# Patient Record
Sex: MALE | Race: WHITE | NOT HISPANIC OR LATINO | Employment: PART TIME | URBAN - METROPOLITAN AREA
[De-identification: names, ages, dates, MRNs, and addresses within clinical notes are randomized per-mention and may not be internally consistent; named-entity substitution may affect disease eponyms.]

---

## 2017-07-27 DIAGNOSIS — I65.29 OCCLUSION AND STENOSIS OF UNSPECIFIED CAROTID ARTERY: ICD-10-CM

## 2018-07-27 ENCOUNTER — OFFICE VISIT (OUTPATIENT)
Dept: VASCULAR SURGERY | Facility: CLINIC | Age: 53
End: 2018-07-27
Payer: COMMERCIAL

## 2018-07-27 VITALS
WEIGHT: 190 LBS | HEIGHT: 69 IN | DIASTOLIC BLOOD PRESSURE: 70 MMHG | BODY MASS INDEX: 28.14 KG/M2 | TEMPERATURE: 98.5 F | SYSTOLIC BLOOD PRESSURE: 120 MMHG

## 2018-07-27 DIAGNOSIS — I83.893 VARICOSE VEINS OF BOTH LEGS WITH EDEMA: Primary | ICD-10-CM

## 2018-07-27 PROCEDURE — 99203 OFFICE O/P NEW LOW 30 MIN: CPT | Performed by: PHYSICIAN ASSISTANT

## 2018-07-27 RX ORDER — MULTIVIT WITH MINERALS/LUTEIN
1 TABLET ORAL DAILY
COMMUNITY

## 2018-07-27 RX ORDER — IBUPROFEN 200 MG
TABLET ORAL
COMMUNITY
End: 2020-11-25 | Stop reason: CLARIF

## 2018-07-27 RX ORDER — GEMFIBROZIL 600 MG/1
600 TABLET, FILM COATED ORAL 2 TIMES DAILY
COMMUNITY
Start: 2018-07-24 | End: 2020-12-07 | Stop reason: ALTCHOICE

## 2018-07-27 RX ORDER — RIBOFLAVIN (VITAMIN B2) 100 MG
TABLET ORAL
COMMUNITY
End: 2020-11-28 | Stop reason: HOSPADM

## 2018-07-27 RX ORDER — CLINDAMYCIN HYDROCHLORIDE 300 MG/1
CAPSULE ORAL
COMMUNITY
Start: 2018-07-06 | End: 2020-11-28 | Stop reason: HOSPADM

## 2018-07-27 RX ORDER — FOLIC ACID 1 MG/1
TABLET ORAL
Status: ON HOLD | COMMUNITY
Start: 2018-07-22 | End: 2020-11-03 | Stop reason: ALTCHOICE

## 2018-07-27 NOTE — LETTER
July 27, 2018     Dmitriy Molina MD  [id number]    Patient: Geri Cespedes   YOB: 1965   Date of Visit: 7/27/2018       Dear Dr Graham Bell Recipients: Thank you for referring Fabby Herrera to me for evaluation  Below are the relevant portions of my assessment and plan of care  If you have questions, please do not hesitate to call me  I look forward to following Shilo along with you  Sincerely,        Cheryle Gaw, PA-C        CC: Dorian Waters MD     Varicose veins of both legs with edema      Bilateral lower extremity varicose veins with edema  Venous insufficiency  Venous stasis changes without sign of infection    Shilo L [place] 47 M carotid artery disease (< 50% 2015), Raynauds, RA, Lupus, OA S/P bilateral THR and R TKR  Kayce Durham comes in for re-evaluation of varicose veins  He reports that his legs have been swelling much worse than the usual and he needs new compression since the current stockings are 11years old  He recently had an area redness and warmth to the back of the left leg  He was placed on clindamycin which improvement  There is a palpable area but no sign of infection  LEV performed elsewhere of the left leg showed no DVT or SVT  Despite the discoloration of his legs and the very prominent venous varicosities, the veins are otherwise not tender  He denies any daily symptoms such as leg pain, achiness or fatigue  Remarkably, outside of recent increase of swelling since last visit, he reports that the appearance or symptoms of legs have not progressed since he was last seen  No wounds  He has had no bleeding or blood clots  Exam:  2-3+ B LE edema  Marked venous stasis changes  B large venous varicosities  There is a L lateral palpable raised area which is slightly tender - vein? Georgette Ormond No heat or redness  The veins are otherwise non-tender  Feet are warm  Good pulses in feet        Lower extremity venous duplex 7/6/2018 report from outside facility  R - CFV negative  L  No thrombus L CFV, GSV, DF, F, pop and calf vein  No DVT or SVT      Recommendations: We had a detailed discussion about his history and the pathophysiology of varicose veins and venous insufficiency  We reviewed his recent lower extremity Venous duplex report from [facility] as well as the reflux study that he had 5 years ago  We further discussed a treatment would be largely be symptom driven  Currently, he has no major symptoms though there is concern for possible recent cellulitis  No evidence or history of phlebitis or blood clots  He has no wounds  The mainstay conservative therapy would be compression  We discussed the role of good, graded compression stockings  He is given a script to be formally fitted for compression  I would like to see him come back in 3 months see how he is doing from a symptom standpoint  He was educated on any concerning symptoms for which he should return sooner such as - bleeding or signs of infection       - Order for prescription graded compression stockings of 20-30 mm Hg  - Wear stocking EVERY day to help control swelling in legs and remove at night  - Elevate legs while at rest  - Continue healthy weight and exercise regularly   - Patient education for venous insufficiency    - We discussed reasons to return sooner, otherwise, follow up in 3 months for re-evaluation

## 2018-07-27 NOTE — PROGRESS NOTES
Assessment/Plan:    Varicose veins of both legs with edema      Bilateral lower extremity varicose veins with edema  Venous insufficiency  Venous stasis changes without sign of infection    [place] [place] [facility] [place] 47 [place] carotid artery disease (< 50% 2015), Raynauds, RA, Lupus, OA S/P bilateral THR and R TKR  Memo Rothman comes in for re-evaluation of varicose veins  He reports that his legs have been swelling much worse than the usual and he needs new compression since the current stockings are 11years old  He recently had an area redness and warmth to the back of the left leg  He was placed on clindamycin which improvement  There is a palpable area but no sign of infection  LEV performed elsewhere of the left leg showed no DVT or SVT  Despite the discoloration of his legs and the very prominent venous varicosities, the veins are otherwise not tender  He denies any daily symptoms such as leg pain, achiness or fatigue  Remarkably, outside of recent increase of swelling since last visit, he reports that the appearance or symptoms of legs have not progressed since he was last seen  No wounds  He has had no bleeding or blood clots  Exam:  2-3+ B LE edema  Marked venous stasis changes  B large venous varicosities  There is a L lateral palpable raised area which is slightly tender - vein? Echavarria Daksha No heat or redness  The veins are otherwise non-tender  Feet are warm  Good pulses in feet  Lower extremity venous duplex 7/6/2018 report from outside facility  R - CFV negative  L - No thrombus L CFV, GSV, DF, F, pop and calf vein  No DVT or SVT      Recommendations: We had a detailed discussion about his history and the pathophysiology of varicose veins and venous insufficiency  We reviewed his recent lower extremity Venous duplex report from [facility] as well as the reflux study that he had 5 years ago  We further discussed a treatment would be largely be symptom driven   Currently, he has no major symptoms though there is concern for possible recent cellulitis  No evidence or history of phlebitis or blood clots  He has no wounds  The mainstay conservative therapy would be compression  We discussed the role of good, graded compression stockings  He is given a script to be formally fitted for compression  I would like to see him come back in 3 months see how he is doing from a symptom standpoint  He was educated on any concerning symptoms for which he should return sooner such as - bleeding or signs of infection       - Order for prescription graded compression stockings of 20-30 mm Hg  - Wear stocking EVERY day to help control swelling in legs and remove at night  - Elevate legs while at rest  - Continue healthy weight and exercise regularly   - Patient education for venous insufficiency  - We discussed reasons to return sooner, otherwise, follow up in 3 months for re-evaluation     Patient ID: Delta Betancourt is a 47 y o  male  Chief complaint: Pt is new to our office here to review LEV 7/6 done at [facility]  Pt is c/o swelling to bilateral legs  Pt states this has been ongoing since 7/2018  Pt is RX wearing compression stockings daily  Pt states he is elevating when he gets a chance  Pt has some discoloration  Pt denies open wounds or sores  HPI    Shilo L [place] 47 M carotid artery disease (< 50% 2015), Raynauds, RA, Lupus, OA S/P bilateral THR and R TKR   He has been seen by Dr Katiuska Wolfe in the past for significant bilateral lower extremity varicose veins  He tells me that he saw Dr John marcano 3 or 4 years ago but I do not see a note from him so this likely longer than this  Devon Artist comes in for re-evaluation of varicose veins  He reports that his legs have been swelling much worse than the usual and he needs new compression since the current stockings are 11years old  Also, he reports he had an area of redness and warmth to the back of the left leg and was placed on clindamycin antibiotics    That area is no longer red or warm, but there is a palpable area  A lower extremity venous duplex performed elsewhere of the left leg showed no DVT or superficial thrombophlebitis  Despite the discoloration of his legs and the very prominent venous varicosities, the veins are otherwise not tender  He denies any daily symptoms such as leg pain, achiness or fatigue  Remarkably, outside of recent increase of swelling since last visit, he reports that the appearance or symptoms of legs have not progressed since he was last seen  No wounds  He has had no bleeding or blood clots  The following portions of the patient's history were reviewed and updated as appropriate: allergies, current medications, past family history, past medical history, past social history, past surgical history and problem list     Stuart Garcia is active  He works as a   This summer, he is working around the house the summer  He has no chest limiting symptoms  No history of heart attack/stroke/TIA  No history of bleeding  Review of Systems   Constitutional: Negative  HENT: Negative  Eyes: Negative  Respiratory: Negative  Cardiovascular: Negative  Gastrointestinal: Negative  Endocrine: Positive for cold intolerance  Genitourinary: Negative  Musculoskeletal: Positive for arthralgias and gait problem  Skin: Negative  Allergic/Immunologic: Negative  Hematological: Negative  Psychiatric/Behavioral: Negative  Objective:    2-3+ B LE edema  Marked venous stasis changes  B large venous varicosities  There is a L lateral palpable raised area which is slightly tender - vein? Jara Spruce No heat or redness  The veins are otherwise non-tender  Feet are warm  Good pulses in feet        /70 (BP Location: Right arm, Patient Position: Sitting, Cuff Size: Adult)   Temp 98 5 °F (36 9 °C) (Tympanic)   Ht 5' 9" (1 753 m)   Wt 86 2 kg (190 lb)   BMI 28 06 kg/m²                  R anterior leg            L lateral       Physical Exam   Constitutional: He is oriented to person, place, and time  He appears well-developed and well-nourished  He is cooperative  HENT:   Head: Normocephalic and atraumatic  Eyes: EOM are normal  Pupils are equal, round, and reactive to light  Neck: Trachea normal  Neck supple  No JVD present  No thyromegaly present  Cardiovascular: Normal rate, regular rhythm, S1 normal, S2 normal and normal heart sounds  Exam reveals no gallop and no friction rub  No murmur heard  Pulses:       Carotid pulses are 2+ on the right side, and 2+ on the left side  Radial pulses are 2+ on the right side, and 2+ on the left side  Dorsalis pedis pulses are 2+ on the right side, and 2+ on the left side  Posterior tibial pulses are 2+ on the right side  Pulmonary/Chest: Effort normal and breath sounds normal  No accessory muscle usage  No respiratory distress  He has no wheezes  He has no rales  Abdominal: Soft  Bowel sounds are normal  He exhibits no distension  There is no hepatosplenomegaly  There is no tenderness  Musculoskeletal: Normal range of motion  He exhibits edema (2+ B LE edema)  He exhibits no deformity  Neurological: He is alert and oriented to person, place, and time  Grossly normal    Skin: Skin is warm and dry  No lesion noted  No cyanosis or erythema  Nails show no clubbing  Psychiatric: He has a normal mood and affect  Nursing note and vitals reviewed  Vitals:    07/27/18 1051   BP: 120/70   BP Location: Right arm   Patient Position: Sitting   Cuff Size: Adult   Temp: 98 5 °F (36 9 °C)   TempSrc: Tympanic   Weight: 86 2 kg (190 lb)   Height: 5' 9" (1 753 m)       Patient Active Problem List   Diagnosis    Varicose veins of both legs with edema       Past Surgical History:   Procedure Laterality Date    HERNIA REPAIR  199?     REPLACEMENT TOTAL KNEE Bilateral     right 06/12 left 07/2016    REVISION TOTAL HIP ARTHROPLASTY Bilateral     right 05/2004 left 02/2006  TOTAL HIP ARTHROPLASTY Bilateral 1988/1989    Right 1988/ left 1989       Family History   Problem Relation Age of Onset    Varicose Veins Father        Social History     Social History    Marital status: /Civil Union     Spouse name: N/A    Number of children: N/A    Years of education: N/A     Occupational History    Not on file       Social History Main Topics    Smoking status: Former Smoker    Smokeless tobacco: Former User     Types: Chew    Alcohol use Not on file    Drug use: Unknown    Sexual activity: Not on file     Other Topics Concern    Not on file     Social History Narrative    No narrative on file       No Known Allergies      Current Outpatient Prescriptions:     Ascorbic Acid (VITAMIN C) 100 MG tablet, Take by mouth, Disp: , Rfl:     Calcium Carbonate (CALTRATE 600) 1500 (600 Ca) MG TABS, Take by mouth, Disp: , Rfl:     cholecalciferol (VITAMIN D3) 1,000 units tablet, Take by mouth, Disp: , Rfl:     clindamycin (CLEOCIN) 300 MG capsule, 2 po tid x 24 hrs then decrease to 1 po tid for additional 6 days, Disp: , Rfl:     folic acid (FOLVITE) 1 mg tablet, , Disp: , Rfl:     gemfibrozil (LOPID) 600 mg tablet, , Disp: , Rfl:     ibuprofen (ADVIL) 200 mg tablet, Take by mouth, Disp: , Rfl:     methotrexate 2 5 mg tablet, , Disp: , Rfl:     Multiple Vitamins-Minerals (CENTRUM SILVER) tablet, Take by mouth, Disp: , Rfl:     Omega-3 Fatty Acids (FISH OIL PO), Take 2 g by mouth, Disp: , Rfl:     Elastic Bandages & Supports (MEDICAL COMPRESSION STOCKINGS) MISC, by Does not apply route daily Knee High 20-30mmHg, Disp: 2 each, Rfl: 0

## 2018-07-27 NOTE — PATIENT INSTRUCTIONS
Bilateral lower extremity varicose veins with edema  Venous insufficiency  Venous stasis changes without sign of infection    Shilo FRAZIER Schoen[place] 47 M who comes in for re-evaluation of varicose veins  He reports that his legs have been swelling much worse than the usual and he needs new compression since the current stockings are 11years old  He recently had an area redness and warmth to the back of the left leg  He was placed on clindamycin which improvement  Despite the discoloration of his legs and the very prominent venous varicosities, the veins are otherwise not tender  He denies any daily symptoms such as leg pain, achiness or fatigue  Aside from recent increase of swelling since last visit, he reports that the appearance or symptoms of legs have not progressed since he was last seen  No wounds  He has had no bleeding or blood clots  Recommendations: We had a detailed discussion about his history and the pathophysiology of varicose veins and venous insufficiency  We reviewed his recent lower extremity Venous duplex report from [facility] as well as the reflux study that he had 5 years ago  We further discussed a treatment would be largely be symptom driven  Currently, he has no major symptoms though there is concern for possible recent cellulitis  No evidence or history of phlebitis or blood clots  He has no wounds  The mainstay conservative therapy would be compression  We discussed the role of good, graded compression stockings  He is given a script to be formally fitted for compression  I would like to see him come back in 3 months see how he is doing from a symptom standpoint    He was educated on any concerning symptoms for which he should return sooner such as - bleeding or signs of infection       - Order for prescription graded compression stockings of 20-30 mm Hg  - Wear stocking EVERY day to help control swelling in legs and remove at night  - Elevate legs while at rest  - Continue healthy weight and exercise regularly   - Patient education for venous insufficiency  - We discussed reasons to return sooner, otherwise, follow up in 3 months for re-evaluation            Varicose Veins   WHAT YOU NEED TO KNOW:   What are varicose veins? Varicose veins are veins that become large, twisted, and swollen  They are common on the back of the calves, knees, and thighs  Varicose veins are caused by valves in your veins that do not work properly  This causes blood to collect and increase pressure in the veins of your legs  The increased pressure causes your veins to stretch, get larger, swell, and twist        What increases my risk for varicose veins? · Pregnancy    · A family history of varicose veins    · Being overweight or obese    · Age 48 years or older    · Sitting or standing for long periods of time    · Wearing tight clothing  What are the signs and symptoms of varicose veins? Your symptoms may be worse after you stand or sit for long periods of time  You may have any of the following:  · Blue, purple, or bulging veins in your legs     · Pain, swelling, or muscle cramps in your legs    · Feeling of fatigue or heaviness in your legs  How are varicose veins diagnosed? Your healthcare provider will examine your legs and ask about your medical history  You may need tests, such as a Doppler ultrasound or duplex scan  These tests show your veins and valves, and how your blood is flowing through them  These tests may also show if there is a blockage or blood clot  How are varicose veins treated? The goal of treatment is to decrease symptoms, improve appearance, and prevent further problems  Treatment will depend on which veins are affected and how severe your condition is  You may need procedures to treat or remove your varicose veins  For example, your healthcare provider may inject a solution or use a laser to close the varicose veins  Surgery to remove long veins may also be done   Ask your healthcare provider for more information about procedures used to treat varicose veins  What can I do to manage my symptoms? · Do not sit or stand for long periods of time  This can cause the blood to collect in your legs and make your symptoms worse  Bend or rotate your ankles several times every hour  Walk around for a few minutes every hour to get blood moving in your legs  · Do not cross your legs when you sit  This decreases blood flow to your feet and can make your symptoms worse  · Do not wear tight clothing or shoes  Do not wear high-heeled shoes  Do not wear clothes that are tight around the waist or knees  · Maintain a healthy weight  Being overweight or obese can make your varicose veins worse  Ask your healthcare provider how much you should weigh  Ask him or her to help you create a weight loss plan if you are overweight  · Wear pressure stockings as directed  The stockings are tight and put pressure on your legs  They improve blood flow and help prevent clots  · Elevate your legs  Keep them above the level of your heart for 15 to 30 minutes several times a day  You can also prop the end of your bed up slightly to elevate your legs while you sleep  This will help blood to flow back to your heart  · Get regular exercise  Talk to your healthcare provider about the best exercise plan for you  Exercise can improve blood flow to your legs and feet  When should I seek immediate care? · You have a wound that does not heal or is infected  · You have an injury that has broken your skin and caused your varicose veins to bleed  · Your leg is swollen and hard  · You notice that your legs or feet are turning blue or black  · Your leg feels warm, tender, and painful  It may look swollen and red  When should I contact my healthcare provider? · You have pain in your leg that does not go away or gets worse  · You notice sudden large bruising on your legs  · You have a rash on your leg  · Your symptoms keep you from doing your daily activities  · You have questions or concerns about your condition or care  CARE AGREEMENT:   You have the right to help plan your care  Learn about your health condition and how it may be treated  Discuss treatment options with your caregivers to decide what care you want to receive  You always have the right to refuse treatment  The above information is an  only  It is not intended as medical advice for individual conditions or treatments  Talk to your doctor, nurse or pharmacist before following any medical regimen to see if it is safe and effective for you  © 2017 [employer] Information is for End User's use only and may not be sold, redistributed or otherwise used for commercial purposes  All illustrations and images included in CareNotes® are the copyrighted property of A [employer] M [employer]  or Elijah Gleasno

## 2018-07-27 NOTE — ASSESSMENT & PLAN NOTE
Bilateral lower extremity varicose veins with edema  Venous insufficiency  Venous stasis changes without sign of infection    Shilo KARIN Schoenfield 47 M carotid artery disease (< 50% 2015), Raynauds, RA, Lupus, OA S/P bilateral THR and R TKR  Emmanuel Howard comes in for re-evaluation of varicose veins  He reports that his legs have been swelling much worse than the usual and he needs new compression since the current stockings are 11years old  He recently had an area redness and warmth to the back of the left leg  He was placed on clindamycin which improvement  There is a palpable area but no sign of infection  LEV performed elsewhere of the left leg showed no DVT or SVT  Despite the discoloration of his legs and the very prominent venous varicosities, the veins are otherwise not tender  He denies any daily symptoms such as leg pain, achiness or fatigue  Remarkably, outside of recent increase of swelling since last visit, he reports that the appearance or symptoms of legs have not progressed since he was last seen  No wounds  He has had no bleeding or blood clots  Exam:  2-3+ B LE edema  Marked venous stasis changes  B large venous varicosities  There is a L lateral palpable raised area which is slightly tender - vein? Brissa Falcon No heat or redness  The veins are otherwise non-tender  Feet are warm  Good pulses in feet  Lower extremity venous duplex 7/6/2018 report from outside facility  R - CFV negative  L - No thrombus L CFV, GSV, DF, F, pop and calf vein  No DVT or SVT      Recommendations: We had a detailed discussion about his history and the pathophysiology of varicose veins and venous insufficiency  We reviewed his recent lower extremity Venous duplex report from [facility] as well as the reflux study that he had 5 years ago  We further discussed a treatment would be largely be symptom driven  Currently, he has no major symptoms though there is concern for possible recent cellulitis   No evidence or history of phlebitis or blood clots  He has no wounds  The mainstay conservative therapy would be compression  We discussed the role of good, graded compression stockings  He is given a script to be formally fitted for compression  I would like to see him come back in 3 months see how he is doing from a symptom standpoint  He was educated on any concerning symptoms for which he should return sooner such as - bleeding or signs of infection       - Order for prescription graded compression stockings of 20-30 mm Hg  - Wear stocking EVERY day to help control swelling in legs and remove at night  - Elevate legs while at rest  - Continue healthy weight and exercise regularly   - Patient education for venous insufficiency    - We discussed reasons to return sooner, otherwise, follow up in 3 months for re-evaluation

## 2020-05-23 ENCOUNTER — APPOINTMENT (EMERGENCY)
Dept: CT IMAGING | Facility: HOSPITAL | Age: 55
End: 2020-05-23
Payer: COMMERCIAL

## 2020-05-23 ENCOUNTER — APPOINTMENT (OUTPATIENT)
Dept: CT IMAGING | Facility: HOSPITAL | Age: 55
End: 2020-05-23
Payer: COMMERCIAL

## 2020-05-23 ENCOUNTER — HOSPITAL ENCOUNTER (OUTPATIENT)
Facility: HOSPITAL | Age: 55
Setting detail: OBSERVATION
Discharge: HOME | End: 2020-05-24
Attending: EMERGENCY MEDICINE | Admitting: INTERNAL MEDICINE
Payer: COMMERCIAL

## 2020-05-23 DIAGNOSIS — D61.818 PANCYTOPENIA (HCC): ICD-10-CM

## 2020-05-23 DIAGNOSIS — R63.4 UNINTENDED WEIGHT LOSS: ICD-10-CM

## 2020-05-23 DIAGNOSIS — R55 SYNCOPE: ICD-10-CM

## 2020-05-23 DIAGNOSIS — M32.8 OTHER FORMS OF SYSTEMIC LUPUS ERYTHEMATOSUS (HCC): ICD-10-CM

## 2020-05-23 DIAGNOSIS — R41.89 UNRESPONSIVE EPISODE: Primary | ICD-10-CM

## 2020-05-23 PROBLEM — M06.9 RHEUMATOID ARTHRITIS INVOLVING MULTIPLE SITES (HCC): Status: ACTIVE | Noted: 2020-05-23

## 2020-05-23 LAB
ALBUMIN SERPL BCP-MCNC: 2.8 G/DL (ref 3.5–5)
ALP SERPL-CCNC: 107 U/L (ref 46–116)
ALT SERPL W P-5'-P-CCNC: 43 U/L (ref 12–78)
AMPHETAMINES SERPL QL SCN: NEGATIVE
ANION GAP SERPL CALCULATED.3IONS-SCNC: 9 MMOL/L (ref 4–13)
AST SERPL W P-5'-P-CCNC: 51 U/L (ref 5–45)
BARBITURATES UR QL: NEGATIVE
BASOPHILS # BLD AUTO: 0.01 THOUSANDS/ΜL (ref 0–0.1)
BASOPHILS NFR BLD AUTO: 0 % (ref 0–1)
BENZODIAZ UR QL: NEGATIVE
BILIRUB SERPL-MCNC: 0.42 MG/DL (ref 0.2–1)
BUN SERPL-MCNC: 17 MG/DL (ref 5–25)
CALCIUM SERPL-MCNC: 8.4 MG/DL (ref 8.3–10.1)
CHLORIDE SERPL-SCNC: 107 MMOL/L (ref 100–108)
CO2 SERPL-SCNC: 24 MMOL/L (ref 21–32)
COCAINE UR QL: NEGATIVE
CREAT SERPL-MCNC: 0.78 MG/DL (ref 0.6–1.3)
D DIMER PPP FEU-MCNC: 1.09 UG/ML FEU
EOSINOPHIL # BLD AUTO: 0.01 THOUSAND/ΜL (ref 0–0.61)
EOSINOPHIL NFR BLD AUTO: 0 % (ref 0–6)
ERYTHROCYTE [DISTWIDTH] IN BLOOD BY AUTOMATED COUNT: 15 % (ref 11.6–15.1)
GFR SERPL CREATININE-BSD FRML MDRD: 102 ML/MIN/1.73SQ M
GLUCOSE SERPL-MCNC: 88 MG/DL (ref 65–140)
GLUCOSE SERPL-MCNC: 89 MG/DL (ref 65–140)
HCT VFR BLD AUTO: 37.8 % (ref 36.5–49.3)
HGB BLD-MCNC: 12.5 G/DL (ref 12–17)
IMM GRANULOCYTES # BLD AUTO: 0.04 THOUSAND/UL (ref 0–0.2)
IMM GRANULOCYTES NFR BLD AUTO: 2 % (ref 0–2)
LYMPHOCYTES # BLD AUTO: 0.53 THOUSANDS/ΜL (ref 0.6–4.47)
LYMPHOCYTES NFR BLD AUTO: 22 % (ref 14–44)
MCH RBC QN AUTO: 32.5 PG (ref 26.8–34.3)
MCHC RBC AUTO-ENTMCNC: 33.1 G/DL (ref 31.4–37.4)
MCV RBC AUTO: 98 FL (ref 82–98)
METHADONE UR QL: NEGATIVE
MONOCYTES # BLD AUTO: 0.22 THOUSAND/ΜL (ref 0.17–1.22)
MONOCYTES NFR BLD AUTO: 9 % (ref 4–12)
NEUTROPHILS # BLD AUTO: 1.6 THOUSANDS/ΜL (ref 1.85–7.62)
NEUTS SEG NFR BLD AUTO: 67 % (ref 43–75)
NRBC BLD AUTO-RTO: 0 /100 WBCS
OPIATES UR QL SCN: NEGATIVE
PCP UR QL: NEGATIVE
PLATELET # BLD AUTO: 128 THOUSANDS/UL (ref 149–390)
PMV BLD AUTO: 11.2 FL (ref 8.9–12.7)
POTASSIUM SERPL-SCNC: 3.8 MMOL/L (ref 3.5–5.3)
PROT SERPL-MCNC: 7.3 G/DL (ref 6.4–8.2)
RBC # BLD AUTO: 3.85 MILLION/UL (ref 3.88–5.62)
SARS-COV-2 RNA RESP QL NAA+PROBE: NEGATIVE
SODIUM SERPL-SCNC: 140 MMOL/L (ref 136–145)
THC UR QL: NEGATIVE
TROPONIN I SERPL-MCNC: <0.02 NG/ML
WBC # BLD AUTO: 2.41 THOUSAND/UL (ref 4.31–10.16)

## 2020-05-23 PROCEDURE — 36415 COLL VENOUS BLD VENIPUNCTURE: CPT | Performed by: EMERGENCY MEDICINE

## 2020-05-23 PROCEDURE — 85025 COMPLETE CBC W/AUTO DIFF WBC: CPT | Performed by: EMERGENCY MEDICINE

## 2020-05-23 PROCEDURE — 87635 SARS-COV-2 COVID-19 AMP PRB: CPT | Performed by: EMERGENCY MEDICINE

## 2020-05-23 PROCEDURE — 82948 REAGENT STRIP/BLOOD GLUCOSE: CPT

## 2020-05-23 PROCEDURE — 84484 ASSAY OF TROPONIN QUANT: CPT | Performed by: EMERGENCY MEDICINE

## 2020-05-23 PROCEDURE — 85379 FIBRIN DEGRADATION QUANT: CPT | Performed by: EMERGENCY MEDICINE

## 2020-05-23 PROCEDURE — 99285 EMERGENCY DEPT VISIT HI MDM: CPT | Performed by: EMERGENCY MEDICINE

## 2020-05-23 PROCEDURE — 80053 COMPREHEN METABOLIC PANEL: CPT | Performed by: EMERGENCY MEDICINE

## 2020-05-23 PROCEDURE — 80307 DRUG TEST PRSMV CHEM ANLYZR: CPT | Performed by: EMERGENCY MEDICINE

## 2020-05-23 PROCEDURE — 71275 CT ANGIOGRAPHY CHEST: CPT

## 2020-05-23 PROCEDURE — 99219 PR INITIAL OBSERVATION CARE/DAY 50 MINUTES: CPT | Performed by: INTERNAL MEDICINE

## 2020-05-23 PROCEDURE — 70450 CT HEAD/BRAIN W/O DYE: CPT

## 2020-05-23 PROCEDURE — 99285 EMERGENCY DEPT VISIT HI MDM: CPT

## 2020-05-23 PROCEDURE — 93005 ELECTROCARDIOGRAM TRACING: CPT

## 2020-05-23 PROCEDURE — 99204 OFFICE O/P NEW MOD 45 MIN: CPT | Performed by: PSYCHIATRY & NEUROLOGY

## 2020-05-23 RX ORDER — GEMFIBROZIL 600 MG/1
600 TABLET, FILM COATED ORAL
Status: DISCONTINUED | OUTPATIENT
Start: 2020-05-23 | End: 2020-05-24 | Stop reason: HOSPADM

## 2020-05-23 RX ORDER — ONDANSETRON 2 MG/ML
4 INJECTION INTRAMUSCULAR; INTRAVENOUS EVERY 6 HOURS PRN
Status: DISCONTINUED | OUTPATIENT
Start: 2020-05-23 | End: 2020-05-24 | Stop reason: HOSPADM

## 2020-05-23 RX ORDER — CALCIUM CARBONATE 200(500)MG
1000 TABLET,CHEWABLE ORAL DAILY PRN
Status: DISCONTINUED | OUTPATIENT
Start: 2020-05-23 | End: 2020-05-24 | Stop reason: HOSPADM

## 2020-05-23 RX ORDER — ACETAMINOPHEN 325 MG/1
650 TABLET ORAL EVERY 6 HOURS PRN
Status: DISCONTINUED | OUTPATIENT
Start: 2020-05-23 | End: 2020-05-24 | Stop reason: HOSPADM

## 2020-05-23 RX ORDER — DOCUSATE SODIUM 100 MG/1
100 CAPSULE, LIQUID FILLED ORAL 2 TIMES DAILY PRN
Status: DISCONTINUED | OUTPATIENT
Start: 2020-05-23 | End: 2020-05-24 | Stop reason: HOSPADM

## 2020-05-23 RX ADMIN — IOHEXOL 85 ML: 350 INJECTION, SOLUTION INTRAVENOUS at 13:55

## 2020-05-24 ENCOUNTER — APPOINTMENT (OUTPATIENT)
Dept: CT IMAGING | Facility: HOSPITAL | Age: 55
End: 2020-05-24
Payer: COMMERCIAL

## 2020-05-24 VITALS
HEART RATE: 80 BPM | TEMPERATURE: 98.3 F | OXYGEN SATURATION: 97 % | SYSTOLIC BLOOD PRESSURE: 127 MMHG | BODY MASS INDEX: 23.15 KG/M2 | RESPIRATION RATE: 18 BRPM | WEIGHT: 156.31 LBS | HEIGHT: 69 IN | DIASTOLIC BLOOD PRESSURE: 81 MMHG

## 2020-05-24 LAB
ANION GAP SERPL CALCULATED.3IONS-SCNC: 7 MMOL/L (ref 4–13)
BUN SERPL-MCNC: 15 MG/DL (ref 5–25)
CALCIUM SERPL-MCNC: 8 MG/DL (ref 8.3–10.1)
CHLORIDE SERPL-SCNC: 107 MMOL/L (ref 100–108)
CO2 SERPL-SCNC: 26 MMOL/L (ref 21–32)
CREAT SERPL-MCNC: 0.7 MG/DL (ref 0.6–1.3)
ERYTHROCYTE [DISTWIDTH] IN BLOOD BY AUTOMATED COUNT: 15 % (ref 11.6–15.1)
GFR SERPL CREATININE-BSD FRML MDRD: 107 ML/MIN/1.73SQ M
GLUCOSE P FAST SERPL-MCNC: 84 MG/DL (ref 65–99)
GLUCOSE SERPL-MCNC: 84 MG/DL (ref 65–140)
HCT VFR BLD AUTO: 36 % (ref 36.5–49.3)
HGB BLD-MCNC: 11.6 G/DL (ref 12–17)
MAGNESIUM SERPL-MCNC: 2 MG/DL (ref 1.6–2.6)
MCH RBC QN AUTO: 31.7 PG (ref 26.8–34.3)
MCHC RBC AUTO-ENTMCNC: 32.2 G/DL (ref 31.4–37.4)
MCV RBC AUTO: 98 FL (ref 82–98)
PLATELET # BLD AUTO: 114 THOUSANDS/UL (ref 149–390)
PMV BLD AUTO: 10.9 FL (ref 8.9–12.7)
POTASSIUM SERPL-SCNC: 3.3 MMOL/L (ref 3.5–5.3)
RBC # BLD AUTO: 3.66 MILLION/UL (ref 3.88–5.62)
SODIUM SERPL-SCNC: 140 MMOL/L (ref 136–145)
WBC # BLD AUTO: 1.94 THOUSAND/UL (ref 4.31–10.16)

## 2020-05-24 PROCEDURE — RECHECK: Performed by: INTERNAL MEDICINE

## 2020-05-24 PROCEDURE — 99217 PR OBSERVATION CARE DISCHARGE MANAGEMENT: CPT | Performed by: INTERNAL MEDICINE

## 2020-05-24 PROCEDURE — 70496 CT ANGIOGRAPHY HEAD: CPT

## 2020-05-24 PROCEDURE — 70498 CT ANGIOGRAPHY NECK: CPT

## 2020-05-24 PROCEDURE — 80048 BASIC METABOLIC PNL TOTAL CA: CPT | Performed by: INTERNAL MEDICINE

## 2020-05-24 PROCEDURE — 85027 COMPLETE CBC AUTOMATED: CPT | Performed by: INTERNAL MEDICINE

## 2020-05-24 PROCEDURE — 83735 ASSAY OF MAGNESIUM: CPT | Performed by: INTERNAL MEDICINE

## 2020-05-24 RX ORDER — POTASSIUM CHLORIDE 20 MEQ/1
40 TABLET, EXTENDED RELEASE ORAL ONCE
Status: COMPLETED | OUTPATIENT
Start: 2020-05-24 | End: 2020-05-24

## 2020-05-24 RX ADMIN — GEMFIBROZIL 600 MG: 600 TABLET ORAL at 17:31

## 2020-05-24 RX ADMIN — IOHEXOL 85 ML: 350 INJECTION, SOLUTION INTRAVENOUS at 12:25

## 2020-05-24 RX ADMIN — POTASSIUM CHLORIDE 40 MEQ: 1500 TABLET, EXTENDED RELEASE ORAL at 09:13

## 2020-05-24 RX ADMIN — GEMFIBROZIL 600 MG: 600 TABLET ORAL at 05:52

## 2020-05-25 LAB
ATRIAL RATE: 88 BPM
P AXIS: 67 DEGREES
PR INTERVAL: 166 MS
QRS AXIS: 18 DEGREES
QRSD INTERVAL: 82 MS
QT INTERVAL: 364 MS
QTC INTERVAL: 440 MS
T WAVE AXIS: 45 DEGREES
VENTRICULAR RATE: 88 BPM

## 2020-05-25 PROCEDURE — 93010 ELECTROCARDIOGRAM REPORT: CPT | Performed by: INTERNAL MEDICINE

## 2020-06-22 ENCOUNTER — HOSPITAL ENCOUNTER (OUTPATIENT)
Dept: NON INVASIVE DIAGNOSTICS | Facility: CLINIC | Age: 55
Discharge: HOME | End: 2020-06-22
Payer: COMMERCIAL

## 2020-06-22 DIAGNOSIS — R55 SYNCOPE: ICD-10-CM

## 2020-06-22 PROCEDURE — 93306 TTE W/DOPPLER COMPLETE: CPT | Performed by: INTERNAL MEDICINE

## 2020-06-22 PROCEDURE — 93306 TTE W/DOPPLER COMPLETE: CPT

## 2020-07-01 ENCOUNTER — CONSULT (OUTPATIENT)
Dept: NEPHROLOGY | Facility: CLINIC | Age: 55
End: 2020-07-01
Payer: COMMERCIAL

## 2020-07-01 VITALS
WEIGHT: 161 LBS | BODY MASS INDEX: 23.85 KG/M2 | DIASTOLIC BLOOD PRESSURE: 74 MMHG | TEMPERATURE: 98.7 F | HEIGHT: 69 IN | SYSTOLIC BLOOD PRESSURE: 112 MMHG

## 2020-07-01 DIAGNOSIS — N06.9 ISOLATED PROTEINURIA WITH MORPHOLOGIC LESION: ICD-10-CM

## 2020-07-01 DIAGNOSIS — N18.1 CHRONIC KIDNEY DISEASE (CKD), STAGE I: Primary | ICD-10-CM

## 2020-07-01 DIAGNOSIS — M32.14 LUPUS NEPHRITIS (HCC): ICD-10-CM

## 2020-07-01 PROBLEM — R80.0 ISOLATED PROTEINURIA: Status: ACTIVE | Noted: 2020-07-01

## 2020-07-01 PROCEDURE — 99204 OFFICE O/P NEW MOD 45 MIN: CPT | Performed by: INTERNAL MEDICINE

## 2020-07-01 RX ORDER — LISINOPRIL 2.5 MG/1
2.5 TABLET ORAL DAILY
COMMUNITY
End: 2020-11-28 | Stop reason: HOSPADM

## 2020-07-01 RX ORDER — MYCOPHENOLATE MOFETIL 500 MG/1
1000 TABLET ORAL DAILY
COMMUNITY
End: 2020-12-19 | Stop reason: ALTCHOICE

## 2020-07-01 NOTE — PATIENT INSTRUCTIONS
Your kidney function is stable but appears to be affected by the lupus based on the amount of protein in the urine  I recommend no change to your medications today but I will talk to Dr Kelton Trinh about further treatment and diagnostic studies  You may need a kidney biopsy in the near future  Please obtain information from your previous doctor  I would like to know the results of the kidney biopsy  Check blood tests in late July or early August 2020  Follow up in 2 months    Please avoid taking NSAIDs (Ibuprofen, Motrin, Aleve, Advil, Naproxen, Celebrex, etc )  Make sure you are eating healthy and have adequate exercise

## 2020-07-01 NOTE — PROGRESS NOTES
NEPHROLOGY OUTPATIENT CONSULTATION   Arlie Forget [place] 48 y o  male MRN: [id number]  Date: 07/01/20  Reason for consultation: Initial evaluation of renal disease, proteinuria  ASSESSMENT and PLAN:  1  Proteinuria:  · Shilo's UPC ratio has worsened to 1 38    · Based on the UA, this seems to be isolated proteinuria as he has no microscopic hematuria or renal dysfunction  · High in the differential diagnosis for this is lupus nephritis  He may have early class V disease  Given the absence of hematuria, class III or class IV disease are less likely but cannot be totally ruled out  · Fortunately, he has already been started on Mycophenolate which would be my drug of choice at this time  I would recommend slowly escalating the dose of this to a goal dose of 1500 mg BID  · I will repeat labs on him in about 3-4 weeks  We will check repeat complements, anti DNA Ab, ANDRE, CBC, CMP, UA, UPC ratio  · If his proteinuria persists or if he has any indication of renal dysfunction, we may need to proceed to a renal biopsy  · In terms of treatment, I totally agree with starting him on lisinopril in order to minimize the proteinuria  Ideally, I would like to increase lisinopril for its anti proteinuric effects  However, he already has occasional orthostatic symptoms and I hesitate to increase lisinopril at this time  2  Chronic kidney disease, stage I:  · He has renal disease on the basis of proteinuria likely related to SLE  · He had a renal biopsy in the past and I asked him to furnish me with the name of his nephrologist so we can obtain records  3  Systemic lupus erythematosus    Patient Instructions   Your kidney function is stable but appears to be affected by the lupus based on the amount of protein in the urine  I recommend no change to your medications today but I will talk to Dr Tere Juarez about further treatment and diagnostic studies  You may need a kidney biopsy in the near future     Please obtain information from your previous doctor  I would like to know the results of the kidney biopsy  Check blood tests in late July or early August 2020  Follow up in 2 months    Please avoid taking NSAIDs (Ibuprofen, Motrin, Aleve, Advil, Naproxen, Celebrex, etc )  Make sure you are eating healthy and have adequate exercise  HISTORY OF PRESENT ILLNESS:  Requesting Physician: Vangie Wallace MD    [address] [facility] 48 [facility] o  male who was referred for initial evaluation of renal disease in the setting of systemic lupus erythematosus  Gideon Hickey has a history of SLE which was diagnosed at the age of 25  He used to live in [place], [place] and moved to the [facility] in 2006  He reports having symptoms related to lupus starting at the age of 26 but was not formally diagnosed until 3 years later  He reports improvement in his symptoms once he started steroids back in the day  He does not recall having any renal involvement at the time of the diagnosis  However, roughly about 10 years after the SLE diagnosis was made, he recalls being found to have microscopic hematuria and was referred to a nephrologist in [place]  He recalls having a renal biopsy done but is unaware of the results  He moved to the [facility] in 2006 and has established care locally  He has been followed by Dr Winnie Jarrett of rheumatology  Since he moved to the area, he has not needed any Nephrology follow-up  Based on the records on Care Everywhere, I note that his serum creatinine has been around 0 6 to 0 8 since 2013  His most recent lab work from June 20, 2020 revealed a creatinine of 0 71  He is now being referred due to worsening of proteinuria  Based on my review of his urinary protein excretion, I note that his UPCR readings were between 241 and 383 mg/g based on urine testing done from 2014 to 2019  His UPCR on May 26, 2020 was noted to be 1 38 prompting renal consultation    His labs from June 20, 2020 revealed hypocomplementemia and a anti DS DNA level of 120  He was previously on methotrexate for RA but was recently changed to mycophenolate on May 28, 2020 by Dr Magdalena Ulloa  It seems that the last time he required a prolonged course of prednisone was about 10-15 years ago  PAST MEDICAL HISTORY:  1  SLE  2  HTN: BP has been high on and off over the past 2 years  He was not on medications until recently started on low dose Lisinopril by Dr Magdalena Ulloa for proteinuria  3  HLP  4  Lymphadenopathy: Biopsy was benign  5  BPH  6  Rheumatoid arthritis    No known history of DM, CAD, CHF, CVA, PAD, COPD, MATTHEW, asthma, thromboembolism, liver disease, GERD, malignancy  PAST SURGICAL HISTORY:  1  Bilateral hip replacements  2  Bilateral knee replacements  3  Revision of both hips  ALLERGIES:  No Known Allergies    SOCIAL HISTORY:  · Former smoker and consumed 1 PPD on and off for 5-6 years  Stopped 25 years ago  · Used to drink heavily and stopped about 31 years ago  · No history of IVDA  FAMILY HISTORY:  · Negative for ESRD       MEDICATIONS:    Current Outpatient Medications:     Ascorbic Acid (VITAMIN C) 100 MG tablet, Take by mouth, Disp: , Rfl:     Calcium Carbonate (CALTRATE 600) 1500 (600 Ca) MG TABS, Take by mouth, Disp: , Rfl:     cholecalciferol (VITAMIN D3) 1,000 units tablet, Take by mouth, Disp: , Rfl:     clindamycin (CLEOCIN) 300 MG capsule, 2 po tid x 24 hrs then decrease to 1 po tid for additional 6 days, Disp: , Rfl:     Elastic Bandages & Supports ([address]) MISC, by Does not apply route daily Knee High 20-30mmHg, Disp: 2 each, Rfl: 0    gemfibrozil (LOPID) 600 mg tablet, Take 600 mg by mouth 2 (two) times a day , Disp: , Rfl:     lisinopril (ZESTRIL) 2 5 mg tablet, Take 2 5 mg by mouth daily, Disp: , Rfl:     Multiple Vitamins-Minerals (CENTRUM SILVER) tablet, Take by mouth, Disp: , Rfl:     mycophenolate (CELLCEPT) 500 mg tablet, Take 500 mg by mouth daily, Disp: , Rfl:     Omega-3 Fatty Acids (FISH OIL PO), Take 2 g by mouth, Disp: , Rfl:     folic acid (FOLVITE) 1 mg tablet, , Disp: , Rfl:     ibuprofen (ADVIL) 200 mg tablet, Take by mouth, Disp: , Rfl:     methotrexate 2 5 mg tablet, , Disp: , Rfl:     REVIEW OF SYSTEMS:  Review of Systems   Constitutional: Positive for fatigue and unexpected weight change  Negative for appetite change, chills and fever  HENT: Positive for postnasal drip and rhinorrhea  Eyes: Negative for visual disturbance  Respiratory: Negative for cough and shortness of breath  Cardiovascular: Positive for leg swelling  Negative for chest pain  Gastrointestinal: Negative for abdominal pain, diarrhea, nausea and vomiting  Genitourinary: Positive for hematuria (On and off microscopic hematuria dating back to 1980s or 1990s  )  Negative for dysuria  Musculoskeletal: Positive for arthralgias  Negative for back pain  Skin: Negative for rash  Allergic/Immunologic: Positive for immunocompromised state  Neurological: Negative for dizziness and light-headedness  Hematological: Positive for adenopathy  Does not bruise/bleed easily  Psychiatric/Behavioral: Negative for dysphoric mood  The patient is not nervous/anxious  All the systems were reviewed and were negative except as documented on the HPI  PHYSICAL EXAMINATION:  /74   Temp 98 7 °F (37 1 °C)   Ht 5' 9" (1 753 m)   Wt 73 kg (161 lb)   BMI 23 78 kg/m²   Current Weight: Weight - Scale: 73 kg (161 lb) Body mass index is 23 78 kg/m²  General: conscious, coherent, cooperative, not in distress  Skin: warm, dry, good turgor  Eyes: pink conjunctivae, no scleral icterus  ENT: moist lips and mucous membranes  Neck: supple, no JVD  Chest/Lungs: clear breath sounds  CVS: distinct heart sounds, normal rate, regular rhythm  Abdomen: soft, non-tender, non-distended, normoactive bowel sounds  Extremities: no edema of extremities     : deferred   Neuro: awake, alert, oriented to time, place and person  Psych: appropriate affect  LABORATORY RESULTS:  Lab Results   Component Value Date    WBC 1 94 (LL) 05/24/2020    HGB 11 6 (L) 05/24/2020    HCT 36 0 (L) 05/24/2020    MCV 98 05/24/2020     (L) 05/24/2020     Lab Results   Component Value Date    SODIUM 140 05/24/2020    K 3 3 (L) 05/24/2020     05/24/2020    CO2 26 05/24/2020    AGAP 7 05/24/2020    BUN 15 05/24/2020    CREATININE 0 70 05/24/2020    GLUC 84 05/24/2020    GLUF 84 05/24/2020    CALCIUM 8 0 (L) 05/24/2020    AST 51 (H) 05/23/2020    ALT 43 05/23/2020    ALKPHOS 107 05/23/2020    TP 7 3 05/23/2020    TBILI 0 42 05/23/2020    EGFR 107 05/24/2020 June 20, 2020: ANDRE negative, SSA negative, SSB negative, anti DNA Ab 120, C3 68, C4-10, anti Smith >8 0, WBC 4 5, hemoglobin 12 0, platelets 834, glucose 76, BUN 14, creatinine 0 71, sodium 139, potassium 4 0, chloride 107, carbon dioxide 25, calcium 8 8, albumin 3 2, AST 24, ALT 15, cholesterol 132  May 26, 2020: UPCR 1 38, urinalysis bland except for 2+ protein  Office microscopy: No RBCs, no casts       IMAGING: none

## 2020-07-01 NOTE — LETTER
July 1, 2020     Manuel Johnson MD  [id number]    Patient: María Carrington   YOB: 1965   Date of Visit: 7/1/2020       Dear Dr Rochelle Meckel:    Thank you for referring Lissette Fernández to me for evaluation  Below are my notes for this consultation  If you have questions, please do not hesitate to call me  I look forward to following your patient along with you  Sincerely,        Raphael Sparks MD        CC: MD Raphael Rushing MD  7/1/2020  5:22 PM  Sign at close encounter  [address] [place] 48 y o  male MRN: [id number]  Date: 07/01/20  Reason for consultation: Initial evaluation of renal disease, proteinuria  ASSESSMENT and PLAN:  1  Proteinuria:  · Shilo's UPC ratio has worsened to 1 38    · Based on the UA, this seems to be isolated proteinuria as he has no microscopic hematuria or renal dysfunction  · High in the differential diagnosis for this is lupus nephritis  He may have early class V disease  Given the absence of hematuria, class III or class IV disease are less likely but cannot be totally ruled out  · Fortunately, he has already been started on Mycophenolate which would be my drug of choice at this time  I would recommend slowly escalating the dose of this to a goal dose of 1500 mg BID  · I will repeat labs on him in about 3-4 weeks  We will check repeat complements, anti DNA Ab, ANDRE, CBC, CMP, UA, UPC ratio  · If his proteinuria persists or if he has any indication of renal dysfunction, we may need to proceed to a renal biopsy  · In terms of treatment, I totally agree with starting him on lisinopril in order to minimize the proteinuria  Ideally, I would like to increase lisinopril for its anti proteinuric effects  However, he already has occasional orthostatic symptoms and I hesitate to increase lisinopril at this time      2  Chronic kidney disease, stage I:  · He has renal disease on the basis of proteinuria likely related to SLE  · He had a renal biopsy in the past and I asked him to furnish me with the name of his nephrologist so we can obtain records  3  Systemic lupus erythematosus    Patient Instructions   Your kidney function is stable but appears to be affected by the lupus based on the amount of protein in the urine  I recommend no change to your medications today but I will talk to Dr Collin Sharpe about further treatment and diagnostic studies  You may need a kidney biopsy in the near future  Please obtain information from your previous doctor  I would like to know the results of the kidney biopsy  Check blood tests in late July or early August 2020  Follow up in 2 months    Please avoid taking NSAIDs (Ibuprofen, Motrin, Aleve, Advil, Naproxen, Celebrex, etc )  Make sure you are eating healthy and have adequate exercise  HISTORY OF PRESENT ILLNESS:  Requesting Physician: Marquis Simpson MD    [address] [place] [address] is a 48 y o  male who was referred for initial evaluation of renal disease in the setting of systemic lupus erythematosus  Walter Ward has a history of SLE which was diagnosed at the age of 25  He used to live in [place], [place] and moved to the [facility] in 2006  He reports having symptoms related to lupus starting at the age of 26 but was not formally diagnosed until 3 years later  He reports improvement in his symptoms once he started steroids back in the day  He does not recall having any renal involvement at the time of the diagnosis  However, roughly about 10 years after the SLE diagnosis was made, he recalls being found to have microscopic hematuria and was referred to a nephrologist in [place]  He recalls having a renal biopsy done but is unaware of the results  He moved to the [facility] in 2006 and has established care locally  He has been followed by Dr Collin Sharpe of rheumatology    Since he moved to the area, he has not needed any Nephrology follow-up  Based on the records on Care Everywhere, I note that his serum creatinine has been around 0 6 to 0 8 since 2013  His most recent lab work from June 20, 2020 revealed a creatinine of 0 71  He is now being referred due to worsening of proteinuria  Based on my review of his urinary protein excretion, I note that his UPCR readings were between 241 and 383 mg/g based on urine testing done from 2014 to 2019  His UPCR on May 26, 2020 was noted to be 1 38 prompting renal consultation  His labs from June 20, 2020 revealed hypocomplementemia and a anti DS DNA level of 120  He was previously on methotrexate for RA but was recently changed to mycophenolate on May 28, 2020 by Dr Camilla Atkinson  It seems that the last time he required a prolonged course of prednisone was about 10-15 years ago  PAST MEDICAL HISTORY:  1  SLE  2  HTN: BP has been high on and off over the past 2 years  He was not on medications until recently started on low dose Lisinopril by Dr Camilla Atkinson for proteinuria  3  HLP  4  Lymphadenopathy: Biopsy was benign  5  BPH  6  Rheumatoid arthritis    No known history of DM, CAD, CHF, CVA, PAD, COPD, MATTHEW, asthma, thromboembolism, liver disease, GERD, malignancy  PAST SURGICAL HISTORY:  1  Bilateral hip replacements  2  Bilateral knee replacements  3  Revision of both hips  ALLERGIES:  No Known Allergies    SOCIAL HISTORY:  · Former smoker and consumed 1 PPD on and off for 5-6 years  Stopped 25 years ago  · Used to drink heavily and stopped about 31 years ago  · No history of IVDA  FAMILY HISTORY:  · Negative for ESRD       MEDICATIONS:    Current Outpatient Medications:     Ascorbic Acid (VITAMIN C) 100 MG tablet, Take by mouth, Disp: , Rfl:     Calcium Carbonate (CALTRATE 600) 1500 (600 Ca) MG TABS, Take by mouth, Disp: , Rfl:     cholecalciferol (VITAMIN D3) 1,000 units tablet, Take by mouth, Disp: , Rfl:     clindamycin (CLEOCIN) 300 MG capsule, 2 po tid x 24 hrs then decrease to 1 po tid for additional 6 days, Disp: , Rfl:     Elastic Bandages & Supports ([address]) MISC, by Does not apply route daily Knee High 20-30mmHg, Disp: 2 each, Rfl: 0    gemfibrozil (LOPID) 600 mg tablet, Take 600 mg by mouth 2 (two) times a day , Disp: , Rfl:     lisinopril (ZESTRIL) 2 5 mg tablet, Take 2 5 mg by mouth daily, Disp: , Rfl:     Multiple Vitamins-Minerals (CENTRUM SILVER) tablet, Take by mouth, Disp: , Rfl:     mycophenolate (CELLCEPT) 500 mg tablet, Take 500 mg by mouth daily, Disp: , Rfl:     Omega-3 Fatty Acids (FISH OIL PO), Take 2 g by mouth, Disp: , Rfl:     folic acid (FOLVITE) 1 mg tablet, , Disp: , Rfl:     ibuprofen (ADVIL) 200 mg tablet, Take by mouth, Disp: , Rfl:     methotrexate 2 5 mg tablet, , Disp: , Rfl:     REVIEW OF SYSTEMS:  Review of Systems   Constitutional: Positive for fatigue and unexpected weight change  Negative for appetite change, chills and fever  HENT: Positive for postnasal drip and rhinorrhea  Eyes: Negative for visual disturbance  Respiratory: Negative for cough and shortness of breath  Cardiovascular: Positive for leg swelling  Negative for chest pain  Gastrointestinal: Negative for abdominal pain, diarrhea, nausea and vomiting  Genitourinary: Positive for hematuria (On and off microscopic hematuria dating back to 1980s or 1990s  )  Negative for dysuria  Musculoskeletal: Positive for arthralgias  Negative for back pain  Skin: Negative for rash  Allergic/Immunologic: Positive for immunocompromised state  Neurological: Negative for dizziness and light-headedness  Hematological: Positive for adenopathy  Does not bruise/bleed easily  Psychiatric/Behavioral: Negative for dysphoric mood  The patient is not nervous/anxious  All the systems were reviewed and were negative except as documented on the HPI      PHYSICAL EXAMINATION:  /74   Temp 98 7 °F (37 1 °C)   Ht 5' 9" (1 753 m)   Wt 73 kg (161 lb)   BMI 23 78 kg/m²    Current Weight: Weight - Scale: 73 kg (161 lb) Body mass index is 23 78 kg/m²  General: conscious, coherent, cooperative, not in distress  Skin: warm, dry, good turgor  Eyes: pink conjunctivae, no scleral icterus  ENT: moist lips and mucous membranes  Neck: supple, no JVD  Chest/Lungs: clear breath sounds  CVS: distinct heart sounds, normal rate, regular rhythm  Abdomen: soft, non-tender, non-distended, normoactive bowel sounds  Extremities: no edema of extremities  : deferred   Neuro: awake, alert, oriented to time, place and person  Psych: appropriate affect  LABORATORY RESULTS:  Lab Results   Component Value Date    WBC 1 94 (LL) 05/24/2020    HGB 11 6 (L) 05/24/2020    HCT 36 0 (L) 05/24/2020    MCV 98 05/24/2020     (L) 05/24/2020     Lab Results   Component Value Date    SODIUM 140 05/24/2020    K 3 3 (L) 05/24/2020     05/24/2020    CO2 26 05/24/2020    AGAP 7 05/24/2020    BUN 15 05/24/2020    CREATININE 0 70 05/24/2020    GLUC 84 05/24/2020    GLUF 84 05/24/2020    CALCIUM 8 0 (L) 05/24/2020    AST 51 (H) 05/23/2020    ALT 43 05/23/2020    ALKPHOS 107 05/23/2020    TP 7 3 05/23/2020    TBILI 0 42 05/23/2020    EGFR 107 05/24/2020 June 20, 2020: ANDRE negative, SSA negative, SSB negative, anti DNA Ab 120, C3 68, C4-10, anti Smith >8 0, WBC 4 5, hemoglobin 12 0, platelets 373, glucose 76, BUN 14, creatinine 0 71, sodium 139, potassium 4 0, chloride 107, carbon dioxide 25, calcium 8 8, albumin 3 2, AST 24, ALT 15, cholesterol 132  May 26, 2020: UPCR 1 38, urinalysis bland except for 2+ protein  Office microscopy: No RBCs, no casts       IMAGING: none

## 2020-07-02 ENCOUNTER — TRANSCRIBE ORDERS (OUTPATIENT)
Dept: ADMINISTRATIVE | Facility: HOSPITAL | Age: 55
End: 2020-07-02

## 2020-07-02 DIAGNOSIS — M81.0 SENILE OSTEOPOROSIS: Primary | ICD-10-CM

## 2020-07-22 ENCOUNTER — TELEPHONE (OUTPATIENT)
Dept: NEPHROLOGY | Facility: CLINIC | Age: 55
End: 2020-07-22

## 2020-07-22 NOTE — TELEPHONE ENCOUNTER
----- Message from Ayana Vieira MD sent at 7/22/2020  3:47 PM EDT -----  Please call patient and inform him that labs show stable renal function

## 2020-07-23 LAB
25(OH)D3 SERPL-MCNC: 51 NG/ML (ref 30–100)
ALBUMIN SERPL-MCNC: 3.4 G/DL (ref 3.6–5.1)
ALBUMIN/GLOB SERPL: 0.9 (CALC) (ref 1–2.5)
ALP SERPL-CCNC: 103 U/L (ref 35–144)
ALT SERPL-CCNC: 22 U/L (ref 9–46)
ANA SER QL IF: NEGATIVE
APPEARANCE UR: CLEAR
AST SERPL-CCNC: 32 U/L (ref 10–35)
BACTERIA UR QL AUTO: ABNORMAL /HPF
BASOPHILS # BLD AUTO: 19 CELLS/UL (ref 0–200)
BASOPHILS NFR BLD AUTO: 0.5 %
BILIRUB SERPL-MCNC: 0.5 MG/DL (ref 0.2–1.2)
BILIRUB UR QL STRIP: NEGATIVE
BUN SERPL-MCNC: 15 MG/DL (ref 7–25)
BUN/CREAT SERPL: 22 (CALC) (ref 6–22)
C3 SERPL-MCNC: 72 MG/DL (ref 82–185)
C4 SERPL-MCNC: 11 MG/DL (ref 15–53)
CALCIUM SERPL-MCNC: 8.8 MG/DL (ref 8.6–10.3)
CHLORIDE SERPL-SCNC: 105 MMOL/L (ref 98–110)
CO2 SERPL-SCNC: 27 MMOL/L (ref 20–32)
COLOR UR: YELLOW
CREAT SERPL-MCNC: 0.67 MG/DL (ref 0.7–1.33)
CREAT UR-MCNC: 44 MG/DL (ref 20–320)
DSDNA AB SER-ACNC: 216 IU/ML
EOSINOPHIL # BLD AUTO: 30 CELLS/UL (ref 15–500)
EOSINOPHIL NFR BLD AUTO: 0.8 %
ERYTHROCYTE [DISTWIDTH] IN BLOOD BY AUTOMATED COUNT: 13.1 % (ref 11–15)
GLOBULIN SER CALC-MCNC: 3.7 G/DL (CALC) (ref 1.9–3.7)
GLUCOSE SERPL-MCNC: 73 MG/DL (ref 65–99)
GLUCOSE UR QL STRIP: NEGATIVE
HCT VFR BLD AUTO: 36.8 % (ref 38.5–50)
HGB BLD-MCNC: 12.5 G/DL (ref 13.2–17.1)
HGB UR QL STRIP: NEGATIVE
HYALINE CASTS #/AREA URNS LPF: ABNORMAL /LPF
KETONES UR QL STRIP: NEGATIVE
LEUKOCYTE ESTERASE UR QL STRIP: NEGATIVE
LYMPHOCYTES # BLD AUTO: 1060 CELLS/UL (ref 850–3900)
LYMPHOCYTES NFR BLD AUTO: 27.9 %
MAGNESIUM SERPL-MCNC: 2 MG/DL (ref 1.5–2.5)
MCH RBC QN AUTO: 31.6 PG (ref 27–33)
MCHC RBC AUTO-ENTMCNC: 34 G/DL (ref 32–36)
MCV RBC AUTO: 92.9 FL (ref 80–100)
MONOCYTES # BLD AUTO: 331 CELLS/UL (ref 200–950)
MONOCYTES NFR BLD AUTO: 8.7 %
NEUTROPHILS # BLD AUTO: 2360 CELLS/UL (ref 1500–7800)
NEUTROPHILS NFR BLD AUTO: 62.1 %
NITRITE UR QL STRIP: NEGATIVE
PH UR STRIP: 6 [PH] (ref 5–8)
PHOSPHATE SERPL-MCNC: 3.4 MG/DL (ref 2.5–4.5)
PLATELET # BLD AUTO: 170 THOUSAND/UL (ref 140–400)
PMV BLD REES-ECKER: 10.9 FL (ref 7.5–12.5)
POTASSIUM SERPL-SCNC: 4.7 MMOL/L (ref 3.5–5.3)
PROT SERPL-MCNC: 7.1 G/DL (ref 6.1–8.1)
PROT UR QL STRIP: ABNORMAL
PROT UR-MCNC: 43 MG/DL (ref 5–25)
PROT/CREAT UR: 0.98 MG/MG CREAT (ref 0.02–0.13)
PROT/CREAT UR: 977 MG/G CREAT (ref 22–128)
RBC # BLD AUTO: 3.96 MILLION/UL (ref 4.2–5.8)
RBC #/AREA URNS HPF: ABNORMAL /HPF
SL AMB EGFR AFRICAN AMERICAN: 126 ML/MIN/1.73M2
SL AMB EGFR NON AFRICAN AMERICAN: 109 ML/MIN/1.73M2
SODIUM SERPL-SCNC: 139 MMOL/L (ref 135–146)
SP GR UR STRIP: 1.01 (ref 1–1.03)
SQUAMOUS #/AREA URNS HPF: ABNORMAL /HPF
WBC # BLD AUTO: 3.8 THOUSAND/UL (ref 3.8–10.8)
WBC #/AREA URNS HPF: ABNORMAL /HPF

## 2020-07-29 ENCOUNTER — HOSPITAL ENCOUNTER (OUTPATIENT)
Dept: RADIOLOGY | Age: 55
Discharge: HOME | End: 2020-07-29
Payer: COMMERCIAL

## 2020-07-29 DIAGNOSIS — M81.0 SENILE OSTEOPOROSIS: ICD-10-CM

## 2020-07-29 PROCEDURE — 77080 DXA BONE DENSITY AXIAL: CPT

## 2020-07-31 ENCOUNTER — TELEPHONE (OUTPATIENT)
Dept: CARDIOLOGY CLINIC | Facility: CLINIC | Age: 55
End: 2020-07-31

## 2020-08-03 ENCOUNTER — TELEPHONE (OUTPATIENT)
Dept: CARDIOLOGY CLINIC | Facility: CLINIC | Age: 55
End: 2020-08-03

## 2020-08-03 NOTE — TELEPHONE ENCOUNTER
Patient was seen by a cardiologist in 2016 and had a stress test performed  Pt will bring copy of stress test with him to appointment

## 2020-08-10 ENCOUNTER — CONSULT (OUTPATIENT)
Dept: CARDIOLOGY CLINIC | Facility: CLINIC | Age: 55
End: 2020-08-10
Payer: COMMERCIAL

## 2020-08-10 VITALS
BODY MASS INDEX: 23.7 KG/M2 | HEIGHT: 69 IN | SYSTOLIC BLOOD PRESSURE: 152 MMHG | HEART RATE: 82 BPM | DIASTOLIC BLOOD PRESSURE: 90 MMHG | OXYGEN SATURATION: 99 % | WEIGHT: 160 LBS

## 2020-08-10 DIAGNOSIS — R55 SYNCOPE, UNSPECIFIED SYNCOPE TYPE: Primary | ICD-10-CM

## 2020-08-10 PROCEDURE — 93000 ELECTROCARDIOGRAM COMPLETE: CPT | Performed by: INTERNAL MEDICINE

## 2020-08-10 PROCEDURE — 99204 OFFICE O/P NEW MOD 45 MIN: CPT | Performed by: INTERNAL MEDICINE

## 2020-08-10 RX ORDER — ACETAMINOPHEN 500 MG
500 TABLET ORAL EVERY 6 HOURS PRN
COMMUNITY

## 2020-08-10 NOTE — PROGRESS NOTES
Cardiology   [facility] 48 [facility] MRN: [id number]        Impression:  1  Syncope - likely vasovagal   No suggestion of primary cardiac etiology  Dysrhythmia very unlikely with structurally normal heart  Recommendations:  1  Continue fluid intake  2  Vagal maneuvers taught  3  Follow up in 6 months  HPI: Amber Mcduffie is a 48y o  year old male with history of SLE and RA, who was admitted to hospital 5/20 with syncopal episode  Was sitting at kitchen table and passed out for 15 seconds  Lost bladder control and was mildly confused for a few minutes afterwards  Felt a strange sensation throughout body prior  Had an echo which demonstrated structurally normal heart - normal LV systolic function with mild MR  No prior symptoms, and no symptoms since  Has an active job  Review of Systems   Constitutional: Negative  HENT: Negative  Eyes: Negative  Respiratory: Negative for chest tightness and shortness of breath  Cardiovascular: Positive for leg swelling  Negative for chest pain and palpitations  Gastrointestinal: Negative  Endocrine: Negative  Genitourinary: Negative  Musculoskeletal: Negative  Skin: Negative  Allergic/Immunologic: Negative  Neurological: Negative  Hematological: Negative  Psychiatric/Behavioral: Negative  All other systems reviewed and are negative  Past Medical History:   Diagnosis Date    Lupus [facility] [address] [facility]     Osteoporosis     Rheumatoid arthritis ([facility] [address] )      Past Surgical History:   Procedure Laterality Date    HERNIA REPAIR  199?     REPLACEMENT TOTAL KNEE Bilateral     right 06/12 left 07/2016    REVISION TOTAL HIP ARTHROPLASTY Bilateral     right 05/2004 left 02/2006    TOTAL HIP ARTHROPLASTY Bilateral 6608/2549    Right 1988/ left 1989     Social History     Substance and Sexual Activity   Alcohol Use Never    Frequency: Never     Social History     Substance and Sexual Activity   Drug Use Never Social History     Tobacco Use   Smoking Status Former Smoker    Packs/day: 1 00    Types: Cigarettes   Smokeless Tobacco Former User    Types: Chew   Tobacco Comment    25 yrs      Family History   Problem Relation Age of Onset    Varicose Veins Father        Allergies:  No Known Allergies    Medications:     Current Outpatient Medications:     acetaminophen (TYLENOL) 500 mg tablet, Take 500 mg by mouth every 6 (six) hours as needed for mild pain, Disp: , Rfl:     Ascorbic Acid (VITAMIN C) 100 MG tablet, Take by mouth, Disp: , Rfl:     Calcium Carbonate (CALTRATE 600) 1500 (600 Ca) MG TABS, Take by mouth, Disp: , Rfl:     cholecalciferol (VITAMIN D3) 1,000 units tablet, Take by mouth, Disp: , Rfl:     clindamycin (CLEOCIN) 300 MG capsule, 2 po tid x 24 hrs then decrease to 1 po tid for additional 6 days, Disp: , Rfl:     Elastic Bandages & Supports (MEDICAL COMPRESSION STOCKINGS) MISC, by Does not apply route daily Knee High 20-30mmHg, Disp: 2 each, Rfl: 0    gemfibrozil (LOPID) 600 mg tablet, Take 600 mg by mouth 2 (two) times a day , Disp: , Rfl:     lisinopril (ZESTRIL) 2 5 mg tablet, Take 2 5 mg by mouth daily, Disp: , Rfl:     Multiple Vitamins-Minerals (CENTRUM SILVER) tablet, Take by mouth, Disp: , Rfl:     mycophenolate (CELLCEPT) 500 mg tablet, Take 500 mg by mouth daily, Disp: , Rfl:     Omega-3 Fatty Acids (FISH OIL PO), Take 2 g by mouth, Disp: , Rfl:     folic acid (FOLVITE) 1 mg tablet, , Disp: , Rfl:     ibuprofen (ADVIL) 200 mg tablet, Take by mouth, Disp: , Rfl:       Wt Readings from Last 3 Encounters:   08/10/20 72 6 kg (160 lb)   07/01/20 73 kg (161 lb)   05/23/20 70 9 kg (156 lb 4 9 oz)     Temp Readings from Last 3 Encounters:   07/01/20 98 7 °F (37 1 °C)   05/24/20 98 3 °F (36 8 °C) (Oral)   07/27/18 98 5 °F (36 9 °C) (Tympanic)     BP Readings from Last 3 Encounters:   08/10/20 152/90   07/01/20 112/74   05/24/20 127/81     Pulse Readings from Last 3 Encounters: 08/10/20 82   20 80   14 78         Physical Exam  HENT:      Head: Atraumatic  Eyes:      Extraocular Movements: Extraocular movements intact  Neck:      Musculoskeletal: Normal range of motion  Cardiovascular:      Rate and Rhythm: Normal rate and regular rhythm  Pulmonary:      Effort: Pulmonary effort is normal       Breath sounds: Normal breath sounds  Abdominal:      General: Abdomen is flat  Palpations: Abdomen is soft  Musculoskeletal: Normal range of motion  Comments: Tr LLE edema   Skin:     General: Skin is warm and dry  Neurological:      General: No focal deficit present  Mental Status: He is alert and oriented to person, place, and time     Psychiatric:         Mood and Affect: Mood normal          Behavior: Behavior normal            Laboratory Studies:  CMP:  Lab Results   Component Value Date    K 4 7 2020     2020    CO2 27 2020    BUN 15 2020    CREATININE 0 67 (L) 2020    AST 32 2020    ALT 22 2020    EGFR 107 2020       Lipid Profile:   No results found for: CHOL  No results found for: HDL  No results found for: LDLCALC  No results found for: TRIG    Cardiac testing: NSR 82 Nml  Results for orders placed during the hospital encounter of 20   Echo complete with contrast if indicated    Narrative [facility] [address]  [address]  [phone]    Transthoracic Echocardiogram  2D, M-mode, Doppler, and Color Doppler    Study date:  2020    Patient: Suha Carter  MR number: [id number]  Account number: [de-identified]  : 1965  Age: 48 years  Gender: Male  Status: Outpatient  Location: [address] [place] [address] [facility]  Height: 69 in  Weight: 155 8 lb  BP: 127/ 81 mmHg    Indications: Syncope    Diagnoses: R55  - Syncope and collapse    Sonographer:  Taurus Nguyen BS, RDCS, RVT, RDMS  Primary Physician:  Jackie Koroma MD  Referring Physician:  [facility]  Interpreting Physician:  Kristian Mart MD    SUMMARY    LEFT VENTRICLE:  Systolic function was normal  Ejection fraction was estimated in the range of 55 % to 65 %  There were no regional wall motion abnormalities  MITRAL VALVE:  There was a mild prolapse involving the anterior and posterior leaflets  There was mild regurgitation  TRICUSPID VALVE:  There was trace regurgitation  PULMONIC VALVE:  There was trace regurgitation  AORTA:  The root exhibited mild dilatation  HISTORY: PRIOR HISTORY: Lupus, former smoker    PROCEDURE: The study was performed in the [address]  This was a routine study  The transthoracic approach was used  The study included complete 2D imaging, M-mode, complete spectral Doppler, and color Doppler  Images were obtained from the parasternal, apical, subcostal, and suprasternal notch acoustic windows  Image quality was excellent  LEFT VENTRICLE: Size was normal  Systolic function was normal  Ejection fraction was estimated in the range of 55 % to 65 %  There were no regional wall motion abnormalities  Wall thickness was normal     RIGHT VENTRICLE: The size was normal  Systolic function was normal  Wall thickness was normal     LEFT ATRIUM: Size was normal     RIGHT ATRIUM: Size was normal     MITRAL VALVE: There was a mild prolapse involving the anterior and posterior leaflets  DOPPLER: There was mild regurgitation  AORTIC VALVE: The valve was trileaflet  Leaflets exhibited normal thickness and normal cuspal separation  DOPPLER: Transaortic velocity was within the normal range  There was no evidence for stenosis  There was no regurgitation  TRICUSPID VALVE: DOPPLER: There was trace regurgitation  PULMONIC VALVE: DOPPLER: There was trace regurgitation  PERICARDIUM: There was no pericardial effusion  The pericardium was normal in appearance      AORTA: The root exhibited mild dilatation      SYSTEMIC VEINS: IVC: Respirophasic changes were normal     MEASUREMENT TABLES    M-MODE MEASUREMENTS  Aorta   (Reference normals)  Root diam ed   42 mm   (20-37)    SYSTEM MEASUREMENT TABLES    2D  %FS: 27 8 %  Ao Diam: 4 15 cm  EDV(Teich): 92 65 ml  EF(Cube): 62 37 %  EF(Teich): 54 03 %  ESV(Cube): 34 39 ml  ESV(Teich): 42 59 ml  IVSd: 1 02 cm  LA Area: 14 88 cm2  LA Diam: 3 46 cm  LVEDV MOD A4C: 85 84 ml  LVEF MOD A4C: 56 69 %  LVESV MOD A4C: 37 18 ml  LVIDd: 4 5 cm  LVIDs: 3 25 cm  LVLd A4C: 8 7 cm  LVLs A4C: 7 85 cm  LVPWd: 1 07 cm  RA Area: 16 15 cm2  RV Diam : 3 77 cm  SI(Cube): 30 64 ml/m2  SI(Teich): 26 91 ml/m2  SV MOD A4C: 48 67 ml  SV(Cube): 57 ml  SV(Teich): 50 06 ml    CW  AV Vmax: 1 21 m/s  AV maxP 89 mmHg  TR Vmax: 2 58 m/s  TR maxP 59 mmHg    MM  AV Cusp: 1 86 cm  Ao Diam: 4 2 cm  LA Diam: 3 24 cm  LA/Ao: 0 77  TAPSE: 1 81 cm    PW  E': 0 09 m/s  E/E': 8 69  LVOT Vmax: 0 86 m/s  LVOT maxP 98 mmHg  MV A Julio: 0 63 m/s  MV Dec [place]: 4 56 m/s2  MV DecT: 162 57 ms  MV E Julio: 0 74 m/s  MV E/A Ratio: 1 18    [employer] Accredited Echocardiography Laboratory    Prepared and electronically signed by    Reji Ribeiro MD  Signed [id number] 09:03:57

## 2020-09-02 LAB
EXT GLUCOSE BLD: 87
EXTERNAL ALBUMIN: 3.2
EXTERNAL ALK PHOS: 109
EXTERNAL ALT: 36
EXTERNAL AST: 60
EXTERNAL BICARBONATE: 28
EXTERNAL BUN: 13
EXTERNAL CALCIUM: 9
EXTERNAL CHLORIDE: 106
EXTERNAL CREATININE: 0.77
EXTERNAL EGFR: 103
EXTERNAL POTASSIUM: 4.2
EXTERNAL SODIUM: 139

## 2020-09-03 ENCOUNTER — OFFICE VISIT (OUTPATIENT)
Dept: NEPHROLOGY | Facility: CLINIC | Age: 55
End: 2020-09-03
Payer: COMMERCIAL

## 2020-09-03 VITALS — BODY MASS INDEX: 23.63 KG/M2 | SYSTOLIC BLOOD PRESSURE: 140 MMHG | DIASTOLIC BLOOD PRESSURE: 86 MMHG | HEIGHT: 69 IN

## 2020-09-03 DIAGNOSIS — N06.9 ISOLATED PROTEINURIA WITH MORPHOLOGIC LESION: ICD-10-CM

## 2020-09-03 DIAGNOSIS — N18.1 CHRONIC KIDNEY DISEASE (CKD), STAGE I: ICD-10-CM

## 2020-09-03 DIAGNOSIS — M32.14 LUPUS NEPHRITIS (HCC): Primary | ICD-10-CM

## 2020-09-03 PROCEDURE — 99214 OFFICE O/P EST MOD 30 MIN: CPT | Performed by: INTERNAL MEDICINE

## 2020-09-03 NOTE — PROGRESS NOTES
NEPHROLOGY OFFICE PROGRESS NOTE   Lawerance Simmer [place]en[place] 48 y o  male MRN: [id number]  DATE: 09/03/20  Reason for visit: Continued evaluation and management of proteinuria  ASSESSMENT & PLAN:  1  Proteinuria:  · Shilo's baseline UPC ratio is around 200 mg to 300 mg    · His proteinuria worsened to 1,380 mg in May 2020 which was felt to be due to lupus nephritis - possibly class V since he did not have hematuria  · He has been started on Mycophenolate and is now on 1000 mg in AM and 500 mg in PM    · His UPC ratio has improved to 977 mg    · Goal for mycophenolate would be 1500 mg BID  · For now, I asked him to increase his Mycophenolate to 1000 mg BID  · At this time, I see no urgent reason to perform a renal biopsy  Will continue to observe  2  Chronic kidney disease, stage I:  · He has renal disease on the basis of proteinuria likely related to SLE  · He had a renal biopsy in the past but does not recall where it was sent to      3  Systemic lupus erythematosus  · Follow up with Dr Wei Esters  4  Mineral and bone disease  · Vitamin D is at goal - 51 on 7/21/20  Patient Instructions   Increase Mycophenolate to 2 tabs twice a day  Check urine protein measurement in the end of Sep 2020 or early Oct 2020  Follow up in 3 months  SUBJECTIVE / INTERVAL HISTORY:  Seen in July 2020 on initial consultation  No recent hospital stays or ER visits  He is now up to Mycophenolate 1000 mg in AM and 500 mg in PM    No GI side effects noted  Renal function is stable  UPC ratio is 0 977  PMH/PSH: HTN, SLE, HLP, lymphadenopathy, BPH, RA, bilateral hip replacements, bilateral knee replacements  Previous work up:     ALLERGIES: No Known Allergies    REVIEW OF SYSTEMS:  Review of Systems   Constitutional: Positive for fatigue  Negative for appetite change, chills and fever  Respiratory: Negative for cough and shortness of breath  Cardiovascular: Negative for chest pain and leg swelling  Gastrointestinal: Negative for abdominal pain, diarrhea, nausea and vomiting  Genitourinary: Negative for dysuria and hematuria  Musculoskeletal: Negative for arthralgias and back pain  Skin:        Feels that skin is hot and itchy  Allergic/Immunologic: Positive for immunocompromised state  Neurological: Positive for light-headedness  Negative for dizziness  OBJECTIVE:  /86   Ht 5' 9" (1 753 m)   BMI 23 63 kg/m²   Current Weight:   Body mass index is 23 63 kg/m²  Physical Exam  Constitutional:       General: He is not in acute distress  Appearance: Normal appearance  He is well-developed and normal weight  He is not ill-appearing  HENT:      Head: Normocephalic and atraumatic  Eyes:      General: No scleral icterus  Conjunctiva/sclera: Conjunctivae normal    Neck:      Musculoskeletal: Neck supple  Vascular: No JVD  Cardiovascular:      Rate and Rhythm: Normal rate and regular rhythm  Heart sounds: Normal heart sounds  Pulmonary:      Effort: Pulmonary effort is normal  No respiratory distress  Breath sounds: Normal breath sounds  Abdominal:      General: Bowel sounds are normal       Palpations: Abdomen is soft  Musculoskeletal:      Right lower leg: No edema  Left lower leg: No edema  Skin:     General: Skin is warm and dry  Neurological:      Mental Status: He is alert and oriented to person, place, and time     Psychiatric:         Mood and Affect: Mood normal          Behavior: Behavior normal          Judgment: Judgment normal        Medications:  Current Outpatient Medications:     acetaminophen (TYLENOL) 500 mg tablet, Take 500 mg by mouth every 6 (six) hours as needed for mild pain, Disp: , Rfl:     Ascorbic Acid (VITAMIN C) 100 MG tablet, Take by mouth, Disp: , Rfl:     Calcium Carbonate (CALTRATE 600) 1500 (600 Ca) MG TABS, Take by mouth, Disp: , Rfl:     cholecalciferol (VITAMIN D3) 1,000 units tablet, Take by mouth, Disp: , Rfl:   clindamycin (CLEOCIN) 300 MG capsule, 2 po tid x 24 hrs then decrease to 1 po tid for additional 6 days, Disp: , Rfl:     Elastic Bandages & Supports ([address]) MISC, by Does not apply route daily Knee High 20-30mmHg, Disp: 2 each, Rfl: 0    folic acid (FOLVITE) 1 mg tablet, , Disp: , Rfl:     gemfibrozil (LOPID) 600 mg tablet, Take 600 mg by mouth 2 (two) times a day , Disp: , Rfl:     ibuprofen (ADVIL) 200 mg tablet, Take by mouth, Disp: , Rfl:     lisinopril (ZESTRIL) 2 5 mg tablet, Take 2 5 mg by mouth daily, Disp: , Rfl:     Multiple Vitamins-Minerals (CENTRUM SILVER) tablet, Take by mouth, Disp: , Rfl:     mycophenolate (CELLCEPT) 500 mg tablet, Take 500 mg by mouth daily, Disp: , Rfl:     Omega-3 Fatty Acids (FISH OIL PO), Take 2 g by mouth, Disp: , Rfl:     Laboratory Results:  Results for orders placed or performed in visit on 09/03/20   Comprehensive metabolic panel   Result Value Ref Range    SODIUM 139     POTASSIUM 4 2     CHLORIDE 106     BICARB 28     BUN 13     CREATININE 0 77     Glucose 87     EXTERNAL CALCIUM 9 0     ALBUMIN 3 2     AST 60     ALT 36     ALK PHOS 109     EXTERNAL EGFR 103

## 2020-09-03 NOTE — PATIENT INSTRUCTIONS
Increase Mycophenolate to 2 tabs twice a day  Check urine protein measurement in the end of Sep 2020 or early Oct 2020  Follow up in 3 months

## 2020-09-03 NOTE — LETTER
September 3, 2020     Kimmy Mazariegos, [address] [facility], [facility]  [address]  [address]    Patient: Chani Benitez   YOB: 1965   Date of Visit: 9/3/2020       Dear Dr Pernell Ewing:    Thank you for referring Shekhar Powell to me for evaluation  Below are my notes for this consultation  If you have questions, please do not hesitate to call me  I look forward to following your patient along with you  Sincerely,        Salvador Huynh MD        CC: MD Salvador Anderson MD  9/3/2020  3:34 PM  Sign when Signing Visit  NEPHROLOGY OFFICE PROGRESS NOTE   Lorayne Loop Schoenfield 48 y o  male MRN: [id number]  DATE: 09/03/20  Reason for visit: Continued evaluation and management of proteinuria  ASSESSMENT & PLAN:  1  Proteinuria:  · Shilo's baseline UPC ratio is around 200 mg to 300 mg    · His proteinuria worsened to 1,380 mg in May 2020 which was felt to be due to lupus nephritis - possibly class V since he did not have hematuria  · He has been started on Mycophenolate and is now on 1000 mg in AM and 500 mg in PM    · His UPC ratio has improved to 977 mg    · Goal for mycophenolate would be 1500 mg BID  · For now, I asked him to increase his Mycophenolate to 1000 mg BID  · At this time, I see no urgent reason to perform a renal biopsy  Will continue to observe  2  Chronic kidney disease, stage I:  · He has renal disease on the basis of proteinuria likely related to SLE  · He had a renal biopsy in the past but does not recall where it was sent to      3  Systemic lupus erythematosus  · Follow up with Dr Magdalena Ulloa  4  Mineral and bone disease  · Vitamin D is at goal - 51 on 7/21/20  Patient Instructions   Increase Mycophenolate to 2 tabs twice a day  Check urine protein measurement in the end of Sep 2020 or early Oct 2020  Follow up in 3 months  SUBJECTIVE / INTERVAL HISTORY:  Seen in July 2020 on initial consultation  No recent hospital stays or ER visits    He is now up to Mycophenolate 1000 mg in AM and 500 mg in PM    No GI side effects noted  Renal function is stable  UPC ratio is 0 977  PMH/PSH: HTN, SLE, HLP, lymphadenopathy, BPH, RA, bilateral hip replacements, bilateral knee replacements  Previous work up:     ALLERGIES: No Known Allergies    REVIEW OF SYSTEMS:  Review of Systems   Constitutional: Positive for fatigue  Negative for appetite change, chills and fever  Respiratory: Negative for cough and shortness of breath  Cardiovascular: Negative for chest pain and leg swelling  Gastrointestinal: Negative for abdominal pain, diarrhea, nausea and vomiting  Genitourinary: Negative for dysuria and hematuria  Musculoskeletal: Negative for arthralgias and back pain  Skin:        Feels that skin is hot and itchy  Allergic/Immunologic: Positive for immunocompromised state  Neurological: Positive for light-headedness  Negative for dizziness  OBJECTIVE:  /86   Ht 5' 9" (1 753 m)   BMI 23 63 kg/m²   Current Weight:   Body mass index is 23 63 kg/m²  Physical Exam  Constitutional:       General: He is not in acute distress  Appearance: Normal appearance  He is well-developed and normal weight  He is not ill-appearing  HENT:      Head: Normocephalic and atraumatic  Eyes:      General: No scleral icterus  Conjunctiva/sclera: Conjunctivae normal    Neck:      Musculoskeletal: Neck supple  Vascular: No JVD  Cardiovascular:      Rate and Rhythm: Normal rate and regular rhythm  Heart sounds: Normal heart sounds  Pulmonary:      Effort: Pulmonary effort is normal  No respiratory distress  Breath sounds: Normal breath sounds  Abdominal:      General: Bowel sounds are normal       Palpations: Abdomen is soft  Musculoskeletal:      Right lower leg: No edema  Left lower leg: No edema  Skin:     General: Skin is warm and dry     Neurological:      Mental Status: He is alert and oriented to person, place, and time    Psychiatric:         Mood and Affect: Mood normal          Behavior: Behavior normal          Judgment: Judgment normal        Medications:  Current Outpatient Medications:     acetaminophen (TYLENOL) 500 mg tablet, Take 500 mg by mouth every 6 (six) hours as needed for mild pain, Disp: , Rfl:     Ascorbic Acid (VITAMIN C) 100 MG tablet, Take by mouth, Disp: , Rfl:     Calcium Carbonate (CALTRATE 600) 1500 (600 Ca) MG TABS, Take by mouth, Disp: , Rfl:     cholecalciferol (VITAMIN D3) 1,000 units tablet, Take by mouth, Disp: , Rfl:     clindamycin (CLEOCIN) 300 MG capsule, 2 po tid x 24 hrs then decrease to 1 po tid for additional 6 days, Disp: , Rfl:     Elastic Bandages & Supports (MEDICAL COMPRESSION STOCKINGS) MISC, by Does not apply route daily Knee High 20-30mmHg, Disp: 2 each, Rfl: 0    folic acid (FOLVITE) 1 mg tablet, , Disp: , Rfl:     gemfibrozil (LOPID) 600 mg tablet, Take 600 mg by mouth 2 (two) times a day , Disp: , Rfl:     ibuprofen (ADVIL) 200 mg tablet, Take by mouth, Disp: , Rfl:     lisinopril (ZESTRIL) 2 5 mg tablet, Take 2 5 mg by mouth daily, Disp: , Rfl:     Multiple Vitamins-Minerals (CENTRUM SILVER) tablet, Take by mouth, Disp: , Rfl:     mycophenolate (CELLCEPT) 500 mg tablet, Take 500 mg by mouth daily, Disp: , Rfl:     Omega-3 Fatty Acids (FISH OIL PO), Take 2 g by mouth, Disp: , Rfl:     Laboratory Results:  Results for orders placed or performed in visit on 09/03/20   Comprehensive metabolic panel   Result Value Ref Range    SODIUM 139     POTASSIUM 4 2     CHLORIDE 106     BICARB 28     BUN 13     CREATININE 0 77     Glucose 87     EXTERNAL CALCIUM 9 0     ALBUMIN 3 2     AST 60     ALT 36     ALK PHOS 109     EXTERNAL EGFR 103

## 2020-10-12 DIAGNOSIS — M32.14 LUPUS NEPHRITIS (HCC): Primary | ICD-10-CM

## 2020-10-12 LAB
CREAT ?TM UR-SCNC: 123 UMOL/L
EXT PROTEIN URINE: 276.2
PROT/CREAT UR: 2.25 MG/G{CREAT}

## 2020-10-14 ENCOUNTER — TELEPHONE (OUTPATIENT)
Dept: NEPHROLOGY | Facility: CLINIC | Age: 55
End: 2020-10-14

## 2020-10-20 ENCOUNTER — PREP FOR PROCEDURE (OUTPATIENT)
Dept: INTERVENTIONAL RADIOLOGY/VASCULAR | Facility: CLINIC | Age: 55
End: 2020-10-20

## 2020-10-20 DIAGNOSIS — M32.14 LUPUS NEPHRITIS (HCC): Primary | ICD-10-CM

## 2020-10-27 ENCOUNTER — DOCUMENTATION (OUTPATIENT)
Dept: NEPHROLOGY | Facility: CLINIC | Age: 55
End: 2020-10-27

## 2020-10-31 LAB
CREAT ?TM UR-SCNC: 60.5 UMOL/L
EXT PROTEIN URINE: 229.4
PROT/CREAT UR: 3.79 MG/G{CREAT}

## 2020-11-03 ENCOUNTER — HOSPITAL ENCOUNTER (OUTPATIENT)
Dept: RADIOLOGY | Facility: HOSPITAL | Age: 55
Discharge: HOME | End: 2020-11-03
Attending: STUDENT IN AN ORGANIZED HEALTH CARE EDUCATION/TRAINING PROGRAM | Admitting: STUDENT IN AN ORGANIZED HEALTH CARE EDUCATION/TRAINING PROGRAM
Payer: COMMERCIAL

## 2020-11-03 VITALS
HEART RATE: 107 BPM | OXYGEN SATURATION: 98 % | WEIGHT: 156 LBS | RESPIRATION RATE: 18 BRPM | DIASTOLIC BLOOD PRESSURE: 67 MMHG | SYSTOLIC BLOOD PRESSURE: 136 MMHG | BODY MASS INDEX: 23.11 KG/M2 | TEMPERATURE: 98.6 F | HEIGHT: 69 IN

## 2020-11-03 DIAGNOSIS — M32.14 LUPUS NEPHRITIS (HCC): ICD-10-CM

## 2020-11-03 LAB
BASOPHILS # BLD AUTO: 0.01 THOUSANDS/ΜL (ref 0–0.1)
BASOPHILS NFR BLD AUTO: 0 % (ref 0–1)
EOSINOPHIL # BLD AUTO: 0.04 THOUSAND/ΜL (ref 0–0.61)
EOSINOPHIL NFR BLD AUTO: 1 % (ref 0–6)
ERYTHROCYTE [DISTWIDTH] IN BLOOD BY AUTOMATED COUNT: 14.6 % (ref 11.6–15.1)
HCT VFR BLD AUTO: 34.4 % (ref 36.5–49.3)
HGB BLD-MCNC: 11.1 G/DL (ref 12–17)
IMM GRANULOCYTES # BLD AUTO: 0.02 THOUSAND/UL (ref 0–0.2)
IMM GRANULOCYTES NFR BLD AUTO: 0 % (ref 0–2)
INR PPP: 0.84 (ref 0.84–1.19)
LYMPHOCYTES # BLD AUTO: 0.8 THOUSANDS/ΜL (ref 0.6–4.47)
LYMPHOCYTES NFR BLD AUTO: 17 % (ref 14–44)
MCH RBC QN AUTO: 31.4 PG (ref 26.8–34.3)
MCHC RBC AUTO-ENTMCNC: 32.3 G/DL (ref 31.4–37.4)
MCV RBC AUTO: 98 FL (ref 82–98)
MONOCYTES # BLD AUTO: 0.38 THOUSAND/ΜL (ref 0.17–1.22)
MONOCYTES NFR BLD AUTO: 8 % (ref 4–12)
NEUTROPHILS # BLD AUTO: 3.46 THOUSANDS/ΜL (ref 1.85–7.62)
NEUTS SEG NFR BLD AUTO: 74 % (ref 43–75)
NRBC BLD AUTO-RTO: 0 /100 WBCS
PLATELET # BLD AUTO: 158 THOUSANDS/UL (ref 149–390)
PMV BLD AUTO: 9.9 FL (ref 8.9–12.7)
PROTHROMBIN TIME: 11.5 SECONDS (ref 11.6–14.5)
RBC # BLD AUTO: 3.53 MILLION/UL (ref 3.88–5.62)
WBC # BLD AUTO: 4.71 THOUSAND/UL (ref 4.31–10.16)

## 2020-11-03 PROCEDURE — 99152 MOD SED SAME PHYS/QHP 5/>YRS: CPT | Performed by: RADIOLOGY

## 2020-11-03 PROCEDURE — 50200 RENAL BIOPSY PERQ: CPT

## 2020-11-03 PROCEDURE — 88305 TISSUE EXAM BY PATHOLOGIST: CPT | Performed by: INTERNAL MEDICINE

## 2020-11-03 PROCEDURE — 85610 PROTHROMBIN TIME: CPT | Performed by: STUDENT IN AN ORGANIZED HEALTH CARE EDUCATION/TRAINING PROGRAM

## 2020-11-03 PROCEDURE — 88313 SPECIAL STAINS GROUP 2: CPT | Performed by: INTERNAL MEDICINE

## 2020-11-03 PROCEDURE — 88300 SURGICAL PATH GROSS: CPT | Performed by: PATHOLOGY

## 2020-11-03 PROCEDURE — 77012 CT SCAN FOR NEEDLE BIOPSY: CPT

## 2020-11-03 PROCEDURE — 88346 IMFLUOR 1ST 1ANTB STAIN PX: CPT | Performed by: INTERNAL MEDICINE

## 2020-11-03 PROCEDURE — 99153 MOD SED SAME PHYS/QHP EA: CPT

## 2020-11-03 PROCEDURE — 88350 IMFLUOR EA ADDL 1ANTB STN PX: CPT | Performed by: INTERNAL MEDICINE

## 2020-11-03 PROCEDURE — 50200 RENAL BIOPSY PERQ: CPT | Performed by: RADIOLOGY

## 2020-11-03 PROCEDURE — 99152 MOD SED SAME PHYS/QHP 5/>YRS: CPT

## 2020-11-03 PROCEDURE — 85025 COMPLETE CBC W/AUTO DIFF WBC: CPT | Performed by: STUDENT IN AN ORGANIZED HEALTH CARE EDUCATION/TRAINING PROGRAM

## 2020-11-03 PROCEDURE — 77012 CT SCAN FOR NEEDLE BIOPSY: CPT | Performed by: RADIOLOGY

## 2020-11-03 PROCEDURE — 88348 ELECTRON MICROSCOPY DX: CPT | Performed by: INTERNAL MEDICINE

## 2020-11-03 RX ORDER — HYDRALAZINE HYDROCHLORIDE 20 MG/ML
INJECTION INTRAMUSCULAR; INTRAVENOUS CODE/TRAUMA/SEDATION MEDICATION
Status: COMPLETED | OUTPATIENT
Start: 2020-11-03 | End: 2020-11-03

## 2020-11-03 RX ORDER — FENTANYL CITRATE 50 UG/ML
INJECTION, SOLUTION INTRAMUSCULAR; INTRAVENOUS CODE/TRAUMA/SEDATION MEDICATION
Status: COMPLETED | OUTPATIENT
Start: 2020-11-03 | End: 2020-11-03

## 2020-11-03 RX ORDER — SODIUM CHLORIDE 9 MG/ML
75 INJECTION, SOLUTION INTRAVENOUS CONTINUOUS
Status: DISCONTINUED | OUTPATIENT
Start: 2020-11-03 | End: 2020-11-03 | Stop reason: HOSPADM

## 2020-11-03 RX ORDER — MIDAZOLAM HYDROCHLORIDE 2 MG/2ML
INJECTION, SOLUTION INTRAMUSCULAR; INTRAVENOUS CODE/TRAUMA/SEDATION MEDICATION
Status: COMPLETED | OUTPATIENT
Start: 2020-11-03 | End: 2020-11-03

## 2020-11-03 RX ADMIN — FENTANYL CITRATE 25 MCG: 50 INJECTION, SOLUTION INTRAMUSCULAR; INTRAVENOUS at 10:25

## 2020-11-03 RX ADMIN — FENTANYL CITRATE 25 MCG: 50 INJECTION, SOLUTION INTRAMUSCULAR; INTRAVENOUS at 10:03

## 2020-11-03 RX ADMIN — MIDAZOLAM 0.5 MG: 1 INJECTION INTRAMUSCULAR; INTRAVENOUS at 10:03

## 2020-11-03 RX ADMIN — MIDAZOLAM 0.5 MG: 1 INJECTION INTRAMUSCULAR; INTRAVENOUS at 09:58

## 2020-11-03 RX ADMIN — HYDRALAZINE HYDROCHLORIDE 10 MG: 20 INJECTION INTRAMUSCULAR; INTRAVENOUS at 10:07

## 2020-11-03 RX ADMIN — FENTANYL CITRATE 50 MCG: 50 INJECTION, SOLUTION INTRAMUSCULAR; INTRAVENOUS at 09:58

## 2020-11-04 ENCOUNTER — TELEPHONE (OUTPATIENT)
Dept: NEPHROLOGY | Facility: CLINIC | Age: 55
End: 2020-11-04

## 2020-11-09 ENCOUNTER — TELEPHONE (OUTPATIENT)
Dept: OTHER | Facility: HOSPITAL | Age: 55
End: 2020-11-09

## 2020-11-09 DIAGNOSIS — M32.14 LUPUS NEPHRITIS (HCC): Primary | ICD-10-CM

## 2020-11-09 RX ORDER — PREDNISONE 20 MG/1
60 TABLET ORAL DAILY
Qty: 90 TABLET | Refills: 0 | Status: ON HOLD | OUTPATIENT
Start: 2020-11-09 | End: 2020-12-28 | Stop reason: SDUPTHER

## 2020-11-09 RX ORDER — SODIUM CHLORIDE 9 MG/ML
20 INJECTION, SOLUTION INTRAVENOUS ONCE
Status: CANCELLED | OUTPATIENT
Start: 2020-11-11

## 2020-11-10 ENCOUNTER — TELEPHONE (OUTPATIENT)
Dept: NEPHROLOGY | Facility: CLINIC | Age: 55
End: 2020-11-10

## 2020-11-10 DIAGNOSIS — N18.1 CHRONIC KIDNEY DISEASE (CKD), STAGE I: Primary | ICD-10-CM

## 2020-11-10 DIAGNOSIS — M32.14 LUPUS NEPHRITIS (HCC): ICD-10-CM

## 2020-11-23 ENCOUNTER — TELEPHONE (OUTPATIENT)
Dept: NEPHROLOGY | Facility: CLINIC | Age: 55
End: 2020-11-23

## 2020-11-23 LAB — SCAN RESULT: NORMAL

## 2020-11-24 ENCOUNTER — APPOINTMENT (EMERGENCY)
Dept: RADIOLOGY | Facility: HOSPITAL | Age: 55
DRG: 291 | End: 2020-11-24
Payer: COMMERCIAL

## 2020-11-24 ENCOUNTER — HOSPITAL ENCOUNTER (INPATIENT)
Facility: HOSPITAL | Age: 55
LOS: 3 days | Discharge: HOME | DRG: 291 | End: 2020-11-28
Attending: EMERGENCY MEDICINE | Admitting: INTERNAL MEDICINE
Payer: COMMERCIAL

## 2020-11-24 ENCOUNTER — APPOINTMENT (EMERGENCY)
Dept: CT IMAGING | Facility: HOSPITAL | Age: 55
DRG: 291 | End: 2020-11-24
Payer: COMMERCIAL

## 2020-11-24 ENCOUNTER — APPOINTMENT (EMERGENCY)
Dept: ULTRASOUND IMAGING | Facility: HOSPITAL | Age: 55
DRG: 291 | End: 2020-11-24
Payer: COMMERCIAL

## 2020-11-24 DIAGNOSIS — I50.9 CHF (CONGESTIVE HEART FAILURE) (HCC): Primary | ICD-10-CM

## 2020-11-24 DIAGNOSIS — M32.14 LUPUS NEPHRITIS (HCC): ICD-10-CM

## 2020-11-24 DIAGNOSIS — J18.9 PNEUMONIA: ICD-10-CM

## 2020-11-24 DIAGNOSIS — N17.9 AKI (ACUTE KIDNEY INJURY) (HCC): ICD-10-CM

## 2020-11-24 DIAGNOSIS — I16.1 HYPERTENSIVE EMERGENCY: ICD-10-CM

## 2020-11-24 DIAGNOSIS — N18.1 CHRONIC KIDNEY DISEASE (CKD), STAGE I: ICD-10-CM

## 2020-11-24 DIAGNOSIS — I10 HYPERTENSION: ICD-10-CM

## 2020-11-24 DIAGNOSIS — M32.8 OTHER FORMS OF SYSTEMIC LUPUS ERYTHEMATOSUS, UNSPECIFIED ORGAN INVOLVEMENT STATUS (HCC): ICD-10-CM

## 2020-11-24 DIAGNOSIS — I50.31 ACUTE DIASTOLIC CONGESTIVE HEART FAILURE (HCC): ICD-10-CM

## 2020-11-24 DIAGNOSIS — R04.2 HEMOPTYSIS: ICD-10-CM

## 2020-11-24 DIAGNOSIS — R77.8 ELEVATED TROPONIN: ICD-10-CM

## 2020-11-24 LAB
ALBUMIN SERPL BCP-MCNC: 2.9 G/DL (ref 3.5–5)
ALP SERPL-CCNC: 90 U/L (ref 46–116)
ALT SERPL W P-5'-P-CCNC: 27 U/L (ref 12–78)
ANION GAP SERPL CALCULATED.3IONS-SCNC: 9 MMOL/L (ref 4–13)
APTT PPP: 26 SECONDS (ref 23–37)
AST SERPL W P-5'-P-CCNC: 34 U/L (ref 5–45)
BASOPHILS # BLD AUTO: 0.01 THOUSANDS/ΜL (ref 0–0.1)
BASOPHILS NFR BLD AUTO: 0 % (ref 0–1)
BILIRUB SERPL-MCNC: 0.32 MG/DL (ref 0.2–1)
BUN SERPL-MCNC: 36 MG/DL (ref 5–25)
CALCIUM ALBUM COR SERPL-MCNC: 10.2 MG/DL (ref 8.3–10.1)
CALCIUM SERPL-MCNC: 9.3 MG/DL (ref 8.3–10.1)
CHLORIDE SERPL-SCNC: 103 MMOL/L (ref 100–108)
CO2 SERPL-SCNC: 25 MMOL/L (ref 21–32)
CREAT SERPL-MCNC: 1.49 MG/DL (ref 0.6–1.3)
D DIMER PPP FEU-MCNC: 1.06 UG/ML FEU
EOSINOPHIL # BLD AUTO: 0 THOUSAND/ΜL (ref 0–0.61)
EOSINOPHIL NFR BLD AUTO: 0 % (ref 0–6)
ERYTHROCYTE [DISTWIDTH] IN BLOOD BY AUTOMATED COUNT: 14.6 % (ref 11.6–15.1)
FLUAV RNA RESP QL NAA+PROBE: NEGATIVE
FLUBV RNA RESP QL NAA+PROBE: NEGATIVE
GFR SERPL CREATININE-BSD FRML MDRD: 52 ML/MIN/1.73SQ M
GLUCOSE SERPL-MCNC: 103 MG/DL (ref 65–140)
HCT VFR BLD AUTO: 40.1 % (ref 36.5–49.3)
HGB BLD-MCNC: 12.5 G/DL (ref 12–17)
IMM GRANULOCYTES # BLD AUTO: 0.11 THOUSAND/UL (ref 0–0.2)
IMM GRANULOCYTES NFR BLD AUTO: 2 % (ref 0–2)
INR PPP: 0.9 (ref 0.84–1.19)
LYMPHOCYTES # BLD AUTO: 1.06 THOUSANDS/ΜL (ref 0.6–4.47)
LYMPHOCYTES NFR BLD AUTO: 15 % (ref 14–44)
MCH RBC QN AUTO: 30.9 PG (ref 26.8–34.3)
MCHC RBC AUTO-ENTMCNC: 31.2 G/DL (ref 31.4–37.4)
MCV RBC AUTO: 99 FL (ref 82–98)
MONOCYTES # BLD AUTO: 0.72 THOUSAND/ΜL (ref 0.17–1.22)
MONOCYTES NFR BLD AUTO: 10 % (ref 4–12)
NEUTROPHILS # BLD AUTO: 5.37 THOUSANDS/ΜL (ref 1.85–7.62)
NEUTS SEG NFR BLD AUTO: 73 % (ref 43–75)
NRBC BLD AUTO-RTO: 0 /100 WBCS
NT-PROBNP SERPL-MCNC: ABNORMAL PG/ML
PLATELET # BLD AUTO: 226 THOUSANDS/UL (ref 149–390)
PMV BLD AUTO: 10.5 FL (ref 8.9–12.7)
POTASSIUM SERPL-SCNC: 4.8 MMOL/L (ref 3.5–5.3)
PROT SERPL-MCNC: 7.7 G/DL (ref 6.4–8.2)
PROTHROMBIN TIME: 12.2 SECONDS (ref 11.6–14.5)
RBC # BLD AUTO: 4.05 MILLION/UL (ref 3.88–5.62)
RSV RNA RESP QL NAA+PROBE: NEGATIVE
SARS-COV-2 RNA RESP QL NAA+PROBE: NEGATIVE
SODIUM SERPL-SCNC: 137 MMOL/L (ref 136–145)
TROPONIN I SERPL-MCNC: 0.16 NG/ML
WBC # BLD AUTO: 7.27 THOUSAND/UL (ref 4.31–10.16)

## 2020-11-24 PROCEDURE — 0241U HB NFCT DS VIR RESP RNA 4 TRGT: CPT | Performed by: EMERGENCY MEDICINE

## 2020-11-24 PROCEDURE — 96374 THER/PROPH/DIAG INJ IV PUSH: CPT

## 2020-11-24 PROCEDURE — 71275 CT ANGIOGRAPHY CHEST: CPT

## 2020-11-24 PROCEDURE — 85379 FIBRIN DEGRADATION QUANT: CPT | Performed by: EMERGENCY MEDICINE

## 2020-11-24 PROCEDURE — 93971 EXTREMITY STUDY: CPT

## 2020-11-24 PROCEDURE — G1004 CDSM NDSC: HCPCS

## 2020-11-24 PROCEDURE — 93005 ELECTROCARDIOGRAM TRACING: CPT

## 2020-11-24 PROCEDURE — 85730 THROMBOPLASTIN TIME PARTIAL: CPT | Performed by: EMERGENCY MEDICINE

## 2020-11-24 PROCEDURE — 36415 COLL VENOUS BLD VENIPUNCTURE: CPT | Performed by: EMERGENCY MEDICINE

## 2020-11-24 PROCEDURE — 85025 COMPLETE CBC W/AUTO DIFF WBC: CPT | Performed by: EMERGENCY MEDICINE

## 2020-11-24 PROCEDURE — 84484 ASSAY OF TROPONIN QUANT: CPT | Performed by: EMERGENCY MEDICINE

## 2020-11-24 PROCEDURE — 71045 X-RAY EXAM CHEST 1 VIEW: CPT

## 2020-11-24 PROCEDURE — 87040 BLOOD CULTURE FOR BACTERIA: CPT | Performed by: EMERGENCY MEDICINE

## 2020-11-24 PROCEDURE — 99291 CRITICAL CARE FIRST HOUR: CPT | Performed by: EMERGENCY MEDICINE

## 2020-11-24 PROCEDURE — 85610 PROTHROMBIN TIME: CPT | Performed by: EMERGENCY MEDICINE

## 2020-11-24 PROCEDURE — 83880 ASSAY OF NATRIURETIC PEPTIDE: CPT | Performed by: EMERGENCY MEDICINE

## 2020-11-24 PROCEDURE — 80053 COMPREHEN METABOLIC PANEL: CPT | Performed by: EMERGENCY MEDICINE

## 2020-11-24 PROCEDURE — 99285 EMERGENCY DEPT VISIT HI MDM: CPT

## 2020-11-24 RX ORDER — FUROSEMIDE 10 MG/ML
20 INJECTION INTRAMUSCULAR; INTRAVENOUS ONCE
Status: COMPLETED | OUTPATIENT
Start: 2020-11-24 | End: 2020-11-24

## 2020-11-24 RX ORDER — ALBUTEROL SULFATE 90 UG/1
2 AEROSOL, METERED RESPIRATORY (INHALATION) ONCE
Status: COMPLETED | OUTPATIENT
Start: 2020-11-24 | End: 2020-11-24

## 2020-11-24 RX ADMIN — ALBUTEROL SULFATE 2 PUFF: 90 AEROSOL, METERED RESPIRATORY (INHALATION) at 22:58

## 2020-11-24 RX ADMIN — FUROSEMIDE 20 MG: 10 INJECTION, SOLUTION INTRAMUSCULAR; INTRAVENOUS at 23:53

## 2020-11-24 RX ADMIN — NITROGLYCERIN 0.5 INCH: 20 OINTMENT TOPICAL at 23:08

## 2020-11-25 ENCOUNTER — APPOINTMENT (INPATIENT)
Dept: NON INVASIVE DIAGNOSTICS | Facility: HOSPITAL | Age: 55
DRG: 291 | End: 2020-11-25
Payer: COMMERCIAL

## 2020-11-25 PROBLEM — R04.2 HEMOPTYSIS: Status: ACTIVE | Noted: 2020-11-25

## 2020-11-25 PROBLEM — R60.0 LOWER EXTREMITY EDEMA: Status: ACTIVE | Noted: 2020-11-25

## 2020-11-25 PROBLEM — I16.1 HYPERTENSIVE EMERGENCY: Status: ACTIVE | Noted: 2020-11-25

## 2020-11-25 PROBLEM — E78.5 HLD (HYPERLIPIDEMIA): Status: ACTIVE | Noted: 2020-11-25

## 2020-11-25 PROBLEM — N17.9 AKI (ACUTE KIDNEY INJURY) (HCC): Status: ACTIVE | Noted: 2020-11-25

## 2020-11-25 PROBLEM — R06.02 SHORTNESS OF BREATH: Status: ACTIVE | Noted: 2020-11-25

## 2020-11-25 PROBLEM — R77.8 ELEVATED TROPONIN: Status: ACTIVE | Noted: 2020-11-25

## 2020-11-25 LAB
ALBUMIN SERPL BCP-MCNC: 2.3 G/DL (ref 3.5–5)
ALP SERPL-CCNC: 65 U/L (ref 46–116)
ALT SERPL W P-5'-P-CCNC: 22 U/L (ref 12–78)
ANION GAP SERPL CALCULATED.3IONS-SCNC: 9 MMOL/L (ref 4–13)
AST SERPL W P-5'-P-CCNC: 19 U/L (ref 5–45)
ATRIAL RATE: 103 BPM
BACTERIA UR QL AUTO: NORMAL /HPF
BASOPHILS # BLD AUTO: 0.01 THOUSANDS/ΜL (ref 0–0.1)
BASOPHILS NFR BLD AUTO: 0 % (ref 0–1)
BILIRUB SERPL-MCNC: 0.29 MG/DL (ref 0.2–1)
BILIRUB UR QL STRIP: NEGATIVE
BUN SERPL-MCNC: 35 MG/DL (ref 5–25)
CALCIUM ALBUM COR SERPL-MCNC: 10.2 MG/DL (ref 8.3–10.1)
CALCIUM SERPL-MCNC: 8.8 MG/DL (ref 8.3–10.1)
CHLORIDE SERPL-SCNC: 105 MMOL/L (ref 100–108)
CLARITY UR: CLEAR
CO2 SERPL-SCNC: 26 MMOL/L (ref 21–32)
COLOR UR: YELLOW
CREAT SERPL-MCNC: 1.34 MG/DL (ref 0.6–1.3)
CREAT UR-MCNC: 27 MG/DL
EOSINOPHIL # BLD AUTO: 0.01 THOUSAND/ΜL (ref 0–0.61)
EOSINOPHIL NFR BLD AUTO: 0 % (ref 0–6)
ERYTHROCYTE [DISTWIDTH] IN BLOOD BY AUTOMATED COUNT: 14.5 % (ref 11.6–15.1)
GFR SERPL CREATININE-BSD FRML MDRD: 60 ML/MIN/1.73SQ M
GLUCOSE SERPL-MCNC: 79 MG/DL (ref 65–140)
GLUCOSE UR STRIP-MCNC: NEGATIVE MG/DL
HCT VFR BLD AUTO: 34.6 % (ref 36.5–49.3)
HGB BLD-MCNC: 10.9 G/DL (ref 12–17)
HGB UR QL STRIP.AUTO: ABNORMAL
IMM GRANULOCYTES # BLD AUTO: 0.1 THOUSAND/UL (ref 0–0.2)
IMM GRANULOCYTES NFR BLD AUTO: 1 % (ref 0–2)
KETONES UR STRIP-MCNC: NEGATIVE MG/DL
LEUKOCYTE ESTERASE UR QL STRIP: NEGATIVE
LYMPHOCYTES # BLD AUTO: 1.27 THOUSANDS/ΜL (ref 0.6–4.47)
LYMPHOCYTES NFR BLD AUTO: 15 % (ref 14–44)
MAGNESIUM SERPL-MCNC: 1.9 MG/DL (ref 1.6–2.6)
MCH RBC QN AUTO: 31.1 PG (ref 26.8–34.3)
MCHC RBC AUTO-ENTMCNC: 31.5 G/DL (ref 31.4–37.4)
MCV RBC AUTO: 99 FL (ref 82–98)
MONOCYTES # BLD AUTO: 0.66 THOUSAND/ΜL (ref 0.17–1.22)
MONOCYTES NFR BLD AUTO: 8 % (ref 4–12)
NEUTROPHILS # BLD AUTO: 6.26 THOUSANDS/ΜL (ref 1.85–7.62)
NEUTS SEG NFR BLD AUTO: 76 % (ref 43–75)
NITRITE UR QL STRIP: NEGATIVE
NON-SQ EPI CELLS URNS QL MICRO: NORMAL /HPF
NRBC BLD AUTO-RTO: 0 /100 WBCS
P AXIS: 72 DEGREES
PH UR STRIP.AUTO: 7 [PH] (ref 4.5–8)
PLATELET # BLD AUTO: 168 THOUSANDS/UL (ref 149–390)
PMV BLD AUTO: 10.2 FL (ref 8.9–12.7)
POTASSIUM SERPL-SCNC: 3.8 MMOL/L (ref 3.5–5.3)
PR INTERVAL: 154 MS
PROCALCITONIN SERPL-MCNC: 0.26 NG/ML
PROT SERPL-MCNC: 6.1 G/DL (ref 6.4–8.2)
PROT UR STRIP-MCNC: >=300 MG/DL
PROT UR-MCNC: 148 MG/DL
PROT/CREAT UR: 5.48 MG/G{CREAT} (ref 0–0.1)
QRS AXIS: 44 DEGREES
QRSD INTERVAL: 82 MS
QT INTERVAL: 312 MS
QTC INTERVAL: 408 MS
RBC # BLD AUTO: 3.5 MILLION/UL (ref 3.88–5.62)
RBC #/AREA URNS AUTO: NORMAL /HPF
SODIUM SERPL-SCNC: 140 MMOL/L (ref 136–145)
SP GR UR STRIP.AUTO: 1.01 (ref 1–1.03)
T WAVE AXIS: 90 DEGREES
TROPONIN I SERPL-MCNC: 0.11 NG/ML
TROPONIN I SERPL-MCNC: 0.13 NG/ML
TROPONIN I SERPL-MCNC: 0.14 NG/ML
UROBILINOGEN UR QL STRIP.AUTO: 0.2 E.U./DL
VENTRICULAR RATE: 103 BPM
WBC # BLD AUTO: 8.31 THOUSAND/UL (ref 4.31–10.16)
WBC #/AREA URNS AUTO: NORMAL /HPF

## 2020-11-25 PROCEDURE — 93306 TTE W/DOPPLER COMPLETE: CPT | Performed by: INTERNAL MEDICINE

## 2020-11-25 PROCEDURE — 84145 PROCALCITONIN (PCT): CPT | Performed by: PSYCHIATRY & NEUROLOGY

## 2020-11-25 PROCEDURE — 93306 TTE W/DOPPLER COMPLETE: CPT

## 2020-11-25 PROCEDURE — 80053 COMPREHEN METABOLIC PANEL: CPT | Performed by: INTERNAL MEDICINE

## 2020-11-25 PROCEDURE — 85025 COMPLETE CBC W/AUTO DIFF WBC: CPT | Performed by: INTERNAL MEDICINE

## 2020-11-25 PROCEDURE — 82570 ASSAY OF URINE CREATININE: CPT | Performed by: PSYCHIATRY & NEUROLOGY

## 2020-11-25 PROCEDURE — 93971 EXTREMITY STUDY: CPT | Performed by: SURGERY

## 2020-11-25 PROCEDURE — 84484 ASSAY OF TROPONIN QUANT: CPT | Performed by: INTERNAL MEDICINE

## 2020-11-25 PROCEDURE — 81003 URINALYSIS AUTO W/O SCOPE: CPT

## 2020-11-25 PROCEDURE — 96374 THER/PROPH/DIAG INJ IV PUSH: CPT

## 2020-11-25 PROCEDURE — 99223 1ST HOSP IP/OBS HIGH 75: CPT | Performed by: NURSE PRACTITIONER

## 2020-11-25 PROCEDURE — 84156 ASSAY OF PROTEIN URINE: CPT | Performed by: PSYCHIATRY & NEUROLOGY

## 2020-11-25 PROCEDURE — 93010 ELECTROCARDIOGRAM REPORT: CPT | Performed by: INTERNAL MEDICINE

## 2020-11-25 PROCEDURE — 99223 1ST HOSP IP/OBS HIGH 75: CPT | Performed by: INTERNAL MEDICINE

## 2020-11-25 PROCEDURE — 81001 URINALYSIS AUTO W/SCOPE: CPT

## 2020-11-25 PROCEDURE — 36415 COLL VENOUS BLD VENIPUNCTURE: CPT | Performed by: INTERNAL MEDICINE

## 2020-11-25 PROCEDURE — 83735 ASSAY OF MAGNESIUM: CPT | Performed by: INTERNAL MEDICINE

## 2020-11-25 RX ORDER — LABETALOL 20 MG/4 ML (5 MG/ML) INTRAVENOUS SYRINGE
10 EVERY 6 HOURS PRN
Status: DISCONTINUED | OUTPATIENT
Start: 2020-11-25 | End: 2020-11-25

## 2020-11-25 RX ORDER — FUROSEMIDE 10 MG/ML
40 INJECTION INTRAMUSCULAR; INTRAVENOUS
Status: DISCONTINUED | OUTPATIENT
Start: 2020-11-25 | End: 2020-11-26

## 2020-11-25 RX ORDER — MYCOPHENOLATE MOFETIL 250 MG/1
500 CAPSULE ORAL
Status: DISCONTINUED | OUTPATIENT
Start: 2020-11-25 | End: 2020-11-28 | Stop reason: HOSPADM

## 2020-11-25 RX ORDER — ONDANSETRON 2 MG/ML
4 INJECTION INTRAMUSCULAR; INTRAVENOUS EVERY 6 HOURS PRN
Status: DISCONTINUED | OUTPATIENT
Start: 2020-11-25 | End: 2020-11-28 | Stop reason: HOSPADM

## 2020-11-25 RX ORDER — MYCOPHENOLATE MOFETIL 250 MG/1
1000 CAPSULE ORAL DAILY
Status: DISCONTINUED | OUTPATIENT
Start: 2020-11-25 | End: 2020-11-28 | Stop reason: HOSPADM

## 2020-11-25 RX ORDER — AZITHROMYCIN 250 MG/1
500 TABLET, FILM COATED ORAL ONCE
Status: COMPLETED | OUTPATIENT
Start: 2020-11-25 | End: 2020-11-25

## 2020-11-25 RX ORDER — MYCOPHENOLATE MOFETIL 500 MG/1
500 TABLET ORAL DAILY
COMMUNITY
End: 2020-12-19 | Stop reason: ALTCHOICE

## 2020-11-25 RX ORDER — GEMFIBROZIL 600 MG/1
600 TABLET, FILM COATED ORAL 2 TIMES DAILY
Status: DISCONTINUED | OUTPATIENT
Start: 2020-11-25 | End: 2020-11-28 | Stop reason: HOSPADM

## 2020-11-25 RX ORDER — PREDNISONE 20 MG/1
60 TABLET ORAL DAILY
Status: DISCONTINUED | OUTPATIENT
Start: 2020-11-25 | End: 2020-11-28 | Stop reason: HOSPADM

## 2020-11-25 RX ORDER — MYCOPHENOLATE MOFETIL 250 MG/1
500 CAPSULE ORAL DAILY
Status: DISCONTINUED | OUTPATIENT
Start: 2020-11-25 | End: 2020-11-28 | Stop reason: HOSPADM

## 2020-11-25 RX ORDER — LABETALOL 20 MG/4 ML (5 MG/ML) INTRAVENOUS SYRINGE
10 EVERY 6 HOURS PRN
Status: DISCONTINUED | OUTPATIENT
Start: 2020-11-25 | End: 2020-11-28 | Stop reason: HOSPADM

## 2020-11-25 RX ORDER — ACETAMINOPHEN 325 MG/1
650 TABLET ORAL EVERY 6 HOURS PRN
Status: DISCONTINUED | OUTPATIENT
Start: 2020-11-25 | End: 2020-11-28 | Stop reason: HOSPADM

## 2020-11-25 RX ORDER — MYCOPHENOLATE MOFETIL 500 MG/1
500 TABLET ORAL
COMMUNITY
End: 2020-12-19 | Stop reason: ALTCHOICE

## 2020-11-25 RX ADMIN — AZITHROMYCIN MONOHYDRATE 500 MG: 250 TABLET ORAL at 02:53

## 2020-11-25 RX ADMIN — PREDNISONE 60 MG: 20 TABLET ORAL at 08:37

## 2020-11-25 RX ADMIN — IODIXANOL 85 ML: 320 INJECTION, SOLUTION INTRAVASCULAR at 00:15

## 2020-11-25 RX ADMIN — CEFTRIAXONE SODIUM 1000 MG: 10 INJECTION, POWDER, FOR SOLUTION INTRAVENOUS at 02:54

## 2020-11-25 RX ADMIN — LABETALOL 20 MG/4 ML (5 MG/ML) INTRAVENOUS SYRINGE 10 MG: at 17:34

## 2020-11-25 RX ADMIN — GEMFIBROZIL 600 MG: 600 TABLET, FILM COATED ORAL at 17:34

## 2020-11-25 RX ADMIN — GEMFIBROZIL 600 MG: 600 TABLET, FILM COATED ORAL at 08:38

## 2020-11-25 RX ADMIN — FUROSEMIDE 40 MG: 10 INJECTION, SOLUTION INTRAMUSCULAR; INTRAVENOUS at 07:43

## 2020-11-25 RX ADMIN — MYCOPHENOLATE MOFETIL 500 MG: 250 CAPSULE ORAL at 07:42

## 2020-11-25 RX ADMIN — MYCOPHENOLATE MOFETIL 500 MG: 250 CAPSULE ORAL at 21:26

## 2020-11-25 RX ADMIN — MYCOPHENOLATE MOFETIL 1000 MG: 250 CAPSULE ORAL at 11:54

## 2020-11-25 RX ADMIN — FUROSEMIDE 40 MG: 10 INJECTION, SOLUTION INTRAMUSCULAR; INTRAVENOUS at 16:34

## 2020-11-26 LAB
ALBUMIN SERPL BCP-MCNC: 2.3 G/DL (ref 3.5–5)
ALP SERPL-CCNC: 70 U/L (ref 46–116)
ALT SERPL W P-5'-P-CCNC: 19 U/L (ref 12–78)
ANION GAP SERPL CALCULATED.3IONS-SCNC: 11 MMOL/L (ref 4–13)
AST SERPL W P-5'-P-CCNC: 19 U/L (ref 5–45)
BILIRUB SERPL-MCNC: 0.4 MG/DL (ref 0.2–1)
BUN SERPL-MCNC: 46 MG/DL (ref 5–25)
CA-I BLD-SCNC: 1.08 MMOL/L (ref 1.12–1.32)
CALCIUM ALBUM COR SERPL-MCNC: 10.2 MG/DL (ref 8.3–10.1)
CALCIUM SERPL-MCNC: 8.8 MG/DL (ref 8.3–10.1)
CHLORIDE SERPL-SCNC: 101 MMOL/L (ref 100–108)
CO2 SERPL-SCNC: 26 MMOL/L (ref 21–32)
CREAT SERPL-MCNC: 1.53 MG/DL (ref 0.6–1.3)
ERYTHROCYTE [DISTWIDTH] IN BLOOD BY AUTOMATED COUNT: 14.5 % (ref 11.6–15.1)
GFR SERPL CREATININE-BSD FRML MDRD: 51 ML/MIN/1.73SQ M
GLUCOSE SERPL-MCNC: 74 MG/DL (ref 65–140)
HCT VFR BLD AUTO: 34.2 % (ref 36.5–49.3)
HGB BLD-MCNC: 10.9 G/DL (ref 12–17)
MCH RBC QN AUTO: 31.3 PG (ref 26.8–34.3)
MCHC RBC AUTO-ENTMCNC: 31.9 G/DL (ref 31.4–37.4)
MCV RBC AUTO: 98 FL (ref 82–98)
PLATELET # BLD AUTO: 157 THOUSANDS/UL (ref 149–390)
PMV BLD AUTO: 10.4 FL (ref 8.9–12.7)
POTASSIUM SERPL-SCNC: 3.8 MMOL/L (ref 3.5–5.3)
PROT SERPL-MCNC: 6.4 G/DL (ref 6.4–8.2)
RBC # BLD AUTO: 3.48 MILLION/UL (ref 3.88–5.62)
SODIUM SERPL-SCNC: 138 MMOL/L (ref 136–145)
WBC # BLD AUTO: 5.49 THOUSAND/UL (ref 4.31–10.16)

## 2020-11-26 PROCEDURE — 85027 COMPLETE CBC AUTOMATED: CPT | Performed by: PSYCHIATRY & NEUROLOGY

## 2020-11-26 PROCEDURE — 82330 ASSAY OF CALCIUM: CPT | Performed by: NURSE PRACTITIONER

## 2020-11-26 PROCEDURE — 99233 SBSQ HOSP IP/OBS HIGH 50: CPT | Performed by: INTERNAL MEDICINE

## 2020-11-26 PROCEDURE — 99233 SBSQ HOSP IP/OBS HIGH 50: CPT | Performed by: NURSE PRACTITIONER

## 2020-11-26 PROCEDURE — 99232 SBSQ HOSP IP/OBS MODERATE 35: CPT | Performed by: INTERNAL MEDICINE

## 2020-11-26 PROCEDURE — 80053 COMPREHEN METABOLIC PANEL: CPT | Performed by: NURSE PRACTITIONER

## 2020-11-26 RX ORDER — FUROSEMIDE 40 MG/1
40 TABLET ORAL DAILY
Status: DISCONTINUED | OUTPATIENT
Start: 2020-11-27 | End: 2020-11-28

## 2020-11-26 RX ORDER — FUROSEMIDE 10 MG/ML
40 INJECTION INTRAMUSCULAR; INTRAVENOUS DAILY
Status: DISCONTINUED | OUTPATIENT
Start: 2020-11-27 | End: 2020-11-26

## 2020-11-26 RX ORDER — AMLODIPINE BESYLATE 2.5 MG/1
2.5 TABLET ORAL DAILY
Status: DISCONTINUED | OUTPATIENT
Start: 2020-11-26 | End: 2020-11-27

## 2020-11-26 RX ADMIN — FUROSEMIDE 40 MG: 10 INJECTION, SOLUTION INTRAMUSCULAR; INTRAVENOUS at 08:15

## 2020-11-26 RX ADMIN — MYCOPHENOLATE MOFETIL 1000 MG: 250 CAPSULE ORAL at 13:03

## 2020-11-26 RX ADMIN — MYCOPHENOLATE MOFETIL 500 MG: 250 CAPSULE ORAL at 21:21

## 2020-11-26 RX ADMIN — GEMFIBROZIL 600 MG: 600 TABLET, FILM COATED ORAL at 17:08

## 2020-11-26 RX ADMIN — MYCOPHENOLATE MOFETIL 500 MG: 250 CAPSULE ORAL at 08:51

## 2020-11-26 RX ADMIN — AMLODIPINE BESYLATE 2.5 MG: 2.5 TABLET ORAL at 09:01

## 2020-11-26 RX ADMIN — GEMFIBROZIL 600 MG: 600 TABLET, FILM COATED ORAL at 13:02

## 2020-11-26 RX ADMIN — PREDNISONE 60 MG: 20 TABLET ORAL at 08:51

## 2020-11-27 PROBLEM — I50.9 CHF (CONGESTIVE HEART FAILURE) (HCC): Status: ACTIVE | Noted: 2020-11-25

## 2020-11-27 PROBLEM — I50.31 ACUTE DIASTOLIC CONGESTIVE HEART FAILURE (HCC): Status: ACTIVE | Noted: 2020-11-25

## 2020-11-27 LAB
ANION GAP SERPL CALCULATED.3IONS-SCNC: 8 MMOL/L (ref 4–13)
BUN SERPL-MCNC: 54 MG/DL (ref 5–25)
CALCIUM SERPL-MCNC: 9 MG/DL (ref 8.3–10.1)
CHLORIDE SERPL-SCNC: 100 MMOL/L (ref 100–108)
CO2 SERPL-SCNC: 29 MMOL/L (ref 21–32)
CREAT SERPL-MCNC: 1.64 MG/DL (ref 0.6–1.3)
GFR SERPL CREATININE-BSD FRML MDRD: 47 ML/MIN/1.73SQ M
GLUCOSE SERPL-MCNC: 81 MG/DL (ref 65–140)
POTASSIUM SERPL-SCNC: 4.2 MMOL/L (ref 3.5–5.3)
SODIUM SERPL-SCNC: 137 MMOL/L (ref 136–145)

## 2020-11-27 PROCEDURE — 80048 BASIC METABOLIC PNL TOTAL CA: CPT | Performed by: INTERNAL MEDICINE

## 2020-11-27 PROCEDURE — 99233 SBSQ HOSP IP/OBS HIGH 50: CPT | Performed by: INTERNAL MEDICINE

## 2020-11-27 PROCEDURE — 99232 SBSQ HOSP IP/OBS MODERATE 35: CPT | Performed by: INTERNAL MEDICINE

## 2020-11-27 PROCEDURE — 99233 SBSQ HOSP IP/OBS HIGH 50: CPT | Performed by: NURSE PRACTITIONER

## 2020-11-27 RX ORDER — AMLODIPINE BESYLATE 5 MG/1
5 TABLET ORAL DAILY
Status: DISCONTINUED | OUTPATIENT
Start: 2020-11-28 | End: 2020-11-28

## 2020-11-27 RX ORDER — AMLODIPINE BESYLATE 2.5 MG/1
2.5 TABLET ORAL ONCE
Status: COMPLETED | OUTPATIENT
Start: 2020-11-27 | End: 2020-11-27

## 2020-11-27 RX ORDER — AMLODIPINE BESYLATE 2.5 MG/1
2.5 TABLET ORAL DAILY
Status: DISCONTINUED | OUTPATIENT
Start: 2020-11-27 | End: 2020-11-27

## 2020-11-27 RX ADMIN — FUROSEMIDE 40 MG: 40 TABLET ORAL at 08:34

## 2020-11-27 RX ADMIN — AMLODIPINE BESYLATE 2.5 MG: 2.5 TABLET ORAL at 16:36

## 2020-11-27 RX ADMIN — PREDNISONE 60 MG: 20 TABLET ORAL at 08:34

## 2020-11-27 RX ADMIN — AMLODIPINE BESYLATE 2.5 MG: 2.5 TABLET ORAL at 08:34

## 2020-11-27 RX ADMIN — MYCOPHENOLATE MOFETIL 500 MG: 250 CAPSULE ORAL at 22:32

## 2020-11-27 RX ADMIN — GEMFIBROZIL 600 MG: 600 TABLET, FILM COATED ORAL at 16:39

## 2020-11-27 RX ADMIN — GEMFIBROZIL 600 MG: 600 TABLET, FILM COATED ORAL at 08:34

## 2020-11-27 RX ADMIN — LABETALOL 20 MG/4 ML (5 MG/ML) INTRAVENOUS SYRINGE 10 MG: at 18:59

## 2020-11-27 RX ADMIN — MYCOPHENOLATE MOFETIL 1000 MG: 250 CAPSULE ORAL at 12:43

## 2020-11-27 RX ADMIN — MYCOPHENOLATE MOFETIL 500 MG: 250 CAPSULE ORAL at 08:33

## 2020-11-28 VITALS
BODY MASS INDEX: 21.7 KG/M2 | HEIGHT: 70 IN | WEIGHT: 151.6 LBS | OXYGEN SATURATION: 99 % | TEMPERATURE: 97.8 F | HEART RATE: 86 BPM | RESPIRATION RATE: 18 BRPM | DIASTOLIC BLOOD PRESSURE: 106 MMHG | SYSTOLIC BLOOD PRESSURE: 178 MMHG

## 2020-11-28 LAB
ANION GAP SERPL CALCULATED.3IONS-SCNC: 11 MMOL/L (ref 4–13)
BUN SERPL-MCNC: 54 MG/DL (ref 5–25)
CALCIUM SERPL-MCNC: 9 MG/DL (ref 8.3–10.1)
CHLORIDE SERPL-SCNC: 101 MMOL/L (ref 100–108)
CO2 SERPL-SCNC: 24 MMOL/L (ref 21–32)
CREAT SERPL-MCNC: 1.73 MG/DL (ref 0.6–1.3)
ERYTHROCYTE [DISTWIDTH] IN BLOOD BY AUTOMATED COUNT: 13.9 % (ref 11.6–15.1)
GFR SERPL CREATININE-BSD FRML MDRD: 44 ML/MIN/1.73SQ M
GLUCOSE SERPL-MCNC: 83 MG/DL (ref 65–140)
HCT VFR BLD AUTO: 36.7 % (ref 36.5–49.3)
HGB BLD-MCNC: 11.5 G/DL (ref 12–17)
MCH RBC QN AUTO: 31.2 PG (ref 26.8–34.3)
MCHC RBC AUTO-ENTMCNC: 31.3 G/DL (ref 31.4–37.4)
MCV RBC AUTO: 100 FL (ref 82–98)
PLATELET # BLD AUTO: 164 THOUSANDS/UL (ref 149–390)
PMV BLD AUTO: 10.4 FL (ref 8.9–12.7)
POTASSIUM SERPL-SCNC: 3.8 MMOL/L (ref 3.5–5.3)
RBC # BLD AUTO: 3.69 MILLION/UL (ref 3.88–5.62)
SODIUM SERPL-SCNC: 136 MMOL/L (ref 136–145)
WBC # BLD AUTO: 4.72 THOUSAND/UL (ref 4.31–10.16)

## 2020-11-28 PROCEDURE — 99239 HOSP IP/OBS DSCHRG MGMT >30: CPT | Performed by: INTERNAL MEDICINE

## 2020-11-28 PROCEDURE — 99233 SBSQ HOSP IP/OBS HIGH 50: CPT | Performed by: NURSE PRACTITIONER

## 2020-11-28 PROCEDURE — 99233 SBSQ HOSP IP/OBS HIGH 50: CPT | Performed by: INTERNAL MEDICINE

## 2020-11-28 PROCEDURE — 80048 BASIC METABOLIC PNL TOTAL CA: CPT | Performed by: INTERNAL MEDICINE

## 2020-11-28 PROCEDURE — 85027 COMPLETE CBC AUTOMATED: CPT | Performed by: PSYCHIATRY & NEUROLOGY

## 2020-11-28 RX ORDER — AMLODIPINE BESYLATE 5 MG/1
5 TABLET ORAL ONCE
Status: COMPLETED | OUTPATIENT
Start: 2020-11-28 | End: 2020-11-28

## 2020-11-28 RX ORDER — AMLODIPINE BESYLATE 10 MG/1
10 TABLET ORAL DAILY
Status: DISCONTINUED | OUTPATIENT
Start: 2020-11-29 | End: 2020-11-28 | Stop reason: HOSPADM

## 2020-11-28 RX ORDER — AMLODIPINE BESYLATE 10 MG/1
10 TABLET ORAL DAILY
Qty: 30 TABLET | Refills: 0 | Status: SHIPPED | OUTPATIENT
Start: 2020-11-29 | End: 2021-01-28 | Stop reason: SDUPTHER

## 2020-11-28 RX ORDER — AMLODIPINE BESYLATE 10 MG/1
10 TABLET ORAL DAILY
Qty: 30 TABLET | Refills: 0 | Status: SHIPPED | OUTPATIENT
Start: 2020-11-29 | End: 2020-11-28

## 2020-11-28 RX ORDER — FUROSEMIDE 20 MG/1
20 TABLET ORAL DAILY
Qty: 30 TABLET | Refills: 0 | Status: SHIPPED | OUTPATIENT
Start: 2020-11-28 | End: 2020-11-28 | Stop reason: SDUPTHER

## 2020-11-28 RX ORDER — METOPROLOL SUCCINATE 25 MG/1
25 TABLET, EXTENDED RELEASE ORAL DAILY
Status: DISCONTINUED | OUTPATIENT
Start: 2020-11-28 | End: 2020-11-28 | Stop reason: HOSPADM

## 2020-11-28 RX ORDER — FUROSEMIDE 20 MG/1
20 TABLET ORAL DAILY
Qty: 30 TABLET | Refills: 0 | Status: SHIPPED | OUTPATIENT
Start: 2020-11-28 | End: 2020-12-18 | Stop reason: SDUPTHER

## 2020-11-28 RX ADMIN — GEMFIBROZIL 600 MG: 600 TABLET, FILM COATED ORAL at 08:43

## 2020-11-28 RX ADMIN — MYCOPHENOLATE MOFETIL 500 MG: 250 CAPSULE ORAL at 08:42

## 2020-11-28 RX ADMIN — PREDNISONE 60 MG: 20 TABLET ORAL at 08:42

## 2020-11-28 RX ADMIN — AMLODIPINE BESYLATE 5 MG: 5 TABLET ORAL at 10:31

## 2020-11-28 RX ADMIN — AMLODIPINE BESYLATE 5 MG: 5 TABLET ORAL at 08:43

## 2020-11-28 RX ADMIN — METOPROLOL SUCCINATE 25 MG: 25 TABLET, EXTENDED RELEASE ORAL at 08:43

## 2020-11-28 RX ADMIN — MYCOPHENOLATE MOFETIL 1000 MG: 250 CAPSULE ORAL at 12:20

## 2020-11-30 LAB
BACTERIA BLD CULT: NORMAL
BACTERIA BLD CULT: NORMAL

## 2020-12-01 ENCOUNTER — TRANSCRIBE ORDERS (OUTPATIENT)
Dept: LAB | Facility: CLINIC | Age: 55
End: 2020-12-01

## 2020-12-01 ENCOUNTER — LAB (OUTPATIENT)
Dept: LAB | Facility: CLINIC | Age: 55
End: 2020-12-01
Payer: COMMERCIAL

## 2020-12-01 DIAGNOSIS — M32.19 OTHER SYSTEMIC LUPUS ERYTHEMATOSUS WITH OTHER ORGAN INVOLVEMENT (HCC): ICD-10-CM

## 2020-12-01 DIAGNOSIS — I83.893 VARICOSE VEINS OF BOTH LEGS WITH EDEMA: Primary | ICD-10-CM

## 2020-12-01 DIAGNOSIS — N18.1 CHRONIC KIDNEY DISEASE (CKD), STAGE I: ICD-10-CM

## 2020-12-01 DIAGNOSIS — N17.9 AKI (ACUTE KIDNEY INJURY) (HCC): ICD-10-CM

## 2020-12-01 DIAGNOSIS — M32.19 OTHER SYSTEMIC LUPUS ERYTHEMATOSUS WITH OTHER ORGAN INVOLVEMENT (HCC): Primary | ICD-10-CM

## 2020-12-01 DIAGNOSIS — M32.14 LUPUS NEPHRITIS (HCC): ICD-10-CM

## 2020-12-01 LAB
ALBUMIN SERPL BCP-MCNC: 3 G/DL (ref 3.5–5)
ALP SERPL-CCNC: 74 U/L (ref 46–116)
ALT SERPL W P-5'-P-CCNC: 28 U/L (ref 12–78)
ANION GAP SERPL CALCULATED.3IONS-SCNC: 12 MMOL/L (ref 4–13)
AST SERPL W P-5'-P-CCNC: 39 U/L (ref 5–45)
BACTERIA UR QL AUTO: ABNORMAL /HPF
BASOPHILS # BLD AUTO: 0 THOUSANDS/ΜL (ref 0–0.1)
BASOPHILS NFR BLD AUTO: 0 % (ref 0–1)
BILIRUB SERPL-MCNC: 0.54 MG/DL (ref 0.2–1)
BILIRUB UR QL STRIP: NEGATIVE
BUN SERPL-MCNC: 52 MG/DL (ref 5–25)
CALCIUM ALBUM COR SERPL-MCNC: 9.8 MG/DL (ref 8.3–10.1)
CALCIUM SERPL-MCNC: 9 MG/DL (ref 8.3–10.1)
CHLORIDE SERPL-SCNC: 102 MMOL/L (ref 100–108)
CLARITY UR: CLEAR
CO2 SERPL-SCNC: 22 MMOL/L (ref 21–32)
COLOR UR: YELLOW
CREAT SERPL-MCNC: 1.83 MG/DL (ref 0.6–1.3)
CREAT UR-MCNC: 101 MG/DL
EOSINOPHIL # BLD AUTO: 0.02 THOUSAND/ΜL (ref 0–0.61)
EOSINOPHIL NFR BLD AUTO: 0 % (ref 0–6)
ERYTHROCYTE [DISTWIDTH] IN BLOOD BY AUTOMATED COUNT: 13.7 % (ref 11.6–15.1)
ERYTHROCYTE [SEDIMENTATION RATE] IN BLOOD: 62 MM/HOUR (ref 0–19)
GFR SERPL CREATININE-BSD FRML MDRD: 41 ML/MIN/1.73SQ M
GLUCOSE P FAST SERPL-MCNC: 79 MG/DL (ref 65–99)
GLUCOSE UR STRIP-MCNC: NEGATIVE MG/DL
HCT VFR BLD AUTO: 37.3 % (ref 36.5–49.3)
HGB BLD-MCNC: 12 G/DL (ref 12–17)
HGB UR QL STRIP.AUTO: ABNORMAL
HYALINE CASTS #/AREA URNS LPF: ABNORMAL /LPF
IMM GRANULOCYTES # BLD AUTO: 0.11 THOUSAND/UL (ref 0–0.2)
IMM GRANULOCYTES NFR BLD AUTO: 2 % (ref 0–2)
KETONES UR STRIP-MCNC: NEGATIVE MG/DL
LEUKOCYTE ESTERASE UR QL STRIP: NEGATIVE
LYMPHOCYTES # BLD AUTO: 1.27 THOUSANDS/ΜL (ref 0.6–4.47)
LYMPHOCYTES NFR BLD AUTO: 25 % (ref 14–44)
MAGNESIUM SERPL-MCNC: 2 MG/DL (ref 1.6–2.6)
MCH RBC QN AUTO: 31.3 PG (ref 26.8–34.3)
MCHC RBC AUTO-ENTMCNC: 32.2 G/DL (ref 31.4–37.4)
MCV RBC AUTO: 97 FL (ref 82–98)
MONOCYTES # BLD AUTO: 0.63 THOUSAND/ΜL (ref 0.17–1.22)
MONOCYTES NFR BLD AUTO: 12 % (ref 4–12)
NEUTROPHILS # BLD AUTO: 3.16 THOUSANDS/ΜL (ref 1.85–7.62)
NEUTS SEG NFR BLD AUTO: 61 % (ref 43–75)
NITRITE UR QL STRIP: NEGATIVE
NON-SQ EPI CELLS URNS QL MICRO: ABNORMAL /HPF
NRBC BLD AUTO-RTO: 0 /100 WBCS
PH UR STRIP.AUTO: 5 [PH]
PHOSPHATE SERPL-MCNC: 4.3 MG/DL (ref 2.7–4.5)
PLATELET # BLD AUTO: 179 THOUSANDS/UL (ref 149–390)
PMV BLD AUTO: 10.9 FL (ref 8.9–12.7)
POTASSIUM SERPL-SCNC: 4.7 MMOL/L (ref 3.5–5.3)
PROT SERPL-MCNC: 7.2 G/DL (ref 6.4–8.2)
PROT UR STRIP-MCNC: ABNORMAL MG/DL
PROT UR-MCNC: 462 MG/DL
PROT/CREAT UR: 4.57 MG/G{CREAT} (ref 0–0.1)
RBC # BLD AUTO: 3.83 MILLION/UL (ref 3.88–5.62)
RBC #/AREA URNS AUTO: ABNORMAL /HPF
SODIUM SERPL-SCNC: 136 MMOL/L (ref 136–145)
SP GR UR STRIP.AUTO: 1.02 (ref 1–1.03)
UROBILINOGEN UR QL STRIP.AUTO: 0.2 E.U./DL
WBC # BLD AUTO: 5.19 THOUSAND/UL (ref 4.31–10.16)
WBC #/AREA URNS AUTO: ABNORMAL /HPF

## 2020-12-01 PROCEDURE — 85652 RBC SED RATE AUTOMATED: CPT

## 2020-12-01 PROCEDURE — 84100 ASSAY OF PHOSPHORUS: CPT

## 2020-12-01 PROCEDURE — 80053 COMPREHEN METABOLIC PANEL: CPT

## 2020-12-01 PROCEDURE — 83735 ASSAY OF MAGNESIUM: CPT

## 2020-12-01 PROCEDURE — 81001 URINALYSIS AUTO W/SCOPE: CPT

## 2020-12-01 PROCEDURE — 36415 COLL VENOUS BLD VENIPUNCTURE: CPT

## 2020-12-01 PROCEDURE — 82570 ASSAY OF URINE CREATININE: CPT

## 2020-12-01 PROCEDURE — 85025 COMPLETE CBC W/AUTO DIFF WBC: CPT

## 2020-12-01 PROCEDURE — 84156 ASSAY OF PROTEIN URINE: CPT

## 2020-12-04 ENCOUNTER — OFFICE VISIT (OUTPATIENT)
Dept: NEPHROLOGY | Facility: CLINIC | Age: 55
End: 2020-12-04
Payer: COMMERCIAL

## 2020-12-04 VITALS
DIASTOLIC BLOOD PRESSURE: 100 MMHG | HEIGHT: 70 IN | WEIGHT: 162 LBS | BODY MASS INDEX: 23.19 KG/M2 | SYSTOLIC BLOOD PRESSURE: 172 MMHG

## 2020-12-04 DIAGNOSIS — N18.1 CHRONIC KIDNEY DISEASE (CKD), STAGE I: ICD-10-CM

## 2020-12-04 DIAGNOSIS — N17.9 AKI (ACUTE KIDNEY INJURY) (HCC): ICD-10-CM

## 2020-12-04 DIAGNOSIS — I10 ESSENTIAL HYPERTENSION: ICD-10-CM

## 2020-12-04 DIAGNOSIS — M32.14 LUPUS NEPHRITIS (HCC): Primary | ICD-10-CM

## 2020-12-04 DIAGNOSIS — R80.9 NEPHROTIC RANGE PROTEINURIA: ICD-10-CM

## 2020-12-04 PROBLEM — I16.1 HYPERTENSIVE EMERGENCY: Status: RESOLVED | Noted: 2020-11-25 | Resolved: 2020-12-04

## 2020-12-04 PROCEDURE — 99214 OFFICE O/P EST MOD 30 MIN: CPT | Performed by: INTERNAL MEDICINE

## 2020-12-04 RX ORDER — METOPROLOL SUCCINATE 25 MG/1
25 TABLET, EXTENDED RELEASE ORAL DAILY
Qty: 30 TABLET | Refills: 4 | Status: SHIPPED | OUTPATIENT
Start: 2020-12-04 | End: 2020-12-10 | Stop reason: SDUPTHER

## 2020-12-05 ENCOUNTER — HOSPITAL ENCOUNTER (OUTPATIENT)
Facility: HOSPITAL | Age: 55
Setting detail: OBSERVATION
Discharge: HOME | End: 2020-12-06
Attending: EMERGENCY MEDICINE | Admitting: INTERNAL MEDICINE
Payer: COMMERCIAL

## 2020-12-05 ENCOUNTER — APPOINTMENT (EMERGENCY)
Dept: RADIOLOGY | Facility: HOSPITAL | Age: 55
End: 2020-12-05
Payer: COMMERCIAL

## 2020-12-05 DIAGNOSIS — I50.33 ACUTE ON CHRONIC DIASTOLIC HEART FAILURE (HCC): Primary | ICD-10-CM

## 2020-12-05 PROBLEM — R06.02 SOB (SHORTNESS OF BREATH): Status: ACTIVE | Noted: 2020-12-05

## 2020-12-05 PROBLEM — I50.32 CHRONIC DIASTOLIC CONGESTIVE HEART FAILURE (HCC): Chronic | Status: ACTIVE | Noted: 2020-11-25

## 2020-12-05 LAB
ALBUMIN SERPL BCP-MCNC: 2.6 G/DL (ref 3.5–5)
ALP SERPL-CCNC: 84 U/L (ref 46–116)
ALT SERPL W P-5'-P-CCNC: 23 U/L (ref 12–78)
ANION GAP SERPL CALCULATED.3IONS-SCNC: 9 MMOL/L (ref 4–13)
AST SERPL W P-5'-P-CCNC: 22 U/L (ref 5–45)
BASOPHILS # BLD AUTO: 0.02 THOUSANDS/ΜL (ref 0–0.1)
BASOPHILS NFR BLD AUTO: 0 % (ref 0–1)
BILIRUB SERPL-MCNC: 0.37 MG/DL (ref 0.2–1)
BUN SERPL-MCNC: 48 MG/DL (ref 5–25)
CALCIUM ALBUM COR SERPL-MCNC: 10.3 MG/DL (ref 8.3–10.1)
CALCIUM SERPL-MCNC: 9.2 MG/DL (ref 8.3–10.1)
CHLORIDE SERPL-SCNC: 104 MMOL/L (ref 100–108)
CO2 SERPL-SCNC: 23 MMOL/L (ref 21–32)
CREAT SERPL-MCNC: 1.97 MG/DL (ref 0.6–1.3)
EOSINOPHIL # BLD AUTO: 0 THOUSAND/ΜL (ref 0–0.61)
EOSINOPHIL NFR BLD AUTO: 0 % (ref 0–6)
ERYTHROCYTE [DISTWIDTH] IN BLOOD BY AUTOMATED COUNT: 14.5 % (ref 11.6–15.1)
FLUAV RNA RESP QL NAA+PROBE: NEGATIVE
FLUBV RNA RESP QL NAA+PROBE: NEGATIVE
GFR SERPL CREATININE-BSD FRML MDRD: 37 ML/MIN/1.73SQ M
GLUCOSE SERPL-MCNC: 92 MG/DL (ref 65–140)
HCT VFR BLD AUTO: 34.7 % (ref 36.5–49.3)
HGB BLD-MCNC: 11.3 G/DL (ref 12–17)
IMM GRANULOCYTES # BLD AUTO: 0.13 THOUSAND/UL (ref 0–0.2)
IMM GRANULOCYTES NFR BLD AUTO: 1 % (ref 0–2)
LYMPHOCYTES # BLD AUTO: 0.43 THOUSANDS/ΜL (ref 0.6–4.47)
LYMPHOCYTES NFR BLD AUTO: 5 % (ref 14–44)
MCH RBC QN AUTO: 32.1 PG (ref 26.8–34.3)
MCHC RBC AUTO-ENTMCNC: 32.6 G/DL (ref 31.4–37.4)
MCV RBC AUTO: 99 FL (ref 82–98)
MONOCYTES # BLD AUTO: 0.21 THOUSAND/ΜL (ref 0.17–1.22)
MONOCYTES NFR BLD AUTO: 2 % (ref 4–12)
NEUTROPHILS # BLD AUTO: 8.74 THOUSANDS/ΜL (ref 1.85–7.62)
NEUTS SEG NFR BLD AUTO: 92 % (ref 43–75)
NRBC BLD AUTO-RTO: 0 /100 WBCS
NT-PROBNP SERPL-MCNC: ABNORMAL PG/ML
PLATELET # BLD AUTO: 155 THOUSANDS/UL (ref 149–390)
PMV BLD AUTO: 11.5 FL (ref 8.9–12.7)
POTASSIUM SERPL-SCNC: 4.4 MMOL/L (ref 3.5–5.3)
PROT SERPL-MCNC: 6.8 G/DL (ref 6.4–8.2)
RBC # BLD AUTO: 3.52 MILLION/UL (ref 3.88–5.62)
RSV RNA RESP QL NAA+PROBE: NEGATIVE
SARS-COV-2 RNA RESP QL NAA+PROBE: NEGATIVE
SODIUM SERPL-SCNC: 136 MMOL/L (ref 136–145)
TROPONIN I SERPL-MCNC: 0.08 NG/ML
WBC # BLD AUTO: 9.53 THOUSAND/UL (ref 4.31–10.16)

## 2020-12-05 PROCEDURE — 71045 X-RAY EXAM CHEST 1 VIEW: CPT

## 2020-12-05 PROCEDURE — 99285 EMERGENCY DEPT VISIT HI MDM: CPT

## 2020-12-05 PROCEDURE — 84484 ASSAY OF TROPONIN QUANT: CPT | Performed by: EMERGENCY MEDICINE

## 2020-12-05 PROCEDURE — 99285 EMERGENCY DEPT VISIT HI MDM: CPT | Performed by: EMERGENCY MEDICINE

## 2020-12-05 PROCEDURE — 36415 COLL VENOUS BLD VENIPUNCTURE: CPT

## 2020-12-05 PROCEDURE — 93005 ELECTROCARDIOGRAM TRACING: CPT

## 2020-12-05 PROCEDURE — 80053 COMPREHEN METABOLIC PANEL: CPT | Performed by: EMERGENCY MEDICINE

## 2020-12-05 PROCEDURE — 85025 COMPLETE CBC W/AUTO DIFF WBC: CPT | Performed by: EMERGENCY MEDICINE

## 2020-12-05 PROCEDURE — 96374 THER/PROPH/DIAG INJ IV PUSH: CPT

## 2020-12-05 PROCEDURE — 0241U HB NFCT DS VIR RESP RNA 4 TRGT: CPT | Performed by: PHYSICIAN ASSISTANT

## 2020-12-05 PROCEDURE — 83880 ASSAY OF NATRIURETIC PEPTIDE: CPT | Performed by: EMERGENCY MEDICINE

## 2020-12-05 PROCEDURE — 99220 PR INITIAL OBSERVATION CARE/DAY 70 MINUTES: CPT | Performed by: INTERNAL MEDICINE

## 2020-12-05 RX ORDER — METOPROLOL SUCCINATE 25 MG/1
25 TABLET, EXTENDED RELEASE ORAL DAILY
Status: DISCONTINUED | OUTPATIENT
Start: 2020-12-06 | End: 2020-12-06 | Stop reason: HOSPADM

## 2020-12-05 RX ORDER — ONDANSETRON 2 MG/ML
4 INJECTION INTRAMUSCULAR; INTRAVENOUS EVERY 6 HOURS PRN
Status: DISCONTINUED | OUTPATIENT
Start: 2020-12-05 | End: 2020-12-06 | Stop reason: HOSPADM

## 2020-12-05 RX ORDER — PREDNISONE 20 MG/1
60 TABLET ORAL DAILY
Status: DISCONTINUED | OUTPATIENT
Start: 2020-12-06 | End: 2020-12-06 | Stop reason: HOSPADM

## 2020-12-05 RX ORDER — MYCOPHENOLATE MOFETIL 250 MG/1
1000 CAPSULE ORAL EVERY MORNING
Status: DISCONTINUED | OUTPATIENT
Start: 2020-12-06 | End: 2020-12-06 | Stop reason: HOSPADM

## 2020-12-05 RX ORDER — FUROSEMIDE 10 MG/ML
40 INJECTION INTRAMUSCULAR; INTRAVENOUS ONCE
Status: COMPLETED | OUTPATIENT
Start: 2020-12-05 | End: 2020-12-05

## 2020-12-05 RX ORDER — MYCOPHENOLATE MOFETIL 250 MG/1
1000 CAPSULE ORAL
Status: DISCONTINUED | OUTPATIENT
Start: 2020-12-06 | End: 2020-12-06 | Stop reason: HOSPADM

## 2020-12-05 RX ORDER — ACETAMINOPHEN 325 MG/1
650 TABLET ORAL EVERY 6 HOURS PRN
Status: DISCONTINUED | OUTPATIENT
Start: 2020-12-05 | End: 2020-12-06 | Stop reason: HOSPADM

## 2020-12-05 RX ORDER — AMLODIPINE BESYLATE 5 MG/1
10 TABLET ORAL EVERY EVENING
Status: DISCONTINUED | OUTPATIENT
Start: 2020-12-05 | End: 2020-12-06 | Stop reason: HOSPADM

## 2020-12-05 RX ORDER — MYCOPHENOLATE MOFETIL 250 MG/1
500 CAPSULE ORAL
Status: DISCONTINUED | OUTPATIENT
Start: 2020-12-05 | End: 2020-12-06 | Stop reason: HOSPADM

## 2020-12-05 RX ORDER — FUROSEMIDE 40 MG/1
20 TABLET ORAL DAILY
Status: DISCONTINUED | OUTPATIENT
Start: 2020-12-06 | End: 2020-12-06 | Stop reason: HOSPADM

## 2020-12-05 RX ADMIN — AMLODIPINE BESYLATE 10 MG: 5 TABLET ORAL at 19:04

## 2020-12-05 RX ADMIN — FUROSEMIDE 40 MG: 10 INJECTION, SOLUTION INTRAMUSCULAR; INTRAVENOUS at 15:08

## 2020-12-05 RX ADMIN — MYCOPHENOLATE MOFETIL 500 MG: 250 CAPSULE ORAL at 22:05

## 2020-12-06 VITALS
SYSTOLIC BLOOD PRESSURE: 154 MMHG | TEMPERATURE: 98.5 F | BODY MASS INDEX: 23.01 KG/M2 | OXYGEN SATURATION: 99 % | HEART RATE: 82 BPM | RESPIRATION RATE: 16 BRPM | WEIGHT: 158.07 LBS | DIASTOLIC BLOOD PRESSURE: 90 MMHG

## 2020-12-06 LAB
ANION GAP SERPL CALCULATED.3IONS-SCNC: 6 MMOL/L (ref 4–13)
BUN SERPL-MCNC: 49 MG/DL (ref 5–25)
CALCIUM SERPL-MCNC: 9 MG/DL (ref 8.3–10.1)
CHLORIDE SERPL-SCNC: 101 MMOL/L (ref 100–108)
CO2 SERPL-SCNC: 26 MMOL/L (ref 21–32)
CREAT SERPL-MCNC: 1.92 MG/DL (ref 0.6–1.3)
GFR SERPL CREATININE-BSD FRML MDRD: 39 ML/MIN/1.73SQ M
GLUCOSE SERPL-MCNC: 79 MG/DL (ref 65–140)
POTASSIUM SERPL-SCNC: 3.9 MMOL/L (ref 3.5–5.3)
SODIUM SERPL-SCNC: 133 MMOL/L (ref 136–145)

## 2020-12-06 PROCEDURE — 36415 COLL VENOUS BLD VENIPUNCTURE: CPT | Performed by: PHYSICIAN ASSISTANT

## 2020-12-06 PROCEDURE — 99217 PR OBSERVATION CARE DISCHARGE MANAGEMENT: CPT | Performed by: PHYSICIAN ASSISTANT

## 2020-12-06 PROCEDURE — 80048 BASIC METABOLIC PNL TOTAL CA: CPT | Performed by: PHYSICIAN ASSISTANT

## 2020-12-07 ENCOUNTER — OFFICE VISIT (OUTPATIENT)
Dept: CARDIOLOGY CLINIC | Facility: CLINIC | Age: 55
End: 2020-12-07
Payer: COMMERCIAL

## 2020-12-07 VITALS
DIASTOLIC BLOOD PRESSURE: 96 MMHG | WEIGHT: 159.4 LBS | BODY MASS INDEX: 23.61 KG/M2 | HEART RATE: 73 BPM | OXYGEN SATURATION: 99 % | SYSTOLIC BLOOD PRESSURE: 170 MMHG | HEIGHT: 69 IN

## 2020-12-07 DIAGNOSIS — N18.1 CHRONIC KIDNEY DISEASE (CKD), STAGE I: ICD-10-CM

## 2020-12-07 DIAGNOSIS — I50.32 CHRONIC DIASTOLIC CONGESTIVE HEART FAILURE (HCC): ICD-10-CM

## 2020-12-07 DIAGNOSIS — R06.00 DOE (DYSPNEA ON EXERTION): ICD-10-CM

## 2020-12-07 DIAGNOSIS — I10 ESSENTIAL HYPERTENSION: ICD-10-CM

## 2020-12-07 DIAGNOSIS — Z09 HOSPITAL DISCHARGE FOLLOW-UP: Primary | ICD-10-CM

## 2020-12-07 DIAGNOSIS — I50.31 ACUTE DIASTOLIC CONGESTIVE HEART FAILURE (HCC): ICD-10-CM

## 2020-12-07 DIAGNOSIS — E78.2 MIXED HYPERLIPIDEMIA: ICD-10-CM

## 2020-12-07 PROCEDURE — 99214 OFFICE O/P EST MOD 30 MIN: CPT | Performed by: NURSE PRACTITIONER

## 2020-12-07 RX ORDER — FUROSEMIDE 40 MG/1
40 TABLET ORAL DAILY
Qty: 30 TABLET | Refills: 1 | Status: SHIPPED | OUTPATIENT
Start: 2020-12-07 | End: 2020-12-28 | Stop reason: HOSPADM

## 2020-12-07 RX ORDER — ATORVASTATIN CALCIUM 40 MG/1
40 TABLET, FILM COATED ORAL DAILY
Qty: 30 TABLET | Refills: 3 | Status: SHIPPED | OUTPATIENT
Start: 2020-12-07 | End: 2021-03-04

## 2020-12-07 RX ORDER — ASPIRIN 81 MG/1
81 TABLET ORAL DAILY
Start: 2020-12-07 | End: 2020-12-28 | Stop reason: HOSPADM

## 2020-12-08 LAB
ATRIAL RATE: 91 BPM
P AXIS: 72 DEGREES
PR INTERVAL: 156 MS
QRS AXIS: 47 DEGREES
QRSD INTERVAL: 92 MS
QT INTERVAL: 336 MS
QTC INTERVAL: 405 MS
T WAVE AXIS: 95 DEGREES
VENTRICULAR RATE: 87 BPM

## 2020-12-08 PROCEDURE — 93010 ELECTROCARDIOGRAM REPORT: CPT | Performed by: INTERNAL MEDICINE

## 2020-12-09 ENCOUNTER — APPOINTMENT (OUTPATIENT)
Dept: LAB | Facility: CLINIC | Age: 55
End: 2020-12-09
Payer: COMMERCIAL

## 2020-12-09 DIAGNOSIS — M32.14 LUPUS NEPHRITIS (HCC): ICD-10-CM

## 2020-12-09 LAB
ANION GAP SERPL CALCULATED.3IONS-SCNC: 12 MMOL/L (ref 4–13)
BUN SERPL-MCNC: 58 MG/DL (ref 5–25)
CALCIUM SERPL-MCNC: 9.1 MG/DL (ref 8.3–10.1)
CHLORIDE SERPL-SCNC: 102 MMOL/L (ref 100–108)
CO2 SERPL-SCNC: 24 MMOL/L (ref 21–32)
CREAT SERPL-MCNC: 2.44 MG/DL (ref 0.6–1.3)
CREAT UR-MCNC: 88 MG/DL
GFR SERPL CREATININE-BSD FRML MDRD: 29 ML/MIN/1.73SQ M
GLUCOSE P FAST SERPL-MCNC: 76 MG/DL (ref 65–99)
POTASSIUM SERPL-SCNC: 3.7 MMOL/L (ref 3.5–5.3)
PROT UR-MCNC: 437 MG/DL
PROT/CREAT UR: 4.97 MG/G{CREAT} (ref 0–0.1)
SODIUM SERPL-SCNC: 138 MMOL/L (ref 136–145)

## 2020-12-09 PROCEDURE — 82570 ASSAY OF URINE CREATININE: CPT

## 2020-12-09 PROCEDURE — 36415 COLL VENOUS BLD VENIPUNCTURE: CPT

## 2020-12-09 PROCEDURE — 80048 BASIC METABOLIC PNL TOTAL CA: CPT

## 2020-12-09 PROCEDURE — 84156 ASSAY OF PROTEIN URINE: CPT

## 2020-12-10 DIAGNOSIS — I10 ESSENTIAL HYPERTENSION: ICD-10-CM

## 2020-12-10 DIAGNOSIS — Z00.00 HEALTHCARE MAINTENANCE: ICD-10-CM

## 2020-12-10 DIAGNOSIS — M32.14 LUPUS NEPHRITIS (HCC): Primary | ICD-10-CM

## 2020-12-10 DIAGNOSIS — Z29.8 NEED FOR PNEUMOCYSTIS PROPHYLAXIS: ICD-10-CM

## 2020-12-10 RX ORDER — ACETAMINOPHEN 325 MG/1
650 TABLET ORAL ONCE
Status: CANCELLED | OUTPATIENT
Start: 2020-12-18

## 2020-12-10 RX ORDER — DIPHENHYDRAMINE HCL 25 MG
25 TABLET ORAL ONCE
Status: CANCELLED | OUTPATIENT
Start: 2020-12-18

## 2020-12-10 RX ORDER — METOPROLOL SUCCINATE 50 MG/1
25 TABLET, EXTENDED RELEASE ORAL DAILY
Qty: 90 TABLET | Refills: 1 | Status: ON HOLD | OUTPATIENT
Start: 2020-12-10 | End: 2020-12-28 | Stop reason: SDUPTHER

## 2020-12-10 RX ORDER — FAMOTIDINE 20 MG/1
20 TABLET, FILM COATED ORAL DAILY
Qty: 90 TABLET | Refills: 1 | Status: SHIPPED | OUTPATIENT
Start: 2020-12-10 | End: 2020-12-28 | Stop reason: HOSPADM

## 2020-12-10 RX ORDER — SULFAMETHOXAZOLE AND TRIMETHOPRIM 800; 160 MG/1; MG/1
1 TABLET ORAL 3 TIMES WEEKLY
Qty: 36 TABLET | Refills: 0 | Status: SHIPPED | OUTPATIENT
Start: 2020-12-11 | End: 2020-12-28 | Stop reason: HOSPADM

## 2020-12-10 RX ORDER — SODIUM CHLORIDE 9 MG/ML
20 INJECTION, SOLUTION INTRAVENOUS ONCE
Status: CANCELLED | OUTPATIENT
Start: 2020-12-18

## 2020-12-14 ENCOUNTER — LAB (OUTPATIENT)
Dept: LAB | Facility: CLINIC | Age: 55
End: 2020-12-14
Payer: COMMERCIAL

## 2020-12-14 DIAGNOSIS — M32.14 LUPUS NEPHRITIS (HCC): ICD-10-CM

## 2020-12-14 LAB
ALBUMIN SERPL BCP-MCNC: 2.9 G/DL (ref 3.5–5)
ALP SERPL-CCNC: 88 U/L (ref 46–116)
ALT SERPL W P-5'-P-CCNC: 37 U/L (ref 12–78)
ANION GAP SERPL CALCULATED.3IONS-SCNC: 11 MMOL/L (ref 4–13)
AST SERPL W P-5'-P-CCNC: 32 U/L (ref 5–45)
BACTERIA UR QL AUTO: ABNORMAL /HPF
BASOPHILS # BLD AUTO: 0.01 THOUSANDS/ΜL (ref 0–0.1)
BASOPHILS NFR BLD AUTO: 0 % (ref 0–1)
BILIRUB SERPL-MCNC: 0.4 MG/DL (ref 0.2–1)
BILIRUB UR QL STRIP: NEGATIVE
BUN SERPL-MCNC: 63 MG/DL (ref 5–25)
CALCIUM ALBUM COR SERPL-MCNC: 10.2 MG/DL (ref 8.3–10.1)
CALCIUM SERPL-MCNC: 9.3 MG/DL (ref 8.3–10.1)
CHLORIDE SERPL-SCNC: 99 MMOL/L (ref 100–108)
CLARITY UR: CLEAR
CO2 SERPL-SCNC: 26 MMOL/L (ref 21–32)
COLOR UR: YELLOW
CREAT SERPL-MCNC: 2.6 MG/DL (ref 0.6–1.3)
EOSINOPHIL # BLD AUTO: 0.03 THOUSAND/ΜL (ref 0–0.61)
EOSINOPHIL NFR BLD AUTO: 0 % (ref 0–6)
ERYTHROCYTE [DISTWIDTH] IN BLOOD BY AUTOMATED COUNT: 13.7 % (ref 11.6–15.1)
GFR SERPL CREATININE-BSD FRML MDRD: 27 ML/MIN/1.73SQ M
GLUCOSE P FAST SERPL-MCNC: 86 MG/DL (ref 65–99)
GLUCOSE UR STRIP-MCNC: NEGATIVE MG/DL
HCT VFR BLD AUTO: 33.1 % (ref 36.5–49.3)
HGB BLD-MCNC: 10.6 G/DL (ref 12–17)
HGB UR QL STRIP.AUTO: ABNORMAL
IMM GRANULOCYTES # BLD AUTO: 0.15 THOUSAND/UL (ref 0–0.2)
IMM GRANULOCYTES NFR BLD AUTO: 2 % (ref 0–2)
KETONES UR STRIP-MCNC: NEGATIVE MG/DL
LEUKOCYTE ESTERASE UR QL STRIP: NEGATIVE
LYMPHOCYTES # BLD AUTO: 1.41 THOUSANDS/ΜL (ref 0.6–4.47)
LYMPHOCYTES NFR BLD AUTO: 19 % (ref 14–44)
MCH RBC QN AUTO: 31.4 PG (ref 26.8–34.3)
MCHC RBC AUTO-ENTMCNC: 32 G/DL (ref 31.4–37.4)
MCV RBC AUTO: 98 FL (ref 82–98)
MONOCYTES # BLD AUTO: 0.72 THOUSAND/ΜL (ref 0.17–1.22)
MONOCYTES NFR BLD AUTO: 9 % (ref 4–12)
NEUTROPHILS # BLD AUTO: 5.32 THOUSANDS/ΜL (ref 1.85–7.62)
NEUTS SEG NFR BLD AUTO: 70 % (ref 43–75)
NITRITE UR QL STRIP: NEGATIVE
NON-SQ EPI CELLS URNS QL MICRO: ABNORMAL /HPF
NRBC BLD AUTO-RTO: 0 /100 WBCS
PH UR STRIP.AUTO: 5 [PH]
PLATELET # BLD AUTO: 185 THOUSANDS/UL (ref 149–390)
PMV BLD AUTO: 10.3 FL (ref 8.9–12.7)
POTASSIUM SERPL-SCNC: 3.7 MMOL/L (ref 3.5–5.3)
PROT SERPL-MCNC: 6.9 G/DL (ref 6.4–8.2)
PROT UR STRIP-MCNC: >=300 MG/DL
RBC # BLD AUTO: 3.38 MILLION/UL (ref 3.88–5.62)
RBC #/AREA URNS AUTO: ABNORMAL /HPF
SODIUM SERPL-SCNC: 136 MMOL/L (ref 136–145)
SP GR UR STRIP.AUTO: 1.02 (ref 1–1.03)
UROBILINOGEN UR QL STRIP.AUTO: 0.2 E.U./DL
WBC # BLD AUTO: 7.64 THOUSAND/UL (ref 4.31–10.16)
WBC #/AREA URNS AUTO: ABNORMAL /HPF

## 2020-12-14 PROCEDURE — 85025 COMPLETE CBC W/AUTO DIFF WBC: CPT

## 2020-12-14 PROCEDURE — 80053 COMPREHEN METABOLIC PANEL: CPT

## 2020-12-14 PROCEDURE — 81001 URINALYSIS AUTO W/SCOPE: CPT

## 2020-12-14 PROCEDURE — 36415 COLL VENOUS BLD VENIPUNCTURE: CPT

## 2020-12-16 ENCOUNTER — HOSPITAL ENCOUNTER (OUTPATIENT)
Dept: ULTRASOUND IMAGING | Facility: HOSPITAL | Age: 55
Discharge: HOME | End: 2020-12-16
Attending: INTERNAL MEDICINE
Payer: COMMERCIAL

## 2020-12-16 DIAGNOSIS — M32.14 LUPUS NEPHRITIS (HCC): ICD-10-CM

## 2020-12-16 PROCEDURE — 51798 US URINE CAPACITY MEASURE: CPT

## 2020-12-17 ENCOUNTER — DOCUMENTATION (OUTPATIENT)
Dept: OTHER | Facility: HOSPITAL | Age: 55
End: 2020-12-17

## 2020-12-17 ENCOUNTER — HOSPITAL ENCOUNTER (OUTPATIENT)
Dept: INFUSION CENTER | Facility: HOSPITAL | Age: 55
Discharge: HOME | End: 2020-12-17
Attending: INTERNAL MEDICINE

## 2020-12-18 ENCOUNTER — HOSPITAL ENCOUNTER (OUTPATIENT)
Dept: INFUSION CENTER | Facility: HOSPITAL | Age: 55
Discharge: HOME | End: 2020-12-18
Attending: INTERNAL MEDICINE
Payer: COMMERCIAL

## 2020-12-18 VITALS
TEMPERATURE: 98.4 F | OXYGEN SATURATION: 96 % | RESPIRATION RATE: 16 BRPM | HEART RATE: 106 BPM | DIASTOLIC BLOOD PRESSURE: 80 MMHG | SYSTOLIC BLOOD PRESSURE: 150 MMHG

## 2020-12-18 DIAGNOSIS — M32.14 LUPUS NEPHRITIS (HCC): Primary | ICD-10-CM

## 2020-12-18 PROCEDURE — 96367 TX/PROPH/DG ADDL SEQ IV INF: CPT

## 2020-12-18 PROCEDURE — 96413 CHEMO IV INFUSION 1 HR: CPT

## 2020-12-18 PROCEDURE — 96415 CHEMO IV INFUSION ADDL HR: CPT

## 2020-12-18 RX ORDER — SODIUM CHLORIDE 9 MG/ML
20 INJECTION, SOLUTION INTRAVENOUS ONCE
Status: COMPLETED | OUTPATIENT
Start: 2020-12-18 | End: 2020-12-18

## 2020-12-18 RX ORDER — DIPHENHYDRAMINE HCL 25 MG
25 TABLET ORAL ONCE
Status: CANCELLED | OUTPATIENT
Start: 2020-12-28

## 2020-12-18 RX ORDER — SODIUM CHLORIDE 9 MG/ML
20 INJECTION, SOLUTION INTRAVENOUS ONCE
Status: CANCELLED | OUTPATIENT
Start: 2020-12-28

## 2020-12-18 RX ORDER — DIPHENHYDRAMINE HCL 25 MG
25 TABLET ORAL ONCE
Status: COMPLETED | OUTPATIENT
Start: 2020-12-18 | End: 2020-12-18

## 2020-12-18 RX ORDER — ACETAMINOPHEN 325 MG/1
650 TABLET ORAL ONCE
Status: CANCELLED | OUTPATIENT
Start: 2020-12-28

## 2020-12-18 RX ORDER — ACETAMINOPHEN 325 MG/1
650 TABLET ORAL ONCE
Status: COMPLETED | OUTPATIENT
Start: 2020-12-18 | End: 2020-12-18

## 2020-12-18 RX ADMIN — DIPHENHYDRAMINE HYDROCHLORIDE 25 MG: 25 TABLET ORAL at 12:18

## 2020-12-18 RX ADMIN — ACETAMINOPHEN 650 MG: 325 TABLET, FILM COATED ORAL at 12:18

## 2020-12-18 RX ADMIN — SODIUM CHLORIDE 500 MG: 0.9 INJECTION, SOLUTION INTRAVENOUS at 12:38

## 2020-12-18 RX ADMIN — SODIUM CHLORIDE 20 ML/HR: 9 INJECTION, SOLUTION INTRAVENOUS at 12:14

## 2020-12-18 RX ADMIN — ONDANSETRON 4 MG: 2 INJECTION INTRAMUSCULAR; INTRAVENOUS at 12:14

## 2020-12-18 RX ADMIN — CYCLOPHOSPHAMIDE 500 MG: 500 INJECTION, POWDER, FOR SOLUTION INTRAVENOUS; ORAL at 13:47

## 2020-12-19 ENCOUNTER — APPOINTMENT (INPATIENT)
Dept: RADIOLOGY | Facility: HOSPITAL | Age: 55
DRG: 545 | End: 2020-12-19
Payer: COMMERCIAL

## 2020-12-19 ENCOUNTER — HOSPITAL ENCOUNTER (INPATIENT)
Facility: HOSPITAL | Age: 55
LOS: 9 days | Discharge: HOME | DRG: 545 | End: 2020-12-28
Attending: EMERGENCY MEDICINE | Admitting: INTERNAL MEDICINE
Payer: COMMERCIAL

## 2020-12-19 ENCOUNTER — APPOINTMENT (INPATIENT)
Dept: ULTRASOUND IMAGING | Facility: HOSPITAL | Age: 55
DRG: 545 | End: 2020-12-19
Payer: COMMERCIAL

## 2020-12-19 ENCOUNTER — APPOINTMENT (INPATIENT)
Dept: CT IMAGING | Facility: HOSPITAL | Age: 55
DRG: 545 | End: 2020-12-19
Payer: COMMERCIAL

## 2020-12-19 ENCOUNTER — APPOINTMENT (EMERGENCY)
Dept: RADIOLOGY | Facility: HOSPITAL | Age: 55
DRG: 545 | End: 2020-12-19
Payer: COMMERCIAL

## 2020-12-19 ENCOUNTER — APPOINTMENT (INPATIENT)
Dept: GASTROENTEROLOGY | Facility: HOSPITAL | Age: 55
DRG: 545 | End: 2020-12-19
Attending: INTERNAL MEDICINE
Payer: COMMERCIAL

## 2020-12-19 DIAGNOSIS — I50.9 CHF (CONGESTIVE HEART FAILURE) (HCC): ICD-10-CM

## 2020-12-19 DIAGNOSIS — I10 ESSENTIAL HYPERTENSION: ICD-10-CM

## 2020-12-19 DIAGNOSIS — M32.14 LUPUS NEPHRITIS (HCC): ICD-10-CM

## 2020-12-19 DIAGNOSIS — M31.0: ICD-10-CM

## 2020-12-19 DIAGNOSIS — R04.2 HEMOPTYSIS: Primary | ICD-10-CM

## 2020-12-19 DIAGNOSIS — R04.89 PULMONARY HEMORRHAGE: ICD-10-CM

## 2020-12-19 DIAGNOSIS — I82.4Y2 ACUTE DEEP VEIN THROMBOSIS (DVT) OF PROXIMAL VEIN OF LEFT LOWER EXTREMITY (HCC): ICD-10-CM

## 2020-12-19 DIAGNOSIS — M32.15 SYSTEMIC LUPUS ERYTHEMATOSUS WITH TUBULO-INTERSTITIAL NEPHROPATHY, UNSPECIFIED SLE TYPE (HCC): ICD-10-CM

## 2020-12-19 DIAGNOSIS — N17.9 ACUTE KIDNEY INJURY SUPERIMPOSED ON CHRONIC KIDNEY DISEASE (HCC): ICD-10-CM

## 2020-12-19 DIAGNOSIS — N17.9 AKI (ACUTE KIDNEY INJURY) (HCC): ICD-10-CM

## 2020-12-19 DIAGNOSIS — N18.9 ACUTE KIDNEY INJURY SUPERIMPOSED ON CHRONIC KIDNEY DISEASE (HCC): ICD-10-CM

## 2020-12-19 DIAGNOSIS — I10 HYPERTENSION: ICD-10-CM

## 2020-12-19 DIAGNOSIS — I50.33 ACUTE ON CHRONIC DIASTOLIC CONGESTIVE HEART FAILURE (HCC): ICD-10-CM

## 2020-12-19 PROBLEM — M32.9 SLE (SYSTEMIC LUPUS ERYTHEMATOSUS) (HCC): Status: ACTIVE | Noted: 2020-12-19

## 2020-12-19 LAB
ALBUMIN SERPL BCP-MCNC: 2.9 G/DL (ref 3.5–5)
ALP SERPL-CCNC: 83 U/L (ref 46–116)
ALT SERPL W P-5'-P-CCNC: 40 U/L (ref 12–78)
ANION GAP SERPL CALCULATED.3IONS-SCNC: 15 MMOL/L (ref 4–13)
APTT PPP: 28 SECONDS (ref 23–37)
AST SERPL W P-5'-P-CCNC: 28 U/L (ref 5–45)
BASOPHILS # BLD AUTO: 0.01 THOUSANDS/ΜL (ref 0–0.1)
BASOPHILS NFR BLD AUTO: 0 % (ref 0–1)
BILIRUB SERPL-MCNC: 0.36 MG/DL (ref 0.2–1)
BUN SERPL-MCNC: 76 MG/DL (ref 5–25)
C3 SERPL-MCNC: 94.1 MG/DL (ref 90–180)
C4 SERPL-MCNC: 18 MG/DL (ref 10–40)
CALCIUM ALBUM COR SERPL-MCNC: 10.3 MG/DL (ref 8.3–10.1)
CALCIUM SERPL-MCNC: 9.4 MG/DL (ref 8.3–10.1)
CHLORIDE SERPL-SCNC: 102 MMOL/L (ref 100–108)
CO2 SERPL-SCNC: 21 MMOL/L (ref 21–32)
CREAT SERPL-MCNC: 3.69 MG/DL (ref 0.6–1.3)
DAT POLY-SP REAG RBC QL: NEGATIVE
EOSINOPHIL # BLD AUTO: 0 THOUSAND/ΜL (ref 0–0.61)
EOSINOPHIL NFR BLD AUTO: 0 % (ref 0–6)
ERYTHROCYTE [DISTWIDTH] IN BLOOD BY AUTOMATED COUNT: 13.9 % (ref 11.6–15.1)
FIBRINOGEN PPP-MCNC: 476 MG/DL (ref 227–495)
FLUAV RNA RESP QL NAA+PROBE: NEGATIVE
FLUBV RNA RESP QL NAA+PROBE: NEGATIVE
GFR SERPL CREATININE-BSD FRML MDRD: 18 ML/MIN/1.73SQ M
GLUCOSE SERPL-MCNC: 124 MG/DL (ref 65–140)
HCT VFR BLD AUTO: 30 % (ref 36.5–49.3)
HGB BLD-MCNC: 9.1 G/DL (ref 12–17)
HGB BLD-MCNC: 9.8 G/DL (ref 12–17)
HGB BLD-MCNC: 9.9 G/DL (ref 12–17)
IMM GRANULOCYTES # BLD AUTO: 0.22 THOUSAND/UL (ref 0–0.2)
IMM GRANULOCYTES NFR BLD AUTO: 2 % (ref 0–2)
INR PPP: 1.04 (ref 0.84–1.19)
LDH SERPL-CCNC: 309 U/L (ref 81–234)
LYMPHOCYTES # BLD AUTO: 0.53 THOUSANDS/ΜL (ref 0.6–4.47)
LYMPHOCYTES NFR BLD AUTO: 5 % (ref 14–44)
MCH RBC QN AUTO: 32 PG (ref 26.8–34.3)
MCHC RBC AUTO-ENTMCNC: 33 G/DL (ref 31.4–37.4)
MCV RBC AUTO: 97 FL (ref 82–98)
MONOCYTES # BLD AUTO: 0.35 THOUSAND/ΜL (ref 0.17–1.22)
MONOCYTES NFR BLD AUTO: 3 % (ref 4–12)
NEUTROPHILS # BLD AUTO: 10.34 THOUSANDS/ΜL (ref 1.85–7.62)
NEUTS SEG NFR BLD AUTO: 90 % (ref 43–75)
NRBC BLD AUTO-RTO: 0 /100 WBCS
NT-PROBNP SERPL-MCNC: ABNORMAL PG/ML
PLATELET # BLD AUTO: 181 THOUSANDS/UL (ref 149–390)
PMV BLD AUTO: 10.5 FL (ref 8.9–12.7)
POTASSIUM SERPL-SCNC: 3.9 MMOL/L (ref 3.5–5.3)
PROCALCITONIN SERPL-MCNC: 0.44 NG/ML
PROT SERPL-MCNC: 6.8 G/DL (ref 6.4–8.2)
PROTHROMBIN TIME: 13.7 SECONDS (ref 11.6–14.5)
RBC # BLD AUTO: 3.09 MILLION/UL (ref 3.88–5.62)
RSV RNA RESP QL NAA+PROBE: NEGATIVE
SARS-COV-2 RNA RESP QL NAA+PROBE: NEGATIVE
SODIUM SERPL-SCNC: 138 MMOL/L (ref 136–145)
TROPONIN I SERPL-MCNC: 0.11 NG/ML
TROPONIN I SERPL-MCNC: 0.16 NG/ML
WBC # BLD AUTO: 11.45 THOUSAND/UL (ref 4.31–10.16)

## 2020-12-19 PROCEDURE — 86901 BLOOD TYPING SEROLOGIC RH(D): CPT | Performed by: PHYSICIAN ASSISTANT

## 2020-12-19 PROCEDURE — 85384 FIBRINOGEN ACTIVITY: CPT | Performed by: PHYSICIAN ASSISTANT

## 2020-12-19 PROCEDURE — 87252 VIRUS INOCULATION TISSUE: CPT | Performed by: INTERNAL MEDICINE

## 2020-12-19 PROCEDURE — 99223 1ST HOSP IP/OBS HIGH 75: CPT | Performed by: INTERNAL MEDICINE

## 2020-12-19 PROCEDURE — 0241U HB NFCT DS VIR RESP RNA 4 TRGT: CPT | Performed by: INTERNAL MEDICINE

## 2020-12-19 PROCEDURE — 86900 BLOOD TYPING SEROLOGIC ABO: CPT | Performed by: PHYSICIAN ASSISTANT

## 2020-12-19 PROCEDURE — 87281 PNEUMOCYSTIS CARINII AG IF: CPT | Performed by: INTERNAL MEDICINE

## 2020-12-19 PROCEDURE — 85018 HEMOGLOBIN: CPT | Performed by: NURSE PRACTITIONER

## 2020-12-19 PROCEDURE — 85730 THROMBOPLASTIN TIME PARTIAL: CPT | Performed by: EMERGENCY MEDICINE

## 2020-12-19 PROCEDURE — 84484 ASSAY OF TROPONIN QUANT: CPT | Performed by: NURSE PRACTITIONER

## 2020-12-19 PROCEDURE — 83010 ASSAY OF HAPTOGLOBIN QUANT: CPT | Performed by: INTERNAL MEDICINE

## 2020-12-19 PROCEDURE — 87070 CULTURE OTHR SPECIMN AEROBIC: CPT | Performed by: INTERNAL MEDICINE

## 2020-12-19 PROCEDURE — 71250 CT THORAX DX C-: CPT

## 2020-12-19 PROCEDURE — 88112 CYTOPATH CELL ENHANCE TECH: CPT | Performed by: PATHOLOGY

## 2020-12-19 PROCEDURE — 86255 FLUORESCENT ANTIBODY SCREEN: CPT | Performed by: INTERNAL MEDICINE

## 2020-12-19 PROCEDURE — 87015 SPECIMEN INFECT AGNT CONCNTJ: CPT | Performed by: INTERNAL MEDICINE

## 2020-12-19 PROCEDURE — 85705 THROMBOPLASTIN INHIBITION: CPT | Performed by: INTERNAL MEDICINE

## 2020-12-19 PROCEDURE — 86160 COMPLEMENT ANTIGEN: CPT | Performed by: INTERNAL MEDICINE

## 2020-12-19 PROCEDURE — 86880 COOMBS TEST DIRECT: CPT | Performed by: INTERNAL MEDICINE

## 2020-12-19 PROCEDURE — 85670 THROMBIN TIME PLASMA: CPT | Performed by: INTERNAL MEDICINE

## 2020-12-19 PROCEDURE — 86850 RBC ANTIBODY SCREEN: CPT | Performed by: PHYSICIAN ASSISTANT

## 2020-12-19 PROCEDURE — 87205 SMEAR GRAM STAIN: CPT | Performed by: INTERNAL MEDICINE

## 2020-12-19 PROCEDURE — 85610 PROTHROMBIN TIME: CPT | Performed by: EMERGENCY MEDICINE

## 2020-12-19 PROCEDURE — 02HV33Z INSERTION OF INFUSION DEVICE INTO SUPERIOR VENA CAVA, PERCUTANEOUS APPROACH: ICD-10-PCS | Performed by: INTERNAL MEDICINE

## 2020-12-19 PROCEDURE — 85613 RUSSELL VIPER VENOM DILUTED: CPT | Performed by: INTERNAL MEDICINE

## 2020-12-19 PROCEDURE — NC001 PR NO CHARGE: Performed by: PHYSICIAN ASSISTANT

## 2020-12-19 PROCEDURE — 83880 ASSAY OF NATRIURETIC PEPTIDE: CPT | Performed by: EMERGENCY MEDICINE

## 2020-12-19 PROCEDURE — 76770 US EXAM ABDO BACK WALL COMP: CPT

## 2020-12-19 PROCEDURE — 94760 N-INVAS EAR/PLS OXIMETRY 1: CPT

## 2020-12-19 PROCEDURE — 36556 INSERT NON-TUNNEL CV CATH: CPT | Performed by: PHYSICIAN ASSISTANT

## 2020-12-19 PROCEDURE — 83615 LACTATE (LD) (LDH) ENZYME: CPT | Performed by: INTERNAL MEDICINE

## 2020-12-19 PROCEDURE — 83520 IMMUNOASSAY QUANT NOS NONAB: CPT | Performed by: INTERNAL MEDICINE

## 2020-12-19 PROCEDURE — G1004 CDSM NDSC: HCPCS

## 2020-12-19 PROCEDURE — 87102 FUNGUS ISOLATION CULTURE: CPT | Performed by: INTERNAL MEDICINE

## 2020-12-19 PROCEDURE — 36415 COLL VENOUS BLD VENIPUNCTURE: CPT

## 2020-12-19 PROCEDURE — 99285 EMERGENCY DEPT VISIT HI MDM: CPT | Performed by: EMERGENCY MEDICINE

## 2020-12-19 PROCEDURE — 85018 HEMOGLOBIN: CPT | Performed by: INTERNAL MEDICINE

## 2020-12-19 PROCEDURE — 93005 ELECTROCARDIOGRAM TRACING: CPT

## 2020-12-19 PROCEDURE — 71045 X-RAY EXAM CHEST 1 VIEW: CPT

## 2020-12-19 PROCEDURE — 85025 COMPLETE CBC W/AUTO DIFF WBC: CPT | Performed by: EMERGENCY MEDICINE

## 2020-12-19 PROCEDURE — 86146 BETA-2 GLYCOPROTEIN ANTIBODY: CPT | Performed by: INTERNAL MEDICINE

## 2020-12-19 PROCEDURE — 94002 VENT MGMT INPAT INIT DAY: CPT

## 2020-12-19 PROCEDURE — 86147 CARDIOLIPIN ANTIBODY EA IG: CPT | Performed by: INTERNAL MEDICINE

## 2020-12-19 PROCEDURE — 96374 THER/PROPH/DIAG INJ IV PUSH: CPT

## 2020-12-19 PROCEDURE — 31624 DX BRONCHOSCOPE/LAVAGE: CPT | Performed by: INTERNAL MEDICINE

## 2020-12-19 PROCEDURE — 84484 ASSAY OF TROPONIN QUANT: CPT | Performed by: EMERGENCY MEDICINE

## 2020-12-19 PROCEDURE — 86225 DNA ANTIBODY NATIVE: CPT | Performed by: INTERNAL MEDICINE

## 2020-12-19 PROCEDURE — 99285 EMERGENCY DEPT VISIT HI MDM: CPT

## 2020-12-19 PROCEDURE — NC001 PR NO CHARGE: Performed by: INTERNAL MEDICINE

## 2020-12-19 PROCEDURE — 84145 PROCALCITONIN (PCT): CPT | Performed by: INTERNAL MEDICINE

## 2020-12-19 PROCEDURE — 6A551Z3 PHERESIS OF PLASMA, MULTIPLE: ICD-10-PCS | Performed by: INTERNAL MEDICINE

## 2020-12-19 PROCEDURE — 0B958ZX DRAINAGE OF RIGHT MIDDLE LOBE BRONCHUS, VIA NATURAL OR ARTIFICIAL OPENING ENDOSCOPIC, DIAGNOSTIC: ICD-10-PCS | Performed by: INTERNAL MEDICINE

## 2020-12-19 PROCEDURE — 80053 COMPREHEN METABOLIC PANEL: CPT | Performed by: EMERGENCY MEDICINE

## 2020-12-19 PROCEDURE — 87106 FUNGI IDENTIFICATION YEAST: CPT | Performed by: INTERNAL MEDICINE

## 2020-12-19 PROCEDURE — 85732 THROMBOPLASTIN TIME PARTIAL: CPT | Performed by: INTERNAL MEDICINE

## 2020-12-19 RX ORDER — LIDOCAINE HYDROCHLORIDE 10 MG/ML
INJECTION, SOLUTION EPIDURAL; INFILTRATION; INTRACAUDAL; PERINEURAL
Status: DISPENSED
Start: 2020-12-19 | End: 2020-12-20

## 2020-12-19 RX ORDER — MIDAZOLAM HYDROCHLORIDE 2 MG/2ML
4 INJECTION, SOLUTION INTRAMUSCULAR; INTRAVENOUS ONCE
Status: COMPLETED | OUTPATIENT
Start: 2020-12-19 | End: 2020-12-19

## 2020-12-19 RX ORDER — DAPSONE 100 MG/1
100 TABLET ORAL DAILY
Status: DISCONTINUED | OUTPATIENT
Start: 2020-12-19 | End: 2020-12-28 | Stop reason: HOSPADM

## 2020-12-19 RX ORDER — ALBUMIN, HUMAN INJ 5% 5 %
75 SOLUTION INTRAVENOUS ONCE
Status: COMPLETED | OUTPATIENT
Start: 2020-12-20 | End: 2020-12-21

## 2020-12-19 RX ORDER — CLOTRIMAZOLE 10 MG/1
10 LOZENGE ORAL; TOPICAL
COMMUNITY
End: 2020-12-28 | Stop reason: HOSPADM

## 2020-12-19 RX ORDER — ALBUMIN, HUMAN INJ 5% 5 %
50 SOLUTION INTRAVENOUS DAILY
Status: DISCONTINUED | OUTPATIENT
Start: 2020-12-20 | End: 2020-12-19 | Stop reason: SDUPTHER

## 2020-12-19 RX ORDER — METOPROLOL TARTRATE 5 MG/5ML
5 INJECTION INTRAVENOUS ONCE
Status: COMPLETED | OUTPATIENT
Start: 2020-12-19 | End: 2020-12-19

## 2020-12-19 RX ORDER — LIDOCAINE HYDROCHLORIDE 20 MG/ML
JELLY TOPICAL ONCE
Status: DISCONTINUED | OUTPATIENT
Start: 2020-12-19 | End: 2020-12-28 | Stop reason: HOSPADM

## 2020-12-19 RX ORDER — ALBUMIN, HUMAN INJ 5% 5 %
25 SOLUTION INTRAVENOUS DAILY
Status: DISCONTINUED | OUTPATIENT
Start: 2020-12-20 | End: 2020-12-19 | Stop reason: SDUPTHER

## 2020-12-19 RX ORDER — ACETAMINOPHEN 325 MG/1
650 TABLET ORAL 4 TIMES DAILY PRN
Status: DISCONTINUED | OUTPATIENT
Start: 2020-12-19 | End: 2020-12-28 | Stop reason: HOSPADM

## 2020-12-19 RX ORDER — MIDAZOLAM HYDROCHLORIDE 2 MG/2ML
INJECTION, SOLUTION INTRAMUSCULAR; INTRAVENOUS
Status: COMPLETED
Start: 2020-12-19 | End: 2020-12-19

## 2020-12-19 RX ORDER — CALCIUM GLUCONATE 20 MG/ML
1 INJECTION, SOLUTION INTRAVENOUS DAILY
Status: DISCONTINUED | OUTPATIENT
Start: 2020-12-20 | End: 2020-12-20

## 2020-12-19 RX ORDER — FENTANYL CITRATE 50 UG/ML
25 INJECTION, SOLUTION INTRAMUSCULAR; INTRAVENOUS ONCE
Status: COMPLETED | OUTPATIENT
Start: 2020-12-19 | End: 2020-12-19

## 2020-12-19 RX ORDER — FENTANYL CITRATE 50 UG/ML
100 INJECTION, SOLUTION INTRAMUSCULAR; INTRAVENOUS ONCE
Status: COMPLETED | OUTPATIENT
Start: 2020-12-19 | End: 2020-12-19

## 2020-12-19 RX ORDER — CALCIUM GLUCONATE 20 MG/ML
2 INJECTION, SOLUTION INTRAVENOUS ONCE
Status: COMPLETED | OUTPATIENT
Start: 2020-12-20 | End: 2020-12-21

## 2020-12-19 RX ORDER — FENTANYL CITRATE 50 UG/ML
INJECTION, SOLUTION INTRAMUSCULAR; INTRAVENOUS
Status: COMPLETED
Start: 2020-12-19 | End: 2020-12-19

## 2020-12-19 RX ORDER — DIPHENHYDRAMINE HYDROCHLORIDE 50 MG/ML
25 INJECTION INTRAMUSCULAR; INTRAVENOUS DAILY
Status: DISCONTINUED | OUTPATIENT
Start: 2020-12-20 | End: 2020-12-20

## 2020-12-19 RX ORDER — ONDANSETRON 2 MG/ML
4 INJECTION INTRAMUSCULAR; INTRAVENOUS EVERY 6 HOURS PRN
Status: DISCONTINUED | OUTPATIENT
Start: 2020-12-19 | End: 2020-12-28 | Stop reason: HOSPADM

## 2020-12-19 RX ADMIN — METOROPROLOL TARTRATE 5 MG: 5 INJECTION, SOLUTION INTRAVENOUS at 10:23

## 2020-12-19 RX ADMIN — SODIUM CHLORIDE 1000 MG: 0.9 INJECTION, SOLUTION INTRAVENOUS at 15:19

## 2020-12-19 RX ADMIN — FENTANYL CITRATE 200 MCG: 50 INJECTION INTRAMUSCULAR; INTRAVENOUS at 15:20

## 2020-12-19 RX ADMIN — FENTANYL CITRATE 25 MCG: 50 INJECTION INTRAMUSCULAR; INTRAVENOUS at 23:51

## 2020-12-19 RX ADMIN — DAPSONE 100 MG: 100 TABLET ORAL at 16:59

## 2020-12-19 RX ADMIN — MIDAZOLAM HYDROCHLORIDE 4 MG: 2 INJECTION, SOLUTION INTRAMUSCULAR; INTRAVENOUS at 15:20

## 2020-12-19 RX ADMIN — FENTANYL CITRATE 200 MCG: 50 INJECTION, SOLUTION INTRAMUSCULAR; INTRAVENOUS at 15:20

## 2020-12-19 RX ADMIN — MIDAZOLAM 4 MG: 1 INJECTION INTRAMUSCULAR; INTRAVENOUS at 15:20

## 2020-12-20 LAB
ABO GROUP BLD: NORMAL
ALBUMIN SERPL BCP-MCNC: 2.6 G/DL (ref 3.5–5)
ALP SERPL-CCNC: 74 U/L (ref 46–116)
ALT SERPL W P-5'-P-CCNC: 37 U/L (ref 12–78)
ANION GAP SERPL CALCULATED.3IONS-SCNC: 14 MMOL/L (ref 4–13)
AST SERPL W P-5'-P-CCNC: 26 U/L (ref 5–45)
BACTERIA UR QL AUTO: ABNORMAL /HPF
BASOPHILS # BLD AUTO: 0.01 THOUSANDS/ΜL (ref 0–0.1)
BASOPHILS NFR BLD AUTO: 0 % (ref 0–1)
BILIRUB SERPL-MCNC: 0.37 MG/DL (ref 0.2–1)
BILIRUB UR QL STRIP: NEGATIVE
BLD GP AB SCN SERPL QL: NEGATIVE
BUN SERPL-MCNC: 85 MG/DL (ref 5–25)
CALCIUM ALBUM COR SERPL-MCNC: 10 MG/DL (ref 8.3–10.1)
CALCIUM SERPL-MCNC: 8.9 MG/DL (ref 8.3–10.1)
CHLORIDE SERPL-SCNC: 102 MMOL/L (ref 100–108)
CLARITY UR: CLEAR
CO2 SERPL-SCNC: 22 MMOL/L (ref 21–32)
COLOR UR: YELLOW
CREAT SERPL-MCNC: 4.02 MG/DL (ref 0.6–1.3)
CREAT UR-MCNC: 70 MG/DL
EOSINOPHIL # BLD AUTO: 0 THOUSAND/ΜL (ref 0–0.61)
EOSINOPHIL NFR BLD AUTO: 0 % (ref 0–6)
ERYTHROCYTE [DISTWIDTH] IN BLOOD BY AUTOMATED COUNT: 13.8 % (ref 11.6–15.1)
FIBRINOGEN PPP-MCNC: 274 MG/DL (ref 227–495)
GFR SERPL CREATININE-BSD FRML MDRD: 16 ML/MIN/1.73SQ M
GLUCOSE SERPL-MCNC: 146 MG/DL (ref 65–140)
GLUCOSE UR STRIP-MCNC: NEGATIVE MG/DL
HAPTOGLOB SERPL-MCNC: 36 MG/DL (ref 29–370)
HCT VFR BLD AUTO: 28.9 % (ref 36.5–49.3)
HGB BLD-MCNC: 9.1 G/DL (ref 12–17)
HGB UR QL STRIP.AUTO: ABNORMAL
IMM GRANULOCYTES # BLD AUTO: 0.17 THOUSAND/UL (ref 0–0.2)
IMM GRANULOCYTES NFR BLD AUTO: 1 % (ref 0–2)
KETONES UR STRIP-MCNC: NEGATIVE MG/DL
L PNEUMO1 AG UR QL IA.RAPID: NEGATIVE
LACTATE SERPL-SCNC: 1.6 MMOL/L (ref 0.5–2)
LEUKOCYTE ESTERASE UR QL STRIP: NEGATIVE
LYMPHOCYTES # BLD AUTO: 0.56 THOUSANDS/ΜL (ref 0.6–4.47)
LYMPHOCYTES NFR BLD AUTO: 5 % (ref 14–44)
MAGNESIUM SERPL-MCNC: 2 MG/DL (ref 1.6–2.6)
MCH RBC QN AUTO: 30.7 PG (ref 26.8–34.3)
MCHC RBC AUTO-ENTMCNC: 31.5 G/DL (ref 31.4–37.4)
MCV RBC AUTO: 98 FL (ref 82–98)
MONOCYTES # BLD AUTO: 0.48 THOUSAND/ΜL (ref 0.17–1.22)
MONOCYTES NFR BLD AUTO: 4 % (ref 4–12)
NEUTROPHILS # BLD AUTO: 10.93 THOUSANDS/ΜL (ref 1.85–7.62)
NEUTS SEG NFR BLD AUTO: 90 % (ref 43–75)
NITRITE UR QL STRIP: NEGATIVE
NON-SQ EPI CELLS URNS QL MICRO: ABNORMAL /HPF
NRBC BLD AUTO-RTO: 0 /100 WBCS
PH UR STRIP.AUTO: 5 [PH]
PHOSPHATE SERPL-MCNC: 7.1 MG/DL (ref 2.7–4.5)
PLATELET # BLD AUTO: 223 THOUSANDS/UL (ref 149–390)
PMV BLD AUTO: 10.5 FL (ref 8.9–12.7)
POTASSIUM SERPL-SCNC: 4 MMOL/L (ref 3.5–5.3)
PROCALCITONIN SERPL-MCNC: 0.42 NG/ML
PROT SERPL-MCNC: 6.3 G/DL (ref 6.4–8.2)
PROT UR STRIP-MCNC: ABNORMAL MG/DL
PROT UR-MCNC: 220 MG/DL
PROT/CREAT UR: 3.14 MG/G{CREAT} (ref 0–0.1)
RBC # BLD AUTO: 2.96 MILLION/UL (ref 3.88–5.62)
RBC #/AREA URNS AUTO: ABNORMAL /HPF
RH BLD: POSITIVE
S PNEUM AG UR QL: NEGATIVE
SODIUM SERPL-SCNC: 138 MMOL/L (ref 136–145)
SP GR UR STRIP.AUTO: 1.02 (ref 1–1.03)
SPECIMEN EXPIRATION DATE: NORMAL
TROPONIN I SERPL-MCNC: 0.13 NG/ML
UROBILINOGEN UR QL STRIP.AUTO: 0.2 E.U./DL
WBC # BLD AUTO: 12.15 THOUSAND/UL (ref 4.31–10.16)
WBC #/AREA URNS AUTO: ABNORMAL /HPF

## 2020-12-20 PROCEDURE — 85025 COMPLETE CBC W/AUTO DIFF WBC: CPT | Performed by: INTERNAL MEDICINE

## 2020-12-20 PROCEDURE — 84156 ASSAY OF PROTEIN URINE: CPT | Performed by: INTERNAL MEDICINE

## 2020-12-20 PROCEDURE — 94760 N-INVAS EAR/PLS OXIMETRY 1: CPT

## 2020-12-20 PROCEDURE — 82570 ASSAY OF URINE CREATININE: CPT | Performed by: INTERNAL MEDICINE

## 2020-12-20 PROCEDURE — 83735 ASSAY OF MAGNESIUM: CPT | Performed by: INTERNAL MEDICINE

## 2020-12-20 PROCEDURE — 80053 COMPREHEN METABOLIC PANEL: CPT | Performed by: INTERNAL MEDICINE

## 2020-12-20 PROCEDURE — 85384 FIBRINOGEN ACTIVITY: CPT | Performed by: INTERNAL MEDICINE

## 2020-12-20 PROCEDURE — 81001 URINALYSIS AUTO W/SCOPE: CPT | Performed by: INTERNAL MEDICINE

## 2020-12-20 PROCEDURE — 84484 ASSAY OF TROPONIN QUANT: CPT | Performed by: NURSE PRACTITIONER

## 2020-12-20 PROCEDURE — 99232 SBSQ HOSP IP/OBS MODERATE 35: CPT | Performed by: INTERNAL MEDICINE

## 2020-12-20 PROCEDURE — 99233 SBSQ HOSP IP/OBS HIGH 50: CPT | Performed by: INTERNAL MEDICINE

## 2020-12-20 PROCEDURE — 84100 ASSAY OF PHOSPHORUS: CPT | Performed by: INTERNAL MEDICINE

## 2020-12-20 PROCEDURE — 83605 ASSAY OF LACTIC ACID: CPT | Performed by: INTERNAL MEDICINE

## 2020-12-20 PROCEDURE — P9017 PLASMA 1 DONOR FRZ W/IN 8 HR: HCPCS

## 2020-12-20 PROCEDURE — 99254 IP/OBS CNSLTJ NEW/EST MOD 60: CPT | Performed by: PHYSICIAN ASSISTANT

## 2020-12-20 PROCEDURE — 94003 VENT MGMT INPAT SUBQ DAY: CPT

## 2020-12-20 PROCEDURE — 84145 PROCALCITONIN (PCT): CPT | Performed by: INTERNAL MEDICINE

## 2020-12-20 PROCEDURE — 87449 NOS EACH ORGANISM AG IA: CPT | Performed by: INTERNAL MEDICINE

## 2020-12-20 PROCEDURE — C9113 INJ PANTOPRAZOLE SODIUM, VIA: HCPCS | Performed by: NURSE PRACTITIONER

## 2020-12-20 RX ORDER — METOPROLOL SUCCINATE 50 MG/1
50 TABLET, EXTENDED RELEASE ORAL DAILY
Status: DISCONTINUED | OUTPATIENT
Start: 2020-12-20 | End: 2020-12-28 | Stop reason: HOSPADM

## 2020-12-20 RX ORDER — LABETALOL 20 MG/4 ML (5 MG/ML) INTRAVENOUS SYRINGE
10 EVERY 4 HOURS PRN
Status: DISCONTINUED | OUTPATIENT
Start: 2020-12-20 | End: 2020-12-25

## 2020-12-20 RX ORDER — ALBUMIN, HUMAN INJ 5% 5 %
50 SOLUTION INTRAVENOUS ONCE
Status: COMPLETED | OUTPATIENT
Start: 2020-12-20 | End: 2020-12-21

## 2020-12-20 RX ORDER — LABETALOL 20 MG/4 ML (5 MG/ML) INTRAVENOUS SYRINGE
20 ONCE
Status: COMPLETED | OUTPATIENT
Start: 2020-12-20 | End: 2020-12-20

## 2020-12-20 RX ORDER — PANTOPRAZOLE SODIUM 40 MG/1
40 INJECTION, POWDER, FOR SOLUTION INTRAVENOUS EVERY 12 HOURS SCHEDULED
Status: DISCONTINUED | OUTPATIENT
Start: 2020-12-20 | End: 2020-12-22

## 2020-12-20 RX ORDER — SEVELAMER HYDROCHLORIDE 800 MG/1
1600 TABLET, FILM COATED ORAL
Status: DISCONTINUED | OUTPATIENT
Start: 2020-12-20 | End: 2020-12-28 | Stop reason: HOSPADM

## 2020-12-20 RX ORDER — AMLODIPINE BESYLATE 10 MG/1
10 TABLET ORAL DAILY
Status: DISCONTINUED | OUTPATIENT
Start: 2020-12-20 | End: 2020-12-28 | Stop reason: HOSPADM

## 2020-12-20 RX ADMIN — DIPHENHYDRAMINE HYDROCHLORIDE 25 MG: 50 INJECTION, SOLUTION INTRAMUSCULAR; INTRAVENOUS at 12:06

## 2020-12-20 RX ADMIN — PANTOPRAZOLE SODIUM 40 MG: 40 INJECTION, POWDER, FOR SOLUTION INTRAVENOUS at 22:29

## 2020-12-20 RX ADMIN — CALCIUM GLUCONATE 2 G: 20 INJECTION, SOLUTION INTRAVENOUS at 12:22

## 2020-12-20 RX ADMIN — SODIUM CHLORIDE 1000 MG: 0.9 INJECTION, SOLUTION INTRAVENOUS at 12:15

## 2020-12-20 RX ADMIN — PANTOPRAZOLE SODIUM 40 MG: 40 INJECTION, POWDER, FOR SOLUTION INTRAVENOUS at 12:23

## 2020-12-20 RX ADMIN — FAMOTIDINE 20 MG: 10 INJECTION INTRAVENOUS at 08:02

## 2020-12-20 RX ADMIN — LABETALOL 20 MG/4 ML (5 MG/ML) INTRAVENOUS SYRINGE 20 MG: at 12:07

## 2020-12-20 RX ADMIN — DAPSONE 100 MG: 100 TABLET ORAL at 12:15

## 2020-12-20 RX ADMIN — SEVELAMER HYDROCHLORIDE 1600 MG: 800 TABLET, FILM COATED PARENTERAL at 17:23

## 2020-12-20 RX ADMIN — METOPROLOL SUCCINATE 50 MG: 50 TABLET, EXTENDED RELEASE ORAL at 14:07

## 2020-12-20 RX ADMIN — HYDROCORTISONE SODIUM SUCCINATE 100 MG: 100 INJECTION, POWDER, FOR SOLUTION INTRAMUSCULAR; INTRAVENOUS at 08:02

## 2020-12-20 RX ADMIN — ALBUMIN HUMAN 50 G: 50 SOLUTION INTRAVENOUS at 14:07

## 2020-12-20 RX ADMIN — ALBUMIN (HUMAN) 75 G: 12.5 INJECTION, SOLUTION INTRAVENOUS at 12:22

## 2020-12-20 RX ADMIN — CALCIUM GLUCONATE 1 G: 20 INJECTION, SOLUTION INTRAVENOUS at 12:22

## 2020-12-20 RX ADMIN — AMLODIPINE BESYLATE 10 MG: 10 TABLET ORAL at 14:07

## 2020-12-21 ENCOUNTER — APPOINTMENT (INPATIENT)
Dept: VASCULAR ULTRASOUND | Facility: HOSPITAL | Age: 55
DRG: 545 | End: 2020-12-21
Payer: COMMERCIAL

## 2020-12-21 DIAGNOSIS — N18.9 ACUTE KIDNEY INJURY SUPERIMPOSED ON CHRONIC KIDNEY DISEASE (HCC): Primary | ICD-10-CM

## 2020-12-21 DIAGNOSIS — N17.9 ACUTE KIDNEY INJURY SUPERIMPOSED ON CHRONIC KIDNEY DISEASE (HCC): Primary | ICD-10-CM

## 2020-12-21 LAB
ABO GROUP BLD BPU: NORMAL
ABO GROUP BLD BPU: NORMAL
ALBUMIN SERPL BCP-MCNC: 3.3 G/DL (ref 3.5–5)
ALP SERPL-CCNC: 43 U/L (ref 46–116)
ALT SERPL W P-5'-P-CCNC: 27 U/L (ref 12–78)
ANION GAP SERPL CALCULATED.3IONS-SCNC: 14 MMOL/L (ref 4–13)
APTT SCREEN TO CONFIRM RATIO: 1.32 RATIO (ref 0–1.4)
AST SERPL W P-5'-P-CCNC: 20 U/L (ref 5–45)
ATRIAL RATE: 104 BPM
BACTERIA BRONCH AEROBE CULT: NORMAL
BASOPHILS # BLD AUTO: 0 THOUSANDS/ΜL (ref 0–0.1)
BASOPHILS NFR BLD AUTO: 0 % (ref 0–1)
BILIRUB SERPL-MCNC: 0.45 MG/DL (ref 0.2–1)
BPU ID: NORMAL
BPU ID: NORMAL
BUN SERPL-MCNC: 92 MG/DL (ref 5–25)
CALCIUM ALBUM COR SERPL-MCNC: 9.3 MG/DL (ref 8.3–10.1)
CALCIUM SERPL-MCNC: 8.7 MG/DL (ref 8.3–10.1)
CARDIOLIPIN IGA SER IA-ACNC: <9 APL U/ML (ref 0–11)
CARDIOLIPIN IGG SER IA-ACNC: <9 GPL U/ML (ref 0–14)
CARDIOLIPIN IGM SER IA-ACNC: <9 MPL U/ML (ref 0–12)
CHLORIDE SERPL-SCNC: 107 MMOL/L (ref 100–108)
CO2 SERPL-SCNC: 20 MMOL/L (ref 21–32)
CONFIRM APTT/NORMAL: 39.7 SEC (ref 0–55)
CREAT SERPL-MCNC: 4.13 MG/DL (ref 0.6–1.3)
DSDNA AB SER-ACNC: 139 IU/ML (ref 0–9)
EOSINOPHIL # BLD AUTO: 0 THOUSAND/ΜL (ref 0–0.61)
EOSINOPHIL NFR BLD AUTO: 0 % (ref 0–6)
ERYTHROCYTE [DISTWIDTH] IN BLOOD BY AUTOMATED COUNT: 13.8 % (ref 11.6–15.1)
GFR SERPL CREATININE-BSD FRML MDRD: 15 ML/MIN/1.73SQ M
GLUCOSE SERPL-MCNC: 136 MG/DL (ref 65–140)
GRAM STN SPEC: NORMAL
HCT VFR BLD AUTO: 27.3 % (ref 36.5–49.3)
HCT VFR BLD AUTO: 27.4 % (ref 36.5–49.3)
HGB BLD-MCNC: 8.6 G/DL (ref 12–17)
HGB BLD-MCNC: 8.7 G/DL (ref 12–17)
IMM GRANULOCYTES # BLD AUTO: 0.16 THOUSAND/UL (ref 0–0.2)
IMM GRANULOCYTES NFR BLD AUTO: 2 % (ref 0–2)
LA PPP-IMP: NORMAL
LYMPHOCYTES # BLD AUTO: 0.48 THOUSANDS/ΜL (ref 0.6–4.47)
LYMPHOCYTES NFR BLD AUTO: 5 % (ref 14–44)
MCH RBC QN AUTO: 31.3 PG (ref 26.8–34.3)
MCHC RBC AUTO-ENTMCNC: 31.9 G/DL (ref 31.4–37.4)
MCV RBC AUTO: 98 FL (ref 82–98)
MONOCYTES # BLD AUTO: 0.5 THOUSAND/ΜL (ref 0.17–1.22)
MONOCYTES NFR BLD AUTO: 5 % (ref 4–12)
NEUTROPHILS # BLD AUTO: 9.44 THOUSANDS/ΜL (ref 1.85–7.62)
NEUTS SEG NFR BLD AUTO: 88 % (ref 43–75)
NRBC BLD AUTO-RTO: 0 /100 WBCS
P AXIS: 94 DEGREES
PLATELET # BLD AUTO: 208 THOUSANDS/UL (ref 149–390)
PMV BLD AUTO: 10.9 FL (ref 8.9–12.7)
POTASSIUM SERPL-SCNC: 4.2 MMOL/L (ref 3.5–5.3)
PR INTERVAL: 168 MS
PROT SERPL-MCNC: 5.4 G/DL (ref 6.4–8.2)
QRS AXIS: 70 DEGREES
QRSD INTERVAL: 86 MS
QT INTERVAL: 332 MS
QTC INTERVAL: 426 MS
RBC # BLD AUTO: 2.78 MILLION/UL (ref 3.88–5.62)
SCREEN APTT: 29.9 SEC (ref 0–51.9)
SCREEN DRVVT: 45.7 SEC (ref 0–47)
SODIUM SERPL-SCNC: 141 MMOL/L (ref 136–145)
T WAVE AXIS: 105 DEGREES
THROMBIN TIME: 15.3 SEC (ref 0–23)
UNIT DISPENSE STATUS: NORMAL
UNIT DISPENSE STATUS: NORMAL
UNIT PRODUCT CODE: NORMAL
UNIT PRODUCT CODE: NORMAL
UNIT RH: NORMAL
UNIT RH: NORMAL
VENTRICULAR RATE: 99 BPM
WBC # BLD AUTO: 10.58 THOUSAND/UL (ref 4.31–10.16)

## 2020-12-21 PROCEDURE — 99233 SBSQ HOSP IP/OBS HIGH 50: CPT | Performed by: INTERNAL MEDICINE

## 2020-12-21 PROCEDURE — 99233 SBSQ HOSP IP/OBS HIGH 50: CPT | Performed by: PHYSICIAN ASSISTANT

## 2020-12-21 PROCEDURE — C9113 INJ PANTOPRAZOLE SODIUM, VIA: HCPCS | Performed by: NURSE PRACTITIONER

## 2020-12-21 PROCEDURE — 85014 HEMATOCRIT: CPT | Performed by: NURSE PRACTITIONER

## 2020-12-21 PROCEDURE — 93970 EXTREMITY STUDY: CPT

## 2020-12-21 PROCEDURE — 85018 HEMOGLOBIN: CPT | Performed by: NURSE PRACTITIONER

## 2020-12-21 PROCEDURE — 93010 ELECTROCARDIOGRAM REPORT: CPT | Performed by: INTERNAL MEDICINE

## 2020-12-21 PROCEDURE — 85025 COMPLETE CBC W/AUTO DIFF WBC: CPT | Performed by: INTERNAL MEDICINE

## 2020-12-21 PROCEDURE — 93970 EXTREMITY STUDY: CPT | Performed by: SURGERY

## 2020-12-21 PROCEDURE — 87081 CULTURE SCREEN ONLY: CPT | Performed by: NURSE PRACTITIONER

## 2020-12-21 PROCEDURE — P9017 PLASMA 1 DONOR FRZ W/IN 8 HR: HCPCS

## 2020-12-21 PROCEDURE — 80053 COMPREHEN METABOLIC PANEL: CPT | Performed by: INTERNAL MEDICINE

## 2020-12-21 PROCEDURE — 30233K1 TRANSFUSION OF NONAUTOLOGOUS FROZEN PLASMA INTO PERIPHERAL VEIN, PERCUTANEOUS APPROACH: ICD-10-PCS | Performed by: INTERNAL MEDICINE

## 2020-12-21 RX ORDER — ALBUMIN, HUMAN INJ 5% 5 %
50 SOLUTION INTRAVENOUS ONCE
Status: DISCONTINUED | OUTPATIENT
Start: 2020-12-21 | End: 2020-12-21

## 2020-12-21 RX ORDER — METHYLPREDNISOLONE SODIUM SUCCINATE 125 MG/2ML
60 INJECTION, POWDER, LYOPHILIZED, FOR SOLUTION INTRAMUSCULAR; INTRAVENOUS DAILY
Status: COMPLETED | OUTPATIENT
Start: 2020-12-22 | End: 2020-12-24

## 2020-12-21 RX ORDER — PREDNISONE 20 MG/1
60 TABLET ORAL DAILY
Status: DISCONTINUED | OUTPATIENT
Start: 2020-12-22 | End: 2020-12-21

## 2020-12-21 RX ORDER — ALBUMIN, HUMAN INJ 5% 5 %
25 SOLUTION INTRAVENOUS ONCE
Status: DISCONTINUED | OUTPATIENT
Start: 2020-12-21 | End: 2020-12-21

## 2020-12-21 RX ORDER — VANCOMYCIN HYDROCHLORIDE 1 G/200ML
15 INJECTION, SOLUTION INTRAVENOUS DAILY PRN
Status: DISCONTINUED | OUTPATIENT
Start: 2020-12-21 | End: 2020-12-24

## 2020-12-21 RX ORDER — CALCIUM GLUCONATE 20 MG/ML
1 INJECTION, SOLUTION INTRAVENOUS ONCE
Status: COMPLETED | OUTPATIENT
Start: 2020-12-21 | End: 2020-12-21

## 2020-12-21 RX ORDER — ALBUMIN, HUMAN INJ 5% 5 %
125 SOLUTION INTRAVENOUS ONCE
Status: COMPLETED | OUTPATIENT
Start: 2020-12-21 | End: 2020-12-21

## 2020-12-21 RX ORDER — FUROSEMIDE 10 MG/ML
60 INJECTION INTRAMUSCULAR; INTRAVENOUS ONCE
Status: COMPLETED | OUTPATIENT
Start: 2020-12-21 | End: 2020-12-21

## 2020-12-21 RX ADMIN — SODIUM CHLORIDE 1000 MG: 0.9 INJECTION, SOLUTION INTRAVENOUS at 09:46

## 2020-12-21 RX ADMIN — CEFEPIME HYDROCHLORIDE 1000 MG: 1 INJECTION, POWDER, FOR SOLUTION INTRAMUSCULAR; INTRAVENOUS at 13:36

## 2020-12-21 RX ADMIN — PANTOPRAZOLE SODIUM 40 MG: 40 INJECTION, POWDER, FOR SOLUTION INTRAVENOUS at 09:47

## 2020-12-21 RX ADMIN — METOPROLOL SUCCINATE 50 MG: 50 TABLET, EXTENDED RELEASE ORAL at 09:47

## 2020-12-21 RX ADMIN — ALBUMIN (HUMAN) 125 G: 12.5 INJECTION, SOLUTION INTRAVENOUS at 15:27

## 2020-12-21 RX ADMIN — DAPSONE 100 MG: 100 TABLET ORAL at 13:09

## 2020-12-21 RX ADMIN — FUROSEMIDE 60 MG: 10 INJECTION, SOLUTION INTRAVENOUS at 12:49

## 2020-12-21 RX ADMIN — SEVELAMER HYDROCHLORIDE 1600 MG: 800 TABLET, FILM COATED PARENTERAL at 13:09

## 2020-12-21 RX ADMIN — SEVELAMER HYDROCHLORIDE 1600 MG: 800 TABLET, FILM COATED PARENTERAL at 09:47

## 2020-12-21 RX ADMIN — CALCIUM GLUCONATE 1 G: 20 INJECTION, SOLUTION INTRAVENOUS at 16:50

## 2020-12-21 RX ADMIN — AMLODIPINE BESYLATE 10 MG: 10 TABLET ORAL at 09:47

## 2020-12-21 RX ADMIN — SEVELAMER HYDROCHLORIDE 1600 MG: 800 TABLET, FILM COATED PARENTERAL at 18:24

## 2020-12-21 RX ADMIN — VANCOMYCIN HYDROCHLORIDE 1500 MG: 5 INJECTION, POWDER, LYOPHILIZED, FOR SOLUTION INTRAVENOUS at 14:34

## 2020-12-21 RX ADMIN — PANTOPRAZOLE SODIUM 40 MG: 40 INJECTION, POWDER, FOR SOLUTION INTRAVENOUS at 22:31

## 2020-12-22 LAB
ABO GROUP BLD BPU: NORMAL
ABO GROUP BLD BPU: NORMAL
ANION GAP SERPL CALCULATED.3IONS-SCNC: 16 MMOL/L (ref 4–13)
B2 GLYCOPROT1 IGA SER-ACNC: <9 GPI IGA UNITS (ref 0–25)
B2 GLYCOPROT1 IGG SER-ACNC: <9 GPI IGG UNITS (ref 0–20)
B2 GLYCOPROT1 IGM SER-ACNC: <9 GPI IGM UNITS (ref 0–32)
BACTERIA SPT RESP CULT: ABNORMAL
BACTERIA SPT RESP CULT: ABNORMAL
BPU ID: NORMAL
BPU ID: NORMAL
BUN SERPL-MCNC: 113 MG/DL (ref 5–25)
CALCIUM SERPL-MCNC: 8.5 MG/DL (ref 8.3–10.1)
CHLORIDE SERPL-SCNC: 108 MMOL/L (ref 100–108)
CO2 SERPL-SCNC: 18 MMOL/L (ref 21–32)
CREAT SERPL-MCNC: 4.18 MG/DL (ref 0.6–1.3)
GBM AB SER IA-ACNC: 3 UNITS (ref 0–20)
GFR SERPL CREATININE-BSD FRML MDRD: 15 ML/MIN/1.73SQ M
GLUCOSE SERPL-MCNC: 156 MG/DL (ref 65–140)
GRAM STN SPEC: ABNORMAL
POTASSIUM SERPL-SCNC: 4.3 MMOL/L (ref 3.5–5.3)
SODIUM SERPL-SCNC: 142 MMOL/L (ref 136–145)
UNIT DISPENSE STATUS: NORMAL
UNIT DISPENSE STATUS: NORMAL
UNIT PRODUCT CODE: NORMAL
UNIT PRODUCT CODE: NORMAL
UNIT RH: NORMAL
UNIT RH: NORMAL
VANCOMYCIN SERPL-MCNC: 15.4 UG/ML

## 2020-12-22 PROCEDURE — 86255 FLUORESCENT ANTIBODY SCREEN: CPT | Performed by: INTERNAL MEDICINE

## 2020-12-22 PROCEDURE — 99222 1ST HOSP IP/OBS MODERATE 55: CPT | Performed by: INTERNAL MEDICINE

## 2020-12-22 PROCEDURE — 80202 ASSAY OF VANCOMYCIN: CPT | Performed by: INTERNAL MEDICINE

## 2020-12-22 PROCEDURE — 99233 SBSQ HOSP IP/OBS HIGH 50: CPT | Performed by: INTERNAL MEDICINE

## 2020-12-22 PROCEDURE — 80048 BASIC METABOLIC PNL TOTAL CA: CPT | Performed by: NURSE PRACTITIONER

## 2020-12-22 PROCEDURE — C9113 INJ PANTOPRAZOLE SODIUM, VIA: HCPCS | Performed by: NURSE PRACTITIONER

## 2020-12-22 PROCEDURE — 99232 SBSQ HOSP IP/OBS MODERATE 35: CPT | Performed by: INTERNAL MEDICINE

## 2020-12-22 RX ORDER — FUROSEMIDE 10 MG/ML
60 INJECTION INTRAMUSCULAR; INTRAVENOUS ONCE
Status: COMPLETED | OUTPATIENT
Start: 2020-12-22 | End: 2020-12-22

## 2020-12-22 RX ORDER — PANTOPRAZOLE SODIUM 40 MG/1
40 TABLET, DELAYED RELEASE ORAL
Status: DISCONTINUED | OUTPATIENT
Start: 2020-12-22 | End: 2020-12-28 | Stop reason: HOSPADM

## 2020-12-22 RX ORDER — VANCOMYCIN HYDROCHLORIDE 1 G/200ML
15 INJECTION, SOLUTION INTRAVENOUS ONCE
Status: COMPLETED | OUTPATIENT
Start: 2020-12-22 | End: 2020-12-22

## 2020-12-22 RX ORDER — SODIUM BICARBONATE 650 MG/1
650 TABLET ORAL
Status: DISCONTINUED | OUTPATIENT
Start: 2020-12-22 | End: 2020-12-23

## 2020-12-22 RX ADMIN — SEVELAMER HYDROCHLORIDE 1600 MG: 800 TABLET, FILM COATED PARENTERAL at 08:44

## 2020-12-22 RX ADMIN — METHYLPREDNISOLONE SODIUM SUCCINATE 60 MG: 125 INJECTION, POWDER, FOR SOLUTION INTRAMUSCULAR; INTRAVENOUS at 08:45

## 2020-12-22 RX ADMIN — NYSTATIN 500000 UNITS: 100000 SUSPENSION ORAL at 21:29

## 2020-12-22 RX ADMIN — PANTOPRAZOLE SODIUM 40 MG: 40 INJECTION, POWDER, FOR SOLUTION INTRAVENOUS at 08:45

## 2020-12-22 RX ADMIN — SEVELAMER HYDROCHLORIDE 1600 MG: 800 TABLET, FILM COATED PARENTERAL at 17:43

## 2020-12-22 RX ADMIN — PANTOPRAZOLE SODIUM 40 MG: 40 TABLET, DELAYED RELEASE ORAL at 17:43

## 2020-12-22 RX ADMIN — METOPROLOL SUCCINATE 50 MG: 50 TABLET, EXTENDED RELEASE ORAL at 08:44

## 2020-12-22 RX ADMIN — NYSTATIN 500000 UNITS: 100000 SUSPENSION ORAL at 17:43

## 2020-12-22 RX ADMIN — VANCOMYCIN HYDROCHLORIDE 1000 MG: 1 INJECTION, SOLUTION INTRAVENOUS at 07:25

## 2020-12-22 RX ADMIN — AMLODIPINE BESYLATE 10 MG: 10 TABLET ORAL at 08:44

## 2020-12-22 RX ADMIN — DAPSONE 100 MG: 100 TABLET ORAL at 08:45

## 2020-12-22 RX ADMIN — CEFEPIME HYDROCHLORIDE 1000 MG: 1 INJECTION, POWDER, FOR SOLUTION INTRAMUSCULAR; INTRAVENOUS at 00:45

## 2020-12-22 RX ADMIN — NYSTATIN 500000 UNITS: 100000 SUSPENSION ORAL at 15:25

## 2020-12-22 RX ADMIN — SODIUM BICARBONATE 650 MG: 650 TABLET ORAL at 12:53

## 2020-12-22 RX ADMIN — SODIUM BICARBONATE 650 MG: 650 TABLET ORAL at 17:43

## 2020-12-22 RX ADMIN — FUROSEMIDE 60 MG: 10 INJECTION, SOLUTION INTRAMUSCULAR; INTRAVENOUS at 12:54

## 2020-12-22 RX ADMIN — CEFEPIME HYDROCHLORIDE 1000 MG: 1 INJECTION, POWDER, FOR SOLUTION INTRAMUSCULAR; INTRAVENOUS at 10:56

## 2020-12-22 RX ADMIN — SEVELAMER HYDROCHLORIDE 1600 MG: 800 TABLET, FILM COATED PARENTERAL at 12:54

## 2020-12-23 PROBLEM — I50.32 CHRONIC DIASTOLIC CONGESTIVE HEART FAILURE (HCC): Status: ACTIVE | Noted: 2020-11-25

## 2020-12-23 LAB
ABO GROUP BLD: NORMAL
ALBUMIN SERPL BCP-MCNC: 3.2 G/DL (ref 3.5–5)
ANION GAP SERPL CALCULATED.3IONS-SCNC: 14 MMOL/L (ref 4–13)
BASOPHILS # BLD MANUAL: 0 THOUSAND/UL (ref 0–0.1)
BASOPHILS NFR MAR MANUAL: 0 % (ref 0–1)
BUN SERPL-MCNC: 121 MG/DL (ref 5–25)
C-ANCA TITR SER IF: ABNORMAL TITER
C-ANCA TITR SER IF: ABNORMAL TITER
CALCIUM ALBUM COR SERPL-MCNC: 9.3 MG/DL (ref 8.3–10.1)
CALCIUM SERPL-MCNC: 8.7 MG/DL (ref 8.3–10.1)
CHLORIDE SERPL-SCNC: 109 MMOL/L (ref 100–108)
CO2 SERPL-SCNC: 19 MMOL/L (ref 21–32)
CREAT SERPL-MCNC: 4.34 MG/DL (ref 0.6–1.3)
EOSINOPHIL # BLD MANUAL: 0 THOUSAND/UL (ref 0–0.4)
EOSINOPHIL NFR BLD MANUAL: 0 % (ref 0–6)
ERYTHROCYTE [DISTWIDTH] IN BLOOD BY AUTOMATED COUNT: 13.3 % (ref 11.6–15.1)
GFR SERPL CREATININE-BSD FRML MDRD: 14 ML/MIN/1.73SQ M
GLUCOSE SERPL-MCNC: 94 MG/DL (ref 65–140)
HCT VFR BLD AUTO: 27.2 % (ref 36.5–49.3)
HGB BLD-MCNC: 8.7 G/DL (ref 12–17)
LYMPHOCYTES # BLD AUTO: 1.47 THOUSAND/UL (ref 0.6–4.47)
LYMPHOCYTES # BLD AUTO: 11 % (ref 14–44)
MCH RBC QN AUTO: 31.5 PG (ref 26.8–34.3)
MCHC RBC AUTO-ENTMCNC: 32 G/DL (ref 31.4–37.4)
MCV RBC AUTO: 99 FL (ref 82–98)
MONOCYTES # BLD AUTO: 0.93 THOUSAND/UL (ref 0–1.22)
MONOCYTES NFR BLD: 7 % (ref 4–12)
MRSA NOSE QL CULT: NORMAL
MYELOPEROXIDASE AB SER IA-ACNC: <9 U/ML (ref 0–9)
MYELOPEROXIDASE AB SER IA-ACNC: <9 U/ML (ref 0–9)
NEUTROPHILS # BLD MANUAL: 10.93 THOUSAND/UL (ref 1.85–7.62)
NEUTS SEG NFR BLD AUTO: 82 % (ref 43–75)
NRBC BLD AUTO-RTO: 0 /100 WBCS
OVALOCYTES BLD QL SMEAR: PRESENT
P JIROVECII AG SPEC QL IF: NORMAL
P-ANCA ATYPICAL TITR SER IF: ABNORMAL TITER
P-ANCA ATYPICAL TITR SER IF: ABNORMAL TITER
P-ANCA TITR SER IF: ABNORMAL TITER
P-ANCA TITR SER IF: ABNORMAL TITER
PHOSPHATE SERPL-MCNC: 5.1 MG/DL (ref 2.7–4.5)
PLATELET # BLD AUTO: 190 THOUSANDS/UL (ref 149–390)
PLATELET BLD QL SMEAR: ADEQUATE
PMV BLD AUTO: 10.5 FL (ref 8.9–12.7)
POTASSIUM SERPL-SCNC: 4.2 MMOL/L (ref 3.5–5.3)
PROTEINASE3 AB SER IA-ACNC: <3.5 U/ML (ref 0–3.5)
PROTEINASE3 AB SER IA-ACNC: <3.5 U/ML (ref 0–3.5)
RBC # BLD AUTO: 2.76 MILLION/UL (ref 3.88–5.62)
RH BLD: POSITIVE
SCHISTOCYTES BLD QL SMEAR: PRESENT
SODIUM SERPL-SCNC: 142 MMOL/L (ref 136–145)
TOTAL CELLS COUNTED SPEC: 100
VANCOMYCIN SERPL-MCNC: 22.3 UG/ML
WBC # BLD AUTO: 13.33 THOUSAND/UL (ref 4.31–10.16)

## 2020-12-23 PROCEDURE — 80069 RENAL FUNCTION PANEL: CPT | Performed by: PHYSICIAN ASSISTANT

## 2020-12-23 PROCEDURE — 85027 COMPLETE CBC AUTOMATED: CPT | Performed by: PHYSICIAN ASSISTANT

## 2020-12-23 PROCEDURE — 99233 SBSQ HOSP IP/OBS HIGH 50: CPT | Performed by: INTERNAL MEDICINE

## 2020-12-23 PROCEDURE — 30233K1 TRANSFUSION OF NONAUTOLOGOUS FROZEN PLASMA INTO PERIPHERAL VEIN, PERCUTANEOUS APPROACH: ICD-10-PCS | Performed by: INTERNAL MEDICINE

## 2020-12-23 PROCEDURE — 80202 ASSAY OF VANCOMYCIN: CPT | Performed by: PHYSICIAN ASSISTANT

## 2020-12-23 PROCEDURE — 99232 SBSQ HOSP IP/OBS MODERATE 35: CPT | Performed by: INTERNAL MEDICINE

## 2020-12-23 PROCEDURE — 85007 BL SMEAR W/DIFF WBC COUNT: CPT | Performed by: PHYSICIAN ASSISTANT

## 2020-12-23 PROCEDURE — P9017 PLASMA 1 DONOR FRZ W/IN 8 HR: HCPCS

## 2020-12-23 RX ORDER — TORSEMIDE 20 MG/1
40 TABLET ORAL DAILY
Status: DISCONTINUED | OUTPATIENT
Start: 2020-12-23 | End: 2020-12-28 | Stop reason: HOSPADM

## 2020-12-23 RX ORDER — ALBUMIN (HUMAN) 12.5 G/50ML
25 SOLUTION INTRAVENOUS ONCE
Status: DISCONTINUED | OUTPATIENT
Start: 2020-12-23 | End: 2020-12-23

## 2020-12-23 RX ORDER — ALBUMIN, HUMAN INJ 5% 5 %
12.5 SOLUTION INTRAVENOUS ONCE
Status: COMPLETED | OUTPATIENT
Start: 2020-12-23 | End: 2020-12-23

## 2020-12-23 RX ORDER — ALBUMIN (HUMAN) 12.5 G/50ML
75 SOLUTION INTRAVENOUS ONCE
Status: DISCONTINUED | OUTPATIENT
Start: 2020-12-23 | End: 2020-12-23

## 2020-12-23 RX ORDER — CALCIUM GLUCONATE 20 MG/ML
1 INJECTION, SOLUTION INTRAVENOUS ONCE
Status: COMPLETED | OUTPATIENT
Start: 2020-12-23 | End: 2020-12-23

## 2020-12-23 RX ORDER — SODIUM BICARBONATE 650 MG/1
650 TABLET ORAL
Status: DISCONTINUED | OUTPATIENT
Start: 2020-12-23 | End: 2020-12-26

## 2020-12-23 RX ADMIN — ALBUMIN (HUMAN) 12.5 G: 12.5 INJECTION, SOLUTION INTRAVENOUS at 09:37

## 2020-12-23 RX ADMIN — SODIUM BICARBONATE 650 MG: 650 TABLET ORAL at 22:39

## 2020-12-23 RX ADMIN — PANTOPRAZOLE SODIUM 40 MG: 40 TABLET, DELAYED RELEASE ORAL at 17:05

## 2020-12-23 RX ADMIN — SEVELAMER HYDROCHLORIDE 1600 MG: 800 TABLET, FILM COATED PARENTERAL at 08:18

## 2020-12-23 RX ADMIN — PANTOPRAZOLE SODIUM 40 MG: 40 TABLET, DELAYED RELEASE ORAL at 06:18

## 2020-12-23 RX ADMIN — DAPSONE 100 MG: 100 TABLET ORAL at 08:13

## 2020-12-23 RX ADMIN — SEVELAMER HYDROCHLORIDE 1600 MG: 800 TABLET, FILM COATED PARENTERAL at 11:35

## 2020-12-23 RX ADMIN — CEFEPIME HYDROCHLORIDE 1000 MG: 1 INJECTION, POWDER, FOR SOLUTION INTRAMUSCULAR; INTRAVENOUS at 22:45

## 2020-12-23 RX ADMIN — ALBUMIN (HUMAN) 12.5 G: 12.5 INJECTION, SOLUTION INTRAVENOUS at 09:36

## 2020-12-23 RX ADMIN — CALCIUM GLUCONATE 1 G: 20 INJECTION, SOLUTION INTRAVENOUS at 09:03

## 2020-12-23 RX ADMIN — NYSTATIN 500000 UNITS: 100000 SUSPENSION ORAL at 11:35

## 2020-12-23 RX ADMIN — NYSTATIN 500000 UNITS: 100000 SUSPENSION ORAL at 17:05

## 2020-12-23 RX ADMIN — SODIUM BICARBONATE 650 MG: 650 TABLET ORAL at 17:06

## 2020-12-23 RX ADMIN — METOPROLOL SUCCINATE 50 MG: 50 TABLET, EXTENDED RELEASE ORAL at 08:12

## 2020-12-23 RX ADMIN — SODIUM BICARBONATE 650 MG: 650 TABLET ORAL at 08:12

## 2020-12-23 RX ADMIN — SODIUM BICARBONATE 650 MG: 650 TABLET ORAL at 11:35

## 2020-12-23 RX ADMIN — METHYLPREDNISOLONE SODIUM SUCCINATE 60 MG: 125 INJECTION, POWDER, FOR SOLUTION INTRAMUSCULAR; INTRAVENOUS at 08:12

## 2020-12-23 RX ADMIN — NYSTATIN 500000 UNITS: 100000 SUSPENSION ORAL at 22:39

## 2020-12-23 RX ADMIN — SEVELAMER HYDROCHLORIDE 1600 MG: 800 TABLET, FILM COATED PARENTERAL at 17:05

## 2020-12-23 RX ADMIN — AMLODIPINE BESYLATE 10 MG: 10 TABLET ORAL at 08:12

## 2020-12-23 RX ADMIN — TORSEMIDE 40 MG: 20 TABLET ORAL at 11:35

## 2020-12-23 RX ADMIN — NYSTATIN 500000 UNITS: 100000 SUSPENSION ORAL at 08:12

## 2020-12-23 RX ADMIN — CEFEPIME HYDROCHLORIDE 1000 MG: 1 INJECTION, POWDER, FOR SOLUTION INTRAMUSCULAR; INTRAVENOUS at 11:35

## 2020-12-23 RX ADMIN — CEFEPIME HYDROCHLORIDE 1000 MG: 1 INJECTION, POWDER, FOR SOLUTION INTRAMUSCULAR; INTRAVENOUS at 00:41

## 2020-12-24 ENCOUNTER — APPOINTMENT (INPATIENT)
Dept: RADIOLOGY | Facility: HOSPITAL | Age: 55
DRG: 545 | End: 2020-12-24
Payer: COMMERCIAL

## 2020-12-24 LAB
ABO GROUP BLD BPU: NORMAL
ANION GAP SERPL CALCULATED.3IONS-SCNC: 12 MMOL/L (ref 4–13)
APTT PPP: 22 SECONDS (ref 23–37)
APTT PPP: 42 SECONDS (ref 23–37)
BASOPHILS # BLD MANUAL: 0 THOUSAND/UL (ref 0–0.1)
BASOPHILS NFR MAR MANUAL: 0 % (ref 0–1)
BPU ID: NORMAL
BUN SERPL-MCNC: 115 MG/DL (ref 5–25)
CALCIUM SERPL-MCNC: 8.8 MG/DL (ref 8.3–10.1)
CHLORIDE SERPL-SCNC: 110 MMOL/L (ref 100–108)
CO2 SERPL-SCNC: 23 MMOL/L (ref 21–32)
CREAT SERPL-MCNC: 4.24 MG/DL (ref 0.6–1.3)
EOSINOPHIL # BLD MANUAL: 0 THOUSAND/UL (ref 0–0.4)
EOSINOPHIL NFR BLD MANUAL: 0 % (ref 0–6)
ERYTHROCYTE [DISTWIDTH] IN BLOOD BY AUTOMATED COUNT: 13.3 % (ref 11.6–15.1)
ERYTHROCYTE [DISTWIDTH] IN BLOOD BY AUTOMATED COUNT: 13.6 % (ref 11.6–15.1)
GFR SERPL CREATININE-BSD FRML MDRD: 15 ML/MIN/1.73SQ M
GLUCOSE SERPL-MCNC: 97 MG/DL (ref 65–140)
HCT VFR BLD AUTO: 23.4 % (ref 36.5–49.3)
HCT VFR BLD AUTO: 25 % (ref 36.5–49.3)
HGB BLD-MCNC: 7.4 G/DL (ref 12–17)
HGB BLD-MCNC: 8 G/DL (ref 12–17)
INR PPP: 1.1 (ref 0.84–1.19)
LYMPHOCYTES # BLD AUTO: 0.6 THOUSAND/UL (ref 0.6–4.47)
LYMPHOCYTES # BLD AUTO: 8 % (ref 14–44)
MCH RBC QN AUTO: 31.1 PG (ref 26.8–34.3)
MCH RBC QN AUTO: 31.4 PG (ref 26.8–34.3)
MCHC RBC AUTO-ENTMCNC: 31.6 G/DL (ref 31.4–37.4)
MCHC RBC AUTO-ENTMCNC: 32 G/DL (ref 31.4–37.4)
MCV RBC AUTO: 98 FL (ref 82–98)
MCV RBC AUTO: 98 FL (ref 82–98)
METAMYELOCYTES NFR BLD MANUAL: 2 % (ref 0–1)
MONOCYTES # BLD AUTO: 0.3 THOUSAND/UL (ref 0–1.22)
MONOCYTES NFR BLD: 4 % (ref 4–12)
MYELOCYTES NFR BLD MANUAL: 2 % (ref 0–1)
NEUTROPHILS # BLD MANUAL: 6.3 THOUSAND/UL (ref 1.85–7.62)
NEUTS BAND NFR BLD MANUAL: 1 % (ref 0–8)
NEUTS SEG NFR BLD AUTO: 83 % (ref 43–75)
NRBC BLD AUTO-RTO: 0 /100 WBCS
OVALOCYTES BLD QL SMEAR: PRESENT
PLATELET # BLD AUTO: 157 THOUSANDS/UL (ref 149–390)
PLATELET # BLD AUTO: 167 THOUSANDS/UL (ref 149–390)
PLATELET BLD QL SMEAR: ADEQUATE
PMV BLD AUTO: 10.2 FL (ref 8.9–12.7)
PMV BLD AUTO: 10.6 FL (ref 8.9–12.7)
POIKILOCYTOSIS BLD QL SMEAR: PRESENT
POTASSIUM SERPL-SCNC: 4.6 MMOL/L (ref 3.5–5.3)
PROTHROMBIN TIME: 14.3 SECONDS (ref 11.6–14.5)
RBC # BLD AUTO: 2.38 MILLION/UL (ref 3.88–5.62)
RBC # BLD AUTO: 2.55 MILLION/UL (ref 3.88–5.62)
SODIUM SERPL-SCNC: 145 MMOL/L (ref 136–145)
TOTAL CELLS COUNTED SPEC: 100
UNIT DISPENSE STATUS: NORMAL
UNIT PRODUCT CODE: NORMAL
UNIT RH: NORMAL
VANCOMYCIN SERPL-MCNC: 16.9 UG/ML
WBC # BLD AUTO: 7.5 THOUSAND/UL (ref 4.31–10.16)
WBC # BLD AUTO: 8.78 THOUSAND/UL (ref 4.31–10.16)

## 2020-12-24 PROCEDURE — 99233 SBSQ HOSP IP/OBS HIGH 50: CPT | Performed by: INTERNAL MEDICINE

## 2020-12-24 PROCEDURE — 99232 SBSQ HOSP IP/OBS MODERATE 35: CPT | Performed by: INTERNAL MEDICINE

## 2020-12-24 PROCEDURE — 80048 BASIC METABOLIC PNL TOTAL CA: CPT | Performed by: INTERNAL MEDICINE

## 2020-12-24 PROCEDURE — 85007 BL SMEAR W/DIFF WBC COUNT: CPT | Performed by: INTERNAL MEDICINE

## 2020-12-24 PROCEDURE — 85730 THROMBOPLASTIN TIME PARTIAL: CPT | Performed by: INTERNAL MEDICINE

## 2020-12-24 PROCEDURE — 85610 PROTHROMBIN TIME: CPT | Performed by: INTERNAL MEDICINE

## 2020-12-24 PROCEDURE — 80202 ASSAY OF VANCOMYCIN: CPT | Performed by: PHYSICIAN ASSISTANT

## 2020-12-24 PROCEDURE — 71045 X-RAY EXAM CHEST 1 VIEW: CPT

## 2020-12-24 PROCEDURE — 85027 COMPLETE CBC AUTOMATED: CPT | Performed by: INTERNAL MEDICINE

## 2020-12-24 RX ORDER — HEPARIN SODIUM 10000 [USP'U]/100ML
3-20 INJECTION, SOLUTION INTRAVENOUS
Status: DISCONTINUED | OUTPATIENT
Start: 2020-12-24 | End: 2020-12-28

## 2020-12-24 RX ORDER — HEPARIN SODIUM 1000 [USP'U]/ML
2000 INJECTION, SOLUTION INTRAVENOUS; SUBCUTANEOUS
Status: DISCONTINUED | OUTPATIENT
Start: 2020-12-24 | End: 2020-12-28

## 2020-12-24 RX ORDER — VANCOMYCIN HYDROCHLORIDE 1 G/200ML
15 INJECTION, SOLUTION INTRAVENOUS ONCE
Status: COMPLETED | OUTPATIENT
Start: 2020-12-24 | End: 2020-12-24

## 2020-12-24 RX ORDER — HEPARIN SODIUM 1000 [USP'U]/ML
4000 INJECTION, SOLUTION INTRAVENOUS; SUBCUTANEOUS
Status: DISCONTINUED | OUTPATIENT
Start: 2020-12-24 | End: 2020-12-28

## 2020-12-24 RX ADMIN — VANCOMYCIN HYDROCHLORIDE 1000 MG: 1 INJECTION, SOLUTION INTRAVENOUS at 09:02

## 2020-12-24 RX ADMIN — SODIUM BICARBONATE 650 MG: 650 TABLET ORAL at 09:03

## 2020-12-24 RX ADMIN — HEPARIN SODIUM 4000 UNITS: 1000 INJECTION INTRAVENOUS; SUBCUTANEOUS at 18:49

## 2020-12-24 RX ADMIN — PANTOPRAZOLE SODIUM 40 MG: 40 TABLET, DELAYED RELEASE ORAL at 17:21

## 2020-12-24 RX ADMIN — NYSTATIN 500000 UNITS: 100000 SUSPENSION ORAL at 09:02

## 2020-12-24 RX ADMIN — CEFEPIME HYDROCHLORIDE 1000 MG: 1 INJECTION, POWDER, FOR SOLUTION INTRAMUSCULAR; INTRAVENOUS at 11:25

## 2020-12-24 RX ADMIN — SODIUM BICARBONATE 650 MG: 650 TABLET ORAL at 12:36

## 2020-12-24 RX ADMIN — METHYLPREDNISOLONE SODIUM SUCCINATE 60 MG: 125 INJECTION, POWDER, FOR SOLUTION INTRAMUSCULAR; INTRAVENOUS at 09:02

## 2020-12-24 RX ADMIN — METOPROLOL SUCCINATE 50 MG: 50 TABLET, EXTENDED RELEASE ORAL at 09:03

## 2020-12-24 RX ADMIN — SEVELAMER HYDROCHLORIDE 1600 MG: 800 TABLET, FILM COATED PARENTERAL at 09:47

## 2020-12-24 RX ADMIN — PANTOPRAZOLE SODIUM 40 MG: 40 TABLET, DELAYED RELEASE ORAL at 05:25

## 2020-12-24 RX ADMIN — SEVELAMER HYDROCHLORIDE 1600 MG: 800 TABLET, FILM COATED PARENTERAL at 17:22

## 2020-12-24 RX ADMIN — HEPARIN SODIUM 12 UNITS/KG/HR: 10000 INJECTION, SOLUTION INTRAVENOUS at 11:43

## 2020-12-24 RX ADMIN — NYSTATIN 500000 UNITS: 100000 SUSPENSION ORAL at 21:27

## 2020-12-24 RX ADMIN — NYSTATIN 500000 UNITS: 100000 SUSPENSION ORAL at 12:36

## 2020-12-24 RX ADMIN — TORSEMIDE 40 MG: 20 TABLET ORAL at 09:02

## 2020-12-24 RX ADMIN — AMLODIPINE BESYLATE 10 MG: 10 TABLET ORAL at 09:03

## 2020-12-24 RX ADMIN — SEVELAMER HYDROCHLORIDE 1600 MG: 800 TABLET, FILM COATED PARENTERAL at 12:36

## 2020-12-24 RX ADMIN — DAPSONE 100 MG: 100 TABLET ORAL at 09:03

## 2020-12-24 RX ADMIN — NYSTATIN 500000 UNITS: 100000 SUSPENSION ORAL at 17:26

## 2020-12-24 RX ADMIN — SODIUM BICARBONATE 650 MG: 650 TABLET ORAL at 21:27

## 2020-12-24 RX ADMIN — SODIUM BICARBONATE 650 MG: 650 TABLET ORAL at 17:22

## 2020-12-25 PROBLEM — I82.409 DVT (DEEP VENOUS THROMBOSIS) (HCC): Status: ACTIVE | Noted: 2020-12-25

## 2020-12-25 LAB
ANION GAP SERPL CALCULATED.3IONS-SCNC: 12 MMOL/L (ref 4–13)
APTT PPP: 143 SECONDS (ref 23–37)
APTT PPP: 72 SECONDS (ref 23–37)
APTT PPP: 75 SECONDS (ref 23–37)
BASOPHILS # BLD MANUAL: 0 THOUSAND/UL (ref 0–0.1)
BASOPHILS NFR MAR MANUAL: 0 % (ref 0–1)
BUN SERPL-MCNC: 128 MG/DL (ref 5–25)
CALCIUM SERPL-MCNC: 9.1 MG/DL (ref 8.3–10.1)
CHLORIDE SERPL-SCNC: 107 MMOL/L (ref 100–108)
CO2 SERPL-SCNC: 24 MMOL/L (ref 21–32)
CREAT SERPL-MCNC: 4.13 MG/DL (ref 0.6–1.3)
EOSINOPHIL # BLD MANUAL: 0 THOUSAND/UL (ref 0–0.4)
EOSINOPHIL NFR BLD MANUAL: 0 % (ref 0–6)
ERYTHROCYTE [DISTWIDTH] IN BLOOD BY AUTOMATED COUNT: 13.4 % (ref 11.6–15.1)
GFR SERPL CREATININE-BSD FRML MDRD: 15 ML/MIN/1.73SQ M
GLUCOSE SERPL-MCNC: 89 MG/DL (ref 65–140)
HCT VFR BLD AUTO: 27.3 % (ref 36.5–49.3)
HGB BLD-MCNC: 8.7 G/DL (ref 12–17)
LYMPHOCYTES # BLD AUTO: 1.28 THOUSAND/UL (ref 0.6–4.47)
LYMPHOCYTES # BLD AUTO: 13 % (ref 14–44)
MCH RBC QN AUTO: 31.8 PG (ref 26.8–34.3)
MCHC RBC AUTO-ENTMCNC: 31.9 G/DL (ref 31.4–37.4)
MCV RBC AUTO: 100 FL (ref 82–98)
METAMYELOCYTES NFR BLD MANUAL: 4 % (ref 0–1)
MONOCYTES # BLD AUTO: 0.49 THOUSAND/UL (ref 0–1.22)
MONOCYTES NFR BLD: 5 % (ref 4–12)
NEUTROPHILS # BLD MANUAL: 7.67 THOUSAND/UL (ref 1.85–7.62)
NEUTS SEG NFR BLD AUTO: 78 % (ref 43–75)
NRBC BLD AUTO-RTO: 0 /100 WBCS
PLATELET # BLD AUTO: 181 THOUSANDS/UL (ref 149–390)
PLATELET BLD QL SMEAR: ADEQUATE
PMV BLD AUTO: 11.1 FL (ref 8.9–12.7)
POIKILOCYTOSIS BLD QL SMEAR: PRESENT
POTASSIUM SERPL-SCNC: 4.4 MMOL/L (ref 3.5–5.3)
RBC # BLD AUTO: 2.74 MILLION/UL (ref 3.88–5.62)
SCHISTOCYTES BLD QL SMEAR: PRESENT
SODIUM SERPL-SCNC: 143 MMOL/L (ref 136–145)
TOTAL CELLS COUNTED SPEC: 100
WBC # BLD AUTO: 9.83 THOUSAND/UL (ref 4.31–10.16)

## 2020-12-25 PROCEDURE — 85730 THROMBOPLASTIN TIME PARTIAL: CPT | Performed by: INTERNAL MEDICINE

## 2020-12-25 PROCEDURE — 85027 COMPLETE CBC AUTOMATED: CPT | Performed by: INTERNAL MEDICINE

## 2020-12-25 PROCEDURE — P9017 PLASMA 1 DONOR FRZ W/IN 8 HR: HCPCS

## 2020-12-25 PROCEDURE — 99233 SBSQ HOSP IP/OBS HIGH 50: CPT | Performed by: INTERNAL MEDICINE

## 2020-12-25 PROCEDURE — 85007 BL SMEAR W/DIFF WBC COUNT: CPT | Performed by: INTERNAL MEDICINE

## 2020-12-25 PROCEDURE — 99232 SBSQ HOSP IP/OBS MODERATE 35: CPT | Performed by: INTERNAL MEDICINE

## 2020-12-25 PROCEDURE — 80048 BASIC METABOLIC PNL TOTAL CA: CPT | Performed by: INTERNAL MEDICINE

## 2020-12-25 RX ORDER — ALBUMIN, HUMAN INJ 5% 5 %
12.5 SOLUTION INTRAVENOUS ONCE
Status: COMPLETED | OUTPATIENT
Start: 2020-12-25 | End: 2020-12-25

## 2020-12-25 RX ORDER — CALCIUM GLUCONATE 20 MG/ML
1 INJECTION, SOLUTION INTRAVENOUS ONCE
Status: COMPLETED | OUTPATIENT
Start: 2020-12-25 | End: 2020-12-25

## 2020-12-25 RX ORDER — LABETALOL 20 MG/4 ML (5 MG/ML) INTRAVENOUS SYRINGE
10 EVERY 4 HOURS PRN
Status: DISCONTINUED | OUTPATIENT
Start: 2020-12-25 | End: 2020-12-28 | Stop reason: HOSPADM

## 2020-12-25 RX ORDER — PREDNISONE 20 MG/1
60 TABLET ORAL DAILY
Status: DISCONTINUED | OUTPATIENT
Start: 2020-12-25 | End: 2020-12-28 | Stop reason: HOSPADM

## 2020-12-25 RX ADMIN — SODIUM BICARBONATE 650 MG: 650 TABLET ORAL at 12:08

## 2020-12-25 RX ADMIN — LABETALOL 20 MG/4 ML (5 MG/ML) INTRAVENOUS SYRINGE 10 MG: at 12:14

## 2020-12-25 RX ADMIN — ALBUMIN (HUMAN) 12.5 G: 12.5 INJECTION, SOLUTION INTRAVENOUS at 13:22

## 2020-12-25 RX ADMIN — PREDNISONE 60 MG: 20 TABLET ORAL at 09:45

## 2020-12-25 RX ADMIN — PANTOPRAZOLE SODIUM 40 MG: 40 TABLET, DELAYED RELEASE ORAL at 17:08

## 2020-12-25 RX ADMIN — CEFEPIME HYDROCHLORIDE 1000 MG: 1 INJECTION, POWDER, FOR SOLUTION INTRAMUSCULAR; INTRAVENOUS at 00:59

## 2020-12-25 RX ADMIN — ALBUMIN (HUMAN) 12.5 G: 12.5 INJECTION, SOLUTION INTRAVENOUS at 13:21

## 2020-12-25 RX ADMIN — PANTOPRAZOLE SODIUM 40 MG: 40 TABLET, DELAYED RELEASE ORAL at 06:03

## 2020-12-25 RX ADMIN — SODIUM BICARBONATE 650 MG: 650 TABLET ORAL at 09:45

## 2020-12-25 RX ADMIN — CALCIUM GLUCONATE 1 G: 20 INJECTION, SOLUTION INTRAVENOUS at 13:22

## 2020-12-25 RX ADMIN — NYSTATIN 500000 UNITS: 100000 SUSPENSION ORAL at 17:10

## 2020-12-25 RX ADMIN — ALBUMIN (HUMAN) 12.5 G: 12.5 INJECTION, SOLUTION INTRAVENOUS at 13:19

## 2020-12-25 RX ADMIN — METOPROLOL SUCCINATE 50 MG: 50 TABLET, EXTENDED RELEASE ORAL at 09:46

## 2020-12-25 RX ADMIN — NYSTATIN 500000 UNITS: 100000 SUSPENSION ORAL at 12:07

## 2020-12-25 RX ADMIN — NYSTATIN 500000 UNITS: 100000 SUSPENSION ORAL at 21:38

## 2020-12-25 RX ADMIN — AMLODIPINE BESYLATE 10 MG: 10 TABLET ORAL at 09:46

## 2020-12-25 RX ADMIN — SODIUM BICARBONATE 650 MG: 650 TABLET ORAL at 21:39

## 2020-12-25 RX ADMIN — LABETALOL 20 MG/4 ML (5 MG/ML) INTRAVENOUS SYRINGE 10 MG: at 05:59

## 2020-12-25 RX ADMIN — SEVELAMER HYDROCHLORIDE 1600 MG: 800 TABLET, FILM COATED PARENTERAL at 09:46

## 2020-12-25 RX ADMIN — DAPSONE 100 MG: 100 TABLET ORAL at 09:46

## 2020-12-25 RX ADMIN — SEVELAMER HYDROCHLORIDE 1600 MG: 800 TABLET, FILM COATED PARENTERAL at 12:08

## 2020-12-25 RX ADMIN — CEFEPIME HYDROCHLORIDE 1000 MG: 1 INJECTION, POWDER, FOR SOLUTION INTRAMUSCULAR; INTRAVENOUS at 12:08

## 2020-12-25 RX ADMIN — SODIUM BICARBONATE 650 MG: 650 TABLET ORAL at 17:08

## 2020-12-25 RX ADMIN — TORSEMIDE 40 MG: 20 TABLET ORAL at 09:46

## 2020-12-25 RX ADMIN — SEVELAMER HYDROCHLORIDE 1600 MG: 800 TABLET, FILM COATED PARENTERAL at 17:08

## 2020-12-25 RX ADMIN — HEPARIN SODIUM 13 UNITS/KG/HR: 10000 INJECTION, SOLUTION INTRAVENOUS at 14:05

## 2020-12-26 LAB
ABO GROUP BLD BPU: NORMAL
ANION GAP SERPL CALCULATED.3IONS-SCNC: 10 MMOL/L (ref 4–13)
APTT PPP: 90 SECONDS (ref 23–37)
BASOPHILS # BLD MANUAL: 0 THOUSAND/UL (ref 0–0.1)
BASOPHILS NFR MAR MANUAL: 0 % (ref 0–1)
BPU ID: NORMAL
BUN SERPL-MCNC: 121 MG/DL (ref 5–25)
CALCIUM SERPL-MCNC: 8.7 MG/DL (ref 8.3–10.1)
CHLORIDE SERPL-SCNC: 107 MMOL/L (ref 100–108)
CO2 SERPL-SCNC: 25 MMOL/L (ref 21–32)
CREAT SERPL-MCNC: 4.18 MG/DL (ref 0.6–1.3)
EOSINOPHIL # BLD MANUAL: 0.17 THOUSAND/UL (ref 0–0.4)
EOSINOPHIL NFR BLD MANUAL: 2 % (ref 0–6)
ERYTHROCYTE [DISTWIDTH] IN BLOOD BY AUTOMATED COUNT: 13.6 % (ref 11.6–15.1)
GFR SERPL CREATININE-BSD FRML MDRD: 15 ML/MIN/1.73SQ M
GLUCOSE SERPL-MCNC: 98 MG/DL (ref 65–140)
HCT VFR BLD AUTO: 22.4 % (ref 36.5–49.3)
HGB BLD-MCNC: 7.1 G/DL (ref 12–17)
HYPERCHROMIA BLD QL SMEAR: PRESENT
LYMPHOCYTES # BLD AUTO: 0.5 THOUSAND/UL (ref 0.6–4.47)
LYMPHOCYTES # BLD AUTO: 6 % (ref 14–44)
MCH RBC QN AUTO: 31.1 PG (ref 26.8–34.3)
MCHC RBC AUTO-ENTMCNC: 31.7 G/DL (ref 31.4–37.4)
MCV RBC AUTO: 98 FL (ref 82–98)
METAMYELOCYTES NFR BLD MANUAL: 5 % (ref 0–1)
MONOCYTES # BLD AUTO: 0.42 THOUSAND/UL (ref 0–1.22)
MONOCYTES NFR BLD: 5 % (ref 4–12)
NEUTROPHILS # BLD MANUAL: 6.81 THOUSAND/UL (ref 1.85–7.62)
NEUTS BAND NFR BLD MANUAL: 1 % (ref 0–8)
NEUTS SEG NFR BLD AUTO: 81 % (ref 43–75)
NRBC BLD AUTO-RTO: 0 /100 WBCS
PLATELET # BLD AUTO: 168 THOUSANDS/UL (ref 149–390)
PLATELET BLD QL SMEAR: ADEQUATE
PMV BLD AUTO: 11.1 FL (ref 8.9–12.7)
POIKILOCYTOSIS BLD QL SMEAR: PRESENT
POLYCHROMASIA BLD QL SMEAR: PRESENT
POTASSIUM SERPL-SCNC: 4.6 MMOL/L (ref 3.5–5.3)
RBC # BLD AUTO: 2.28 MILLION/UL (ref 3.88–5.62)
RBC MORPH BLD: PRESENT
SODIUM SERPL-SCNC: 142 MMOL/L (ref 136–145)
TOTAL CELLS COUNTED SPEC: 100
UNIT DISPENSE STATUS: NORMAL
UNIT PRODUCT CODE: NORMAL
UNIT RH: NORMAL
WBC # BLD AUTO: 8.3 THOUSAND/UL (ref 4.31–10.16)

## 2020-12-26 PROCEDURE — 85027 COMPLETE CBC AUTOMATED: CPT | Performed by: INTERNAL MEDICINE

## 2020-12-26 PROCEDURE — 99233 SBSQ HOSP IP/OBS HIGH 50: CPT | Performed by: INTERNAL MEDICINE

## 2020-12-26 PROCEDURE — 99232 SBSQ HOSP IP/OBS MODERATE 35: CPT | Performed by: INTERNAL MEDICINE

## 2020-12-26 PROCEDURE — 85730 THROMBOPLASTIN TIME PARTIAL: CPT | Performed by: INTERNAL MEDICINE

## 2020-12-26 PROCEDURE — 85007 BL SMEAR W/DIFF WBC COUNT: CPT | Performed by: INTERNAL MEDICINE

## 2020-12-26 PROCEDURE — 80048 BASIC METABOLIC PNL TOTAL CA: CPT | Performed by: INTERNAL MEDICINE

## 2020-12-26 RX ORDER — SODIUM BICARBONATE 650 MG/1
650 TABLET ORAL
Status: DISCONTINUED | OUTPATIENT
Start: 2020-12-26 | End: 2020-12-27

## 2020-12-26 RX ADMIN — NYSTATIN 500000 UNITS: 100000 SUSPENSION ORAL at 10:25

## 2020-12-26 RX ADMIN — SEVELAMER HYDROCHLORIDE 1600 MG: 800 TABLET, FILM COATED PARENTERAL at 10:25

## 2020-12-26 RX ADMIN — TORSEMIDE 40 MG: 20 TABLET ORAL at 10:25

## 2020-12-26 RX ADMIN — NYSTATIN 500000 UNITS: 100000 SUSPENSION ORAL at 22:29

## 2020-12-26 RX ADMIN — NYSTATIN 500000 UNITS: 100000 SUSPENSION ORAL at 17:18

## 2020-12-26 RX ADMIN — AMLODIPINE BESYLATE 10 MG: 10 TABLET ORAL at 10:25

## 2020-12-26 RX ADMIN — PREDNISONE 60 MG: 20 TABLET ORAL at 10:25

## 2020-12-26 RX ADMIN — PANTOPRAZOLE SODIUM 40 MG: 40 TABLET, DELAYED RELEASE ORAL at 06:01

## 2020-12-26 RX ADMIN — HEPARIN SODIUM 13 UNITS/KG/HR: 10000 INJECTION, SOLUTION INTRAVENOUS at 18:26

## 2020-12-26 RX ADMIN — PANTOPRAZOLE SODIUM 40 MG: 40 TABLET, DELAYED RELEASE ORAL at 17:19

## 2020-12-26 RX ADMIN — SEVELAMER HYDROCHLORIDE 1600 MG: 800 TABLET, FILM COATED PARENTERAL at 13:52

## 2020-12-26 RX ADMIN — SEVELAMER HYDROCHLORIDE 1600 MG: 800 TABLET, FILM COATED PARENTERAL at 17:18

## 2020-12-26 RX ADMIN — DAPSONE 100 MG: 100 TABLET ORAL at 10:26

## 2020-12-26 RX ADMIN — NYSTATIN 500000 UNITS: 100000 SUSPENSION ORAL at 13:52

## 2020-12-26 RX ADMIN — METOPROLOL SUCCINATE 50 MG: 50 TABLET, EXTENDED RELEASE ORAL at 10:25

## 2020-12-26 RX ADMIN — SODIUM BICARBONATE 650 MG: 650 TABLET ORAL at 17:19

## 2020-12-26 RX ADMIN — CEFEPIME HYDROCHLORIDE 1000 MG: 1 INJECTION, POWDER, FOR SOLUTION INTRAMUSCULAR; INTRAVENOUS at 00:05

## 2020-12-27 LAB
ANION GAP SERPL CALCULATED.3IONS-SCNC: 11 MMOL/L (ref 4–13)
APTT PPP: 85 SECONDS (ref 23–37)
BASOPHILS # BLD MANUAL: 0 THOUSAND/UL (ref 0–0.1)
BASOPHILS NFR MAR MANUAL: 0 % (ref 0–1)
BUN SERPL-MCNC: 128 MG/DL (ref 5–25)
CALCIUM SERPL-MCNC: 9 MG/DL (ref 8.3–10.1)
CHLORIDE SERPL-SCNC: 103 MMOL/L (ref 100–108)
CO2 SERPL-SCNC: 25 MMOL/L (ref 21–32)
CREAT SERPL-MCNC: 4.22 MG/DL (ref 0.6–1.3)
EOSINOPHIL # BLD MANUAL: 0.08 THOUSAND/UL (ref 0–0.4)
EOSINOPHIL NFR BLD MANUAL: 1 % (ref 0–6)
ERYTHROCYTE [DISTWIDTH] IN BLOOD BY AUTOMATED COUNT: 13.9 % (ref 11.6–15.1)
GFR SERPL CREATININE-BSD FRML MDRD: 15 ML/MIN/1.73SQ M
GLUCOSE SERPL-MCNC: 89 MG/DL (ref 65–140)
HCT VFR BLD AUTO: 26.7 % (ref 36.5–49.3)
HGB BLD-MCNC: 8.5 G/DL (ref 12–17)
LYMPHOCYTES # BLD AUTO: 1.08 THOUSAND/UL (ref 0.6–4.47)
LYMPHOCYTES # BLD AUTO: 14 % (ref 14–44)
MCH RBC QN AUTO: 31.4 PG (ref 26.8–34.3)
MCHC RBC AUTO-ENTMCNC: 31.8 G/DL (ref 31.4–37.4)
MCV RBC AUTO: 99 FL (ref 82–98)
METAMYELOCYTES NFR BLD MANUAL: 1 % (ref 0–1)
MONOCYTES # BLD AUTO: 0.46 THOUSAND/UL (ref 0–1.22)
MONOCYTES NFR BLD: 6 % (ref 4–12)
MYELOCYTES NFR BLD MANUAL: 2 % (ref 0–1)
NEUTROPHILS # BLD MANUAL: 5.8 THOUSAND/UL (ref 1.85–7.62)
NEUTS BAND NFR BLD MANUAL: 3 % (ref 0–8)
NEUTS SEG NFR BLD AUTO: 72 % (ref 43–75)
NRBC BLD AUTO-RTO: 0 /100 WBCS
PLATELET # BLD AUTO: 160 THOUSANDS/UL (ref 149–390)
PLATELET BLD QL SMEAR: ADEQUATE
PMV BLD AUTO: 11 FL (ref 8.9–12.7)
POLYCHROMASIA BLD QL SMEAR: PRESENT
POTASSIUM SERPL-SCNC: 4.3 MMOL/L (ref 3.5–5.3)
PROMYELOCYTES NFR BLD MANUAL: 1 % (ref 0–0)
RBC # BLD AUTO: 2.71 MILLION/UL (ref 3.88–5.62)
SODIUM SERPL-SCNC: 139 MMOL/L (ref 136–145)
TOTAL CELLS COUNTED SPEC: 100
WBC # BLD AUTO: 7.73 THOUSAND/UL (ref 4.31–10.16)

## 2020-12-27 PROCEDURE — 85007 BL SMEAR W/DIFF WBC COUNT: CPT | Performed by: INTERNAL MEDICINE

## 2020-12-27 PROCEDURE — 99232 SBSQ HOSP IP/OBS MODERATE 35: CPT | Performed by: INTERNAL MEDICINE

## 2020-12-27 PROCEDURE — 80048 BASIC METABOLIC PNL TOTAL CA: CPT | Performed by: INTERNAL MEDICINE

## 2020-12-27 PROCEDURE — 99233 SBSQ HOSP IP/OBS HIGH 50: CPT | Performed by: INTERNAL MEDICINE

## 2020-12-27 PROCEDURE — 85730 THROMBOPLASTIN TIME PARTIAL: CPT | Performed by: INTERNAL MEDICINE

## 2020-12-27 PROCEDURE — P9017 PLASMA 1 DONOR FRZ W/IN 8 HR: HCPCS

## 2020-12-27 PROCEDURE — 85027 COMPLETE CBC AUTOMATED: CPT | Performed by: INTERNAL MEDICINE

## 2020-12-27 RX ORDER — ALBUMIN, HUMAN INJ 5% 5 %
12.5 SOLUTION INTRAVENOUS ONCE
Status: COMPLETED | OUTPATIENT
Start: 2020-12-27 | End: 2020-12-27

## 2020-12-27 RX ORDER — SODIUM BICARBONATE 650 MG/1
650 TABLET ORAL
Status: DISCONTINUED | OUTPATIENT
Start: 2020-12-27 | End: 2020-12-28 | Stop reason: HOSPADM

## 2020-12-27 RX ORDER — CALCIUM GLUCONATE 20 MG/ML
1 INJECTION, SOLUTION INTRAVENOUS ONCE
Status: COMPLETED | OUTPATIENT
Start: 2020-12-27 | End: 2020-12-27

## 2020-12-27 RX ORDER — DIPHENHYDRAMINE HYDROCHLORIDE 50 MG/ML
25 INJECTION INTRAMUSCULAR; INTRAVENOUS EVERY 6 HOURS PRN
Status: DISCONTINUED | OUTPATIENT
Start: 2020-12-27 | End: 2020-12-28 | Stop reason: HOSPADM

## 2020-12-27 RX ADMIN — NYSTATIN 500000 UNITS: 100000 SUSPENSION ORAL at 13:04

## 2020-12-27 RX ADMIN — PREDNISONE 60 MG: 20 TABLET ORAL at 09:31

## 2020-12-27 RX ADMIN — SEVELAMER HYDROCHLORIDE 1600 MG: 800 TABLET, FILM COATED PARENTERAL at 09:31

## 2020-12-27 RX ADMIN — SEVELAMER HYDROCHLORIDE 1600 MG: 800 TABLET, FILM COATED PARENTERAL at 13:04

## 2020-12-27 RX ADMIN — ALBUMIN (HUMAN) 12.5 G: 12.5 INJECTION, SOLUTION INTRAVENOUS at 14:06

## 2020-12-27 RX ADMIN — SODIUM BICARBONATE 650 MG: 650 TABLET ORAL at 17:36

## 2020-12-27 RX ADMIN — PANTOPRAZOLE SODIUM 40 MG: 40 TABLET, DELAYED RELEASE ORAL at 17:36

## 2020-12-27 RX ADMIN — DAPSONE 100 MG: 100 TABLET ORAL at 09:33

## 2020-12-27 RX ADMIN — METOPROLOL SUCCINATE 50 MG: 50 TABLET, EXTENDED RELEASE ORAL at 09:32

## 2020-12-27 RX ADMIN — ALBUMIN (HUMAN) 12.5 G: 12.5 INJECTION, SOLUTION INTRAVENOUS at 14:05

## 2020-12-27 RX ADMIN — DIPHENHYDRAMINE HYDROCHLORIDE 25 MG: 50 INJECTION, SOLUTION INTRAMUSCULAR; INTRAVENOUS at 14:06

## 2020-12-27 RX ADMIN — HEPARIN SODIUM 13 UNITS/KG/HR: 10000 INJECTION, SOLUTION INTRAVENOUS at 20:52

## 2020-12-27 RX ADMIN — NYSTATIN 500000 UNITS: 100000 SUSPENSION ORAL at 09:31

## 2020-12-27 RX ADMIN — TORSEMIDE 40 MG: 20 TABLET ORAL at 09:31

## 2020-12-27 RX ADMIN — SEVELAMER HYDROCHLORIDE 1600 MG: 800 TABLET, FILM COATED PARENTERAL at 17:35

## 2020-12-27 RX ADMIN — PANTOPRAZOLE SODIUM 40 MG: 40 TABLET, DELAYED RELEASE ORAL at 06:22

## 2020-12-27 RX ADMIN — CALCIUM GLUCONATE 1 G: 20 INJECTION, SOLUTION INTRAVENOUS at 14:07

## 2020-12-27 RX ADMIN — NYSTATIN 500000 UNITS: 100000 SUSPENSION ORAL at 20:51

## 2020-12-27 RX ADMIN — SODIUM BICARBONATE 650 MG: 650 TABLET ORAL at 09:32

## 2020-12-27 RX ADMIN — AMLODIPINE BESYLATE 10 MG: 10 TABLET ORAL at 09:32

## 2020-12-27 RX ADMIN — NYSTATIN 500000 UNITS: 100000 SUSPENSION ORAL at 17:35

## 2020-12-28 VITALS
HEART RATE: 82 BPM | DIASTOLIC BLOOD PRESSURE: 83 MMHG | SYSTOLIC BLOOD PRESSURE: 143 MMHG | WEIGHT: 166.67 LBS | OXYGEN SATURATION: 96 % | BODY MASS INDEX: 24.61 KG/M2 | TEMPERATURE: 97.9 F | RESPIRATION RATE: 18 BRPM

## 2020-12-28 LAB
ABO GROUP BLD BPU: NORMAL
ANION GAP SERPL CALCULATED.3IONS-SCNC: 10 MMOL/L (ref 4–13)
APTT PPP: 88 SECONDS (ref 23–37)
BPU ID: NORMAL
BUN SERPL-MCNC: 113 MG/DL (ref 5–25)
C-ANCA TITR SER IF: NORMAL TITER
CALCIUM SERPL-MCNC: 9.1 MG/DL (ref 8.3–10.1)
CHLORIDE SERPL-SCNC: 102 MMOL/L (ref 100–108)
CO2 SERPL-SCNC: 26 MMOL/L (ref 21–32)
CREAT SERPL-MCNC: 4.32 MG/DL (ref 0.6–1.3)
GFR SERPL CREATININE-BSD FRML MDRD: 14 ML/MIN/1.73SQ M
GLUCOSE SERPL-MCNC: 143 MG/DL (ref 65–140)
HAV IGM SER QL: NORMAL
HBV CORE IGM SER QL: NORMAL
HBV SURFACE AG SER QL: NORMAL
HCV AB SER QL: NORMAL
MYELOPEROXIDASE AB SER IA-ACNC: <9 U/ML (ref 0–9)
P-ANCA ATYPICAL TITR SER IF: NORMAL TITER
P-ANCA TITR SER IF: NORMAL TITER
POTASSIUM SERPL-SCNC: 4.1 MMOL/L (ref 3.5–5.3)
PROTEINASE3 AB SER IA-ACNC: <3.5 U/ML (ref 0–3.5)
SODIUM SERPL-SCNC: 138 MMOL/L (ref 136–145)
UNIT DISPENSE STATUS: NORMAL
UNIT PRODUCT CODE: NORMAL
UNIT RH: NORMAL

## 2020-12-28 PROCEDURE — 80048 BASIC METABOLIC PNL TOTAL CA: CPT | Performed by: PHYSICIAN ASSISTANT

## 2020-12-28 PROCEDURE — 99239 HOSP IP/OBS DSCHRG MGMT >30: CPT | Performed by: PHYSICIAN ASSISTANT

## 2020-12-28 PROCEDURE — 99232 SBSQ HOSP IP/OBS MODERATE 35: CPT | Performed by: INTERNAL MEDICINE

## 2020-12-28 PROCEDURE — 85730 THROMBOPLASTIN TIME PARTIAL: CPT | Performed by: INTERNAL MEDICINE

## 2020-12-28 PROCEDURE — 80074 ACUTE HEPATITIS PANEL: CPT | Performed by: PHYSICIAN ASSISTANT

## 2020-12-28 RX ORDER — DAPSONE 100 MG/1
100 TABLET ORAL DAILY
Qty: 30 TABLET | Refills: 0 | Status: SHIPPED | OUTPATIENT
Start: 2020-12-29 | End: 2021-01-28 | Stop reason: SDUPTHER

## 2020-12-28 RX ORDER — METOPROLOL SUCCINATE 50 MG/1
50 TABLET, EXTENDED RELEASE ORAL DAILY
Status: ON HOLD
Start: 2020-12-28 | End: 2021-01-13 | Stop reason: SDUPTHER

## 2020-12-28 RX ORDER — PREDNISONE 20 MG/1
60 TABLET ORAL DAILY
Qty: 90 TABLET | Refills: 0 | Status: SHIPPED | OUTPATIENT
Start: 2020-12-28 | End: 2021-01-28 | Stop reason: SDUPTHER

## 2020-12-28 RX ORDER — SEVELAMER HYDROCHLORIDE 800 MG/1
1600 TABLET, FILM COATED ORAL
Qty: 180 TABLET | Refills: 0 | Status: SHIPPED | OUTPATIENT
Start: 2020-12-28 | End: 2021-02-04 | Stop reason: SDUPTHER

## 2020-12-28 RX ORDER — TORSEMIDE 20 MG/1
40 TABLET ORAL DAILY
Qty: 60 TABLET | Refills: 0 | Status: SHIPPED | OUTPATIENT
Start: 2020-12-29 | End: 2021-01-28 | Stop reason: SDUPTHER

## 2020-12-28 RX ORDER — SODIUM BICARBONATE 650 MG/1
650 TABLET ORAL
Qty: 60 TABLET | Refills: 0 | Status: SHIPPED | OUTPATIENT
Start: 2020-12-28 | End: 2021-01-13 | Stop reason: HOSPADM

## 2020-12-28 RX ORDER — PANTOPRAZOLE SODIUM 40 MG/1
40 TABLET, DELAYED RELEASE ORAL DAILY
Qty: 30 TABLET | Refills: 0 | Status: SHIPPED | OUTPATIENT
Start: 2020-12-28 | End: 2021-01-28 | Stop reason: SDUPTHER

## 2020-12-28 RX ADMIN — PANTOPRAZOLE SODIUM 40 MG: 40 TABLET, DELAYED RELEASE ORAL at 16:18

## 2020-12-28 RX ADMIN — METOPROLOL SUCCINATE 50 MG: 50 TABLET, EXTENDED RELEASE ORAL at 08:23

## 2020-12-28 RX ADMIN — TORSEMIDE 40 MG: 20 TABLET ORAL at 08:23

## 2020-12-28 RX ADMIN — NYSTATIN 500000 UNITS: 100000 SUSPENSION ORAL at 12:30

## 2020-12-28 RX ADMIN — SODIUM BICARBONATE 650 MG: 650 TABLET ORAL at 08:23

## 2020-12-28 RX ADMIN — AMLODIPINE BESYLATE 10 MG: 10 TABLET ORAL at 08:23

## 2020-12-28 RX ADMIN — PANTOPRAZOLE SODIUM 40 MG: 40 TABLET, DELAYED RELEASE ORAL at 06:05

## 2020-12-28 RX ADMIN — NYSTATIN 500000 UNITS: 100000 SUSPENSION ORAL at 09:48

## 2020-12-28 RX ADMIN — SEVELAMER HYDROCHLORIDE 1600 MG: 800 TABLET, FILM COATED PARENTERAL at 08:23

## 2020-12-28 RX ADMIN — SEVELAMER HYDROCHLORIDE 1600 MG: 800 TABLET, FILM COATED PARENTERAL at 12:30

## 2020-12-28 RX ADMIN — DAPSONE 100 MG: 100 TABLET ORAL at 09:49

## 2020-12-28 RX ADMIN — APIXABAN 10 MG: 5 TABLET, FILM COATED ORAL at 16:18

## 2020-12-28 RX ADMIN — PREDNISONE 60 MG: 20 TABLET ORAL at 08:23

## 2020-12-29 ENCOUNTER — TELEPHONE (OUTPATIENT)
Dept: NEPHROLOGY | Facility: CLINIC | Age: 55
End: 2020-12-29

## 2020-12-29 NOTE — TELEPHONE ENCOUNTER
[facility] called requesting for Dr Poonam Sahu to release the hold for the infusion appointment and to change the date for the patient's infusion appointment on 12/31/2021

## 2020-12-30 ENCOUNTER — APPOINTMENT (OUTPATIENT)
Dept: LAB | Facility: CLINIC | Age: 55
End: 2020-12-30
Payer: COMMERCIAL

## 2020-12-30 DIAGNOSIS — N17.9 ACUTE KIDNEY INJURY SUPERIMPOSED ON CHRONIC KIDNEY DISEASE (HCC): ICD-10-CM

## 2020-12-30 DIAGNOSIS — N18.9 ACUTE KIDNEY INJURY SUPERIMPOSED ON CHRONIC KIDNEY DISEASE (HCC): ICD-10-CM

## 2020-12-30 LAB
ALBUMIN SERPL BCP-MCNC: 3.6 G/DL (ref 3.5–5)
ALP SERPL-CCNC: 73 U/L (ref 46–116)
ALT SERPL W P-5'-P-CCNC: 42 U/L (ref 12–78)
ANION GAP SERPL CALCULATED.3IONS-SCNC: 8 MMOL/L (ref 4–13)
ANISOCYTOSIS BLD QL SMEAR: PRESENT
AST SERPL W P-5'-P-CCNC: 19 U/L (ref 5–45)
BACTERIA UR QL AUTO: ABNORMAL /HPF
BASO STIPL BLD QL SMEAR: PRESENT
BASOPHILS # BLD MANUAL: 0 THOUSAND/UL (ref 0–0.1)
BASOPHILS NFR MAR MANUAL: 0 % (ref 0–1)
BILIRUB SERPL-MCNC: 0.49 MG/DL (ref 0.2–1)
BILIRUB UR QL STRIP: NEGATIVE
BUN SERPL-MCNC: 109 MG/DL (ref 5–25)
CALCIUM SERPL-MCNC: 9.2 MG/DL (ref 8.3–10.1)
CHLORIDE SERPL-SCNC: 101 MMOL/L (ref 100–108)
CLARITY UR: CLEAR
CO2 SERPL-SCNC: 28 MMOL/L (ref 21–32)
COLOR UR: YELLOW
CREAT SERPL-MCNC: 4.41 MG/DL (ref 0.6–1.3)
EOSINOPHIL # BLD MANUAL: 0 THOUSAND/UL (ref 0–0.4)
EOSINOPHIL NFR BLD MANUAL: 0 % (ref 0–6)
ERYTHROCYTE [DISTWIDTH] IN BLOOD BY AUTOMATED COUNT: 14.7 % (ref 11.6–15.1)
FUNGUS SPEC CULT: ABNORMAL
GFR SERPL CREATININE-BSD FRML MDRD: 14 ML/MIN/1.73SQ M
GLUCOSE SERPL-MCNC: 127 MG/DL (ref 65–140)
GLUCOSE UR STRIP-MCNC: NEGATIVE MG/DL
HCT VFR BLD AUTO: 26.5 % (ref 36.5–49.3)
HGB BLD-MCNC: 8.4 G/DL (ref 12–17)
HGB UR QL STRIP.AUTO: ABNORMAL
HYPERCHROMIA BLD QL SMEAR: PRESENT
KETONES UR STRIP-MCNC: NEGATIVE MG/DL
LEUKOCYTE ESTERASE UR QL STRIP: NEGATIVE
LYMPHOCYTES # BLD AUTO: 1.78 THOUSAND/UL (ref 0.6–4.47)
LYMPHOCYTES # BLD AUTO: 13 % (ref 14–44)
MCH RBC QN AUTO: 32.3 PG (ref 26.8–34.3)
MCHC RBC AUTO-ENTMCNC: 31.7 G/DL (ref 31.4–37.4)
MCV RBC AUTO: 102 FL (ref 82–98)
MONOCYTES # BLD AUTO: 0.55 THOUSAND/UL (ref 0–1.22)
MONOCYTES NFR BLD: 4 % (ref 4–12)
MYELOCYTES NFR BLD MANUAL: 1 % (ref 0–1)
NEUTROPHILS # BLD MANUAL: 11.25 THOUSAND/UL (ref 1.85–7.62)
NEUTS BAND NFR BLD MANUAL: 1 % (ref 0–8)
NEUTS SEG NFR BLD AUTO: 81 % (ref 43–75)
NITRITE UR QL STRIP: NEGATIVE
NON-SQ EPI CELLS URNS QL MICRO: ABNORMAL /HPF
NRBC BLD AUTO-RTO: 0 /100 WBCS
NRBC BLD AUTO-RTO: 1 /100 WBC (ref 0–2)
PH UR STRIP.AUTO: 5.5 [PH]
PLATELET # BLD AUTO: 241 THOUSANDS/UL (ref 149–390)
PLATELET BLD QL SMEAR: ADEQUATE
PMV BLD AUTO: 11.2 FL (ref 8.9–12.7)
POIKILOCYTOSIS BLD QL SMEAR: PRESENT
POLYCHROMASIA BLD QL SMEAR: PRESENT
POTASSIUM SERPL-SCNC: 6.1 MMOL/L (ref 3.5–5.3)
PROT SERPL-MCNC: 6 G/DL (ref 6.4–8.2)
PROT UR STRIP-MCNC: ABNORMAL MG/DL
RBC # BLD AUTO: 2.6 MILLION/UL (ref 3.88–5.62)
RBC #/AREA URNS AUTO: ABNORMAL /HPF
SODIUM SERPL-SCNC: 137 MMOL/L (ref 136–145)
SP GR UR STRIP.AUTO: 1.01 (ref 1–1.03)
TOTAL CELLS COUNTED SPEC: 100
UROBILINOGEN UR QL STRIP.AUTO: 0.2 E.U./DL
WBC # BLD AUTO: 13.72 THOUSAND/UL (ref 4.31–10.16)
WBC #/AREA URNS AUTO: ABNORMAL /HPF

## 2020-12-30 PROCEDURE — 36415 COLL VENOUS BLD VENIPUNCTURE: CPT

## 2020-12-30 PROCEDURE — 81001 URINALYSIS AUTO W/SCOPE: CPT

## 2020-12-30 PROCEDURE — 80053 COMPREHEN METABOLIC PANEL: CPT

## 2020-12-30 PROCEDURE — 85027 COMPLETE CBC AUTOMATED: CPT

## 2020-12-30 PROCEDURE — 85007 BL SMEAR W/DIFF WBC COUNT: CPT

## 2020-12-30 RX ORDER — DIPHENHYDRAMINE HCL 25 MG
25 TABLET ORAL ONCE
Status: CANCELLED | OUTPATIENT
Start: 2020-12-31

## 2020-12-30 RX ORDER — ACETAMINOPHEN 325 MG/1
650 TABLET ORAL ONCE
Status: CANCELLED | OUTPATIENT
Start: 2020-12-31

## 2020-12-30 RX ORDER — SODIUM CHLORIDE 9 MG/ML
20 INJECTION, SOLUTION INTRAVENOUS ONCE
Status: CANCELLED | OUTPATIENT
Start: 2020-12-31

## 2020-12-30 NOTE — PROGRESS NOTES
Ok to treat with Hgb of 8 4 / redraw BMP ok to start premeds - call will potassium result  Dr Aneudy Montgomeryverdalia will be in [facility]

## 2020-12-31 ENCOUNTER — HOSPITAL ENCOUNTER (OUTPATIENT)
Dept: INFUSION CENTER | Facility: HOSPITAL | Age: 55
Discharge: HOME | End: 2020-12-31
Attending: INTERNAL MEDICINE
Payer: COMMERCIAL

## 2020-12-31 VITALS
HEART RATE: 85 BPM | SYSTOLIC BLOOD PRESSURE: 165 MMHG | TEMPERATURE: 97.8 F | DIASTOLIC BLOOD PRESSURE: 81 MMHG | OXYGEN SATURATION: 96 % | RESPIRATION RATE: 16 BRPM

## 2020-12-31 DIAGNOSIS — N17.9 ACUTE KIDNEY INJURY SUPERIMPOSED ON CHRONIC KIDNEY DISEASE (HCC): Primary | ICD-10-CM

## 2020-12-31 DIAGNOSIS — N18.9 ACUTE KIDNEY INJURY SUPERIMPOSED ON CHRONIC KIDNEY DISEASE (HCC): Primary | ICD-10-CM

## 2020-12-31 LAB
ANION GAP SERPL CALCULATED.3IONS-SCNC: 9 MMOL/L (ref 4–13)
BUN SERPL-MCNC: 106 MG/DL (ref 5–25)
CALCIUM SERPL-MCNC: 8.9 MG/DL (ref 8.3–10.1)
CHLORIDE SERPL-SCNC: 102 MMOL/L (ref 100–108)
CO2 SERPL-SCNC: 28 MMOL/L (ref 21–32)
CREAT SERPL-MCNC: 4.59 MG/DL (ref 0.6–1.3)
GFR SERPL CREATININE-BSD FRML MDRD: 13 ML/MIN/1.73SQ M
GLUCOSE SERPL-MCNC: 118 MG/DL (ref 65–140)
POTASSIUM SERPL-SCNC: 4.5 MMOL/L (ref 3.5–5.3)
SODIUM SERPL-SCNC: 139 MMOL/L (ref 136–145)

## 2020-12-31 PROCEDURE — 80048 BASIC METABOLIC PNL TOTAL CA: CPT | Performed by: INTERNAL MEDICINE

## 2020-12-31 PROCEDURE — 96367 TX/PROPH/DG ADDL SEQ IV INF: CPT

## 2020-12-31 PROCEDURE — 96415 CHEMO IV INFUSION ADDL HR: CPT

## 2020-12-31 PROCEDURE — 96413 CHEMO IV INFUSION 1 HR: CPT

## 2020-12-31 RX ORDER — ACETAMINOPHEN 325 MG/1
650 TABLET ORAL ONCE
Status: COMPLETED | OUTPATIENT
Start: 2020-12-31 | End: 2020-12-31

## 2020-12-31 RX ORDER — SODIUM CHLORIDE 9 MG/ML
20 INJECTION, SOLUTION INTRAVENOUS ONCE
Status: CANCELLED | OUTPATIENT
Start: 2021-01-13

## 2020-12-31 RX ORDER — DIPHENHYDRAMINE HCL 25 MG
25 TABLET ORAL ONCE
Status: COMPLETED | OUTPATIENT
Start: 2020-12-31 | End: 2020-12-31

## 2020-12-31 RX ORDER — SODIUM CHLORIDE 9 MG/ML
20 INJECTION, SOLUTION INTRAVENOUS ONCE
Status: COMPLETED | OUTPATIENT
Start: 2020-12-31 | End: 2020-12-31

## 2020-12-31 RX ORDER — ACETAMINOPHEN 325 MG/1
650 TABLET ORAL ONCE
Status: CANCELLED | OUTPATIENT
Start: 2021-01-13

## 2020-12-31 RX ORDER — DIPHENHYDRAMINE HCL 25 MG
25 TABLET ORAL ONCE
Status: CANCELLED | OUTPATIENT
Start: 2021-01-13

## 2020-12-31 RX ADMIN — ONDANSETRON 4 MG: 2 INJECTION INTRAMUSCULAR; INTRAVENOUS at 08:51

## 2020-12-31 RX ADMIN — CYCLOPHOSPHAMIDE 500 MG: 500 INJECTION, POWDER, FOR SOLUTION INTRAVENOUS; ORAL at 09:47

## 2020-12-31 RX ADMIN — SODIUM CHLORIDE 20 ML/HR: 9 INJECTION, SOLUTION INTRAVENOUS at 08:49

## 2020-12-31 RX ADMIN — DIPHENHYDRAMINE HYDROCHLORIDE 25 MG: 25 TABLET ORAL at 08:50

## 2020-12-31 RX ADMIN — ACETAMINOPHEN 650 MG: 325 TABLET, FILM COATED ORAL at 08:50

## 2020-12-31 NOTE — PLAN OF CARE
Problem: Potential for Falls  Goal: Patient will remain free of falls  Description: INTERVENTIONS:  - Assess patient frequently for physical needs  -  Identify cognitive and physical deficits and behaviors that affect risk of falls    -  [place] fall precautions as indicated by assessment   - Educate patient/family on patient safety including physical limitations  - Instruct patient to call for assistance with activity based on assessment  - Modify environment to reduce risk of injury  - Consider OT/PT consult to assist with strengthening/mobility  Outcome: Progressing

## 2021-01-04 ENCOUNTER — APPOINTMENT (INPATIENT)
Dept: PERIOP | Facility: HOSPITAL | Age: 56
DRG: 545 | End: 2021-01-04
Payer: COMMERCIAL

## 2021-01-04 ENCOUNTER — ANESTHESIA EVENT (INPATIENT)
Dept: PERIOP | Facility: HOSPITAL | Age: 56
DRG: 545 | End: 2021-01-04
Payer: COMMERCIAL

## 2021-01-04 ENCOUNTER — HOSPITAL ENCOUNTER (EMERGENCY)
Facility: HOSPITAL | Age: 56
End: 2021-01-04
Attending: EMERGENCY MEDICINE | Admitting: EMERGENCY MEDICINE
Payer: COMMERCIAL

## 2021-01-04 ENCOUNTER — HOSPITAL ENCOUNTER (INPATIENT)
Facility: HOSPITAL | Age: 56
LOS: 9 days | Discharge: HOME | DRG: 545 | End: 2021-01-13
Attending: FAMILY MEDICINE | Admitting: FAMILY MEDICINE
Payer: COMMERCIAL

## 2021-01-04 ENCOUNTER — ANESTHESIA (INPATIENT)
Dept: PERIOP | Facility: HOSPITAL | Age: 56
DRG: 545 | End: 2021-01-04
Payer: COMMERCIAL

## 2021-01-04 ENCOUNTER — APPOINTMENT (EMERGENCY)
Dept: RADIOLOGY | Facility: HOSPITAL | Age: 56
End: 2021-01-04
Payer: COMMERCIAL

## 2021-01-04 VITALS
HEIGHT: 70 IN | HEART RATE: 90 BPM | OXYGEN SATURATION: 91 % | WEIGHT: 157 LBS | TEMPERATURE: 97.8 F | BODY MASS INDEX: 22.48 KG/M2 | SYSTOLIC BLOOD PRESSURE: 166 MMHG | DIASTOLIC BLOOD PRESSURE: 86 MMHG | RESPIRATION RATE: 18 BRPM

## 2021-01-04 VITALS — HEART RATE: 81 BPM

## 2021-01-04 DIAGNOSIS — B37.9 YEAST DETECTED: ICD-10-CM

## 2021-01-04 DIAGNOSIS — M06.9 RHEUMATOID ARTHRITIS INVOLVING MULTIPLE SITES (HCC): ICD-10-CM

## 2021-01-04 DIAGNOSIS — I82.4Y2 ACUTE DEEP VEIN THROMBOSIS (DVT) OF PROXIMAL VEIN OF LEFT LOWER EXTREMITY (HCC): ICD-10-CM

## 2021-01-04 DIAGNOSIS — I82.569 CHRONIC DEEP VEIN THROMBOSIS (DVT) OF CALF MUSCLE VEIN, UNSPECIFIED LATERALITY (HCC): ICD-10-CM

## 2021-01-04 DIAGNOSIS — R04.2 HEMOPTYSIS: ICD-10-CM

## 2021-01-04 DIAGNOSIS — N18.1 CHRONIC KIDNEY DISEASE (CKD), STAGE I: Primary | ICD-10-CM

## 2021-01-04 DIAGNOSIS — N18.9 ACUTE KIDNEY INJURY SUPERIMPOSED ON CHRONIC KIDNEY DISEASE (HCC): ICD-10-CM

## 2021-01-04 DIAGNOSIS — B37.0 THRUSH: ICD-10-CM

## 2021-01-04 DIAGNOSIS — I10 ESSENTIAL HYPERTENSION: ICD-10-CM

## 2021-01-04 DIAGNOSIS — R04.2 HEMOPTYSIS: Primary | ICD-10-CM

## 2021-01-04 DIAGNOSIS — N17.9 ACUTE KIDNEY INJURY SUPERIMPOSED ON CHRONIC KIDNEY DISEASE (HCC): ICD-10-CM

## 2021-01-04 PROBLEM — D61.818 PANCYTOPENIA (HCC): Chronic | Status: ACTIVE | Noted: 2020-05-23

## 2021-01-04 PROBLEM — R91.8 PULMONARY INFILTRATES: Status: ACTIVE | Noted: 2021-01-04

## 2021-01-04 PROBLEM — D64.9 ANEMIA: Status: ACTIVE | Noted: 2021-01-04

## 2021-01-04 LAB
ABO GROUP BLD: NORMAL
ALBUMIN SERPL BCP-MCNC: 3.3 G/DL (ref 3.5–5)
ALP SERPL-CCNC: 86 U/L (ref 46–116)
ALT SERPL W P-5'-P-CCNC: 37 U/L (ref 12–78)
ANION GAP SERPL CALCULATED.3IONS-SCNC: 11 MMOL/L (ref 4–13)
APTT PPP: 26 SECONDS (ref 23–37)
AST SERPL W P-5'-P-CCNC: 17 U/L (ref 5–45)
ATRIAL RATE: 99 BPM
BASOPHILS # BLD AUTO: 0.02 THOUSANDS/ΜL (ref 0–0.1)
BASOPHILS NFR BLD AUTO: 0 % (ref 0–1)
BILIRUB SERPL-MCNC: 0.7 MG/DL (ref 0.2–1)
BLD GP AB SCN SERPL QL: NEGATIVE
BUN SERPL-MCNC: 103 MG/DL (ref 5–25)
CALCIUM ALBUM COR SERPL-MCNC: 9.7 MG/DL (ref 8.3–10.1)
CALCIUM SERPL-MCNC: 9.1 MG/DL (ref 8.3–10.1)
CHLORIDE SERPL-SCNC: 104 MMOL/L (ref 100–108)
CO2 SERPL-SCNC: 26 MMOL/L (ref 21–32)
CREAT SERPL-MCNC: 4.7 MG/DL (ref 0.6–1.3)
EOSINOPHIL # BLD AUTO: 0.03 THOUSAND/ΜL (ref 0–0.61)
EOSINOPHIL NFR BLD AUTO: 0 % (ref 0–6)
ERYTHROCYTE [DISTWIDTH] IN BLOOD BY AUTOMATED COUNT: 15.2 % (ref 11.6–15.1)
FLUAV RNA RESP QL NAA+PROBE: NEGATIVE
FLUBV RNA RESP QL NAA+PROBE: NEGATIVE
GFR SERPL CREATININE-BSD FRML MDRD: 13 ML/MIN/1.73SQ M
GLUCOSE SERPL-MCNC: 105 MG/DL (ref 65–140)
HCT VFR BLD AUTO: 24.7 % (ref 36.5–49.3)
HGB BLD-MCNC: 7.9 G/DL (ref 12–17)
IMM GRANULOCYTES # BLD AUTO: 0.26 THOUSAND/UL (ref 0–0.2)
IMM GRANULOCYTES NFR BLD AUTO: 2 % (ref 0–2)
INR PPP: 1.52 (ref 0.84–1.19)
LYMPHOCYTES # BLD AUTO: 1.29 THOUSANDS/ΜL (ref 0.6–4.47)
LYMPHOCYTES NFR BLD AUTO: 11 % (ref 14–44)
MCH RBC QN AUTO: 32.6 PG (ref 26.8–34.3)
MCHC RBC AUTO-ENTMCNC: 32 G/DL (ref 31.4–37.4)
MCV RBC AUTO: 102 FL (ref 82–98)
MONOCYTES # BLD AUTO: 1.11 THOUSAND/ΜL (ref 0.17–1.22)
MONOCYTES NFR BLD AUTO: 9 % (ref 4–12)
NEUTROPHILS # BLD AUTO: 9.36 THOUSANDS/ΜL (ref 1.85–7.62)
NEUTS SEG NFR BLD AUTO: 78 % (ref 43–75)
NRBC BLD AUTO-RTO: 1 /100 WBCS
NT-PROBNP SERPL-MCNC: ABNORMAL PG/ML
P AXIS: 58 DEGREES
PLATELET # BLD AUTO: 160 THOUSANDS/UL (ref 149–390)
PMV BLD AUTO: 10.1 FL (ref 8.9–12.7)
POTASSIUM SERPL-SCNC: 4.3 MMOL/L (ref 3.5–5.3)
PR INTERVAL: 162 MS
PROT SERPL-MCNC: 5.9 G/DL (ref 6.4–8.2)
PROTHROMBIN TIME: 18.4 SECONDS (ref 11.6–14.5)
QRS AXIS: 25 DEGREES
QRSD INTERVAL: 86 MS
QT INTERVAL: 344 MS
QTC INTERVAL: 433 MS
RBC # BLD AUTO: 2.42 MILLION/UL (ref 3.88–5.62)
RH BLD: POSITIVE
RSV RNA RESP QL NAA+PROBE: NEGATIVE
SARS-COV-2 RNA RESP QL NAA+PROBE: NEGATIVE
SODIUM SERPL-SCNC: 141 MMOL/L (ref 136–145)
SPECIMEN EXPIRATION DATE: NORMAL
T WAVE AXIS: 91 DEGREES
TROPONIN I SERPL-MCNC: 0.11 NG/ML
VENTRICULAR RATE: 95 BPM
WBC # BLD AUTO: 12.07 THOUSAND/UL (ref 4.31–10.16)

## 2021-01-04 PROCEDURE — 87186 SC STD MICRODIL/AGAR DIL: CPT | Performed by: INTERNAL MEDICINE

## 2021-01-04 PROCEDURE — 99223 1ST HOSP IP/OBS HIGH 75: CPT | Performed by: INTERNAL MEDICINE

## 2021-01-04 PROCEDURE — 87116 MYCOBACTERIA CULTURE: CPT | Performed by: INTERNAL MEDICINE

## 2021-01-04 PROCEDURE — 87206 SMEAR FLUORESCENT/ACID STAI: CPT | Performed by: INTERNAL MEDICINE

## 2021-01-04 PROCEDURE — 0241U HB NFCT DS VIR RESP RNA 4 TRGT: CPT | Performed by: PHYSICIAN ASSISTANT

## 2021-01-04 PROCEDURE — 36415 COLL VENOUS BLD VENIPUNCTURE: CPT | Performed by: EMERGENCY MEDICINE

## 2021-01-04 PROCEDURE — 0B988ZX DRAINAGE OF LEFT UPPER LOBE BRONCHUS, VIA NATURAL OR ARTIFICIAL OPENING ENDOSCOPIC, DIAGNOSTIC: ICD-10-PCS | Performed by: INTERNAL MEDICINE

## 2021-01-04 PROCEDURE — 88305 TISSUE EXAM BY PATHOLOGIST: CPT | Performed by: PATHOLOGY

## 2021-01-04 PROCEDURE — 88112 CYTOPATH CELL ENHANCE TECH: CPT | Performed by: PATHOLOGY

## 2021-01-04 PROCEDURE — 96374 THER/PROPH/DIAG INJ IV PUSH: CPT

## 2021-01-04 PROCEDURE — 31624 DX BRONCHOSCOPE/LAVAGE: CPT | Performed by: INTERNAL MEDICINE

## 2021-01-04 PROCEDURE — 87015 SPECIMEN INFECT AGNT CONCNTJ: CPT | Performed by: INTERNAL MEDICINE

## 2021-01-04 PROCEDURE — 87281 PNEUMOCYSTIS CARINII AG IF: CPT | Performed by: INTERNAL MEDICINE

## 2021-01-04 PROCEDURE — 99285 EMERGENCY DEPT VISIT HI MDM: CPT

## 2021-01-04 PROCEDURE — 87102 FUNGUS ISOLATION CULTURE: CPT | Performed by: INTERNAL MEDICINE

## 2021-01-04 PROCEDURE — 87070 CULTURE OTHR SPECIMN AEROBIC: CPT | Performed by: INTERNAL MEDICINE

## 2021-01-04 PROCEDURE — 99223 1ST HOSP IP/OBS HIGH 75: CPT | Performed by: FAMILY MEDICINE

## 2021-01-04 PROCEDURE — 83880 ASSAY OF NATRIURETIC PEPTIDE: CPT | Performed by: EMERGENCY MEDICINE

## 2021-01-04 PROCEDURE — 93005 ELECTROCARDIOGRAM TRACING: CPT

## 2021-01-04 PROCEDURE — 84484 ASSAY OF TROPONIN QUANT: CPT | Performed by: EMERGENCY MEDICINE

## 2021-01-04 PROCEDURE — 85025 COMPLETE CBC W/AUTO DIFF WBC: CPT | Performed by: EMERGENCY MEDICINE

## 2021-01-04 PROCEDURE — 80053 COMPREHEN METABOLIC PANEL: CPT | Performed by: EMERGENCY MEDICINE

## 2021-01-04 PROCEDURE — 93010 ELECTROCARDIOGRAM REPORT: CPT | Performed by: INTERNAL MEDICINE

## 2021-01-04 PROCEDURE — 86850 RBC ANTIBODY SCREEN: CPT | Performed by: EMERGENCY MEDICINE

## 2021-01-04 PROCEDURE — 86900 BLOOD TYPING SEROLOGIC ABO: CPT | Performed by: EMERGENCY MEDICINE

## 2021-01-04 PROCEDURE — 85610 PROTHROMBIN TIME: CPT | Performed by: EMERGENCY MEDICINE

## 2021-01-04 PROCEDURE — 87077 CULTURE AEROBIC IDENTIFY: CPT | Performed by: INTERNAL MEDICINE

## 2021-01-04 PROCEDURE — 87106 FUNGI IDENTIFICATION YEAST: CPT | Performed by: INTERNAL MEDICINE

## 2021-01-04 PROCEDURE — 85730 THROMBOPLASTIN TIME PARTIAL: CPT | Performed by: EMERGENCY MEDICINE

## 2021-01-04 PROCEDURE — 71045 X-RAY EXAM CHEST 1 VIEW: CPT

## 2021-01-04 PROCEDURE — 86901 BLOOD TYPING SEROLOGIC RH(D): CPT | Performed by: EMERGENCY MEDICINE

## 2021-01-04 PROCEDURE — 99285 EMERGENCY DEPT VISIT HI MDM: CPT | Performed by: EMERGENCY MEDICINE

## 2021-01-04 RX ORDER — AMLODIPINE BESYLATE 10 MG/1
10 TABLET ORAL DAILY
Status: DISCONTINUED | OUTPATIENT
Start: 2021-01-04 | End: 2021-01-12

## 2021-01-04 RX ORDER — SEVELAMER HYDROCHLORIDE 800 MG/1
1600 TABLET, FILM COATED ORAL
Status: DISCONTINUED | OUTPATIENT
Start: 2021-01-04 | End: 2021-01-13 | Stop reason: HOSPADM

## 2021-01-04 RX ORDER — SODIUM CHLORIDE 9 MG/ML
50 INJECTION, SOLUTION INTRAVENOUS CONTINUOUS
Status: DISCONTINUED | OUTPATIENT
Start: 2021-01-04 | End: 2021-01-05

## 2021-01-04 RX ORDER — ACETAMINOPHEN 325 MG/1
650 TABLET ORAL EVERY 6 HOURS PRN
Status: DISCONTINUED | OUTPATIENT
Start: 2021-01-04 | End: 2021-01-13 | Stop reason: HOSPADM

## 2021-01-04 RX ORDER — PROPOFOL 10 MG/ML
INJECTION, EMULSION INTRAVENOUS CONTINUOUS PRN
Status: DISCONTINUED | OUTPATIENT
Start: 2021-01-04 | End: 2021-01-04

## 2021-01-04 RX ORDER — SEVELAMER HYDROCHLORIDE 800 MG/1
800 TABLET, FILM COATED ORAL
Status: DISCONTINUED | OUTPATIENT
Start: 2021-01-04 | End: 2021-01-04 | Stop reason: HOSPADM

## 2021-01-04 RX ORDER — ATORVASTATIN CALCIUM 40 MG/1
40 TABLET, FILM COATED ORAL
Status: DISCONTINUED | OUTPATIENT
Start: 2021-01-04 | End: 2021-01-13 | Stop reason: HOSPADM

## 2021-01-04 RX ORDER — METHYLPREDNISOLONE SODIUM SUCCINATE 40 MG/ML
40 INJECTION, POWDER, LYOPHILIZED, FOR SOLUTION INTRAMUSCULAR; INTRAVENOUS EVERY 12 HOURS SCHEDULED
Status: DISCONTINUED | OUTPATIENT
Start: 2021-01-04 | End: 2021-01-05

## 2021-01-04 RX ORDER — ONDANSETRON 2 MG/ML
4 INJECTION INTRAMUSCULAR; INTRAVENOUS EVERY 6 HOURS PRN
Status: DISCONTINUED | OUTPATIENT
Start: 2021-01-04 | End: 2021-01-13 | Stop reason: HOSPADM

## 2021-01-04 RX ORDER — ONDANSETRON 2 MG/ML
INJECTION INTRAMUSCULAR; INTRAVENOUS AS NEEDED
Status: DISCONTINUED | OUTPATIENT
Start: 2021-01-04 | End: 2021-01-04

## 2021-01-04 RX ORDER — METOPROLOL SUCCINATE 50 MG/1
50 TABLET, EXTENDED RELEASE ORAL DAILY
Status: DISCONTINUED | OUTPATIENT
Start: 2021-01-05 | End: 2021-01-07

## 2021-01-04 RX ORDER — PROPOFOL 10 MG/ML
INJECTION, EMULSION INTRAVENOUS AS NEEDED
Status: DISCONTINUED | OUTPATIENT
Start: 2021-01-04 | End: 2021-01-04

## 2021-01-04 RX ORDER — LIDOCAINE HYDROCHLORIDE 40 MG/ML
5 INJECTION, SOLUTION RETROBULBAR; TOPICAL ONCE
Status: COMPLETED | OUTPATIENT
Start: 2021-01-04 | End: 2021-01-04

## 2021-01-04 RX ORDER — LABETALOL 20 MG/4 ML (5 MG/ML) INTRAVENOUS SYRINGE
10 ONCE
Status: COMPLETED | OUTPATIENT
Start: 2021-01-04 | End: 2021-01-04

## 2021-01-04 RX ORDER — TORSEMIDE 20 MG/1
40 TABLET ORAL DAILY
Status: DISCONTINUED | OUTPATIENT
Start: 2021-01-05 | End: 2021-01-04

## 2021-01-04 RX ORDER — METOPROLOL SUCCINATE 50 MG/1
50 TABLET, EXTENDED RELEASE ORAL DAILY
Status: DISCONTINUED | OUTPATIENT
Start: 2021-01-04 | End: 2021-01-04 | Stop reason: HOSPADM

## 2021-01-04 RX ORDER — SODIUM BICARBONATE 650 MG/1
650 TABLET ORAL ONCE
Status: COMPLETED | OUTPATIENT
Start: 2021-01-04 | End: 2021-01-04

## 2021-01-04 RX ORDER — SODIUM CHLORIDE 9 MG/ML
INJECTION INTRAVENOUS CODE/TRAUMA/SEDATION MEDICATION
Status: COMPLETED | OUTPATIENT
Start: 2021-01-04 | End: 2021-01-04

## 2021-01-04 RX ORDER — AMLODIPINE BESYLATE 5 MG/1
10 TABLET ORAL ONCE
Status: COMPLETED | OUTPATIENT
Start: 2021-01-04 | End: 2021-01-04

## 2021-01-04 RX ORDER — B-COMPLEX WITH VITAMIN C
1 TABLET ORAL
Status: DISCONTINUED | OUTPATIENT
Start: 2021-01-04 | End: 2021-01-04 | Stop reason: HOSPADM

## 2021-01-04 RX ORDER — SODIUM BICARBONATE 650 MG/1
650 TABLET ORAL
Status: DISCONTINUED | OUTPATIENT
Start: 2021-01-04 | End: 2021-01-07

## 2021-01-04 RX ORDER — DAPSONE 100 MG/1
100 TABLET ORAL DAILY
Status: DISCONTINUED | OUTPATIENT
Start: 2021-01-05 | End: 2021-01-13 | Stop reason: HOSPADM

## 2021-01-04 RX ORDER — TORSEMIDE 10 MG/1
10 TABLET ORAL ONCE
Status: DISCONTINUED | OUTPATIENT
Start: 2021-01-04 | End: 2021-01-04 | Stop reason: HOSPADM

## 2021-01-04 RX ORDER — DAPSONE 100 MG/1
100 TABLET ORAL DAILY
Status: DISCONTINUED | OUTPATIENT
Start: 2021-01-04 | End: 2021-01-04 | Stop reason: HOSPADM

## 2021-01-04 RX ORDER — SODIUM CHLORIDE 9 MG/ML
50 INJECTION, SOLUTION INTRAVENOUS CONTINUOUS
Status: DISCONTINUED | OUTPATIENT
Start: 2021-01-04 | End: 2021-01-04

## 2021-01-04 RX ORDER — PANTOPRAZOLE SODIUM 40 MG/1
40 TABLET, DELAYED RELEASE ORAL
Status: DISCONTINUED | OUTPATIENT
Start: 2021-01-05 | End: 2021-01-13 | Stop reason: HOSPADM

## 2021-01-04 RX ORDER — PANTOPRAZOLE SODIUM 40 MG/1
40 TABLET, DELAYED RELEASE ORAL ONCE
Status: COMPLETED | OUTPATIENT
Start: 2021-01-04 | End: 2021-01-04

## 2021-01-04 RX ORDER — B-COMPLEX WITH VITAMIN C
1 TABLET ORAL
Status: DISCONTINUED | OUTPATIENT
Start: 2021-01-05 | End: 2021-01-13 | Stop reason: HOSPADM

## 2021-01-04 RX ADMIN — SODIUM BICARBONATE 650 MG TABLET 650 MG: at 18:18

## 2021-01-04 RX ADMIN — PROPOFOL 40 MG: 10 INJECTION, EMULSION INTRAVENOUS at 15:03

## 2021-01-04 RX ADMIN — LIDOCAINE HYDROCHLORIDE 5 ML: 40 INJECTION, SOLUTION RETROBULBAR; TOPICAL at 14:30

## 2021-01-04 RX ADMIN — PROPOFOL 80 MCG/KG/MIN: 10 INJECTION, EMULSION INTRAVENOUS at 14:59

## 2021-01-04 RX ADMIN — SEVELAMER HYDROCHLORIDE 800 MG: 800 TABLET, FILM COATED PARENTERAL at 08:30

## 2021-01-04 RX ADMIN — METHYLPREDNISOLONE SODIUM SUCCINATE 40 MG: 40 INJECTION, POWDER, FOR SOLUTION INTRAMUSCULAR; INTRAVENOUS at 12:37

## 2021-01-04 RX ADMIN — DAPSONE 100 MG: 100 TABLET ORAL at 08:31

## 2021-01-04 RX ADMIN — Medication 10 ML: at 15:05

## 2021-01-04 RX ADMIN — ATORVASTATIN CALCIUM 40 MG: 40 TABLET, FILM COATED ORAL at 18:18

## 2021-01-04 RX ADMIN — METOPROLOL SUCCINATE 50 MG: 50 TABLET, EXTENDED RELEASE ORAL at 08:31

## 2021-01-04 RX ADMIN — PROPOFOL 100 MG: 10 INJECTION, EMULSION INTRAVENOUS at 14:59

## 2021-01-04 RX ADMIN — SODIUM CHLORIDE 100 ML: 9 INJECTION, SOLUTION INTRAMUSCULAR; INTRAVENOUS; SUBCUTANEOUS at 15:06

## 2021-01-04 RX ADMIN — SODIUM CHLORIDE 50 ML/HR: 0.9 INJECTION, SOLUTION INTRAVENOUS at 12:37

## 2021-01-04 RX ADMIN — Medication 1 TABLET: at 08:29

## 2021-01-04 RX ADMIN — ONDANSETRON 4 MG: 2 INJECTION INTRAMUSCULAR; INTRAVENOUS at 15:03

## 2021-01-04 RX ADMIN — LABETALOL 20 MG/4 ML (5 MG/ML) INTRAVENOUS SYRINGE 10 MG: at 06:02

## 2021-01-04 RX ADMIN — SEVELAMER HYDROCHLORIDE 1600 MG: 800 TABLET, FILM COATED PARENTERAL at 18:18

## 2021-01-04 RX ADMIN — PANTOPRAZOLE SODIUM 40 MG: 40 TABLET, DELAYED RELEASE ORAL at 08:30

## 2021-01-04 RX ADMIN — AMLODIPINE BESYLATE 10 MG: 5 TABLET ORAL at 08:31

## 2021-01-04 RX ADMIN — Medication 1 TABLET: at 08:30

## 2021-01-04 RX ADMIN — SODIUM BICARBONATE 650 MG TABLET 650 MG: at 08:30

## 2021-01-04 RX ADMIN — METHYLPREDNISOLONE SODIUM SUCCINATE 40 MG: 40 INJECTION, POWDER, FOR SOLUTION INTRAMUSCULAR; INTRAVENOUS at 21:44

## 2021-01-04 NOTE — ED PROVIDER NOTES
History  Chief Complaint   Patient presents with    Hemoptysis     Pt comes to ED for coughing up blood starting at 0200 this morning  Pt states this has happened in past and he was admitted here for this  Discharged on 12/28  Denies pain  +SOB       History provided by:  Patient   used: No    Hemoptysis  Associated symptoms: cough and shortness of breath    Associated symptoms: no abdominal pain, no chest pain, no ear pain, no fever, no rash, no sore throat and no vomiting      Patient is a 15-year-old male presenting to emergency department with hemoptysis the started today morning  Patient was recently hospitalized for mopped assist in the setting of lupus nephritis, underwent plasmapheresis and getting infusions  Patient is also on Eliquis for newly diagnosed lower extremity DVT  Denies chest pain  No fever  No nausea vomiting  No diarrhea  No headache or neck pain  MDM will check baseline labs, get a chest x-ray, will discuss with pulmonology and will need admission to the hospital    X-ray shows bilateral infiltrates, differential includes pneumonia versus pulmonary hemorrhage versus CHF    Prior to Admission Medications   Prescriptions Last Dose Informant Patient Reported? Taking?    Elastic Bandages & Supports (MEDICAL COMPRESSION STOCKINGS) MISC  Self No Yes   Sig: by Does not apply route daily Knee High 20-30mmHg   Multiple Vitamins-Minerals (CENTRUM SILVER) tablet 1/3/2021 at Unknown time Self Yes Yes   Sig: Take 1 tablet by mouth daily    acetaminophen (TYLENOL) 500 mg tablet More than a month at Unknown time Self Yes No   Sig: Take 500 mg by mouth every 6 (six) hours as needed for mild pain   amLODIPine (NORVASC) 10 mg tablet 1/3/2021 at Unknown time Self No Yes   Sig: Take 1 tablet (10 mg total) by mouth daily   apixaban (ELIQUIS) 5 mg 1/3/2021 at Unknown time  No Yes   Sig: Take 2 tablets (10 mg total) by mouth 2 (two) times a day for 7 days, THEN 1 tablet (5 mg total) 2 (two) times a day for 23 days  atorvastatin (LIPITOR) 40 mg tablet 1/3/2021 at Unknown time  No Yes   Sig: Take 1 tablet (40 mg total) by mouth daily   calcium-vitamin D (OSCAL) 250-125 MG-UNIT per tablet Not Taking at Unknown time  No No   Sig: Take 1 tablet by mouth 2 (two) times a day   dapsone 100 mg tablet 1/3/2021 at Unknown time  No Yes   Sig: Take 1 tablet (100 mg total) by mouth daily   metoprolol succinate (TOPROL-XL) 50 mg 24 hr tablet 1/3/2021 at Unknown time  No Yes   Sig: Take 1 tablet (50 mg total) by mouth daily   pantoprazole (PROTONIX) 40 mg tablet 1/3/2021 at Unknown time  No Yes   Sig: Take 1 tablet (40 mg total) by mouth daily   predniSONE 20 mg tablet 1/3/2021 at Unknown time  No Yes   Sig: Take 3 tablets (60 mg total) by mouth daily   sevelamer (RENAGEL) 800 mg tablet 1/3/2021 at Unknown time  No Yes   Sig: Take 2 tablets (1,600 mg total) by mouth 3 (three) times a day with meals   sodium bicarbonate 650 mg tablet 1/3/2021 at Unknown time  No Yes   Sig: Take 1 tablet (650 mg total) by mouth 2 (two) times daily after meals   torsemide (DEMADEX) 20 mg tablet 1/3/2021 at Unknown time  No Yes   Sig: Take 2 tablets (40 mg total) by mouth daily      Facility-Administered Medications: None       Past Medical History:   Diagnosis Date    CHF (congestive heart failure) (HCC)     Hypertension     Lupus (HCC)     Osteoporosis     Rheumatoid arthritis ([facility] [address] )        Past Surgical History:   Procedure Laterality Date    CT NEEDLE BIOPSY KIDNEY  11/3/2020    HERNIA REPAIR  199?     IR TUNNELED DIALYSIS CATHETER PLACEMENT  1/8/2021    REPLACEMENT TOTAL KNEE Bilateral     right 06/12 left 07/2016    REVISION TOTAL HIP ARTHROPLASTY Bilateral     right 05/2004 left 02/2006    TOTAL HIP ARTHROPLASTY Bilateral 3045/3683    Right 1988/ left 1989       Family History   Problem Relation Age of Onset    Varicose Veins Father      I have reviewed and agree with the history as documented  E-Cigarette/Vaping    E-Cigarette Use Never User      E-Cigarette/Vaping Substances     Social History     Tobacco Use    Smoking status: Former Smoker     Packs/day: 1 00     Types: Cigarettes    Smokeless tobacco: Former User     Types: Chew    Tobacco comment: 25 yrs    Substance Use Topics    Alcohol use: Never     Frequency: Never    Drug use: Never       Review of Systems   Constitutional: Negative for chills and fever  HENT: Negative for ear pain and sore throat  Eyes: Negative for pain and visual disturbance  Respiratory: Positive for cough and shortness of breath  Cardiovascular: Negative for chest pain and palpitations  Gastrointestinal: Negative for abdominal pain and vomiting  Genitourinary: Negative for dysuria and hematuria  Musculoskeletal: Negative for arthralgias and back pain  Skin: Negative for color change and rash  Neurological: Negative for seizures and syncope  All other systems reviewed and are negative  Physical Exam  Physical Exam  Vitals signs reviewed  Constitutional:       Appearance: He is well-developed  HENT:      Head: Normocephalic and atraumatic  Eyes:      Pupils: Pupils are equal, round, and reactive to light  Neck:      Musculoskeletal: Neck supple  Cardiovascular:      Rate and Rhythm: Normal rate and regular rhythm  Heart sounds: Normal heart sounds  Pulmonary:      Effort: Pulmonary effort is normal  No respiratory distress  Breath sounds: Normal breath sounds  Abdominal:      General: Bowel sounds are normal       Palpations: Abdomen is soft  Tenderness: There is no abdominal tenderness  Musculoskeletal: Normal range of motion  General: No tenderness or deformity  Skin:     General: Skin is warm and dry  Capillary Refill: Capillary refill takes less than 2 seconds  Neurological:      General: No focal deficit present        Mental Status: He is alert and oriented to person, place, and time           Vital Signs  ED Triage Vitals   Temperature Pulse Respirations Blood Pressure SpO2   01/04/21 0454 01/04/21 0458 01/04/21 0458 01/04/21 0458 01/04/21 0458   97 8 °F (36 6 °C) 102 20 (!) 203/95 93 %      Temp Source Heart Rate Source Patient Position - Orthostatic VS BP Location FiO2 (%)   01/04/21 0454 01/04/21 0458 01/04/21 0458 01/04/21 0458 --   Oral Monitor Sitting Right arm       Pain Score       01/04/21 0458       No Pain           Vitals:    01/04/21 0730 01/04/21 0745 01/04/21 0800 01/04/21 0900   BP: 165/86 163/82 164/82 166/86   Pulse: 88 90 96 90   Patient Position - Orthostatic VS:             Visual Acuity      ED Medications  Medications   Labetalol HCl (NORMODYNE) injection 10 mg (10 mg Intravenous Given 1/4/21 0602)   amLODIPine (NORVASC) tablet 10 mg (10 mg Oral Given 1/4/21 0831)   multivitamin-minerals (CENTRUM) tablet 1 tablet (1 tablet Oral Given 1/4/21 0829)   pantoprazole (PROTONIX) EC tablet 40 mg (40 mg Oral Given 1/4/21 0830)   sodium bicarbonate tablet 650 mg (650 mg Oral Given 1/4/21 0830)       Diagnostic Studies  Results Reviewed     Procedure Component Value Units Date/Time    NT-BNP PRO [[id number]]  (Abnormal) Collected: 01/04/21 0505    Lab Status: Final result Specimen: Blood from Arm, Right Updated: 01/04/21 0552     NT-proBNP 24,658 pg/mL     APTT [[id number]]  (Normal) Collected: 01/04/21 0505    Lab Status: Final result Specimen: Blood from Arm, Right Updated: 01/04/21 0538     PTT 26 seconds     Protime-INR [[id number]]  (Abnormal) Collected: 01/04/21 0505    Lab Status: Final result Specimen: Blood from Arm, Right Updated: 01/04/21 0535     Protime 18 4 seconds      INR 1 52    Troponin I [[id number]]  (Abnormal) Collected: 01/04/21 0505    Lab Status: Final result Specimen: Blood from Arm, Right Updated: 01/04/21 0533     Troponin I 0 11 ng/mL     Comprehensive metabolic panel [[id number]]  (Abnormal) Collected: 01/04/21 0505    Lab Status: Final result Specimen: Blood from Arm, Right Updated: 01/04/21 0530     Sodium 141 mmol/L      Potassium 4 3 mmol/L      Chloride 104 mmol/L      CO2 26 mmol/L      ANION GAP 11 mmol/L       mg/dL      Creatinine 4 70 mg/dL      Glucose 105 mg/dL      Calcium 9 1 mg/dL      Corrected Calcium 9 7 mg/dL      AST 17 U/L      ALT 37 U/L      Alkaline Phosphatase 86 U/L      Total Protein 5 9 g/dL      Albumin 3 3 g/dL      Total Bilirubin 0 70 mg/dL      eGFR 13 ml/min/1 73sq m     Narrative:      National Kidney Disease Foundation guidelines for Chronic Kidney Disease (CKD):     Stage 1 with normal or high GFR (GFR > 90 mL/min/1 73 square meters)    Stage 2 Mild CKD (GFR = 60-89 mL/min/1 73 square meters)    Stage 3A Moderate CKD (GFR = 45-59 mL/min/1 73 square meters)    Stage 3B Moderate CKD (GFR = 30-44 mL/min/1 73 square meters)    Stage 4 Severe CKD (GFR = 15-29 mL/min/1 73 square meters)    Stage 5 End Stage CKD (GFR <15 mL/min/1 73 square meters)  Note: GFR calculation is accurate only with a steady state creatinine    CBC and differential [[id number]]  (Abnormal) Collected: 01/04/21 0505    Lab Status: Final result Specimen: Blood from Arm, Right Updated: 01/04/21 0514     WBC 12 07 Thousand/uL      RBC 2 42 Million/uL      Hemoglobin 7 9 g/dL      Hematocrit 24 7 %       fL      MCH 32 6 pg      MCHC 32 0 g/dL      RDW 15 2 %      MPV 10 1 fL      Platelets 034 Thousands/uL      nRBC 1 /100 WBCs      Neutrophils Relative 78 %      Immat GRANS % 2 %      Lymphocytes Relative 11 %      Monocytes Relative 9 %      Eosinophils Relative 0 %      Basophils Relative 0 %      Neutrophils Absolute 9 36 Thousands/µL      Immature Grans Absolute 0 26 Thousand/uL      Lymphocytes Absolute 1 29 Thousands/µL      Monocytes Absolute 1 11 Thousand/µL      Eosinophils Absolute 0 03 Thousand/µL      Basophils Absolute 0 02 Thousands/µL                  XR chest 1 view portable   Final Result by Marjorie Menjivar MD (01/04 4897)      Bilateral groundglass opacity, improved on the right and worsening on the left, which could be due to pulmonary hemorrhage or vasculitis  Workstation performed: [id number]                    Procedures  Procedures         ED Course  ED Course as of Jan 12 0730 Mon Jan 04, 2021   8986 EKG shows rate of 95, sinus, normal axis, normal QRS, nonspecific ST and T-waves, no significant change from previous EKG, independently interpreted by me      5850 12/19 bronch Diffuse erythema and prominent vasculature without overt sequential   increases in hemorrhage on serial aliquots   Still appears to be a diffuse   inflammatory process rather than focal pneumonia   Diffuse pneumonia not   ruled out      46 Spoke with Dr Dinorah Mcneil pulmonolgist, went over the case including previous admission here at [facility] and the treatment pt received  He is ok with pt being transferred to [facility]  [phone] with patient, explained to patient that hospital is at capacity and he will be a hold in ER for the near future but able to transfer to [facility] for admission  He prefers to be transferred to [facility]        6011 Will discuss case with [facility] provider at [address] [place] [address] National    Disposition  Final diagnoses:   Hemoptysis     Time reflects when diagnosis was documented in both MDM as applicable and the Disposition within this note     Time User Action Codes Description Comment    1/4/2021  6:13 AM Ruchi Muniz Add [R04 2] Hemoptysis       ED Disposition     ED Disposition Condition Date/Time Comment    Transfer to Another Facility-In Network  Mon Jan 4, 2021  6:13 AM Patient to be transferred to [facility]        MD Documentation      Most Recent Value   Patient Condition  The patient has been stabilized such that within reasonable medical probability, no material deterioration of the patient condition or the condition of the unborn child(aryan) is likely to result from the transfer   Reason for Transfer  No bed available at level of patient's needs [hospital at full capacity, do not anticipate any open beds anytime soon  patient preference to be at [facility]]   Benefits of Transfer  Patient preference   Risks of Transfer  Potential for delay in receiving treatment, Potential deterioration of medical condition, Loss of IV, Increased discomfort during transfer, Possible worsening of condition or death during transfer   Accepting Physician  Dr Kylah Choudhury Name, Edda Molina   Sending MD Dr Mancuso Body   Provider Certification  General risk, such as traffic hazards, adverse weather conditions, rough terrain or turbulence, possible failure of equipment (including vehicle or aircraft), or consequences of actions of persons outside the control of the transport personnel      RN Documentation      Most [facility] Name, Edda Molina   Bed Assignment  419   [facility]      Follow-up Information    None         Discharge Medication List as of 1/4/2021  9:20 AM      CONTINUE these medications which have NOT CHANGED    Details   amLODIPine (NORVASC) 10 mg tablet Take 1 tablet (10 mg total) by mouth daily, Starting Sun 11/29/2020, Normal      apixaban (ELIQUIS) 5 mg Multiple Dosages:Starting Mon 12/28/2020, Last dose on Sun 1/3/2021, THEN Starting Mon 1/4/2021, Last dose on Tue 1/26/2021Take 2 tablets (10 mg total) by mouth 2 (two) times a day for 7 days, THEN 1 tablet (5 mg total) 2 (two) times a day for 23 days   , Normal      atorvastatin (LIPITOR) 40 mg tablet Take 1 tablet (40 mg total) by mouth daily, Starting Mon 12/7/2020, Normal      dapsone 100 mg tablet Take 1 tablet (100 mg total) by mouth daily, Starting Tue 12/29/2020, Until Thu 1/28/2021, Normal      Elastic Bandages & Supports (MEDICAL COMPRESSION STOCKINGS) MISC by Does not apply route daily Knee High 20-30mmHg, Starting Fri 7/27/2018, Print      metoprolol succinate (TOPROL-XL) 50 mg 24 hr tablet Take 1 tablet (50 mg total) by mouth daily, Starting Mon 12/28/2020, No Print      Multiple Vitamins-Minerals (CENTRUM SILVER) tablet Take 1 tablet by mouth daily , Historical Med      pantoprazole (PROTONIX) 40 mg tablet Take 1 tablet (40 mg total) by mouth daily, Starting Mon 12/28/2020, Normal      predniSONE 20 mg tablet Take 3 tablets (60 mg total) by mouth daily, Starting Mon 12/28/2020, Normal      sevelamer (RENAGEL) 800 mg tablet Take 2 tablets (1,600 mg total) by mouth 3 (three) times a day with meals, Starting Mon 12/28/2020, Normal      sodium bicarbonate 650 mg tablet Take 1 tablet (650 mg total) by mouth 2 (two) times daily after meals, Starting Mon 12/28/2020, Normal      torsemide (DEMADEX) 20 mg tablet Take 2 tablets (40 mg total) by mouth daily, Starting Tue 12/29/2020, Normal      acetaminophen (TYLENOL) 500 mg tablet Take 500 mg by mouth every 6 (six) hours as needed for mild pain, Historical Med      calcium-vitamin D (OSCAL) 250-125 MG-UNIT per tablet Take 1 tablet by mouth 2 (two) times a day, Starting Mon 12/28/2020, No Print           No discharge procedures on file      PDMP Review       Value Time User    PDMP Reviewed  Yes 11/24/2020 10:40 PM Hillary Lackey MD          ED Provider  Electronically Signed by           Ryan Arce MD  01/12/21 6133

## 2021-01-04 NOTE — CONSULTS
Consultation - Pulmonary Medicine  [facility] 48 [facility]  [facility] MRN: [id number]  Unit/Bed#: [address] [facility] Encounter: [id number]  Code Status: Level 1 - Full Code    Assessment/Plan:  Pulmonary infiltrates  Assessment & Plan      DVT (deep venous thrombosis) ([facility] [address] )  Assessment & Plan  Hx of recent LLE DVT as of 12/21 venous duplex  On Eliquis    Hemoptysis  Assessment & Plan  Persistent Hemoptysis   Eliquis held  Transferred for bronchoscopy from [facility] volume capacity  NPO for bronch  Hx of DVT  On Eliquis    Acute kidney injury superimposed on chronic kidney disease [facility])  Assessment & Plan  Lab Results   Component Value Date    EGFR 13 01/04/2021    EGFR 13 12/31/2020    EGFR 14 12/30/2020    CREATININE 4 70 (H) 01/04/2021    CREATININE 4 59 (H) 12/31/2020    CREATININE 4 41 (H) 12/30/2020     CAROLINE on CKD  Nephro following in consult  On Torsemide 20 /day > held        Thank you for this consultation; we will be happy to follow with you     ______________________________________________________________________      History of Present Illness   Physician Requesting Consult: Cesar Jimenez DO  Reason for Consult / Principal Problem:  Hemoptysis  HX and PE limited by:     HPI:  Denny Byrd is a 48 y o  male  has a past medical history of CHF (congestive heart failure) ([facility] [address] ), [facility]pertension, [facility] Osteoporosis, and Rheumatoid [facility] ([facility] )  who presents with Chief Complaint of : Coughing up blood  49 y/o M W/ CKD 4 / nephrotic range proteinuria, SLE w/ bilateral pulmonary infiltrates, Recent Acute non occlusive DVT on most recent hospital admission / Hx of recent Acute on Chronic DCHF  On Cytoxan and Prednisone 60 mg in outpatient setting  Recently Ad[facility] to [facility] for hemoptysis for progressive hemoptysis from streaks to full blood      Bronch'd 12/19 w/ findings of diffuse erythemia/hyperemia of airways w/ some bloody secretions w/o progressive blood pooling on sequential lavage  Prior Cyto  A  Bronchial Lavage, CELL COUNT:  Negative for malignancy  Macrophages, lymphocytes and few neutrophils     Satisfactory for evaluation         Cell Count     Macrophages 56%  Neutrophil 15%  Lymphocyte 30%  Eosinophil 0%  Basophil 0%       Tolerated heparin on hospitalization and D/C'd on Eliquis for LLE DVT  Was not sent home on home oxygen  This am approx 0200 having frothy sputum progressed to frankly bloody sputum reports approximately 1/4 cup full of cherrie blood  Now requiring 5 L nasal cannula oxygen for oxygen saturation 92%    Transferred specifically for bronchoscopy  Discussed risks and benefits of bronch w/ patient and wife via telephone  Consent obtained  Has scheduled f/u appt w/ Dr Carlo Adames on 1/21  Had discussion w/ patient surrounding possible IVC filter placement       Labs Reviewed: yes   Imaging Reviewed: yes  Echo Reviewed:11/25/2020  G1DD  Consults Reviewed: medicine notes reviewed / prior hospital notes reviewed  Collaborative Discussion: discussed case w/ Dr Frandy Garcia and IM team [facility]    Review of Systems   Constitutional: Negative for fatigue and fever  HENT: Negative for congestion, rhinorrhea, sinus pressure and sinus pain  Respiratory: Positive for cough and shortness of breath  Negative for choking, chest tightness, wheezing and stridor  Hemoptysis > cherrie blood    approx 1/4 cup in total since this morning   Cardiovascular: Negative for chest pain, palpitations and leg swelling  Gastrointestinal: Negative for abdominal pain, diarrhea, nausea and vomiting  Endocrine: Negative for cold intolerance and heat intolerance  Genitourinary: Negative for flank pain and scrotal swelling  Musculoskeletal: Negative for arthralgias, joint swelling and myalgias  Skin: Negative for color change  Allergic/Immunologic: Negative for environmental allergies     Neurological: Negative for dizziness, syncope and light-headedness  Psychiatric/Behavioral: Negative for confusion  Past Medical/Surgical History  Past Medical History:   Diagnosis Date    [facility]congestive heart failure) ([facility] [address] [facility]     Hypertension     Lupus ([facility] [address] [facility]     Osteoporosis     Rheumatoid arthritis ([facility] [address] )      Past Surgical History:   Procedure Laterality Date    CT NEEDLE BIOPSY KIDNEY  11/3/2020    HERNIA REPAIR  199?  REPLACEMENT TOTAL KNEE Bilateral     right 06/12 left 07/2016    REVISION TOTAL HIP ARTHROPLASTY Bilateral     right 05/2004 left 02/2006    TOTAL HIP ARTHROPLASTY Bilateral 8300/2770    Right 1988/ left 1989       Social History  Social History     Substance and Sexual Activity   Alcohol Use Never    Frequency: Never     Social History     Substance and Sexual Activity   Drug Use Never     Social History     Tobacco Use   Smoking Status Former Smoker    Packs/day: 1 00    Types: Cigarettes   Smokeless Tobacco Former User    Types: Chew   Tobacco Comment    25 yrs        Family History  Family History   Problem Relation Age of Onset    Varicose Veins Father        Allergies  No Known Allergies    Home Meds:   Medications Prior to Admission   Medication Sig Dispense Refill Last Dose    acetaminophen (TYLENOL) 500 mg tablet Take 500 mg by mouth every 6 (six) hours as needed for mild pain   Past Week at Unknown time    amLODIPine (NORVASC) 10 mg tablet Take 1 tablet (10 mg total) by mouth daily 30 tablet 0 1/4/2021 at Unknown time    apixaban (ELIQUIS) 5 mg Take 2 tablets (10 mg total) by mouth 2 (two) times a day for 7 days, THEN 1 tablet (5 mg total) 2 (two) times a day for 23 days   74 tablet 0 1/3/2021 at Unknown time    atorvastatin (LIPITOR) 40 mg tablet Take 1 tablet (40 mg total) by mouth daily 30 tablet 3 1/3/2021 at Unknown time    calcium-vitamin D (OSCAL) 250-125 MG-UNIT per tablet Take 1 tablet by mouth 2 (two) times a day  0 1/4/2021 at Unknown time    dapsone 100 mg tablet Take 1 tablet (100 mg total) by mouth daily 30 tablet 0 1/4/2021 at Unknown time    metoprolol succinate (TOPROL-XL) 50 mg 24 hr tablet Take 1 tablet (50 mg total) by mouth daily   1/4/2021 at Unknown time    Multiple Vitamins-Minerals (CENTRUM SILVER) tablet Take 1 tablet by mouth daily    1/4/2021 at Unknown time    pantoprazole (PROTONIX) 40 mg tablet Take 1 tablet (40 mg total) by mouth daily 30 tablet 0 1/4/2021 at Unknown time    predniSONE 20 mg tablet Take 3 tablets (60 mg total) by mouth daily 90 tablet 0 1/3/2021 at Unknown time    sevelamer (RENAGEL) 800 mg tablet Take 2 tablets (1,600 mg total) by mouth 3 (three) times a day with meals 180 tablet 0 1/4/2021 at Unknown time    sodium bicarbonate 650 mg tablet Take 1 tablet (650 mg total) by mouth 2 (two) times daily after meals 60 tablet 0 1/4/2021 at Unknown time    torsemide (DEMADEX) 20 mg tablet Take 2 tablets (40 mg total) by mouth daily 60 tablet 0 1/3/2021 at Unknown time    Elastic Bandages & Supports (MEDICAL COMPRESSION STOCKINGS) MISC by Does not apply route daily Knee High 20-30mmHg 2 each 0      Current Meds:   Scheduled Meds:  Current Facility-Administered Medications   Medication Dose Route Frequency Provider Last Rate    acetaminophen  650 mg Oral Q6H PRN CRISTAL Felix      amLODIPine  10 mg Oral Daily CRISTAL Felix      atorvastatin  40 mg Oral Daily With [facility] CRISTAL Alonzo      [START ON 1/5/2021] calcium carbonate-vitamin D  1 tablet Oral Daily With Breakfast CRISTAL Felix      [START ON 1/5/2021] dapsone  100 mg Oral Daily CRISTAL Felix      [START ON 1/5/2021] metoprolol succinate  50 mg Oral Daily CRISTAL Felix      multivitamin-minerals  1 tablet Oral Daily CRISTAL Felix      ondansetron  4 mg Intravenous Q6H PRN CRISTAL Felix      [START ON 1/5/2021] pantoprazole  40 mg Oral Early Morning CRISTAL Felix      sevelamer  1,600 mg Oral TID With Meals Dmitry Greenvale, CRNP      sodium bicarbonate  650 mg Oral BID after meals Dmitry Greenvale, CRNP      [START ON 2021] torsemide  40 mg Oral Daily Dmitry Greenvale, CRNP       PRN Meds:acetaminophen, 650 mg, Q6H PRN  ondansetron, 4 mg, Q6H PRN        ____________________________________________________________________    Objective   Vitals:   Temp:  [97 5 °F (36 4 °C)-97 8 °F (36 6 °C)] 97 5 °F (36 4 °C)  HR:  [] 93  Resp:  [18-20] 18  BP: (163-203)/(82-95) 163/89  Weight (last 2 days)     Date/Time   Weight    21 0955   71 6 (157 85)            Vitals:    21 0955 21 1003 21 1049   BP:  163/89    Pulse:  93    Resp:  18    Temp:  97 5 °F (36 4 °C)    TempSrc:  Oral    SpO2:  96% 90%   Weight: 71 6 kg (157 lb 13 6 oz)     Height: 5' 9 5" (1 765 m)       Temp (24hrs), Av 7 °F (36 5 °C), Min:97 5 °F (36 4 °C), Max:97 8 °F (36 6 °C)  Current: Temperature: 97 5 °F (36 4 °C)        SpO2: SpO2: 90 %, SpO2 Activity: SpO2 Activity: At Rest, SpO2 Device: O2 Device: Nasal cannula      IV Infusions:        Nutrition:        Diet Orders   (From admission, onward)             Start     Ordered    21 1106  Diet NPO; Sips with meds  Diet effective now     Question Answer Comment   Diet Type NPO    NPO Except: Sips with meds    RD to adjust diet per protocol? No        21 1105                  Ins/Outs:   I/O     None            Lines/Drains:  Invasive Devices     Peripheral Intravenous Line            Peripheral IV 21 Right Forearm less than 1 day                ____________________________________________________________________      Physical Exam  Constitutional:       Appearance: He is well-developed  HENT:      Head: Normocephalic and atraumatic  Eyes:      Conjunctiva/sclera: Conjunctivae normal       Pupils: Pupils are equal, round, and reactive to light  Neck:      Musculoskeletal: Normal range of motion and neck supple     Cardiovascular: Rate and Rhythm: Normal rate and regular rhythm  Heart sounds: Normal heart sounds  No murmur  No friction rub  No gallop  Pulmonary:      Effort: Pulmonary effort is normal  No respiratory distress  Breath sounds: Examination of the right-upper field reveals rhonchi  Examination of the left-upper field reveals rhonchi  Examination of the right-middle field reveals rhonchi  Examination of the left-middle field reveals rhonchi  Examination of the right-lower field reveals rhonchi  Examination of the left-lower field reveals rhonchi  Rhonchi present  No wheezing or rales  Comments: Diffuse rhonchi    92% on 5 L NC  Chest:      Chest wall: No tenderness  Abdominal:      General: Bowel sounds are normal       Palpations: Abdomen is soft  Musculoskeletal: Normal range of motion  Skin:     General: Skin is warm and dry  Neurological:      Mental Status: He is alert and oriented to person, place, and time           ____________________________________________________________________    Invasive/non-invasive ventilation settings   Respiratory    Lab Data (Last 4 hours)    None         O2/Vent Data (Last 4 hours)    None                Laboratory and Diagnostics:  Results from last 7 days   Lab Units 01/04/21  0505 12/30/20  1435   WBC Thousand/uL 12 07* 13 72*   HEMOGLOBIN g/dL 7 9* 8 4*   HEMATOCRIT % 24 7* 26 5*   PLATELETS Thousands/uL 160 241   NEUTROS PCT % 78*  --    BANDS PCT %  --  1   MONOS PCT % 9  --    MONO PCT %  --  4     Results from last 7 days   Lab Units 01/04/21  0505 12/31/20  0842 12/30/20  1435 12/28/20  1315   SODIUM mmol/L 141 139 137 138   POTASSIUM mmol/L 4 3 4 5 6 1* 4 1   CHLORIDE mmol/L 104 102 101 102   CO2 mmol/L 26 28 28 26   ANION GAP mmol/L 11 9 8 10   BUN mg/dL 103* 106* 109* 113*   CREATININE mg/dL 4 70* 4 59* 4 41* 4 32*   CALCIUM mg/dL 9 1 8 9 9 2 9 1   GLUCOSE RANDOM mg/dL 105 118 127 143*   ALT U/L 37  --  42  --    AST U/L 17  --  19  --    ALK PHOS U/L 86 --  73  --    ALBUMIN g/dL 3 3*  --  3 6  --    TOTAL BILIRUBIN mg/dL 0 70  --  0 49  --           Results from last 7 days   Lab Units 01/04/21  0505   INR  1 52*   PTT seconds 26      Results from last 7 days   Lab Units 01/04/21  0505   TROPONIN I ng/mL 0 11*         ABG:    VBG:          Micro        Imaging:   No orders to display         Micro:   Lab Results   Component Value Date    BLOODCX No Growth After 5 Days  11/24/2020    BLOODCX No Growth After 5 Days  11/24/2020    SPUTUMCULTUR 4+ Growth of  12/19/2020    SPUTUMCULTUR  12/19/2020     Commensal respiratory tobias only; No significant growth of Staph aureus/MRSA or Pseudomonas aeruginosa      MRSACULTURE  12/21/2020     No Methicillin Resistant Staphlyococcus aureus (MRSA) isolated    BRONCHIALCUL 1+ Growth of  12/19/2020        ____________________________________________________________________

## 2021-01-04 NOTE — ASSESSMENT & PLAN NOTE
Lab Results   Component Value Date    EGFR 15 01/11/2021    EGFR 19 01/10/2021    EGFR 14 01/09/2021    CREATININE 4 20 (H) 01/11/2021    CREATININE 3 43 (H) 01/10/2021    CREATININE 4 40 (H) 01/09/2021   Had [place] cath placed 1/8  Had 3rd HD on 1/11  Overall - 10 0 L

## 2021-01-04 NOTE — H&P
H&P- Palma Pizarro 1965, 48 y o  male MRN: [id number]    Unit/Bed#: [address] [place] [address] Encounter: [id number]    Primary Care Provider: Vanita Merino MD   Date and time admitted to hospital: 1/4/2021  9:54 AM        * Hemoptysis  Assessment & Plan  Admitted with persistent hemoptysis  Recent hospital admission in December at [facility] for which he underwent bronchoscopy by Pulmonary  Bronchoscopy revealed diffuse erythema and prominent vasculature without overt sequential increases in hemorrhage on serial aliquots   Still appears to be a diffuse inflammatory process rather than focal pneumonia   Diffuse pneumonia not ruled out  Patient underwent pulse steroids and plasmapheresis at that time  Hemoptysis resolved  Patient was discharged home on prednisone and Eliquis for left lower extremity DVT  · Pulmonary following, appreciate input  · Planning for bronchoscopy today or tomorrow  · NPO for bronchoscopy  · Gentle IV hydration  · Hold Eliquis  · Consider Rheumatology and/or Hematology consult pending bronchoscopy results  · Consider need for IVC filter if anticoagulation is contraindicated in the future  · Monitor hemoglobin  · Supplemental oxygen to avoid hypoxia    SLE (systemic lupus erythematosus) ([facility] [address] )  Assessment & Plan  Diagnosed lupus in his 20's  Cellcept was discontinued recently     S/p plasmapheresis x 5 treatments recently  S/P cytoxan 12/18 with plan for every other week for 6 doses  Currently on Prednisone 60mg daily  · Hold Prednisone   · Solumedrol 40 mg IV q 12h  · Needs rheumatology follow-up     Acute kidney injury superimposed on chronic kidney disease [facility])  Assessment & Plan  Lab Results   Component Value Date    EGFR 13 01/04/2021    EGFR 13 12/31/2020    EGFR 14 12/30/2020    CREATININE 4 70 (H) 01/04/2021    CREATININE 4 59 (H) 12/31/2020    CREATININE 4 41 (H) 12/30/2020     In the setting of known Lupus Nephritis  Progressively worsening with recent creatinine 2 44 -> 3 69 -> 4 59  Cr 4 7 on admission   Known to Dr Mandy Reis and Dr Pallavi Martinez renal U/S 12/16 showed increased cortical echogenicity bilaterally, suggestive of underlying renal parenchymal disease and no hydronephrosis  Pt underwent kidney biopsy in November  · Nephrology input appreciated  · Gentle IVF, NS at 50 mL/hr while NPO  · Avoid nephrotoxic agents and hypotension  · Trend counts daily     Anemia  Assessment & Plan  Chronic anemia with baseline hemoglobin near 8 5  · Hemoglobin today, 7 9, hemoptysis could be contributing  · Trend counts daily  · Holding Eliquis    DVT (deep venous thrombosis) ([facility] [address] )  Assessment & Plan  Diagnosed with a left lower extremity DVT on 12/21/2020 for which she was started on Eliquis   · Hold Eliquis in the setting of hemoptysis and anticipated bronchoscopy today or tomorrow  · Consider need for IVC filter if anticoagulation is contraindicated in the future    Elevated troponin  Assessment & Plan  Troponin 0 11, likely non MI elevated troponin elevation in the setting of CAROLINE  Presently, chest pain-free  · Cycle cardiac enzymes  · Obtain EKG  · Monitor on continuous telemetry    Chronic diastolic congestive heart failure (HCC)  Assessment & Plan  Wt Readings from Last 3 Encounters:   01/04/21 71 6 kg (157 lb 13 6 oz)   01/04/21 71 2 kg (157 lb)   12/22/20 75 6 kg (166 lb 10 7 oz)     Patient with volume overload on recent admission to [facility] in December 2020 for which she was managed with IV Lasix the later transitioned to Demadex 40 mg daily  · Hold Demadex while NPO  · Gentle IV hydration  · Monitor I&O, daily weight  · Continue metoprolol    VTE Prophylaxis: Pharmacologic VTE Prophylaxis contraindicated due to Hemoptysis and anemia  / sequential compression device   Code Status:  Full code, level 1  POLST: POLST form is not discussed and not completed at this time    Discussion with family:  Wife via telephone    Anticipated Length of Stay:  Patient will be admitted on an Inpatient basis with an anticipated length of stay of  greater than 2 midnights  Justification for Hospital Stay:  Hemoptysis, acute kidney injury    Total Time for Visit, including Counseling / Coordination of Care: 1 hour  Greater than 50% of this total time spent on direct patient counseling and coordination of care  Chief Complaint:   Recurrent hemoptysis    History of Present Illness:    Horacio Tucker is a 48 y o  male with a past medical history including systemic lupus erythematous, chronic kidney disease, chronic diastolic heart failure, anemia, left lower extremity DVT on Eliquis, rheumatoid arthritis, and Raynaud's disease who presents with recurrent hemoptysis  Patient was recently admitted to [facility] in December 2020 for the same  At that time he underwent a bronchoscopy which revealed inflammation and patient was treated with steroids  Subsequently, he was found to have a left lower extremity DVT for which he was discharged on Eliquis  Additionally he was noted to have acute kidney injury on chronic kidney disease in the setting of systemic lupus erythematous for which nephrology is following closely  Patient states he was discharged and doing relatively well when he suddenly developed a cough with hemoptysis around 2am this morning  Hemoptysis continued for which patient's wife decided to seek medical attention at [facility] Emergency Department  Workup in the emergency department revealed hemoptysis, worsening renal function, hemoglobin 7 9, troponin 0 11, and BNP 24,658  Per report, patient was chest pain-free and EKG was unchanged  ED provider discussed the case with on-call Pulmonary who recommended a repeat bronchoscopy  Patient had to be transferred to [facility] due to capacity issues at [facility]  Patient presented to [facility] without any chest pain  He does have some shortness of breath and persistent hemoptysis    He currently denies any headache, dizziness, lightheadedness, nausea, vomiting, or diarrhea  He did not take his Eliquis today  Review of Systems:    Review of Systems   Constitutional: Positive for fatigue  Negative for appetite change, chills and fever  HENT: Negative for congestion, postnasal drip, rhinorrhea and sore throat  Eyes: Negative for photophobia and visual disturbance  Respiratory: Positive for cough (Hemoptysis) and shortness of breath  Negative for chest tightness and wheezing  Cardiovascular: Negative for chest pain, palpitations and leg swelling  Gastrointestinal: Negative for abdominal distention, abdominal pain, constipation, diarrhea, nausea and vomiting  Genitourinary: Negative for difficulty urinating, dysuria and hematuria  Musculoskeletal: Negative for arthralgias, gait problem and myalgias  Skin: Negative for rash and wound  Neurological: Positive for weakness  Negative for dizziness, light-headedness, numbness and headaches  Psychiatric/Behavioral: Negative for confusion  The patient is not nervous/anxious  Past Medical and Surgical History:     Past Medical History:   Diagnosis Date    CHF (congestive heart failure) ([facility] )     Hypertension     Lupus ([facility] )     Osteoporosis     Rheumatoid arthritis ([facility] )        Past Surgical History:   Procedure Laterality Date    CT NEEDLE BIOPSY KIDNEY  11/3/2020    HERNIA REPAIR  199?  REPLACEMENT TOTAL KNEE Bilateral     right 06/12 left 07/2016    REVISION TOTAL HIP ARTHROPLASTY Bilateral     right 05/2004 left 02/2006    TOTAL HIP ARTHROPLASTY Bilateral 5826/7314    Right 1988/ left 1989       Meds/Allergies:    Prior to Admission medications    Medication Sig Start Date End Date Taking?  Authorizing Provider   acetaminophen (TYLENOL) 500 mg tablet Take 500 mg by mouth every 6 (six) hours as needed for mild pain   Yes Historical Provider, MD   amLODIPine (NORVASC) 10 mg tablet Take 1 tablet (10 mg total) by mouth daily 11/29/20  Yes Deo Kelly MD   apixaban (ELIQUIS) 5 mg Take 2 tablets (10 mg total) by mouth 2 (two) times a day for 7 days, THEN 1 tablet (5 mg total) 2 (two) times a day for 23 days  12/28/20 1/27/21 Yes Osmin Souza PA-C   atorvastatin (LIPITOR) 40 mg tablet Take 1 tablet (40 mg total) by mouth daily 12/7/20  Yes EmilianoCRISTAL Clark   calcium-vitamin D ([address]) 250-125 MG-UNIT per tablet Take 1 tablet by mouth 2 (two) times a day 12/28/20  Yes Osmin Souza PA-C   dapsone 100 mg tablet Take 1 tablet (100 mg total) by mouth daily 12/29/20 1/28/21 Yes Osmin Souza PA-C   metoprolol succinate (TOPROL-XL) 50 mg 24 hr tablet Take 1 tablet (50 mg total) by mouth daily 12/28/20  Yes Osmin Souza PA-C   Multiple Vitamins-Minerals (CENTRUM SILVER) tablet Take 1 tablet by mouth daily    Yes Historical MD Michaelle   pantoprazole (PROTONIX) 40 mg tablet Take 1 tablet (40 mg total) by mouth daily 12/28/20  Yes Osmin Souza PA-C   predniSONE 20 mg tablet Take 3 tablets (60 mg total) by mouth daily 12/28/20  Yes Osmin Souza PA-C   sevelamer (RENAGEL) 800 mg tablet Take 2 tablets (1,600 mg total) by mouth 3 (three) times a day with meals 12/28/20  Yes Osmin Souza PA-C   sodium bicarbonate 650 mg tablet Take 1 tablet (650 mg total) by mouth 2 (two) times daily after meals 12/28/20  Yes Osmin Souza PA-C   torsemide (DEMADEX) 20 mg tablet Take 2 tablets (40 mg total) by mouth daily 12/29/20  Yes Osmin Souza PA-C   Elastic Bandages & Supports ([address] [facility] by Does not apply route daily Knee High 20-30mmHg 7/27/18   Feliz Kam PA-C     I have reviewed home medications with patient personally      Allergies: No Known Allergies    Social History:     Marital Status: /Civil Union   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  Patient Pre-hospital Living Situation:  Home with wife  Patient Pre-hospital Level of Mobility:  Independent  Patient Pre-hospital Diet Restrictions: None  Substance Use History:   Social History     Substance and Sexual Activity   Alcohol Use Never    Frequency: Never     Social History     Tobacco Use   Smoking Status Former Smoker    Packs/day: 1 00    Types: Cigarettes   Smokeless Tobacco Former User    Types: Chew   Tobacco Comment    25 yrs      Social History     Substance and Sexual Activity   Drug Use Never       Family History:    Family History   Problem Relation Age of Onset    Varicose Veins Father        Physical Exam:     Vitals:   Blood Pressure: 163/89 (01/04/21 1003)  Pulse: 93 (01/04/21 1407)  Temperature: 98 7 °F (37 1 °C) (01/04/21 1407)  Temp Source: Tympanic (01/04/21 1407)  Respirations: (!) 24 (01/04/21 1407)  Height: 5' 9 5" (176 5 cm) (01/04/21 0955)  Weight - Scale: 71 6 kg (157 lb 13 6 oz) (01/04/21 1111)  SpO2: 93 % (01/04/21 1407)    Physical Exam  Vitals signs and nursing note reviewed  Constitutional:       General: He is not in acute distress  Appearance: He is well-developed  He is ill-appearing  He is not toxic-appearing or diaphoretic  Comments: Pleasant, talkative gentleman resting comfortably in bed on 5 L nasal cannula satting 91%   HENT:      Head: Normocephalic and atraumatic  Eyes:      Conjunctiva/sclera: Conjunctivae normal    Neck:      Musculoskeletal: Normal range of motion and neck supple  Cardiovascular:      Rate and Rhythm: Regular rhythm  Tachycardia present  Heart sounds: No murmur  Pulmonary:      Effort: Pulmonary effort is normal  Tachypnea (mild) present  No respiratory distress  Breath sounds: Examination of the right-upper field reveals decreased breath sounds  Examination of the left-upper field reveals decreased breath sounds  Examination of the right-lower field reveals decreased breath sounds and wheezing  Examination of the left-lower field reveals decreased breath sounds and wheezing  Decreased breath sounds and wheezing present        Comments: Hemoptysis  Abdominal: General: Bowel sounds are normal  There is no distension  Palpations: Abdomen is soft  Tenderness: There is no abdominal tenderness  Musculoskeletal: Normal range of motion  Right lower leg: No edema  Left lower leg: Edema (trace ) present  Skin:     General: Skin is warm and dry  Capillary Refill: Capillary refill takes less than 2 seconds  Neurological:      Mental Status: He is alert and oriented to person, place, and time  Mental status is at baseline  Motor: Weakness (generalized ) present  Psychiatric:         Mood and Affect: Mood normal          Behavior: Behavior normal          Thought Content: Thought content normal          Judgment: Judgment normal              Additional Data:     Lab Results: I have personally reviewed pertinent reports  Results from last 7 days   Lab Units 01/04/21  0505 12/30/20  1435   WBC Thousand/uL 12 07* 13 72*   HEMOGLOBIN g/dL 7 9* 8 4*   HEMATOCRIT % 24 7* 26 5*   PLATELETS Thousands/uL 160 241   BANDS PCT %  --  1   NEUTROS PCT % 78*  --    LYMPHS PCT % 11*  --    LYMPHO PCT %  --  13*   MONOS PCT % 9  --    MONO PCT %  --  4   EOS PCT % 0 0     Results from last 7 days   Lab Units 01/04/21  0505   SODIUM mmol/L 141   POTASSIUM mmol/L 4 3   CHLORIDE mmol/L 104   CO2 mmol/L 26   BUN mg/dL 103*   CREATININE mg/dL 4 70*   ANION GAP mmol/L 11   CALCIUM mg/dL 9 1   ALBUMIN g/dL 3 3*   TOTAL BILIRUBIN mg/dL 0 70   ALK PHOS U/L 86   ALT U/L 37   AST U/L 17   GLUCOSE RANDOM mg/dL 105     Results from last 7 days   Lab Units 01/04/21  0505   INR  1 52*                   Imaging: I have personally reviewed pertinent reports  No orders to display       EKG, Pathology, and Other Studies Reviewed on Admission:   · EKG:  None available, will obtain    [employer] Records Reviewed: Yes     ** Please Note: This note has been constructed using a voice recognition system   **

## 2021-01-04 NOTE — ASSESSMENT & PLAN NOTE
Chronic anemia with baseline hemoglobin near 8 5  · Hemoglobin today, 7 9, hemoptysis could be contributing  · Trend counts daily  · Holding Eliquis

## 2021-01-04 NOTE — PLAN OF CARE
Problem: Potential for Falls  Goal: Patient will remain free of falls  Description: INTERVENTIONS:  - Assess patient frequently for physical needs  -  Identify cognitive and physical deficits and behaviors that affect risk of falls    -  Ol[place] fall precautions as indicated by assessment   - Educate patient/family on patient safety including physical limitations  - Instruct patient to call for assistance with activity based on assessment  - Modify environment to reduce risk of injury  - Consider OT/PT consult to assist with strengthening/mobility  Outcome: Progressing     Problem: DISCHARGE PLANNING - CARE MANAGEMENT  Goal: Discharge to post-acute care or home with appropriate resources  Description: INTERVENTIONS:  - Conduct assessment to determine patient/family and health care team treatment goals, and need for post-acute services based on payer coverage, community resources, and patient preferences, and barriers to discharge  - Address psychosocial, clinical, and financial barriers to discharge as identified in assessment in conjunction with the patient/family and health care team  - Arrange appropriate level of post-acute services according to patients   needs and preference and payer coverage in collaboration with the physician and health care team  - Communicate with and update the patient/family, physician, and health care team regarding progress on the discharge plan  - Arrange appropriate transportation to post-acute venues  Outcome: Progressing     Problem: PAIN - ADULT  Goal: Verbalizes/displays adequate comfort level or baseline comfort level  Description: Interventions:  - Encourage patient to monitor pain and request assistance  - Assess pain using appropriate pain scale  - Administer analgesics based on type and severity of pain and evaluate response  - Implement non-pharmacological measures as appropriate and evaluate response  - Consider cultural and social influences on pain and pain management  - Notify physician/advanced practitioner if interventions unsuccessful or patient reports new pain  Outcome: Progressing     Problem: INFECTION - ADULT  Goal: Absence or prevention of progression during hospitalization  Description: INTERVENTIONS:  - Assess and monitor for signs and symptoms of infection  - Monitor lab/diagnostic results  - Monitor all insertion sites, i e  indwelling lines, tubes, and drains  - Monitor endotracheal if appropriate and nasal secretions for changes in amount and color  - Pitcher appropriate cooling/warming therapies per order  - Administer medications as ordered  - Instruct and encourage patient and family to use good hand hygiene technique  - Identify and instruct in appropriate isolation precautions for identified infection/condition  Outcome: Progressing  Goal: Absence of fever/infection during neutropenic period  Description: INTERVENTIONS:  - Monitor WBC    Outcome: Progressing     Problem: SAFETY ADULT  Goal: Patient will remain free of falls  Description: INTERVENTIONS:  - Assess patient frequently for physical needs  -  Identify cognitive and physical deficits and behaviors that affect risk of falls    -  Pitcher fall precautions as indicated by assessment   - Educate patient/family on patient safety including physical limitations  - Instruct patient to call for assistance with activity based on assessment  - Modify environment to reduce risk of injury  - Consider OT/PT consult to assist with strengthening/mobility  Outcome: Progressing  Goal: Maintain or return to baseline ADL function  Description: INTERVENTIONS:  -  Assess patient's ability to carry out ADLs; assess patient's baseline for ADL function and identify physical deficits which impact ability to perform ADLs (bathing, care of mouth/teeth, toileting, grooming, dressing, etc )  - Assess/evaluate cause of self-care deficits   - Assess range of motion  - Assess patient's mobility; develop plan if impaired  - Assess patient's need for assistive devices and provide as appropriate  - Encourage maximum independence but intervene and supervise when necessary  - Involve family in performance of ADLs  - Assess for home care needs following discharge   - Consider OT consult to assist with ADL evaluation and planning for discharge  - Provide patient education as appropriate  Outcome: Progressing  Goal: Maintain or return mobility status to optimal level  Description: INTERVENTIONS:  - Assess patient's baseline mobility status (ambulation, transfers, stairs, etc )    - Identify cognitive and physical deficits and behaviors that affect mobility  - Identify mobility aids required to assist with transfers and/or ambulation (gait belt, sit-to-stand, lift, walker, cane, etc )  - Gilberts fall precautions as indicated by assessment  - Record patient progress and toleration of activity level on Mobility SBAR; progress patient to next Phase/Stage  - Instruct patient to call for assistance with activity based on assessment  - Consider rehabilitation consult to assist with strengthening/weightbearing, etc   Outcome: Progressing     Problem: DISCHARGE PLANNING  Goal: Discharge to home or other facility with appropriate resources  Description: INTERVENTIONS:  - Identify barriers to discharge w/patient and caregiver  - Arrange for needed discharge resources and transportation as appropriate  - Identify discharge learning needs (meds, wound care, etc )  - Arrange for interpretive services to assist at discharge as needed  - Refer to Case Management Department for coordinating discharge planning if the patient needs post-hospital services based on physician/advanced practitioner order or complex needs related to functional status, cognitive ability, or social support system  Outcome: Progressing     Problem: Knowledge Deficit  Goal: Patient/family/caregiver demonstrates understanding of disease process, treatment plan, medications, and discharge instructions  Description: Complete learning assessment and assess knowledge base    Interventions:  - Provide teaching at level of understanding  - Provide teaching via preferred learning methods  Outcome: Progressing     Problem: CARDIOVASCULAR - ADULT  Goal: Maintains optimal cardiac output and hemodynamic stability  Description: INTERVENTIONS:  - Monitor I/O, vital signs and rhythm  - Monitor for S/S and trends of decreased cardiac output  - Administer and titrate ordered vasoactive medications to optimize hemodynamic stability  - Assess quality of pulses, skin color and temperature  - Assess for signs of decreased coronary artery perfusion  - Instruct patient to report change in severity of symptoms  Outcome: Progressing     Problem: RESPIRATORY - ADULT  Goal: Achieves optimal ventilation and oxygenation  Description: INTERVENTIONS:  - Assess for changes in respiratory status  - Assess for changes in mentation and behavior  - Position to facilitate oxygenation and minimize respiratory effort  - Oxygen administered by appropriate delivery if ordered  - Initiate smoking cessation education as indicated  - Encourage broncho-pulmonary hygiene including cough, deep breathe, Incentive Spirometry  - Assess the need for suctioning and aspirate as needed  - Assess and instruct to report SOB or any respiratory difficulty  - Respiratory Therapy support as indicated  Outcome: Progressing     Problem: GASTROINTESTINAL - ADULT  Goal: Maintains or returns to baseline bowel function  Description: INTERVENTIONS:  - Assess bowel function  - Encourage oral fluids to ensure adequate hydration  - Administer IV fluids if ordered to ensure adequate hydration  - Administer ordered medications as needed  - Encourage mobilization and activity  - Consider nutritional services referral to assist patient with adequate nutrition and appropriate food choices  Outcome: Progressing     Problem: GENITOURINARY - ADULT  Goal: Maintains or returns to baseline urinary function  Description: INTERVENTIONS:  - Assess urinary function  - Encourage oral fluids to ensure adequate hydration if ordered  - Administer IV fluids as ordered to ensure adequate hydration  - Administer ordered medications as needed  - Offer frequent toileting  - Follow urinary retention protocol if ordered  Outcome: Progressing     Problem: HEMATOLOGIC - ADULT  Goal: Maintains hematologic stability  Description: INTERVENTIONS  - Assess for signs and symptoms of bleeding or hemorrhage  - Monitor labs  - Administer supportive blood products/factors as ordered and appropriate  Outcome: Progressing

## 2021-01-04 NOTE — ASSESSMENT & PLAN NOTE
Lab Results   Component Value Date    EGFR 13 01/04/2021    EGFR 13 12/31/2020    EGFR 14 12/30/2020    CREATININE 4 70 (H) 01/04/2021    CREATININE 4 59 (H) 12/31/2020    CREATININE 4 41 (H) 12/30/2020     In the setting of known Lupus Nephritis  Progressively worsening with recent creatinine 2 44 -> 3 69 -> 4 59  Cr 4 7 on admission   Known to Dr Ji Solorzano and Dr Tesfaye Jorgensen renal U/S 12/16 showed increased cortical echogenicity bilaterally, suggestive of underlying renal parenchymal disease and no hydronephrosis  Pt underwent kidney biopsy in November  · Nephrology input appreciated  · Gentle IVF, NS at 50 mL/hr while NPO  · Avoid nephrotoxic agents and hypotension  · Trend counts daily

## 2021-01-04 NOTE — ANESTHESIA POSTPROCEDURE EVALUATION
Post-Op Assessment Note    CV Status:  Stable  Pain Score: 0    Pain management: adequate     Mental Status:  Alert and awake   Hydration Status:  Euvolemic   PONV Controlled:  Controlled   Airway Patency:  Patent      Post Op Vitals Reviewed: Yes      Staff: CRNA         No complications documented      BP  128/71   Temp      Pulse  82   Resp  18   SpO2   96%

## 2021-01-04 NOTE — ASSESSMENT & PLAN NOTE
Diagnosed lupus in his 20's  Cellcept was discontinued recently     S/p plasmapheresis x 5 treatments recently  S/P cytoxan 12/18 with plan for every other week for 6 doses  Currently on Prednisone 60mg daily  · Hold Prednisone   · Solumedrol 40 mg IV q 12h  · Needs rheumatology follow-up

## 2021-01-04 NOTE — ANESTHESIA PREPROCEDURE EVALUATION
Procedure:  BRONCHOSCOPY    Relevant Problems   CARDIO   (+) Chronic diastolic congestive heart failure (HCC)   (+) DVT (deep venous thrombosis) (HCC)   (+) Essential hypertension   (+) HLD (hyperlipidemia)      /RENAL   (+) CAROLINE (acute kidney injury) ([facility] [address] )   (+) Acute kidney injury superimposed on chronic kidney disease (HCC)   (+) Chronic kidney disease (CKD), stage I      HEMATOLOGY   (+) Anemia      MUSCULOSKELETAL   (+) Other forms of systemic lupus erythematosus (HCC)   (+) Rheumatoid arthritis involving multiple sites (HCC)   (+) SLE (systemic lupus erythematosus) ([facility])        Physical Exam    Airway    Mallampati score: II  TM Distance: >3 FB  Neck ROM: full     Dental   No notable dental hx     Cardiovascular  Rhythm: regular, Rate: normal,     Pulmonary  Pulmonary exam normal Breath sounds clear to auscultation,     Other Findings        Anesthesia Plan  ASA Score- 3     Anesthesia Type-     Plan Factors-Exercise tolerance (METS): <4 METS  Chart reviewed  EKG reviewed  Existing labs reviewed  Patient summary reviewed  Patient is not a current smoker  Induction- intravenous  Postoperative Plan- Plan for postoperative opioid use  Informed Consent- Anesthetic plan and risks discussed with patient  I personally reviewed this patient with the CRNA  Discussed and agreed on the Anesthesia Plan with the CRNA  Jersey Zheng

## 2021-01-04 NOTE — ASSESSMENT & PLAN NOTE
Hx of recent LLE DVT as of 12/21 venous duplex  Venous duplex repeat on 1/12 - for acute DVT / Chronic LLE DVT  Plan is to restart pt on 1/2 dose eliquis for chronic DVT given recurrence of hemoptysis and hypoxemia on full dose A/C  Repeat venous duplex in approx 1 week on f/u

## 2021-01-04 NOTE — ASSESSMENT & PLAN NOTE
Troponin 0 11, likely non MI elevated troponin elevation in the setting of CAROLINE  Presently, chest pain-free  · Cycle cardiac enzymes  · Obtain EKG  · Monitor on continuous telemetry

## 2021-01-04 NOTE — ASSESSMENT & PLAN NOTE
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 and new onset renal failure  Now initiated on HD  Permcath placed 1/8  S/p 3 doses of HD on 1/11 and tolerated  Hypoxia resolved     Pulmonary asked to reevaluate 2/2 increased BLE erythema / swelling / edema / recs for ongoing anticoagulation therapy pending +/- test    Stable and continually improving hemoptysis  Likely SLE pneumonitis related   Non DAH by bronch x 2 [facility] on prior ADM and repeat here at [facility])  Rituxan being organized by Nephro service into outpatient setting  No further role for bronchoscopy at this time  Likely inflammation mediated  Monitor ongoing symptoms  Sputum cx + w/ Stenotrophomonas  / Colonization  Also + for Candida  ID has seen and evaluated > No clinical need for systemic treatment    Case Report   Non-gynecologic Cytology                          Case: [id number]                                   Authorizing Provider: Nicholas Shah DO      Collected:           01/04/2021 1520               Ordering Location:     [address] [place] [address] Received:            01/04/2021 1713                                      [address]                                                                       Pathologist:           Leonid PAT 1 Rebel Merritt MD                                                         Specimens:   A) - Lung, Left Upper Lobe Bronchial Lavage                                                          B) - Lung, Left Upper Lobe Bronchial Lavage, cell block                                    Final Diagnosis   A  Lung, Left Upper Lobe Bronchial Lavage (Thin-prep and cell block preparations):   Negative for malignancy  Benign bronchial cells, benign squamous cells  Fungal organisms  morphologically compatible with Neida sp, present  Rare pulmonary macrophages and few white blood cells      Satisfactory for evaluation      Cell Count - see NOTE     Electronically signed by Steven Rothman MD on 1/6/2021 at 10:59 AM   Note    NOTE:  An order for a cell count on this specimen is noted; however, the following conditions preclude the count being performed:     -paucity of alveolar macrophages on the prepared glass slides    -excessive numbers of epithelial cells exceeding the number of alveolar macrophages  Gross Description       A  35 mL of pink, slightly cloudy fluid, received in CytoLyt        B  Minimal cell button, white      Patient is stable for D/C from a Pulmonary standpoint  Pulmonary Signing off  Please call with questions, concerns, or need for reevaluation  Follow Up Items:  Dispo: f/u in 1 week  Pt has f/u on 1/21 w/ Dr Monica Plummer @ [facility]   Medications: Continue 60 mg prednisone in outpatient setting until scheduled f/u  Ultimately if recurrent need for A/C AND recurrence of Hemoptysis / may need IVC filter for need for D/C of anticoagulation

## 2021-01-04 NOTE — CONSULTS
Consultation - Nephrology   Oscar Walters Schoenfield 48 y o  male MRN: [id number]  Unit/Bed#: [address] Encounter: [id number]    ASSESSMENT and PLAN:  Acute kidney injury, POA on chronic kidney disease stage 3  -follows outpatient with Lucila Holbrook  -Patient creatinine was around 1 6-1 9 till December 6, 2020  Had acute kidney injury during recent hospital admission in December with peak creatinine of 4 3 on 12/23  Was discharged with a creatinine of 4 4 on 12/28 but as outpatient creatinine was around 4 5   -Admission creatinine on 01/04/2020 was 4 7  Currently not on Bactrim as per patient since discharge from hospital in December  Previously was on Bactrim 3 times a week for PCP prophylaxis  -likely has worsening of renal function due to underlying lupus nephritis/GN  Previously had positive C ANCA but negative PR3 as well as MPO antibodies on blood work on 12/22/2020 C ANCA was negative at less than 1:20  -anti cardiolipin antibody in December was negative  -continue to monitor function  Avoid nephrotoxins  Avoid hypotension   -check postvoid residual   Previous UA from 12/30 showed 4-10 RBC per high-power field as well as 2+ protein  -due to monitor renal function  Started on IV fluids, continue normal saline at 50 mL per as patient is currently NPO  Continue to hold diuretics for now  Lupus nephritis/proteinuria  -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 and tubular interstitial scarring  Tubular atrophy and interstitial inflammation were mild  -currently on prednisone 60 mg daily and cyclophosphamide 500 mg IV every 2 weeks   Last dose of cyclophosphamide was on 12/31 which was the 2nd dose  Plan for Cytoxan 500 mg every 2 weeks for 6 doses initially along with high dose of prednisone  Currently on PCP prophylaxis with dapsone 100 mg daily      Primary hypertension  -blood pressure slightly on higher side, avoid hypotension, continue medication    Hemoptysis:  Recent hospital admission in December at [facility] for similar complaint for which patient underwent plasmapheresis from 12/20 to 12/27 due to concern for diffuse alveolar hemorrhage  Was also treated with IV Solu-Medrol at that time     -Would recommend following Rheumatology as well as pulmonary consult  May need to consider repeat bronchoscopy and if possible biopsy  At this time I am not sure if he would benefit from repeat treatment with plasmapheresis  Follow-up Hematology recommendations  CKD/MBD/hyperphosphatemia on binders-Renagel 1600 mg t i d   Monitor phosphorus level    Left lower extremity DVT on Eliquis as outpatient, currently on hold    Anemia:  Hemoglobin dropped to 7 9, continue to monitor per primary team   Hemoptysis could be contributing  Discussed with primary team advanced practitioner, discussed with Pulmonary advanced practitioner      HISTORY OF PRESENT ILLNESS:  Requesting Physician: Landry Ruiz DO  Reason for Consult:  Acute kidney injury    Timm L Schoenfield is a 48 y o  male known history of lupus nephritis class 4 on biopsy in November 2020 was previously treated with MMF but with persistent proteinuria and worsening of renal function was changed to Cytoxan in December 2020, 1st dose of Cytoxan on 12/18/2020,  Also history of positive C ANCA titer but negative PR3 and MPO, nephrotic range proteinuria, hypertension, CHF, left lower extremity DVT on Eliquis who was admitted to [facility] after transfer from [facility] after presenting with complain of hemoptysis  He was recently at [facility] from 12/19 to 12/28/2020 for complain of hemoptysis and weight gain  He underwent bronchoscopy during the hospital stay and was treated with IV pulse steroids as well as plasmapheresis 5 treatments  Also was diuresed for fluid overload    He was discharged home on prednisone 60 mg daily and was told to continue Cytoxan as outpatient  Last dose of Cytoxan was on 12/31 which was the 2nd dose  As per patient during last hospital admission hemoptysis improved but again today morning he coughed up cherrie blood and came to ED  Outpatient rheumatologist is Dr Joey Montoya    Patient creatinine was around 1 6-1 9 till December 6, 2020  Had acute kidney injury during recent hospital admission in December with peak creatinine of 4 3 on 12/23  Was discharged with a creatinine of 4 59 on 12/31  Admission creatinine on 01/04/2020 was 4 7  No shortness of breath but complained of hemoptysis      PAST MEDICAL HISTORY:  Past Medical History:   Diagnosis Date    CHF (congestive heart failure) (HCC)     Hypertension     Lupus (HCC)     Osteoporosis     Rheumatoid arthritis ([address] )        PAST SURGICAL HISTORY:  Past Surgical History:   Procedure Laterality Date    CT NEEDLE BIOPSY KIDNEY  11/3/2020    HERNIA REPAIR  199?     REPLACEMENT TOTAL KNEE Bilateral     right 06/12 left 07/2016    REVISION TOTAL HIP ARTHROPLASTY Bilateral     right 05/2004 left 02/2006    TOTAL HIP ARTHROPLASTY Bilateral 2177/3637    Right 1988/ left 1989       SOCIAL HISTORY:  Social History     Substance and Sexual Activity   Alcohol Use Never    Frequency: Never     Social History     Substance and Sexual Activity   Drug Use Never     Social History     Tobacco Use   Smoking Status Former Smoker    Packs/day: 1 00    Types: Cigarettes   Smokeless Tobacco Former User    Types: Chew   Tobacco Comment    25 yrs        FAMILY HISTORY:  Family History   Problem Relation Age of Onset    Varicose Veins Father        ALLERGIES:  No Known Allergies    MEDICATIONS:    Current Facility-Administered Medications:     acetaminophen (TYLENOL) tablet 650 mg, 650 mg, Oral, Q6H PRN, CRISTAL Aguilar    amLODIPine (NORVASC) tablet 10 mg, 10 mg, Oral, Daily, CRISTAL Niño    atorvastatin (LIPITOR) tablet 40 mg, 40 mg, Oral, Daily [facility]    [START ON 1/5/2021] calcium carbonate-vitamin D (OSCAL-D) 500 mg-200 units per tablet 1 tablet, 1 tablet, Oral, Daily With Breakfast, CRISTAL Mckeon    [START ON 1/5/2021] dapsone tablet 100 mg, 100 mg, Oral, Daily, CRISTAL Niño    methylPREDNISolone sodium succinate (Solu-MEDROL) injection 40 mg, 40 mg, Intravenous, Q12H [address], CRISTAL Mckeon, 40 mg at 01/04/21 1237    [START ON 1/5/2021] metoprolol succinate (TOPROL-XL) 24 hr tablet 50 mg, 50 mg, Oral, Daily, CRISTAL Niño    multivitamin-minerals (CENTRUM) tablet 1 tablet, 1 tablet, Oral, Daily, CRISTAL Mckeon    ondansetron [facility]) injection 4 mg, 4 mg, Intravenous, Q6H PRN, CRISTAL Mckeon    [START ON 1/5/2021] pantoprazole (PROTONIX) EC tablet 40 mg, 40 mg, Oral, Early Morning, CRISTAL Niño    sevelamer (RENAGEL) tablet 1,600 mg, 1,600 mg, Oral, TID With Meals, CRISTAL Mckeon    sodium bicarbonate tablet 650 mg, 650 mg, Oral, BID after meals, CRISTAL Mckeon    sodium chloride 0 9 % infusion, 50 mL/hr, Intravenous, Continuous, CRISTAL Niño, Last Rate: 50 mL/hr at 01/04/21 1237, 50 mL/hr at 01/04/21 1237    REVIEW OF SYSTEMS:   Review of Systems   Constitutional: Negative for chills and fever  HENT: Negative for ear pain and sore throat  Eyes: Negative for pain and visual disturbance  Respiratory: Positive for cough (Hemoptysis)  Negative for shortness of breath  Cardiovascular: Negative for chest pain and palpitations  Gastrointestinal: Negative for abdominal pain and vomiting  Genitourinary: Negative for dysuria and hematuria  Musculoskeletal: Negative for arthralgias and back pain  Skin: Negative for color change and rash  Neurological: Negative for seizures and syncope  All other systems reviewed and are negative        All the systems were reviewed and were negative except as documented on the HPI  PHYSICAL EXAM:  Current Weight: Weight - Scale: 71 6 kg (157 lb 13 6 oz)  First Weight: Weight - Scale: 71 6 kg (157 lb 13 6 oz)  Vitals:    01/04/21 0955 01/04/21 1003 01/04/21 1049 01/04/21 1111   BP:  163/89     Pulse:  93     Resp:  18     Temp:  97 5 °F (36 4 °C)     TempSrc:  Oral     SpO2:  96% 90%    Weight: 71 6 kg (157 lb 13 6 oz)   71 6 kg (157 lb 13 6 oz)   Height: 5' 9 5" (1 765 m)        No intake or output data in the 24 hours ending 01/04/21 1242  Physical Exam  Constitutional:       General: He is not in acute distress  Appearance: He is well-developed  He is not diaphoretic  HENT:      Head: Normocephalic and atraumatic  Mouth/Throat:      Mouth: Mucous membranes are moist    Eyes:      General: No scleral icterus  Conjunctiva/sclera: Conjunctivae normal       Pupils: Pupils are equal, round, and reactive to light  Neck:      Musculoskeletal: Neck supple  Thyroid: No thyromegaly  Cardiovascular:      Rate and Rhythm: Normal rate and regular rhythm  Heart sounds: Normal heart sounds  No murmur  No friction rub  Pulmonary:      Effort: Pulmonary effort is normal  No respiratory distress  Breath sounds: Normal breath sounds  No wheezing or rales  Abdominal:      General: Bowel sounds are normal  There is no distension  Palpations: Abdomen is soft  Tenderness: There is no abdominal tenderness  Musculoskeletal:         General: No deformity  Right lower leg: No edema  Left lower leg: No edema  Lymphadenopathy:      Cervical: No cervical adenopathy  Skin:     Coloration: Skin is not pale  Nails: There is no clubbing  Neurological:      Mental Status: He is alert and oriented to person, place, and time  He is not disoriented  Psychiatric:         Mood and Affect: Mood normal  Mood is not anxious  Affect is not inappropriate           Behavior: Behavior normal          Thought Content: Thought content normal            Invasive Devices:        Lab Results:   Results from last 7 days   Lab Units 01/04/21  0505 12/31/20  0842 12/30/20  1435   WBC Thousand/uL 12 07*  --  13 72*   HEMOGLOBIN g/dL 7 9*  --  8 4*   HEMATOCRIT % 24 7*  --  26 5*   PLATELETS Thousands/uL 160  --  241   POTASSIUM mmol/L 4 3 4 5 6 1*   CHLORIDE mmol/L 104 102 101   CO2 mmol/L 26 28 28   BUN mg/dL 103* 106* 109*   CREATININE mg/dL 4 70* 4 59* 4 41*   CALCIUM mg/dL 9 1 8 9 9 2   ALK PHOS U/L 86  --  73   ALT U/L 37  --  42   AST U/L 17  --  19       Other Studies: CHEST      INDICATION:   coughing up blood  History of lupus with vasculitis status post PLEX      COMPARISON:  Chest radiograph from 12/24/2020 and 12/19/2020  Chest CT from 12/19/2020      EXAM PERFORMED/VIEWS:  XR CHEST PORTABLE        FINDINGS:     Cardiomediastinal silhouette appears unremarkable      Bilateral groundglass opacity, slightly improved on the right and worsening on the left  No effusion or pneumothorax      Osseous structures appear within normal limits for patient age  Moderate right glenohumeral degenerative disease      IMPRESSION:     Bilateral groundglass opacity, improved on the right and worsening on the left, which could be due to pulmonary hemorrhage or vasculitis          Portions of the record may have been created with voice recognition software  Occasional wrong word or "sound a like" substitutions may have occurred due to the inherent limitations of voice recognition software  Read the chart carefully and recognize, using context, where substitutions have occurred  If you have any questions, please contact the dictating provider

## 2021-01-04 NOTE — ASSESSMENT & PLAN NOTE
Wt Readings from Last 3 Encounters:   01/04/21 71 6 kg (157 lb 13 6 oz)   01/04/21 71 2 kg (157 lb)   12/22/20 75 6 kg (166 lb 10 7 oz)     Patient with volume overload on recent admission to [facility] in December 2020 for which she was managed with IV Lasix the later transitioned to Demadex 40 mg daily  · Hold Demadex while NPO  · Gentle IV hydration  · Monitor I&O, daily weight  · Continue metoprolol

## 2021-01-04 NOTE — ED NOTES
Pt is getting pick by romel @9 am [facility] to [facility] number for report is [phone] the room he going to is [address] [place], [place] [address]arik  01/04/21 0232

## 2021-01-04 NOTE — ASSESSMENT & PLAN NOTE
Diagnosed with a left lower extremity DVT on 12/21/2020 for which she was started on Eliquis   · Hold Eliquis in the setting of hemoptysis and anticipated bronchoscopy today or tomorrow  · Consider need for IVC filter if anticoagulation is contraindicated in the future

## 2021-01-04 NOTE — ASSESSMENT & PLAN NOTE
Admitted with persistent hemoptysis  Recent hospital admission in December at [facility] for which he underwent bronchoscopy by Pulmonary  Bronchoscopy revealed diffuse erythema and prominent vasculature without overt sequential increases in hemorrhage on serial aliquots   Still appears to be a diffuse inflammatory process rather than focal pneumonia   Diffuse pneumonia not ruled out  Patient underwent pulse steroids and plasmapheresis at that time  Hemoptysis resolved    Patient was discharged home on prednisone and Eliquis for left lower extremity DVT  · Pulmonary following, appreciate input  · Planning for bronchoscopy today or tomorrow  · NPO for bronchoscopy  · Gentle IV hydration  · Hold Eliquis  · Consider Rheumatology and/or Hematology consult pending bronchoscopy results  · Consider need for IVC filter if anticoagulation is contraindicated in the future  · Monitor hemoglobin  · Supplemental oxygen to avoid hypoxia

## 2021-01-04 NOTE — EMTALA/ACUTE CARE TRANSFER
[address] [address]  Dept: [phone]      EMTALA TRANSFER CONSENT    NAME Margie Pinal Schoenfield                                         1965                              MRN [id number]    I have been informed of my rights regarding examination, treatment, and transfer   by Dr Jen Monreal MD    Benefits: Patient preference    Risks: Potential for delay in receiving treatment, Potential deterioration of medical condition, Loss of IV, Increased discomfort during transfer, Possible worsening of condition or death during transfer      Consent for Transfer:  I acknowledge that my medical condition has been evaluated and explained to me by the emergency department physician or other qualified medical person and/or my attending physician, who has recommended that I be transferred to the service of  Accepting Physician: Dr Dale Lewis at [address] [place] [address] [facility] : [phone]  The above potential benefits of such transfer, the potential risks associated with such transfer, and the probable risks of not being transferred have been explained to me, and I fully understand them  The doctor has explained that, in my case, the benefits of transfer outweigh the risks  I agree to be transferred  I authorize the performance of emergency medical procedures and treatments upon me in both transit and upon arrival at the receiving facility  Additionally, I authorize the release of any and all medical records to the receiving facility and request they be transported with me, if possible  I understand that the safest mode of transportation during a medical emergency is an ambulance and that the Hospital advocates the use of this mode of transport   Risks of traveling to the receiving facility by car, including absence of medical control, life sustaining equipment, such as oxygen, and medical personnel has been explained to me and I fully understand them  ([place] CORRECT BOX BELOW)  [  ]  I consent to the stated transfer and to be transported by ambulance/helicopter  [  ]  I consent to the stated transfer, but refuse transportation by ambulance and accept full responsibility for my transportation by car  I understand the risks of non-ambulance transfers and I exonerate the Hospital and its staff from any deterioration in my condition that results from this refusal     X___________________________________________    DATE  21  TIME________  Signature of patient or legally responsible individual signing on patient behalf           RELATIONSHIP TO PATIENT_________________________          Provider Certification    NAME Delaine Bidding Schoenfield                                         1965                              MRN [id number]    A medical screening exam was performed on the above named patient  Based on the examination:    Condition Necessitating Transfer The encounter diagnosis was Hemoptysis  Patient Condition: The patient has been stabilized such that within reasonable medical probability, no material deterioration of the patient condition or the condition of the unborn child(aryan) is likely to result from the transfer    Reason for Transfer: No bed available at level of patient's needs(hospital at full capacity, do not anticipate any open beds anytime soon   patient preference to be at [facility])    Transfer Requirements: [facility] available and qualified personnel available for treatment as acknowledged by    · Agreed to accept transfer and to provide appropriate medical treatment as acknowledged by       Dr Marce Figueroa  · Appropriate medical records of the examination and treatment of the patient are provided at the time of transfer   [address] _______  · Transfer will be performed by qualified personnel from    and appropriate transfer equipment as required, including the use of necessary and appropriate life support measures  Provider Certification: I have examined the patient and explained the following risks and benefits of being transferred/refusing transfer to the patient/family:  General risk, such as traffic hazards, adverse weather conditions, rough terrain or turbulence, possible failure of equipment (including vehicle or aircraft), or consequences of actions of persons outside the control of the transport personnel      Based on these reasonable risks and benefits to the patient and/or the unborn child(aryan), and based upon the information available at the time of the patients examination, I certify that the medical benefits reasonably to be expected from the provision of appropriate medical treatments at another medical facility outweigh the increasing risks, if any, to the individuals medical condition, and in the case of labor to the unborn child, from effecting the transfer      X____________________________________________ DATE 01/04/21        TIME_______      ORIGINAL - SEND TO MEDICAL RECORDS   COPY - SEND WITH PATIENT DURING TRANSFER

## 2021-01-05 ENCOUNTER — APPOINTMENT (INPATIENT)
Dept: RADIOLOGY | Facility: HOSPITAL | Age: 56
DRG: 545 | End: 2021-01-05
Payer: COMMERCIAL

## 2021-01-05 PROBLEM — J96.00 ACUTE RESPIRATORY FAILURE (HCC): Status: ACTIVE | Noted: 2021-01-05

## 2021-01-05 LAB
ALBUMIN SERPL BCP-MCNC: 2.9 G/DL (ref 3.5–5)
ALP SERPL-CCNC: 73 U/L (ref 46–116)
ALT SERPL W P-5'-P-CCNC: 27 U/L (ref 12–78)
ANION GAP SERPL CALCULATED.3IONS-SCNC: 10 MMOL/L (ref 4–13)
ANISOCYTOSIS BLD QL SMEAR: PRESENT
AST SERPL W P-5'-P-CCNC: 18 U/L (ref 5–45)
ATRIAL RATE: 92 BPM
BASOPHILS # BLD MANUAL: 0 THOUSAND/UL (ref 0–0.1)
BASOPHILS NFR MAR MANUAL: 0 % (ref 0–1)
BILIRUB SERPL-MCNC: 0.7 MG/DL (ref 0.2–1)
BUN SERPL-MCNC: 104 MG/DL (ref 5–25)
C3 SERPL-MCNC: 74.7 MG/DL (ref 90–180)
C4 SERPL-MCNC: 21 MG/DL (ref 10–40)
CALCIUM ALBUM COR SERPL-MCNC: 9.7 MG/DL (ref 8.3–10.1)
CALCIUM SERPL-MCNC: 8.8 MG/DL (ref 8.3–10.1)
CHLORIDE SERPL-SCNC: 105 MMOL/L (ref 100–108)
CO2 SERPL-SCNC: 25 MMOL/L (ref 21–32)
CREAT SERPL-MCNC: 4.69 MG/DL (ref 0.6–1.3)
EOSINOPHIL # BLD MANUAL: 0 THOUSAND/UL (ref 0–0.4)
EOSINOPHIL NFR BLD MANUAL: 0 % (ref 0–6)
ERYTHROCYTE [DISTWIDTH] IN BLOOD BY AUTOMATED COUNT: 14.9 % (ref 11.6–15.1)
GFR SERPL CREATININE-BSD FRML MDRD: 13 ML/MIN/1.73SQ M
GLUCOSE SERPL-MCNC: 102 MG/DL (ref 65–140)
HCT VFR BLD AUTO: 24.6 % (ref 36.5–49.3)
HGB BLD-MCNC: 7.7 G/DL (ref 12–17)
LYMPHOCYTES # BLD AUTO: 0.43 THOUSAND/UL (ref 0.6–4.47)
LYMPHOCYTES # BLD AUTO: 5 % (ref 14–44)
MACROCYTES BLD QL AUTO: PRESENT
MAGNESIUM SERPL-MCNC: 2.6 MG/DL (ref 1.6–2.6)
MCH RBC QN AUTO: 33.2 PG (ref 26.8–34.3)
MCHC RBC AUTO-ENTMCNC: 31.3 G/DL (ref 31.4–37.4)
MCV RBC AUTO: 106 FL (ref 82–98)
MONOCYTES # BLD AUTO: 0.43 THOUSAND/UL (ref 0–1.22)
MONOCYTES NFR BLD: 5 % (ref 4–12)
MYELOCYTES NFR BLD MANUAL: 1 % (ref 0–1)
NEUTROPHILS # BLD MANUAL: 7.69 THOUSAND/UL (ref 1.85–7.62)
NEUTS SEG NFR BLD AUTO: 89 % (ref 43–75)
P AXIS: 71 DEGREES
PHOSPHATE SERPL-MCNC: 6.9 MG/DL (ref 2.7–4.5)
PLATELET # BLD AUTO: 138 THOUSANDS/UL (ref 149–390)
PLATELET BLD QL SMEAR: ABNORMAL
PMV BLD AUTO: 11.2 FL (ref 8.9–12.7)
POLYCHROMASIA BLD QL SMEAR: PRESENT
POTASSIUM SERPL-SCNC: 5.2 MMOL/L (ref 3.5–5.3)
PR INTERVAL: 162 MS
PROT SERPL-MCNC: 5.8 G/DL (ref 6.4–8.2)
QRS AXIS: -1 DEGREES
QRSD INTERVAL: 78 MS
QT INTERVAL: 372 MS
QTC INTERVAL: 460 MS
RBC # BLD AUTO: 2.32 MILLION/UL (ref 3.88–5.62)
RBC MORPH BLD: PRESENT
SODIUM SERPL-SCNC: 140 MMOL/L (ref 136–145)
T WAVE AXIS: 102 DEGREES
TOTAL CELLS COUNTED SPEC: 100
TROPONIN I SERPL-MCNC: 0.07 NG/ML
TROPONIN I SERPL-MCNC: 0.07 NG/ML
VENTRICULAR RATE: 92 BPM
WBC # BLD AUTO: 8.64 THOUSAND/UL (ref 4.31–10.16)

## 2021-01-05 PROCEDURE — 71045 X-RAY EXAM CHEST 1 VIEW: CPT

## 2021-01-05 PROCEDURE — 85027 COMPLETE CBC AUTOMATED: CPT | Performed by: NURSE PRACTITIONER

## 2021-01-05 PROCEDURE — 84484 ASSAY OF TROPONIN QUANT: CPT | Performed by: INTERNAL MEDICINE

## 2021-01-05 PROCEDURE — 99233 SBSQ HOSP IP/OBS HIGH 50: CPT | Performed by: INTERNAL MEDICINE

## 2021-01-05 PROCEDURE — 84100 ASSAY OF PHOSPHORUS: CPT | Performed by: NURSE PRACTITIONER

## 2021-01-05 PROCEDURE — 86235 NUCLEAR ANTIGEN ANTIBODY: CPT | Performed by: INTERNAL MEDICINE

## 2021-01-05 PROCEDURE — 85007 BL SMEAR W/DIFF WBC COUNT: CPT | Performed by: NURSE PRACTITIONER

## 2021-01-05 PROCEDURE — 86160 COMPLEMENT ANTIGEN: CPT | Performed by: NURSE PRACTITIONER

## 2021-01-05 PROCEDURE — 99232 SBSQ HOSP IP/OBS MODERATE 35: CPT | Performed by: INTERNAL MEDICINE

## 2021-01-05 PROCEDURE — 93010 ELECTROCARDIOGRAM REPORT: CPT | Performed by: INTERNAL MEDICINE

## 2021-01-05 PROCEDURE — 80053 COMPREHEN METABOLIC PANEL: CPT | Performed by: NURSE PRACTITIONER

## 2021-01-05 PROCEDURE — 86255 FLUORESCENT ANTIBODY SCREEN: CPT | Performed by: NURSE PRACTITIONER

## 2021-01-05 PROCEDURE — 83735 ASSAY OF MAGNESIUM: CPT | Performed by: NURSE PRACTITIONER

## 2021-01-05 PROCEDURE — 83520 IMMUNOASSAY QUANT NOS NONAB: CPT | Performed by: NURSE PRACTITIONER

## 2021-01-05 RX ADMIN — SEVELAMER HYDROCHLORIDE 1600 MG: 800 TABLET, FILM COATED PARENTERAL at 11:27

## 2021-01-05 RX ADMIN — HYDROCORTISONE SODIUM SUCCINATE 250 MG: 100 INJECTION, POWDER, FOR SOLUTION INTRAMUSCULAR; INTRAVENOUS at 14:06

## 2021-01-05 RX ADMIN — AMLODIPINE BESYLATE 10 MG: 10 TABLET ORAL at 09:26

## 2021-01-05 RX ADMIN — PANTOPRAZOLE SODIUM 40 MG: 40 TABLET, DELAYED RELEASE ORAL at 06:30

## 2021-01-05 RX ADMIN — SODIUM BICARBONATE 650 MG TABLET 650 MG: at 09:24

## 2021-01-05 RX ADMIN — METOPROLOL SUCCINATE 50 MG: 50 TABLET, EXTENDED RELEASE ORAL at 09:24

## 2021-01-05 RX ADMIN — DAPSONE 100 MG: 100 TABLET ORAL at 09:26

## 2021-01-05 RX ADMIN — SODIUM CHLORIDE 250 MG: 0.9 INJECTION, SOLUTION INTRAVENOUS at 23:38

## 2021-01-05 RX ADMIN — SEVELAMER HYDROCHLORIDE 1600 MG: 800 TABLET, FILM COATED PARENTERAL at 16:45

## 2021-01-05 RX ADMIN — HYDROCORTISONE SODIUM SUCCINATE 250 MG: 100 INJECTION, POWDER, FOR SOLUTION INTRAMUSCULAR; INTRAVENOUS at 09:27

## 2021-01-05 RX ADMIN — ATORVASTATIN CALCIUM 40 MG: 40 TABLET, FILM COATED ORAL at 16:45

## 2021-01-05 RX ADMIN — OYSTER SHELL CALCIUM WITH VITAMIN D 1 TABLET: 500; 200 TABLET, FILM COATED ORAL at 09:24

## 2021-01-05 RX ADMIN — SODIUM CHLORIDE 250 MG: 0.9 INJECTION, SOLUTION INTRAVENOUS at 17:31

## 2021-01-05 RX ADMIN — SODIUM BICARBONATE 650 MG TABLET 650 MG: at 16:45

## 2021-01-05 RX ADMIN — SODIUM CHLORIDE 50 ML/HR: 0.9 INJECTION, SOLUTION INTRAVENOUS at 00:09

## 2021-01-05 RX ADMIN — Medication 1 TABLET: at 09:24

## 2021-01-05 NOTE — ASSESSMENT & PLAN NOTE
Wt Readings from Last 3 Encounters:   01/05/21 72 7 kg (160 lb 4 8 oz)   01/04/21 71 2 kg (157 lb)   12/22/20 75 6 kg (166 lb 10 7 oz)     Patient with volume overload on recent admission to [facility] in December 2020 for which she was managed with IV Lasix the later transitioned to Demadex 40 mg daily  · Continue to hold Demadex  · Patient was not gentle IV hydration which was stopped today  · No clinical evidence of fluid overload  · Continue metoprolol

## 2021-01-05 NOTE — ASSESSMENT & PLAN NOTE
Chronic anemia with baseline hemoglobin near 8 5  · Hemoglobin is slightly down trending in the setting of hemoptysis  · Monitor hemoglobin and transfuse if hemoglobin less than 7 5

## 2021-01-05 NOTE — PROGRESS NOTES
Progress Note Ben Orozco 1965, 48 y o  male MRN: [id number]    Unit/Bed#: [address] [address] Encounter: [id number]    Primary Care Provider: Bob Lr MD   Date and time admitted to hospital: 1/4/2021  9:54 AM        * Hemoptysis  Assessment & Plan  Admitted with persistent hemoptysis  Recent hospital admission in December at [address] [place] [address] for which he underwent bronchoscopy by Pulmonary  Bronchoscopy revealed diffuse erythema and prominent vasculature without overt sequential increases in hemorrhage on serial aliquots   Still appears to be a diffuse inflammatory process rather than focal pneumonia   Diffuse pneumonia not ruled out  Patient underwent pulse steroids and plasmapheresis at that time  Hemoptysis resolved  Patient was discharged home on prednisone and Eliquis for left lower extremity DVT  · Pulmonary following, appreciate input  · Patient underwent repeat bronchoscopy on January 4, 2021 which showed no active bleeding or endobronchial lesions with some scattered thin bloody secretions throughout the airway  BAL of the left upper lobe was sent for cultures and cytology along with cell count  · Continue to hold Eliquis  · Rheumatology was consulted  · Patient might need IVC filter if anticoagulation is contraindication in the future  · Patient started on pulse dose steroids with hydrocortisone 250 milligram IV Q 6 hours    Acute respiratory failure [facility])  Assessment & Plan  Patient does not use oxygen at home    Patient is currently on 5 liters oxygen with O2 saturation in mid 90s  In the setting of bilateral pulmonary infiltrates suggesting lupus vasculitis    Acute kidney injury superimposed on chronic kidney disease [facility])  Assessment & Plan  Lab Results   Component Value Date    EGFR 13 01/05/2021    EGFR 13 01/04/2021    EGFR 13 12/31/2020    CREATININE 4 69 (H) 01/05/2021    CREATININE 4 70 (H) 01/04/2021    CREATININE 4 59 (H) 12/31/2020     In the setting of known Lupus Nephritis  Progressively worsening with recent creatinine 2 44 -> 3 69 -> 4 59  Cr 4 7 on admission   Known to Dr Jason Li and Dr Tara Nj renal U/S 12/16 showed increased cortical echogenicity bilaterally, suggestive of underlying renal parenchymal disease and no hydronephrosis  Pt underwent kidney biopsy in November  · Nephrology input appreciated  · Patient initially received gentle IV hydration with normal saline which were discontinued today  · Avoid nephrotoxic agents and hypotension  · Worsening creatinine likely secondary to lupus nephritis/glomerulonephritis  · Patient's creatinine has been stable  · No indication for renal replacement therapy at the current time as per Nephrology  Results from last 7 days   Lab Units 01/05/21  0646 01/04/21  0505 12/31/20  0842 12/30/20  1435   BUN mg/dL 104* 103* 106* 109*   CREATININE mg/dL 4 69* 4 70* 4 59* 4 41*       Anemia  Assessment & Plan  Chronic anemia with baseline hemoglobin near 8 5  · Hemoglobin is slightly down trending in the setting of hemoptysis  · Monitor hemoglobin and transfuse if hemoglobin less than 7 5    SLE (systemic lupus erythematosus) (HCC)  Assessment & Plan  Diagnosed lupus in his 20's  Cellcept was discontinued recently  S/p plasmapheresis x 5 treatments recently  S/P cytoxan 12/18 with plan for every other week for 6 doses  Patient was on prednisone 20 milligram p o   Daily  · Initially started on Solu-Medrol 40 milligram q 12 hours which was changed to methylprednisolone 250 milligram IV q 6 hours  · Rheumatology was consulted    DVT (deep venous thrombosis) ([facility] [address] )  Assessment & Plan  Diagnosed with a left lower extremity DVT on 12/21/2020 for which she was started on Eliquis   · Hold Eliquis in the setting of hemoptysis and anticipated bronchoscopy today or tomorrow  · Consider need for IVC filter if anticoagulation is contraindicated in the future    Elevated troponin  Assessment & Plan  Troponin 0 11, likely non MI elevated troponin elevation in the setting of CAROLINE  Presently, chest pain-free  · Check serial troponins  Results from last 7 days   Lab Units 21  0505   TROPONIN I ng/mL 0 11*   ·     Chronic diastolic congestive heart failure (HCC)  Assessment & Plan  Wt Readings from Last 3 Encounters:   21 72 7 kg (160 lb 4 8 oz)   21 71 2 kg (157 lb)   20 75 6 kg (166 lb 10 7 oz)     Patient with volume overload on recent admission to [facility] in 2020 for which she was managed with IV Lasix the later transitioned to Demadex 40 mg daily  · Continue to hold Demadex  · Patient was not gentle IV hydration which was stopped today  · No clinical evidence of fluid overload  · Continue metoprolol        VTE Pharmacologic Prophylaxis:   Pharmacologic: Pharmacologic VTE Prophylaxis contraindicated due to Hemoptysis and anemia  Mechanical VTE Prophylaxis in Place: Yes    Patient Centered Rounds: I have performed bedside rounds with nursing staff today  Discussions with Specialists or Other Care Team Provider: Allyson Turner    Education and Discussions with Family / Patient: yes-wife was on the phone during rounding    Time Spent for Care: 45 minutes  More than 50% of total time spent on counseling and coordination of care as described above  Current Length of Stay: 1 day(s)    Current Patient Status: Inpatient   Certification Statement: The patient will continue to require additional inpatient hospital stay due to Acute hypoxic respiratory failure, hemoptysis, acute kidney injury    Discharge Plan:  Pending course    Code Status: Level 1 - Full Code      Subjective:   Patient is still coughing up but less amounts of blood compared to yesterday  Patient does have some chest tightness  Denies any chest pain, abdominal pain, nausea or vomiting      Objective:     Vitals:   Temp (24hrs), Av 8 °F (36 6 °C), Min:97 1 °F (36 2 °C), Max:98 4 °F (36 9 °C)    Temp:  [97 1 °F (36 2 °C)-98 4 °F (36 9 °C)] 98 1 °F (36 7 °C)  HR:  [91-93] 91  Resp:  [14-19] 19  BP: (145-164)/(82-95) 145/82  SpO2:  [91 %-93 %] 91 %  Body mass index is 23 33 kg/m²  Input and Output Summary (last 24 hours): Intake/Output Summary (Last 24 hours) at 1/5/2021 1808  Last data filed at 1/5/2021 1701  Gross per 24 hour   Intake 480 ml   Output 700 ml   Net -220 ml       Physical Exam:     Physical Exam  Constitutional:       General: He is not in acute distress  HENT:      Head: Normocephalic and atraumatic  Eyes:      Conjunctiva/sclera: Conjunctivae normal       Pupils: Pupils are equal, round, and reactive to light  Neck:      Musculoskeletal: Normal range of motion and neck supple  Cardiovascular:      Rate and Rhythm: Normal rate and regular rhythm  Heart sounds: Normal heart sounds  Pulmonary:      Effort: Pulmonary effort is normal  No respiratory distress  Breath sounds: No wheezing, rhonchi or rales  Comments: Bilateral rhonchi and wheezes  Chest:      Chest wall: No tenderness  Abdominal:      General: Bowel sounds are normal  There is no distension  Palpations: Abdomen is soft  Tenderness: There is no abdominal tenderness  There is no guarding or rebound  Skin:     General: Skin is warm and dry  Findings: No rash  Neurological:      Mental Status: He is alert  Cranial Nerves: No cranial nerve deficit           Additional Data:     Labs:    Results from last 7 days   Lab Units 01/05/21  0646 01/04/21  0505   WBC Thousand/uL 8 64 12 07*   HEMOGLOBIN g/dL 7 7* 7 9*   HEMATOCRIT % 24 6* 24 7*   PLATELETS Thousands/uL 138* 160   NEUTROS PCT %  --  78*   LYMPHS PCT %  --  11*   LYMPHO PCT % 5*  --    MONOS PCT %  --  9   MONO PCT % 5  --    EOS PCT % 0 0     Results from last 7 days   Lab Units 01/05/21  0646   POTASSIUM mmol/L 5 2   CHLORIDE mmol/L 105   CO2 mmol/L 25   BUN mg/dL 104*   CREATININE mg/dL 4 69*   CALCIUM mg/dL 8 8   ALK PHOS U/L 73   ALT U/L 27   AST U/L 18     Results from last 7 days   Lab Units 01/04/21  0505   INR  1 52*       * I Have Reviewed All Lab Data Listed Above  * Additional Pertinent Lab Tests Reviewed: [address] Admission Reviewed    Imaging:    Imaging Reports Reviewed Today Include:  Chest x-ray      Recent Cultures (last 7 days):     Results from last 7 days   Lab Units 01/04/21  1520   GRAM STAIN RESULT  1+ Polys*  2+ Gram positive cocci in pairs*       Last 24 Hours Medication List:   Current Facility-Administered Medications   Medication Dose Route Frequency Provider Last Rate    acetaminophen  650 mg Oral Q6H PRN Bonnie Marcus, CRISTAL      amLODIPine  10 mg Oral Daily Bonnie Angeline, CRISTAL      atorvastatin  40 mg Oral Daily With Dinner CRISTAL Dee      calcium carbonate-vitamin D  1 tablet Oral Daily With Breakfast CRISTAL Dee      dapsone  100 mg Oral Daily CRISTAL Dee      methylPREDNISolone sodium succinate  250 mg Intravenous Q6H [facility] 250 mg (01/05/21 1731)    metoprolol succinate  50 mg Oral Daily CRISTAL Dee      multivitamin-minerals  1 tablet Oral Daily CRISTAL Dee      ondansetron  4 mg Intravenous Q6H PRN CRISTAL Dee      pantoprazole  40 mg Oral Early Morning CRISTAL Dee      sevelamer  1,600 mg Oral TID With Meals CRISTAL Dee      sodium bicarbonate  650 mg Oral BID after meals CRISTAL Dee          Today, Patient Was Seen By: Emory Ramirez MD    ** Please Note: Dictation voice to text software may have been used in the creation of this document   **

## 2021-01-05 NOTE — PROGRESS NOTES
NEPHROLOGY PROGRESS NOTE   Toby Cromer [place] 48 y o  male MRN: [id number]  Unit/Bed#: [address] [place] [address] Encounter: [id number]    ASSESSMENT & PLAN:    Acute kidney injury, POA on chronic kidney disease stage 3  -follows outpatient with Dr Levin  -Patient creatinine was around 1 6-1 9 till December 6, 2020  Had acute kidney injury during recent hospital admission in December with peak creatinine of 4 3 on 12/23  Was discharged with a creatinine of 4 4 on 12/28 but as outpatient creatinine was around 4 5   -Admission creatinine on 01/04/2020 was 4 7  Currently not on Bactrim as per patient since discharge from hospital in December  Previously was on Bactrim 3 times a week for PCP prophylaxis  Currently on dapsone  -likely has worsening of renal function due to underlying lupus nephritis/GN  Previously had positive C ANCA but negative PR3 as well as MPO antibodies on blood work on 12/22/2020  Repeat in December,  C ANCA was negative at less than 1:20    -anti cardiolipin antibody in December was negative  -continue to monitor function  Avoid nephrotoxins  Avoid hypotension   -Previous UA from 12/30 showed 4-10 RBC per high-power field as well as 2+ protein  -check for postvoid residual  -01/05/2020:  Renal function is stable at creatinine 4 69, was treated with IV fluid on admission  As renal function stable and patient has oral intake, would stop further IV fluids and monitor renal function, avoid hypotension and nephrotoxins    -no indication for renal replacement therapy at this time  Discussed about low-potassium and low phosphorus diet     Lupus nephritis/proteinuria  -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 and tubular interstitial scarring  Tubular atrophy and interstitial inflammation were mild  -as outpatient was on prednisone 60 mg daily and cyclophosphamide 500 mg IV every 2 weeks     Last dose of cyclophosphamide was on 12/31 which was the 2nd dose  Plan for Cytoxan 500 mg every 2 weeks for 6 doses initially along with high dose of prednisone  Currently on PCP prophylaxis with dapsone 100 mg daily   -during hospital admission was receiving IV Solu-Medrol which was changed to IV hydrocortisone to 250 mg every 6 hours by Pulmonary on 01/05  Continue IV steroids      Primary hypertension  -blood pressure slightly on higher side, steroids could be contributing  -avoid hypotension, continue medication:  Amlodipine and metoprolol   -monitor response to stopping IV fluids    Hemoptysis:  Recent hospital admission in December at [address] [place] [address] for similar complaint for which patient underwent plasmapheresis from 12/20 to 12/27 due to concern for diffuse alveolar hemorrhage  Was also treated with IV Solu-Medrol at that time  - was seen by Pulmonary and underwent bronchoscopy as well as bronchial lavage for culture and cytology  As per Pulmonary may need to consider high dose of IV Solu-Medrol if no improvement in hemoptysis  -would recommend rheumatology consult     CKD/MBD/hyperphosphatemia on binders-Renagel 1600 mg t i d   Monitor phosphorus level, was found to be elevated at 6 9 on 01/05  Placed on low phosphorus diet     Left lower extremity DVT on Eliquis as outpatient, currently on hold     Anemia:  Hemoglobin dropped to 7 7, continue to monitor per primary team   Hemoptysis could be contributing  Continue to monitor per primary team   Not a candidate for JESSEE due to left lower extremity DVT  If hemoglobin drops below 7 0, would recommend PRBC  Check iron panel     Discussed with primary team advanced practitioner    SUBJECTIVE:  Still with hemoptysis but improving    No chest pain or shortness of breath    OBJECTIVE:  Current Weight: Weight - Scale: 72 7 kg (160 lb 4 8 oz)  Vitals:    01/05/21 0805   BP: 164/95   Pulse: 93   Resp: 14   Temp: 97 5 °F (36 4 °C)   SpO2: 92%       Intake/Output Summary (Last 24 hours) at 1/5/2021 1037  Last data filed at 1/5/2021 0832  Gross per 24 hour   Intake 200 ml   Output 950 ml   Net -750 ml       Physical Exam  General:  Ill looking, awake  Eyes: Conjunctivae pink,  Sclera anicteric  ENT: lips and mucous membranes moist  Neck: supple   Chest:  Coarse crackles left lung base    Bilateral equal air entry  CVS: S1 & S2 present, normal rate, regular rhythm, ejection systolic murmur in aortic area  Abdomen: soft, non-tender, non-distended, Bowel sounds normoactive  Extremities: no edema of  legs  Skin: no rash  Neuro: awake, alert, oriented x 3  Psych: Mood and affect appropriate       Medications:    Current Facility-Administered Medications:     acetaminophen (TYLENOL) tablet 650 mg, 650 mg, Oral, Q6H PRN, Kennedi Hensleya, CRNP    amLODIPine (NORVASC) tablet 10 mg, 10 mg, Oral, Daily, CRISTAL Niño, 10 mg at 01/05/21 0264    atorvastatin (LIPITOR) tablet 40 mg, 40 mg, Oral, Daily With Dinner, Kennedi Hensleya, CRNP, 40 mg at 01/04/21 1818    calcium carbonate-vitamin D (OSCAL-D) 500 mg-200 units per tablet 1 tablet, 1 tablet, Oral, Daily With Breakfast, Coral Formica, CRNP, 1 tablet at 01/05/21 6911    dapsone tablet 100 mg, 100 mg, Oral, Daily, Coral Formica, CRNP, 100 mg at 01/05/21 5627    hydrocortisone sodium succinate (PF) (Solu-CORTEF) injection 250 mg, 250 mg, Intravenous, Q6H [address], [place] ALICIA Gil, 250 mg at 01/05/21 4225    metoprolol succinate (TOPROL-XL) 24 hr tablet 50 mg, 50 mg, Oral, Daily, CRISTAL Niño, 50 mg at 01/05/21 6421    multivitamin-minerals (CENTRUM) tablet 1 tablet, 1 tablet, Oral, Daily, Coral Formica, CRNP, 1 tablet at 01/05/21 0924    ondansetron (ZOFRAN) injection 4 mg, 4 mg, Intravenous, Q6H PRN, Kennedi Hensleya, CRNP    pantoprazole (PROTONIX) EC tablet 40 mg, 40 mg, Oral, Early Morning, CRISTAL Pearl, 40 mg at 01/05/21 0630    sevelamer (RENAGEL) tablet 1,600 mg, 1,600 mg, Oral, TID With Meals, Michalene Attila, CRNP, 1,600 mg at 01/04/21 1818    sodium bicarbonate tablet 650 mg, 650 mg, Oral, BID after meals, Michalene Attila, CRNP, 650 mg at 01/05/21 5252    sodium chloride 0 9 % infusion, 50 mL/hr, Intravenous, Continuous, Michalene Attila, CRNP, Last Rate: 50 mL/hr at 01/05/21 0009, 50 mL/hr at 01/05/21 0009    Invasive Devices:        Lab Results:   Results from last 7 days   Lab Units 01/05/21  0646 01/04/21  0505 12/31/20  0842 12/30/20  1435   WBC Thousand/uL 8 64 12 07*  --  13 72*   HEMOGLOBIN g/dL 7 7* 7 9*  --  8 4*   HEMATOCRIT % 24 6* 24 7*  --  26 5*   PLATELETS Thousands/uL 138* 160  --  241   POTASSIUM mmol/L 5 2 4 3 4 5 6 1*   CHLORIDE mmol/L 105 104 102 101   CO2 mmol/L 25 26 28 28   BUN mg/dL 104* 103* 106* 109*   CREATININE mg/dL 4 69* 4 70* 4 59* 4 41*   CALCIUM mg/dL 8 8 9 1 8 9 9 2   MAGNESIUM mg/dL 2 6  --   --   --    PHOSPHORUS mg/dL 6 9*  --   --   --    ALK PHOS U/L 73 86  --  73   ALT U/L 27 37  --  42   AST U/L 18 17  --  19       Previous work up:         Portions of the record may have been created with voice recognition software  Occasional wrong word or "sound a like" substitutions may have occurred due to the inherent limitations of voice recognition software  Read the chart carefully and recognize, using context, where substitutions have occurred  If you have any questions, please contact the dictating provider

## 2021-01-05 NOTE — UTILIZATION REVIEW
Initial Clinical Review    Admission: Date/Time/Statement:   Admission Orders (From admission, onward)     Ordered        01/04/21 1110  Inpatient Admission  Once                   Orders Placed This Encounter   Procedures    Inpatient Admission     Standing Status:   Standing     Number of Occurrences:   1     Order Specific Question:   Admitting Physician     Answer:   Sharmila Goodrich [[id number]]     Order Specific Question:   Level of Care     Answer:   Med Surg [[id number]]     Order Specific Question:   Estimated length of stay     Answer:   More than 2 Midnights     Order Specific Question:   Certification     Answer:   I certify that inpatient services are medically necessary for this patient for a duration of greater than two midnights  See H&P and MD Progress Notes for additional information about the patient's course of treatment  Assessment/Plan: 48year old male directly admitted from [address] [place] [address] ED to [facility] inpatient unit for recurrent hemoptysis, SLE  Initially to the ED with complaints of coughing up blood which started 8 hours prior to arrival   H/O CKD, SLE, anemia  Recently admitted and [facility] in December where he underwent bronchoscopy which showed inflammation and patient was treated with steroids  Also recently diagnosed with DVT and was discharged on Eliquis  Plan for bronchoscopy  Pulm consult  NPO  Give gently IV hydration  Started on IV solumedrol  Progressively worsening with recent creatinine 2 44 -> 3 69 -> 4 59   4 7 on admit  Continue with IV fluids  Nephrology consult  1/4 pulmonary consult:  PUlse ox 93% on 4LNC  Lungs with crackles  Hemoptysis possibly due to 2 diffuse alveolar hemorrhage secondary to lupus for other possibility could be acute on chronic interstitial pneumonitis  This was exacerbated by patient being on anticoagulation with with Eliquis because of DVT left lower extremity diagnosed on duplex Dopplers 1221  Continue IV solumedrol  Hold Eliquis  1/4 Nephrology consult:  Current creat on admit4 7  Check PVR  Hold diuretics  Continue with IV fluids  Biopsy proven class 4 lupus nephritis  Continue with steroids  1/5 Nephrology consult:   Acute kidney injury, POA on chronic kidney disease stage 3  -Patient creatinine was around 1 6-1 9 till December 6, 2020  Had acute kidney injury during recent hospital admission in December with peak creatinine of 4 3 on 12/23   Was discharged with a creatinine of 4 4 on 12/28 but as outpatient creatinine was around 4 5   -Admission creatinine on 01/04/2020 was 4 7   Currently not on Bactrim as per patient since discharge from hospital in December   Previously was on Bactrim 3 times a week for PCP prophylaxis  Currently on dapsone  -likely has worsening of renal function due to underlying lupus nephritis/GN   Previously had positive C ANCA but negative PR3 as well as MPO antibodies on blood work on 12/22/2020  Repeat in December,  C ANCA was negative at less than 1:20    -anti cardiolipin antibody in December was negative  -continue to monitor function   Avoid nephrotoxins   Avoid hypotension   -Previous UA from 12/30 showed 4-10 RBC per high-power field as well as 2+ protein  -check for postvoid residual  -01/05/2020:  Renal function is stable at creatinine 4 69, was treated with IV fluid on admission    As renal function stable and patient has oral intake, would stop further IV fluids and monitor renal function, avoid hypotension and nephrotoxins         Temperature Pulse Respirations Blood Pressure SpO2   01/04/21 1003 01/04/21 1003 01/04/21 1003 01/04/21 1003 01/04/21 1003   97 5 °F (36 4 °C) 93 18 163/89 96 %      Temp Source Heart Rate Source Patient Position - Orthostatic VS BP Location FiO2 (%)   01/04/21 1003 01/04/21 1407 -- -- --   Oral Monitor         Pain Score       01/04/21 1001       No Pain          Wt Readings from Last 1 Encounters:   01/05/21 72 7 kg (160 lb 4 8 oz)     Additional Vital Signs:   Date/Time  Temp  Pulse  Resp  BP  MAP (mmHg)  SpO2  Calculated FIO2 (%) - Nasal Cannula  O2 Flow Rate (L/min)  Nasal Cannula O2 Flow Rate (L/min)  O2 Device   01/05/21 08:05:15  97 5 °F (36 4 °C)  93  14  164/95  118  92 %           01/05/21 04:08:18  98 °F (36 7 °C)  93  17  162/94  117  92 %           01/05/21 01:29:55  97 1 °F (36 2 °C)Abnormal     18  162/94  117             01/04/21 19:15:59  98 4 °F (36 9 °C)  93  18  163/88  113  93 %           01/04/21 16:23:22  98 1 °F (36 7 °C)  91  18  163/90  114  85 %Abnormal            01/04/21 1537    92  22  147/86    94 %  40    5 L/min  None (Room air)   01/04/21 1527    90  22  145/84    94 %    6 L/min    Simple mask   01/04/21 1518    88  22  130/75    92 %    6 L/min    Simple mask   01/04/21 1407  98 7 °F (37 1 °C)  93  24Abnormal       93 %  40    5 L/min  Nasal cannula   01/04/21 1338            90 %  40    5 L/min  Nasal cannula   01/04/21 1049            90 %  30    2 5 L/min  Nasal cannula   01/04/21 10:03:21  97 5 °F (36 4 °C)  93  18  163/89  114  96 %  36    4 L/min  Nasal cannula          Pertinent Labs/Diagnostic Test Results:   1/4 CXR: Bilateral groundglass opacity, improved on the right and worsening on the left, which could be due to pulmonary hemorrhage or vasculitis  1/4EKG: Normal sinus rhythm  Possible Left atrial enlargement  Nonspecific ST and T wave abnormality  Abnormal ECG  When compared with ECG of 19-DEC-2020 07:18,  No significant change was found  1/4 Bronchoscopy result:  ANESTHESIA INFORMATION:  ASA: ASA 3  Anesthesia Type: IV Sedation with Anesthesia   Anesthesia Type: IV Sedation with Anesthesia     FINDINGS:  Bronchoalveolar lavage was performed x1 in the CINDY with 100 mL of saline instilled and a total return of 45 mL; the fluid appeared bloody   Some serous bloody fluid  Results from last 7 days   Lab Units 01/04/21  1250   SARS-COV-2  Negative     Results from last 7 days   Lab Units 01/05/21  0646 01/04/21  0505 12/30/20  1435   WBC Thousand/uL 8 64 12 07* 13 72*   HEMOGLOBIN g/dL 7 7* 7 9* 8 4*   HEMATOCRIT % 24 6* 24 7* 26 5*   PLATELETS Thousands/uL 138* 160 241   NEUTROS ABS Thousands/µL  --  9 36*  --    BANDS PCT %  --   --  1         Results from last 7 days   Lab Units 01/05/21  0646 01/04/21  0505 12/31/20  0842 12/30/20  1435   SODIUM mmol/L 140 141 139 137   POTASSIUM mmol/L 5 2 4 3 4 5 6 1*   CHLORIDE mmol/L 105 104 102 101   CO2 mmol/L 25 26 28 28   ANION GAP mmol/L 10 11 9 8   BUN mg/dL 104* 103* 106* 109*   CREATININE mg/dL 4 69* 4 70* 4 59* 4 41*   EGFR ml/min/1 73sq m 13 13 13 14   CALCIUM mg/dL 8 8 9 1 8 9 9 2   MAGNESIUM mg/dL 2 6  --   --   --    PHOSPHORUS mg/dL 6 9*  --   --   --      Results from last 7 days   Lab Units 01/05/21  0646 01/04/21  0505 12/30/20  1435   AST U/L 18 17 19   ALT U/L 27 37 42   ALK PHOS U/L 73 86 73   TOTAL PROTEIN g/dL 5 8* 5 9* 6 0*   ALBUMIN g/dL 2 9* 3 3* 3 6   TOTAL BILIRUBIN mg/dL 0 70 0 70 0 49         Results from last 7 days   Lab Units 01/05/21  0646 01/04/21  0505 12/31/20  0842 12/30/20  1435   GLUCOSE RANDOM mg/dL 102 105 118 127         Results from last 7 days   Lab Units 01/04/21  0505   TROPONIN I ng/mL 0 11*         Results from last 7 days   Lab Units 01/04/21  0505   PROTIME seconds 18 4*   INR  1 52*   PTT seconds 26       Results from last 7 days   Lab Units 01/04/21  0505   NT-PRO BNP pg/mL 24,658*       Results from last 7 days   Lab Units 12/30/20  1435   CLARITY UA  Clear   COLOR UA  Yellow   SPEC GRAV UA  1 015   PH UA  5 5   GLUCOSE UA mg/dl Negative   KETONES UA mg/dl Negative   BLOOD UA  Moderate*   PROTEIN UA mg/dl 100 (2+)*   NITRITE UA  Negative   BILIRUBIN UA  Negative   UROBILINOGEN UA E U /dl 0 2   LEUKOCYTES UA  Negative   WBC UA /hpf 0-1*   RBC UA /hpf 4-10*   BACTERIA UA /hpf None Seen   EPITHELIAL CELLS WET PREP /hpf None Seen     Results from last 7 days   Lab Units 01/04/21  1250 INFLUENZA A PCR  Negative   INFLUENZA B PCR  Negative   RSV PCR  Negative     Results from last 7 days   Lab Units 01/04/21  1520   GRAM STAIN RESULT  1+ Polys*  2+ Gram positive cocci in pairs*     Results from last 7 days   Lab Units 01/05/21  0646 12/30/20  1435   TOTAL COUNTED  100 100       Past Medical History:   Diagnosis Date    [facility]est[facility]) ([facility])     Hypertension     Lupus ([facility] )     Osteoporosis     Rheumatoid arthritis ([facility] )        Admitting Diagnosis: Hemoptysis [R04 2]  Age/Sex: 48 y o  male  Admission Orders:  Tele  I/O  BMP, ferritin, iron sat %, TIBC, antineutorphilic cytoplasmic ab, sjogrens anb, fungal culture, legionella culture, pneumocystitis smear,   Scheduled Medications:  amLODIPine, 10 mg, Oral, Daily  atorvastatin, 40 mg, Oral, Daily With Dinner  calcium carbonate-vitamin D, 1 tablet, Oral, Daily With Breakfast  dapsone, 100 mg, Oral, Daily  hydrocortisone sodium succinate, 250 mg, Intravenous, Q6H KRISTEN  metoprolol succinate, 50 mg, Oral, Daily  multivitamin-minerals, 1 tablet, Oral, Daily  pantoprazole, 40 mg, Oral, Early Morning  sevelamer, 1,600 mg, Oral, TID With Meals  sodium bicarbonate, 650 mg, Oral, BID after meals      Continuous IV Infusions:     PRN Meds:  acetaminophen, 650 mg, Oral, Q6H PRN  ondansetron, 4 mg, Intravenous, Q6H PRN        IP CONSULT TO PULMONOLOGY  IP CONSULT TO NEPHROLOGY  IP CONSULT TO RHEUMATOLOGY    Network Utilization Review Department  ATTENTION: Please call with any questions or concerns to [phone] and carefully listen to the prompts so that you are directed to the right person  All voicemails are confidential   Ruth Ricks all requests for admission clinical reviews, approved or denied determinations and any other requests to dedicated fax number below belonging to the campus where the patient is receiving treatment   List of dedicated fax numbers for the Facilities:  FACILITY NAME UR FAX NUMBER   ADMISSION DENIALS (Administrative/Medical Necessity) [phone]   [address] (Maternity/NICU/Pediatrics) [address] [facility],[address] Floor [facility] 40 125 [facility]  [phone]   Jada Diamond 6910 (  Shaina Flaherty "Kylee" 103) [id number] [address] [facility] [phone]   [place] [address]481 [phone]

## 2021-01-05 NOTE — ASSESSMENT & PLAN NOTE
Lab Results   Component Value Date    EGFR 13 01/05/2021    EGFR 13 01/04/2021    EGFR 13 12/31/2020    CREATININE 4 69 (H) 01/05/2021    CREATININE 4 70 (H) 01/04/2021    CREATININE 4 59 (H) 12/31/2020     In the setting of known Lupus Nephritis  Progressively worsening with recent creatinine 2 44 -> 3 69 -> 4 59  Cr 4 7 on admission   Known to Dr Sergio Hernandez and Dr Gricelda Oneil renal U/S 12/16 showed increased cortical echogenicity bilaterally, suggestive of underlying renal parenchymal disease and no hydronephrosis  Pt underwent kidney biopsy in November  · Nephrology input appreciated  · Patient initially received gentle IV hydration with normal saline which were discontinued today  · Avoid nephrotoxic agents and hypotension  · Worsening creatinine likely secondary to lupus nephritis/glomerulonephritis  · Patient's creatinine has been stable  · No indication for renal replacement therapy at the current time as per Nephrology  Results from last 7 days   Lab Units 01/05/21  0646 01/04/21  0505 12/31/20  0842 12/30/20  1435   BUN mg/dL 104* 103* 106* 109*   CREATININE mg/dL 4 69* 4 70* 4 59* 4 41*

## 2021-01-05 NOTE — ASSESSMENT & PLAN NOTE
Troponin 0 11, likely non MI elevated troponin elevation in the setting of CAROLINE  Presently, chest pain-free  · Check serial troponins  Results from last 7 days   Lab Units 01/04/21  0505   TROPONIN I ng/mL 0 11*   ·

## 2021-01-05 NOTE — ASSESSMENT & PLAN NOTE
Patient does not use oxygen at home    Patient is currently on 5 liters oxygen with O2 saturation in mid 90s  In the setting of bilateral pulmonary infiltrates suggesting lupus vasculitis

## 2021-01-05 NOTE — ASSESSMENT & PLAN NOTE
Admitted with persistent hemoptysis  Recent hospital admission in December at [facility] for which he underwent bronchoscopy by Pulmonary  Bronchoscopy revealed diffuse erythema and prominent vasculature without overt sequential increases in hemorrhage on serial aliquots   Still appears to be a diffuse inflammatory process rather than focal pneumonia   Diffuse pneumonia not ruled out  Patient underwent pulse steroids and plasmapheresis at that time  Hemoptysis resolved  Patient was discharged home on prednisone and Eliquis for left lower extremity DVT  · Pulmonary following, appreciate input  · Patient underwent repeat bronchoscopy on January 4, 2021 which showed no active bleeding or endobronchial lesions with some scattered thin bloody secretions throughout the airway    BAL of the left upper lobe was sent for cultures and cytology along with cell count  · Continue to hold Eliquis  · Rheumatology was consulted  · Patient might need IVC filter if anticoagulation is contraindication in the future  · Patient started on pulse dose steroids with hydrocortisone 250 milligram IV Q 6 hours

## 2021-01-05 NOTE — ASSESSMENT & PLAN NOTE
Diagnosed lupus in his 19's  Cellcept was discontinued recently  S/p plasmapheresis x 5 treatments recently  S/P cytoxan 12/18 with plan for every other week for 6 doses  Patient was on prednisone 20 milligram p o   Daily  · Initially started on Solu-Medrol 40 milligram q 12 hours which was changed to methylprednisolone 250 milligram IV q 6 hours  · Rheumatology was consulted

## 2021-01-05 NOTE — PROGRESS NOTES
Progress Note - Pulmonary   Jolene Payer Schoenfield 48 y o  male MRN: [id number]  Unit/Bed#: [address] [place] [address] Encounter: [id number]  Code Status: Level 1 - Full Code    Jolly Young is a 48 y o  Past Medical History:   Diagnosis Date    [facility]) [facility])     Hypertension     [facility]upus [facility]     Osteoporosis     Rheumatoid arthritis ([facility] )        Assessment/Plan:   Pulmonary infiltrates  Assessment & Plan      DVT (deep venous thrombosis) ([facility])  Assessment & Plan  Hx of recent LLE DVT as of 12/21 venous duplex  Chronic  On Eliquis / but held    Acute kidney injury superimposed on chronic kidney disease [facility])  Assessment & Plan  Lab Results   Component Value Date    EGFR 13 01/05/2021    EGFR 13 01/04/2021    EGFR 13 12/31/2020    CREATININE 4 69 (H) 01/05/2021    CREATININE 4 70 (H) 01/04/2021    CREATININE 4 59 (H) 12/31/2020     CAROLINE on CKD  Nephro following in consult  On Torsemide 20 /day > held    * Hemoptysis  Assessment & Plan  Improving  Pending f/u cell count and cyto        _________________________________________________    Subjective: Pt seen and examined at bedside  Persistent but improving hemoptysis  Bronch w/o evidence of ongoing bleeding  C3 Complement 74 7 , MA-3 AMPO pending / ANCA pending   Initiated high dose / pulse dose steroids today   Chief Complaint: "I started coughing again this morning, but I'm not coughing up that much blood      Labs Reviewed: yes   Imaging Reviewed: ordered CXR   Echo Reviewed: 11/2020:  EF 55 G1DD   Collaborative Discussion: Discussed w/ IM team and Nephrology / Discussion surrounding consultation for Rheum / Heme Onc  Tele Events:     Vitals:   Temp:  [97 1 °F (36 2 °C)-98 7 °F (37 1 °C)] 98 °F (36 7 °C)  HR:  [80-96] 93  Resp:  [17-24] 17  BP: (130-166)/(75-94) 162/94  Weight (last 2 days)     Date/Time   Weight    01/05/21 0600   72 7 (160 3)    01/04/21 1111   71 6 (157 85)    01/04/21 0955   71 6 (157 85)            Vitals:    01/04/21 1915 21 0129 21 0408 21 0600   BP: 163/88 162/94 162/94    Pulse: 93  93    Resp: 18 18 17    Temp: 98 4 °F (36 9 °C) (!) 97 1 °F (36 2 °C) 98 °F (36 7 °C)    TempSrc:       SpO2: 93%  92%    Weight:    72 7 kg (160 lb 4 8 oz)   Height:         Temp (24hrs), Av °F (36 7 °C), Min:97 1 °F (36 2 °C), Max:98 7 °F (37 1 °C)  Current: Temperature: 98 °F (36 7 °C)        SpO2: SpO2: 92 %, SpO2 Activity: SpO2 Activity: At Rest, SpO2 Device: O2 Device: None (Room air)      IV Infusions:  sodium chloride, 50 mL/hr, Last Rate: 50 mL/hr (21 0009)        Nutrition:        Diet Orders   (From admission, onward)             Start     Ordered    21 0001  Diet NPO; Sips with meds  Diet effective midnight     Question Answer Comment   Diet Type NPO    NPO Except:  Sips with meds        21 1346    21 1446  Room Service  Once     Question:  Type of Service  Answer:  Room Service-Appropriate    21 1445                Ins/Outs:   I/O        07 -  0700  07 -  0700  07 -  0700    I V  (mL/kg)  200 (2 8)     Total Intake(mL/kg)  200 (2 8)     Urine (mL/kg/hr)  650     Total Output  650     Net  -450                  Lines/Drains:  Invasive Devices     Peripheral Intravenous Line            Peripheral IV 21 Right Forearm 1 day                 Active medications:  Scheduled Meds:  Current Facility-Administered Medications   Medication Dose Route Frequency Provider Last Rate    acetaminophen  650 mg Oral Q6H PRN CRISTAL Sanchez      amLODIPine  10 mg Oral Daily CRISTAL Sanchez      atorvastatin  40 mg Oral Daily With Dinner CRISTAL Sanchez      calcium carbonate-vitamin D  1 tablet Oral Daily With Breakfast CRISTAL Sanchez      dapsone  100 mg Oral Daily CRISTAL Sanchez      methylPREDNISolone sodium succinate  40 mg Intravenous Q12H [address] CRISTAL Sanchez      metoprolol succinate  50 mg Oral Daily Zehra Barnett, CRISTAL      multivitamin-minerals  1 tablet Oral Daily Zehra Barnett, CRISTAL      ondansetron  4 mg Intravenous Q6H PRN Zehra aBrnett, CRISTAL      pantoprazole  40 mg Oral Early Morning CRISTAL Venegas      sevelamer  1,600 mg Oral TID With Meals CRISTAL Venegas      sodium bicarbonate  650 mg Oral BID after meals CRISTAL Venegas      sodium chloride  50 mL/hr Intravenous Continuous ATUL VenegasNP 50 mL/hr (01/05/21 0009)     PRN Meds:acetaminophen, 650 mg, Q6H PRN  ondansetron, 4 mg, Q6H PRN      ____________________________________________________________________    Physical Exam  Constitutional:       General: He is not in acute distress  Appearance: He is well-developed  HENT:      Head: Normocephalic and atraumatic  Mouth/Throat:      Pharynx: No oropharyngeal exudate  Eyes:      Pupils: Pupils are equal, round, and reactive to light  Neck:      Musculoskeletal: Normal range of motion and neck supple  Thyroid: No thyromegaly  Vascular: No JVD  Trachea: No tracheal deviation  Cardiovascular:      Heart sounds: No murmur  No friction rub  No gallop  Pulmonary:      Effort: Pulmonary effort is normal  No respiratory distress  Breath sounds: No stridor  Examination of the right-upper field reveals rhonchi  Examination of the left-upper field reveals rhonchi  Examination of the right-middle field reveals rhonchi  Examination of the left-middle field reveals rhonchi  Examination of the right-lower field reveals rhonchi  Examination of the left-lower field reveals rhonchi  Rhonchi and rales present  No wheezing  Comments: Persistent but improved rhonchi from yesterday's examination     Some blood streaked sputum / improved from 1/4   Chest:      Chest wall: No tenderness  Abdominal:      General: There is no distension  Tenderness: There is no abdominal tenderness  There is no guarding  Musculoskeletal: Normal range of motion  Skin:     General: Skin is warm and dry  Findings: No erythema or rash  Neurological:      Mental Status: He is alert and oriented to person, place, and time  ____________________________________________________________________    Invasive/non-invasive ventilation settings   Respiratory    Lab Data (Last 4 hours)    None         O2/Vent Data (Last 4 hours)    None                Laboratory and Diagnostics:  Results from last 7 days   Lab Units 01/04/21  0505 12/30/20  1435   WBC Thousand/uL 12 07* 13 72*   HEMOGLOBIN g/dL 7 9* 8 4*   HEMATOCRIT % 24 7* 26 5*   PLATELETS Thousands/uL 160 241   NEUTROS PCT % 78*  --    BANDS PCT %  --  1   MONOS PCT % 9  --    MONO PCT %  --  4     Results from last 7 days   Lab Units 01/04/21  0505 12/31/20  0842 12/30/20  1435   SODIUM mmol/L 141 139 137   POTASSIUM mmol/L 4 3 4 5 6 1*   CHLORIDE mmol/L 104 102 101   CO2 mmol/L 26 28 28   ANION GAP mmol/L 11 9 8   BUN mg/dL 103* 106* 109*   CREATININE mg/dL 4 70* 4 59* 4 41*   CALCIUM mg/dL 9 1 8 9 9 2   GLUCOSE RANDOM mg/dL 105 118 127   ALT U/L 37  --  42   AST U/L 17  --  19   ALK PHOS U/L 86  --  73   ALBUMIN g/dL 3 3*  --  3 6   TOTAL BILIRUBIN mg/dL 0 70  --  0 49          Results from last 7 days   Lab Units 01/04/21  0505   INR  1 52*   PTT seconds 26      Results from last 7 days   Lab Units 01/04/21  0505   TROPONIN I ng/mL 0 11*         ABG:    VBG:          Micro  Results from last 7 days   Lab Units 01/04/21  1520   GRAM STAIN RESULT  1+ Polys*  2+ Gram positive cocci in pairs*       Imaging:   No orders to display       Micro:   Lab Results   Component Value Date    BLOODCX No Growth After 5 Days  11/24/2020    BLOODCX No Growth After 5 Days  11/24/2020    SPUTUMCULTUR 4+ Growth of  12/19/2020    SPUTUMCULTUR  12/19/2020     Commensal respiratory tobias only; No significant growth of Staph aureus/MRSA or Pseudomonas aeruginosa      MRSACULTURE  12/21/2020     No Methicillin Resistant Staphlyococcus aureus (MRSA) isolated    BRONCHIALCUL 1+ Growth of  12/19/2020            Invalid input(s): Jacqui Weathers

## 2021-01-06 PROBLEM — J18.9 PNEUMONITIS: Status: ACTIVE | Noted: 2021-01-06

## 2021-01-06 LAB
ANION GAP SERPL CALCULATED.3IONS-SCNC: 12 MMOL/L (ref 4–13)
BUN SERPL-MCNC: 104 MG/DL (ref 5–25)
CALCIUM SERPL-MCNC: 8.8 MG/DL (ref 8.3–10.1)
CHLORIDE SERPL-SCNC: 103 MMOL/L (ref 100–108)
CO2 SERPL-SCNC: 23 MMOL/L (ref 21–32)
CREAT SERPL-MCNC: 5.03 MG/DL (ref 0.6–1.3)
CRP SERPL QL: 30.8 MG/L
ENA SS-A AB SER-ACNC: <0.2 AI (ref 0–0.9)
ENA SS-B AB SER-ACNC: <0.2 AI (ref 0–0.9)
ERYTHROCYTE [DISTWIDTH] IN BLOOD BY AUTOMATED COUNT: 15.6 % (ref 11.6–15.1)
FERRITIN SERPL-MCNC: 347 NG/ML (ref 8–388)
GFR SERPL CREATININE-BSD FRML MDRD: 12 ML/MIN/1.73SQ M
GLUCOSE SERPL-MCNC: 152 MG/DL (ref 65–140)
HCT VFR BLD AUTO: 23.5 % (ref 36.5–49.3)
HGB BLD-MCNC: 7.2 G/DL (ref 12–17)
IRON SATN MFR SERPL: 41 %
IRON SERPL-MCNC: 100 UG/DL (ref 65–175)
MCH RBC QN AUTO: 32.6 PG (ref 26.8–34.3)
MCHC RBC AUTO-ENTMCNC: 30.6 G/DL (ref 31.4–37.4)
MCV RBC AUTO: 106 FL (ref 82–98)
PLATELET # BLD AUTO: 160 THOUSANDS/UL (ref 149–390)
PMV BLD AUTO: 10.7 FL (ref 8.9–12.7)
POTASSIUM SERPL-SCNC: 5 MMOL/L (ref 3.5–5.3)
RBC # BLD AUTO: 2.21 MILLION/UL (ref 3.88–5.62)
SODIUM SERPL-SCNC: 138 MMOL/L (ref 136–145)
TIBC SERPL-MCNC: 246 UG/DL (ref 250–450)
WBC # BLD AUTO: 8.66 THOUSAND/UL (ref 4.31–10.16)

## 2021-01-06 PROCEDURE — 82728 ASSAY OF FERRITIN: CPT | Performed by: INTERNAL MEDICINE

## 2021-01-06 PROCEDURE — 99233 SBSQ HOSP IP/OBS HIGH 50: CPT | Performed by: INTERNAL MEDICINE

## 2021-01-06 PROCEDURE — 85027 COMPLETE CBC AUTOMATED: CPT | Performed by: INTERNAL MEDICINE

## 2021-01-06 PROCEDURE — 83550 IRON BINDING TEST: CPT | Performed by: INTERNAL MEDICINE

## 2021-01-06 PROCEDURE — 80048 BASIC METABOLIC PNL TOTAL CA: CPT | Performed by: INTERNAL MEDICINE

## 2021-01-06 PROCEDURE — 86140 C-REACTIVE PROTEIN: CPT | Performed by: PHYSICIAN ASSISTANT

## 2021-01-06 PROCEDURE — 83540 ASSAY OF IRON: CPT | Performed by: INTERNAL MEDICINE

## 2021-01-06 RX ORDER — FUROSEMIDE 10 MG/ML
80 INJECTION INTRAMUSCULAR; INTRAVENOUS ONCE
Status: COMPLETED | OUTPATIENT
Start: 2021-01-06 | End: 2021-01-06

## 2021-01-06 RX ADMIN — ATORVASTATIN CALCIUM 40 MG: 40 TABLET, FILM COATED ORAL at 16:41

## 2021-01-06 RX ADMIN — FUROSEMIDE 80 MG: 10 INJECTION, SOLUTION INTRAMUSCULAR; INTRAVENOUS at 10:12

## 2021-01-06 RX ADMIN — SODIUM CHLORIDE 250 MG: 0.9 INJECTION, SOLUTION INTRAVENOUS at 23:51

## 2021-01-06 RX ADMIN — AMLODIPINE BESYLATE 10 MG: 10 TABLET ORAL at 08:33

## 2021-01-06 RX ADMIN — DAPSONE 100 MG: 100 TABLET ORAL at 08:33

## 2021-01-06 RX ADMIN — Medication 1 TABLET: at 08:33

## 2021-01-06 RX ADMIN — PANTOPRAZOLE SODIUM 40 MG: 40 TABLET, DELAYED RELEASE ORAL at 06:00

## 2021-01-06 RX ADMIN — SEVELAMER HYDROCHLORIDE 1600 MG: 800 TABLET, FILM COATED PARENTERAL at 16:41

## 2021-01-06 RX ADMIN — METOPROLOL SUCCINATE 50 MG: 50 TABLET, EXTENDED RELEASE ORAL at 08:33

## 2021-01-06 RX ADMIN — OYSTER SHELL CALCIUM WITH VITAMIN D 1 TABLET: 500; 200 TABLET, FILM COATED ORAL at 08:33

## 2021-01-06 RX ADMIN — SEVELAMER HYDROCHLORIDE 1600 MG: 800 TABLET, FILM COATED PARENTERAL at 12:41

## 2021-01-06 RX ADMIN — SEVELAMER HYDROCHLORIDE 1600 MG: 800 TABLET, FILM COATED PARENTERAL at 08:32

## 2021-01-06 RX ADMIN — SODIUM CHLORIDE 250 MG: 0.9 INJECTION, SOLUTION INTRAVENOUS at 06:00

## 2021-01-06 RX ADMIN — SODIUM CHLORIDE 250 MG: 0.9 INJECTION, SOLUTION INTRAVENOUS at 17:47

## 2021-01-06 RX ADMIN — SODIUM CHLORIDE 250 MG: 0.9 INJECTION, SOLUTION INTRAVENOUS at 12:41

## 2021-01-06 RX ADMIN — SODIUM BICARBONATE 650 MG TABLET 650 MG: at 17:47

## 2021-01-06 RX ADMIN — SODIUM BICARBONATE 650 MG TABLET 650 MG: at 08:33

## 2021-01-06 NOTE — ASSESSMENT & PLAN NOTE
Admitted with persistent hemoptysis  Recent hospital admission in December at [facility] for which he underwent bronchoscopy by Pulmonary  Bronchoscopy revealed diffuse erythema and prominent vasculature without overt sequential increases in hemorrhage on serial aliquots   Still appears to be a diffuse inflammatory process rather than focal pneumonia   Diffuse pneumonia not ruled out  Patient underwent pulse steroids and plasmapheresis at that time  Hemoptysis resolved  Patient was discharged home on prednisone and Eliquis for left lower extremity DVT  · Pulmonary following, appreciate input  · Patient underwent repeat bronchoscopy on January 4, 2021 which showed no active bleeding or endobronchial lesions with some scattered thin bloody secretions throughout the airway  BAL of the left upper lobe was sent for cultures and cytology along with cell count  · Continue to hold Eliquis  · Rheumatology was consulted  · Patient might need IVC filter if anticoagulation is contraindication in the future  · Patient was started on pulse dose steroids with Solu-Medrol 250 milligram IV q 6 hours  · Patient continues to have persistent symptoms with hemoptysis and shortness of breath  · Prolonged discussion with patient and family in coordination with pulmonology, Nephrology  · Pulmonology discussed coordination of care with potentially [place] at DR MARTHA THAKKAR [facility] further intervention was recommended by test during [facility]    Rheumatology suggesting changing cyclophosphamide to Rituxan

## 2021-01-06 NOTE — ASSESSMENT & PLAN NOTE
Patient does not use oxygen at home    Patient is currently on 5 liters oxygen with O2 saturation in mid 90s  In the setting of bilateral pulmonary infiltrates suggesting lupus vasculitis/pneumonitis  Patient is on high-dose steroids

## 2021-01-06 NOTE — CONSULTS
Consultation - Hedy Borer Schoenfield 48 y o  male MRN: [id number]    Unit/Bed#: [address] [place] [address] Encounter: [id number]          Assessment/Plan     Assessment and Plan:  1  Lupus nephritis and proteinuria:   He is now on high-dose steroids and has had 2 doses of IV cyclophosphamide so far, with plans for 6 doses total, given at 2 week intervals  Proteinuria and elevated creatinine persists  He was started on mycophenolate a few months ago but the changed to cyclophosphamide last month when he did not seem to be responding adequately to mycophenolate  Kidney biopsy showed class 4 nephritis in early November  2  Hemoptysis and pulmonary infiltrates:  Ground-glass appearance CT December 19  Cultures pending from bronchoscopy yesterday  He was treated with plasmapheresis while hospitalized at [facility] last month  3  Rheumatoid arthritis:  He has no complaints of joint pain now while on high-dose steroids  He has no swelling or synovitis in any joint now  He has bilateral total knee arthroplasty and bilateral total hip arthroplasties  He has severe damage in the right shoulder joint  He was on methotrexate for many years but that was held earlier this year when he started medication for nephritis  He also was treated with hydroxychloroquine for approximately 30 years, but that was held about 2 years ago when his ophthalmologist detected signs of retinal effects  4  DVT lower extremities:  DVT was diagnosed last month and he was started on anticoagulation  Eliquis is now on hold because of hemoptysis  5  Positive C-ANCA:   He was found to have positive C-ANCA on laboratory tests December 19, 2020, however P-ANCA, proteinase 3 antibodies, and myeloperoxidase antibodies were all negative  This raises the possibility of an ANCA associated vasculitis such as GPA or MPA, but it is difficult to be certain without biopsy proof  In any case, he is being treated with steroids and cyclophosphamide   History of Present Illness     HPI: Aaron John is a 48y o  year old male who was admitted to [facility] yesterday for recurrence of hemoptysis  He reports coughing up bright red blood  He was recently discharged from [facility] December 28th after admission for hemoptysis and weight gain with edema  He was treated with plasmapheresis and pulsed  with steroids during that recent admission  He was recently found to have a positive C- ANCA antibody with negative PR3, negative MPO and negative p-ANCA  He was found to have a left leg DVT December 21 and was started on Eliquis  He has a long history of rheumatoid arthritis and lupus  He was 1st diagnosed with lupus at age 26 while living in [place]  He had normal renal function until earlier this year when he developed proteinuria  He had a kidney biopsy in November showing  class 4 nephritis  He was started on mycophenolate earlier this year, but since he did not seem to be responding adequately with deterioration of renal function and persistent proteinuria, he was started on cyclophosphamide IV in December 2020  He had a 2nd dose of cyclophosphamide December 31 with plans to give 6 doses of cyclophosphamide every 2 weeks  He had been stable with no sign of renal disease for many years until earlier this year when he started to develop proteinuria  Previously his rheumatoid arthritis and lupus had been treated with methotrexate and hydroxychloroquine with good control for many years  Hydroxychloroquine has been held for the last 2 years when he started to develop signs of eye toxicity  He was on hydroxychloroquine since about age 22 for lupus and rheumatoid arthritis  He has had inflammatory arthritis from rheumatoid arthritis for many years in the past, but he does not complain joint pain now    He has had Raynaud's for many years and he says his Raynaud symptoms are worse this winter than they had been in the past   He has bilateral total knee arthroplasty and bilateral total hip arthroplasties He has a history of Raynaud syndrome for many years  He had pancreatitis in 1999 with no recurrences  He has a history of osteoporosis and was treated previously with Forteo and alendronate  He had bronchoscopy on December 19 with no endobronchial lesions seen  He has now on dapsone for Pneumocystis prophylaxis  He had repeat bronchoscopy done yesterday here at [facility], with no active bleeding seen  Inpatient consult to Rheumatology  Consult performed by: Anatoly Flores MD  Consult ordered by: Rajwinder Dykes MD          Review of Systems    Historical Information   Past Medical History:   Diagnosis Date    [facility]estive [facility]) [facility] [address] [facility]     Hypertension     Lupus [facility] [address]     Osteoporosis     Rheumatoid arthritis ([facility] [address] [facility]      Past Surgical History:   Procedure Laterality Date    CT NEEDLE BIOPSY KIDNEY  11/3/2020    HERNIA REPAIR  199?     REPLACEMENT TOTAL KNEE Bilateral     right 06/12 left 07/2016    REVISION TOTAL HIP ARTHROPLASTY Bilateral     right 05/2004 left 02/2006    TOTAL HIP ARTHROPLASTY Bilateral 8695/[id number]    Right 1988/ left 1989     Social History   Social History     Substance and Sexual Activity   Alcohol Use Never    Frequency: Never     Social History     Substance and Sexual Activity   Drug Use Never     Social History     Tobacco Use   Smoking Status Former Smoker    Packs/day: 1 00    Types: Cigarettes   Smokeless Tobacco Former User    Types: Chew   Tobacco Comment    25 yrs      Family History:   Family History   Problem Relation Age of Onset    Varicose Veins Father        Meds/Allergies   current meds:   Current Facility-Administered Medications   Medication Dose Route Frequency    acetaminophen (TYLENOL) tablet 650 mg  650 mg Oral Q6H PRN    amLODIPine (NORVASC) tablet 10 mg  10 mg Oral Daily    atorvastatin (LIPITOR) tablet 40 mg  40 mg Oral Daily With Dinner    calcium carbonate-vitamin D (OSCAL-D) 500 mg-200 units per tablet 1 tablet  1 tablet Oral Daily With Breakfast    dapsone tablet 100 mg  100 mg Oral Daily    methylPREDNISolone sodium succinate (Solu-MEDROL) 250 mg in sodium chloride 0 9 % 250 mL IVPB  250 mg Intravenous Q6H [facility] & half-way    metoprolol succinate (TOPROL-XL) 24 hr tablet 50 mg  50 mg Oral Daily    multivitamin-minerals (CENTRUM) tablet 1 tablet  1 tablet Oral Daily    ondansetron (ZOFRAN) injection 4 mg  4 mg Intravenous Q6H PRN    pantoprazole (PROTONIX) EC tablet 40 mg  40 mg Oral Early Morning    sevelamer (RENAGEL) tablet 1,600 mg  1,600 mg Oral TID With Meals    sodium bicarbonate tablet 650 mg  650 mg Oral BID after meals     No Known Allergies    Objective   Vitals: Blood pressure 165/94, pulse 89, temperature 97 7 °F (36 5 °C), resp  rate 19, height 5' 9 5" (1 765 m), weight 72 7 kg (160 lb 4 8 oz), SpO2 93 %  Intake/Output Summary (Last 24 hours) at 1/5/2021 2031  Last data filed at 1/5/2021 1701  Gross per 24 hour   Intake 480 ml   Output 700 ml   Net -220 ml     Invasive Devices     Peripheral Intravenous Line            Peripheral IV 01/04/21 Right Forearm 1 day                Physical Exam    Constitutional:   He is awake and alert in no acute distress while resting in bed  He is thinner and appears weaker than in past   Eyes:  NELIA, conjunctiva normal, sclera white   HENT:  Atraumatic, external ears normal, nose normal, oropharynx moist    Neck: normal range of motion, no tenderness, supple   Respiratory:  Good pulmonary effort, no respiratory distress resting in bed, scattered rhonchi  Cardiovascular:  Normal rate, normal rhythm, no murmurs  GI:  Soft, nondistended, normal bowel sounds, nontender, no rebound, no guarding  :  No costovertebral angle tenderness  Integument:   no rash, he has no Raynaud's in the fingers now    Lymphatic:  No lymphadenopathy noted   Neurologic:  Alert & oriented x 3, CN 2-12 normal, he moves all extremities with grossly normal strength, sensation intact touch    Psychiatric:  Speech and behavior appropriate  Musculoskeletal:   He has no tenderness or swelling in any of the finger joints, wrists, elbows, or shoulders  The right shoulder is very limited range of motion with abduction to only about 90 degrees and little internal external rotation but no swelling  Bilateral hip prosthesis have good range of motion without pain  Right total knee arthroplasty nontender without swelling, left knee total knee arthroplasty nontender without swelling  Ankles and toes are nontender without swelling  12/19/20 1248     C-ANCA Neg:<1:20 titer 1:160High     Atypical pANCA Neg:<1:20 titer <1:20    Comment: The atypical pANCA pattern has been observed in a significant   percentage of patients with ulcerative colitis, primary sclerosing   cholangitis and autoimmune hepatitis  MPO AB 0 0 - 9 0 U/mL <9 0    SD-3 AB 0 0 - 3 5 U/mL <3 5    P-ANCA Neg:<1:20 titer <1:20      Lupus anticoagulant  December 19, 2020   Ref Range & Units 12/19/20 1248   PTT Lupus Anticoagulant 0 0 - 51 9 sec 29 9    Dilute Viper Venom Time 0 0 - 47 0 sec 45 7    DILUTE PROTHROMBIN TIME(DPT) 0 0 - 55 0 sec 39 7    THROMBIN TIME (DRVW) 0 0 - 23 0 sec 15 3    DPT CONFIRM RATIO 0 00 - 1 40 Ratio 1 32    LUPUS REFLEX INTERPRETATION  Comment:    Comment: No lupus anticoagulant was detected  12/22/2020  6:05 AM  Component Value Flag Ref Range Units Status   Beta-2 Glyco 1 IgG <9   0 - 20 GPI IgG units Final   Comment:   The reference interval reflects a 3SD or 99th percentile interval,   which is thought to represent a potentially clinically significant   result in accordance with the International Consensus Statement on   the classification criteria for definitive antiphospholipid syndrome   (APS)  J Thromb Haem 2006;4:295-306     Beta-2 Glyco 1 IgA <9   0 - 25 GPI IgA units Final   Comment:   The reference interval reflects a 3SD or 99th percentile interval,   which is thought to represent a potentially clinically significant   result in accordance with the International Consensus Statement on   the classification criteria for definitive antiphospholipid syndrome   (APS)  J Thromb Haem 2006;4:295-306  Beta-2 Glyco 1 IgM <9   0 - 32 GPI         Cardiolipin antibody  Order: [id number]  Status:  Final result   Visible to patient:  Yes ([facility] MyChart)   Next appt:  01/14/2021 at 09:00 AM in Infusion Therapy ([facility])   Ref Range & Units 12/19/20 1248   Anticardiolipin IgA 0 - 11 APL U/mL <9    Comment:                           Negative:              <12                             Indeterminate:     12 - 20                             Low-Med Positive: >20 - 80                             High Positive:         >80   Anticardiolipin IgG 0 - 14 GPL U/mL <9    Comment:                           Negative:              <15                             Indeterminate:     15 - 20                             Low-Med Positive: >20 - 80                             High Positive:         >80   Anticardiolipin IgM 0 - 12 MPL U/mL <9    Comment:                           Negative:              <13                             Indeterminate:     13 - 23                             Low-Med Positive: >20 - 80                             High Positive:         >80             12/19/20 1248     ds DNA Ab 0 - 9 IU/mL 139High     Comment:                                    Negative      <5                                      Equivocal  5 - 7                                      Positive      >9     On December 19, 2020:    C4 normal at 18, C3 normal at 94 1      COVID19, Influenza A/B, RSV PCR, [facility]  Order: [id number]  Status:  Final result   Visible to patient:  Yes ([address] [employer])   Next appt:  01/14/2021 at 09:00 AM in Infusion Therapy ([facility])  Specimen Information: Nasopharyngeal Swab; Nares         Ref Range & Units    SARS-CoV-2 Negative Negative    Comment:     INFLUENZA A PCR Negative Negative    Comment:     INFLUENZA B PCR Negative Negative    Comment:     RSV PCR Negative Negative    Comment:                 Lab Results:CBC:   Lab Results   Component Value Date    WBC 8 64 01/05/2021    WBC 12 07 (H) 01/04/2021    WBC 13 72 (H) 12/30/2020    WBC 7 73 12/27/2020    WBC 8 30 12/26/2020    WBC 9 83 12/25/2020    WBC 8 78 12/24/2020    RBC 2 32 (L) 01/05/2021    RBC 2 42 (L) 01/04/2021    RBC 2 60 (L) 12/30/2020    RBC 2 71 (L) 12/27/2020    RBC 2 28 (L) 12/26/2020    RBC 2 74 (L) 12/25/2020    RBC 2 38 (L) 12/24/2020       CMP:   Lab Results   Component Value Date     01/05/2021     01/04/2021     12/31/2020     12/30/2020     12/28/2020     12/27/2020     12/26/2020    CO2 25 01/05/2021    CO2 26 01/04/2021    CO2 28 12/31/2020    CO2 28 12/30/2020    CO2 26 12/28/2020    CO2 25 12/27/2020    CO2 25 12/26/2020     (H) 01/05/2021     (H) 01/04/2021     (H) 12/31/2020     (H) 12/30/2020     (H) 12/28/2020     (H) 12/27/2020     (H) 12/26/2020    CREATININE 4 69 (H) 01/05/2021    CREATININE 4 70 (H) 01/04/2021    CREATININE 4 59 (H) 12/31/2020    CREATININE 4 41 (H) 12/30/2020    CREATININE 4 32 (H) 12/28/2020    CREATININE 4 22 (H) 12/27/2020    CREATININE 4 18 (H) 12/26/2020    CALCIUM 8 8 01/05/2021    CALCIUM 9 1 01/04/2021    CALCIUM 8 9 12/31/2020    CALCIUM 9 2 12/30/2020    CALCIUM 9 1 12/28/2020    CALCIUM 9 0 12/27/2020    CALCIUM 8 7 12/26/2020    AST 18 01/05/2021    AST 17 01/04/2021    AST 19 12/30/2020    AST 20 12/21/2020    AST 26 12/20/2020    AST 28 12/19/2020    AST 32 12/14/2020    ALT 27 01/05/2021    ALT 37 01/04/2021    ALT 42 12/30/2020    ALT 27 12/21/2020    ALT 37 12/20/2020    ALT 40 12/19/2020    ALT 37 12/14/2020    ALKPHOS 73 01/05/2021    ALKPHOS 86 01/04/2021    ALKPHOS 73 12/30/2020    ALKPHOS 43 (L) 12/21/2020 ALKPHOS 74 12/20/2020    ALKPHOS 83 12/19/2020    ALKPHOS 88 12/14/2020    EGFR 13 01/05/2021    EGFR 13 01/04/2021    EGFR 13 12/31/2020    EGFR 14 12/30/2020    EGFR 14 12/28/2020    EGFR 15 12/27/2020    EGFR 15 12/26/2020    Lactic Acid:   Lab Results   Component Value Date    LACTICACID 1 6 12/20/2020       Blood Culture:   Lab Results   Component Value Date    BLOODCX No Growth After 5 Days  11/24/2020    BLOODCX No Growth After 5 Days  11/24/2020       Urine Culture: No results found for: URINECX    Urinalysis:   Lab Results   Component Value Date    COLORU Yellow 12/30/2020    CLARITYU Clear 12/30/2020    SPECGRAV 1 015 12/30/2020    PHUR 5 5 12/30/2020    PHUR 7 0 11/25/2020    LEUKOCYTESUR Negative 12/30/2020    NITRITE Negative 12/30/2020    GLUCOSEU Negative 12/30/2020    KETONESU Negative 12/30/2020    BILIRUBINUR Negative 12/30/2020    BLOODU Moderate (A) 12/30/2020    AMYLASE: No results found for: AMYLASE HEMOGLOBIN A1C: No results found for: HGBA1C    Imaging Studies: I have personally reviewed pertinent reports  EKG, Pathology, and Other Studies: I have personally reviewed pertinent reports

## 2021-01-06 NOTE — PROGRESS NOTES
Progress Note Laya Leigh 1965, 48 y o  male MRN: [id number]    Unit/Bed#: [address] [place] [address] Encounter: [id number]    Primary Care Provider: Phil Crouch MD   Date and time admitted to hospital: 1/4/2021  9:54 AM        * Hemoptysis  Assessment & Plan  Admitted with persistent hemoptysis  Recent hospital admission in December at [facility] for which he underwent bronchoscopy by Pulmonary  Bronchoscopy revealed diffuse erythema and prominent vasculature without overt sequential increases in hemorrhage on serial aliquots   Still appears to be a diffuse inflammatory process rather than focal pneumonia   Diffuse pneumonia not ruled out  Patient underwent pulse steroids and plasmapheresis at that time  Hemoptysis resolved  Patient was discharged home on prednisone and Eliquis for left lower extremity DVT  · Pulmonary following, appreciate input  · Patient underwent repeat bronchoscopy on January 4, 2021 which showed no active bleeding or endobronchial lesions with some scattered thin bloody secretions throughout the airway  BAL of the left upper lobe was sent for cultures and cytology along with cell count  · Continue to hold Eliquis  · Rheumatology was consulted  · Patient might need IVC filter if anticoagulation is contraindication in the future  · Patient was started on pulse dose steroids with Solu-Medrol 250 milligram IV q 6 hours  · Patient continues to have persistent symptoms with hemoptysis and shortness of breath  · Prolonged discussion with patient and family in coordination with pulmonology, Nephrology  · Pulmonology discussed coordination of care with potentially [place] at DR MARTHA THAKKAR [facility] [place] further intervention was recommended by test during [facility]  Rheumatology suggesting changing cyclophosphamide to Rituxan    Acute respiratory failure [facility])  Assessment & Plan  Patient does not use oxygen at home    Patient is currently on 5 liters oxygen with O2 saturation in mid 90s  In the setting of bilateral pulmonary infiltrates suggesting lupus vasculitis/pneumonitis  Patient is on high-dose steroids    Acute kidney injury superimposed on chronic kidney disease [facility])  Assessment & Plan  Lab Results   Component Value Date    EGFR 12 01/06/2021    EGFR 13 01/05/2021    EGFR 13 01/04/2021    CREATININE 5 03 (H) 01/06/2021    CREATININE 4 69 (H) 01/05/2021    CREATININE 4 70 (H) 01/04/2021     In the setting of known Lupus Nephritis  Progressively worsening with recent creatinine 2 44 -> 3 69 -> 4 59  Cr 4 7 on admission   Known to Dr Mandy Reis and Dr Pallavi Martinez renal U/S 12/16 showed increased cortical echogenicity bilaterally, suggestive of underlying renal parenchymal disease and no hydronephrosis  Pt underwent kidney biopsy in November  · Nephrology input appreciated  · Patient initially received gentle IV hydration with normal saline which were discontinued today  · Avoid nephrotoxic agents and hypotension  · Worsening creatinine likely secondary to lupus nephritis/glomerulonephritis  · Patient's creatinine continues to get worse with poor urine output  · Patient was given 80 milligrams of IV Lasix today as patient also noted to have some hyperkalemia  · If patient continues to have poor urine output and creatinine continues to get worse patient might need renal replacement therapy in the next 24-48 hours  Results from last 7 days   Lab Units 01/06/21  0540 01/05/21  0646 01/04/21  0505 12/31/20  0842   BUN mg/dL 104* 104* 103* 106*   CREATININE mg/dL 5 03* 4 69* 4 70* 4 59*       Anemia  Assessment & Plan  Chronic anemia with baseline hemoglobin near 8 5  · Hemoglobin is slightly down trending in the setting of hemoptysis  · Hemoglobin continues to drop  · Might need transfusion if hemoglobin drops less than 7  Results from last 7 days   Lab Units 01/06/21  0540 01/05/21  0646 01/04/21  0505   HEMOGLOBIN g/dL 7 2* 7 7* 7 9*   HEMATOCRIT % 23 5* 24 6* 24 7*   MCV fL 106* 106* 102* ·     SLE (systemic lupus erythematosus) ([facility] )  Assessment & Plan  Diagnosed lupus in his 20's  Cellcept was discontinued recently  S/p plasmapheresis x 5 treatments recently  S/P cytoxan 12/18 with plan for every other week for 6 doses  Patient was on prednisone 20 milligram p o   Daily  · Initially started on Solu-Medrol 40 milligram q 12 hours which was changed to methylprednisolone 250 milligram IV q 6 hours  · Rheumatology was consulted and appreciate recommendations  · Patient has had 2 doses of IV cyclophosphamide so far and the plan was to receive 6 doses total every 2 weeks as outpatient  · Pulmonology discussed called of care with [facility] who recommended rituxan in the place of cyclophosphamide  · Further treatment plant based on pulmonology and Rheumatology recommendations    DVT (deep venous thrombosis) ([facility] )  Assessment & Plan  Diagnosed with a left lower extremity DVT on 12/21/2020 for which she was started on Eliquis   · Hold Eliquis in the setting of hemoptysis and anticipated bronchoscopy today or tomorrow  · Consider need for IVC filter if anticoagulation is contraindicated in the future  · Repeat lower extremity venous Dopplers to check the status of the chronic DVT    Elevated troponin  Assessment & Plan  Troponin 0 11, likely non MI elevated troponin elevation in the setting of CAROLINE, respiratory failure  Presently, chest pain-free  · Serial troponins continued remained flat  Results from last 7 days   Lab Units 01/05/21 2023 01/05/21  1820 01/04/21  0505   TROPONIN I ng/mL 0 07* 0 07* 0 11*       Chronic diastolic congestive heart failure ([facility] )  Assessment & Plan  Wt Readings from Last 3 Encounters:   01/06/21 74 3 kg (163 lb 12 8 oz)   01/04/21 71 2 kg (157 lb)   12/22/20 75 6 kg (166 lb 10 7 oz)     Patient with volume overload on recent admission to [facility] in December 2020 for which she was managed with IV Lasix the later transitioned to Demadex 40 mg daily  · Continue to hold Demadex  · Patient was initially on gentle IV hydration which was later stopped  · No clinical evidence of fluid overload  · Patient was given a double dose of Lasix 80 milligram IV today as patient also did have some hyperkalemia        VTE Pharmacologic Prophylaxis:   Pharmacologic: Pharmacologic VTE Prophylaxis contraindicated due to Hemoptysis  Mechanical VTE Prophylaxis in Place: Yes    Patient Centered Rounds: I have performed bedside rounds with nursing staff today  Discussions with Specialists or Other Care Team Provider: Dr Renetta García    Education and Discussions with Family / Patient:  Wife Leon Lainez over the phone    Time Spent for Care: 1 hour  More than 50% of total time spent on counseling and coordination of care as described above  Current Length of Stay: 2 day(s)    Current Patient Status: Inpatient   Certification Statement: The patient will continue to require additional inpatient hospital stay due to Acute kidney injury, acute respiratory failure, hemoptysis    Discharge Plan:  Pending course    Code Status: Level 1 - Full Code      Subjective:   Patient does complain of cough productive of bloody phlegm  Does complain of some shortness of breath  Denies any chest pain, abdominal pain, nausea or vomiting    Objective:     Vitals:   Temp (24hrs), Av 5 °F (36 4 °C), Min:97 2 °F (36 2 °C), Max:97 7 °F (36 5 °C)    Temp:  [97 2 °F (36 2 °C)-97 7 °F (36 5 °C)] 97 4 °F (36 3 °C)  HR:  [88-96] 94  Resp:  [16-20] 19  BP: (164-176)/() 176/102  SpO2:  [91 %-95 %] 95 %  Body mass index is 23 84 kg/m²  Input and Output Summary (last 24 hours): Intake/Output Summary (Last 24 hours) at 2021 1837  Last data filed at 2021 1801  Gross per 24 hour   Intake 500 ml   Output 1320 ml   Net -820 ml       Physical Exam:     Physical Exam  Constitutional:       General: He is not in acute distress  HENT:      Head: Normocephalic and atraumatic     Eyes: Conjunctiva/sclera: Conjunctivae normal       Pupils: Pupils are equal, round, and reactive to light  Neck:      Musculoskeletal: Normal range of motion and neck supple  Cardiovascular:      Rate and Rhythm: Regular rhythm  Tachycardia present  Heart sounds: Normal heart sounds  Pulmonary:      Effort: Pulmonary effort is normal  No respiratory distress  Breath sounds: Rhonchi present  No wheezing or rales  Chest:      Chest wall: No tenderness  Abdominal:      General: Bowel sounds are normal  There is no distension  Palpations: Abdomen is soft  Tenderness: There is no abdominal tenderness  There is no guarding or rebound  Skin:     General: Skin is warm and dry  Findings: No rash  Neurological:      Mental Status: He is alert  Cranial Nerves: No cranial nerve deficit  Additional Data:     Labs:    Results from last 7 days   Lab Units 01/06/21  0540 01/05/21  0646 01/04/21  0505   WBC Thousand/uL 8 66 8 64 12 07*   HEMOGLOBIN g/dL 7 2* 7 7* 7 9*   HEMATOCRIT % 23 5* 24 6* 24 7*   PLATELETS Thousands/uL 160 138* 160   NEUTROS PCT %  --   --  78*   LYMPHS PCT %  --   --  11*   LYMPHO PCT %  --  5*  --    MONOS PCT %  --   --  9   MONO PCT %  --  5  --    EOS PCT %  --  0 0     Results from last 7 days   Lab Units 01/06/21  0540 01/05/21  0646   POTASSIUM mmol/L 5 0 5 2   CHLORIDE mmol/L 103 105   CO2 mmol/L 23 25   BUN mg/dL 104* 104*   CREATININE mg/dL 5 03* 4 69*   CALCIUM mg/dL 8 8 8 8   ALK PHOS U/L  --  73   ALT U/L  --  27   AST U/L  --  18     Results from last 7 days   Lab Units 01/04/21  0505   INR  1 52*       * I Have Reviewed All Lab Data Listed Above  * Additional Pertinent Lab Tests Reviewed:  [place] [address] Admission Reviewed      Recent Cultures (last 7 days):     Results from last 7 days   Lab Units 01/04/21  1520   GRAM STAIN RESULT  1+ Polys*  2+ Gram positive cocci in pairs*       Last 24 Hours Medication List:   Current Facility-Administered Medications   Medication Dose Route Frequency Provider Last Rate    acetaminophen  650 mg Oral Q6H PRN Christiano Pang, CRISTAL      amLODIPine  10 mg Oral Daily CRISTAL Elizondo      atorvastatin  40 mg Oral Daily With Dinner CRISTAL Elizondo      calcium carbonate-vitamin D  1 tablet Oral Daily With Breakfast CRISTAL Elizondo      dapsone  100 mg Oral Daily Christiano Pang, CRISTAL      methylPREDNISolone sodium succinate  250 mg Intravenous Q6H [facility] 250 mg (01/06/21 1889)    metoprolol succinate  50 mg Oral Daily CRISTAL Elizondo      multivitamin-minerals  1 tablet Oral Daily CRISTAL Elizondo      ondansetron  4 mg Intravenous Q6H PRN CRISTAL Elizondo      pantoprazole  40 mg Oral Early Morning CRISTAL Elizondo      sevelamer  1,600 mg Oral TID With Meals CRISTAL Elizondo      sodium bicarbonate  650 mg Oral BID after meals CRISTAL Elizondo          Today, Patient Was Seen By: Rajwinder Dykes MD    ** Please Note: Dictation voice to text software may have been used in the creation of this document   **

## 2021-01-06 NOTE — ASSESSMENT & PLAN NOTE
Wt Readings from Last 3 Encounters:   01/06/21 74 3 kg (163 lb 12 8 oz)   01/04/21 71 2 kg (157 lb)   12/22/20 75 6 kg (166 lb 10 7 oz)     Patient with volume overload on recent admission to [facility] in December 2020 for which she was managed with IV Lasix the later transitioned to Demadex 40 mg daily  · Continue to hold Demadex  · Patient was initially on gentle IV hydration which was later stopped  · No clinical evidence of fluid overload  · Patient was given a double dose of Lasix 80 milligram IV today as patient also did have some hyperkalemia

## 2021-01-06 NOTE — ASSESSMENT & PLAN NOTE
Troponin 0 11, likely non MI elevated troponin elevation in the setting of CAROLINE, respiratory failure  Presently, chest pain-free  · Serial troponins continued remained flat  Results from last 7 days   Lab Units 01/05/21 2023 01/05/21  1820 01/04/21  0505   TROPONIN I ng/mL 0 07* 0 07* 0 11*

## 2021-01-06 NOTE — PROGRESS NOTES
Progress Note - Pulmonary   Arrie Cadet [place] 48 y o  male MRN: [id number]  Unit/Bed#: [address] [place] [address]57[address]71 Encounter: [id number]  Code Status: Level 1 - Full Code    Carrol Mccray is a 48 y o     Past Medical History:   Diagnosis Date    CHF [facility]congestive [facility] failure) [facility] )     Hypertension     Lupus ([facility] )     Osteoporosis     Rheumatoid arthritis ([facility] )        Assessment/Plan:   Pneumonitis  Assessment & Plan  SLE V  Vasculitic Pneumonitis  Superimposed on worsening renal failure  ANCA + prior    WV-3/MPO/Repeat ANCA pending    Currently on Pulse dose steroids  Had been treated w/ cytoxin    Pulmonary infiltrates  Assessment & Plan      DVT (deep venous thrombosis) ([facility] )  Assessment & Plan  Hx of recent LLE DVT as of 12/21 venous duplex  Chronic  Eliquis remains on hold    Acute kidney injury superimposed on chronic kidney disease [facility])  Assessment & Plan  Lab Results   Component Value Date    EGFR 13 01/05/2021    EGFR 13 01/04/2021    EGFR 13 12/31/2020    CREATININE 4 69 (H) 01/05/2021    CREATININE 4 70 (H) 01/04/2021    CREATININE 4 59 (H) 12/31/2020     CAROLINE on CKD  Nephro following in consult  On Torsemide 20 /day > held    * Hemoptysis  Assessment & Plan  Recurrent and now w/ worsening hemoptysis  Likely SLE pneumonitis related   Non DAH by bronch    Case Report   Non-gynecologic Cytology                          Case: [id number]                                   Authorizing Provider: Terra Vargas DO      Collected:           01/04/2021 1520               Ordering Location:     [address] [place] [address] Received:            01/04/2021 1713                                      [address]                                                                       Pathologist:           Leonid Merritt MD                                                         Specimens:   A) - Lung, Left Upper Lobe Bronchial Lavage                                                          B) - Lung, Left Upper Lobe Bronchial Lavage, cell block                                    Final Diagnosis   A  Lung, Left Upper Lobe Bronchial Lavage (Thin-prep and cell block preparations):   Negative for malignancy  Benign bronchial cells, benign squamous cells  Fungal organisms  morphologically compatible with Neida sp, present  Rare pulmonary macrophages and few white blood cells      Satisfactory for evaluation      Cell Count - see NOTE  Electronically signed by Tavares Villasenor MD on 1/6/2021 at 10:59 AM   Note    NOTE:  An order for a cell count on this specimen is noted; however, the following conditions preclude the count being performed:     -paucity of alveolar macrophages on the prepared glass slides    -excessive numbers of epithelial cells exceeding the number of alveolar macrophages  Gross Description       A  35 mL of pink, slightly cloudy fluid, received in CytoLyt        B   Minimal cell button, white              _________________________________________________    Subjective: Pt seen and examined at bedside    Chief Complaint: I'm still coughing up blood    Labs Reviewed: yes   Imaging Reviewed:1/5 > wet read worsening alveolar infiltrates / no PTX    Collaborative Discussion: discussed w/ Pulm Attending Dr Kerry Lopez and Dr Ann Pierce from   Likely plan for transfer discussion re  Tele Events:     Vitals:   Temp:  [97 2 °F (36 2 °C)-98 1 °F (36 7 °C)] 97 2 °F (36 2 °C)  HR:  [88-96] 96  Resp:  [16-20] 20  BP: (145-176)/() 176/102  Weight (last 2 days)     Date/Time   Weight    01/06/21 0547   74 3 (163 8)    01/05/21 0600   72 7 (160 3)    01/04/21 1111   71 6 (157 85)    01/04/21 0955   71 6 (157 85)            Vitals:    01/05/21 2341 01/06/21 0326 01/06/21 0547 01/06/21 0832   BP: 164/92 166/93  (!) 176/102   Pulse: 91 88  96   Resp: 19 16  20   Temp: 97 5 °F (36 4 °C) 97 5 °F (36 4 °C)  (!) 97 2 °F (36 2 °C)   TempSrc:       SpO2: 91% 93%  93%   Weight:   74 3 kg (163 lb 12 8 oz)    Height:         Temp (24hrs), Av 6 °F (36 4 °C), Min:97 2 °F (36 2 °C), Max:98 1 °F (36 7 °C)  Current: Temperature: (!) 97 2 °F (36 2 °C)        SpO2: SpO2: 93 %, SpO2 Activity: SpO2 Activity: At Rest, SpO2 Device: O2 Device: None (Room air)  95% on 6 L     IV Infusions:       Nutrition:        Diet Orders   (From admission, onward)             Start     Ordered    21 1044  Diet Renal; Potassium 2 GM; No; Yes; Protein 80 GM; Lo Phos  Diet effective now     Question Answer Comment   Diet Type Renal    Renal Potassium 2 GM    Should patient have a fluid restriction? No    Should patient have a protein modifier? Yes    Protein Protein 80 GM    Other Restriction(s): Lo Phos    RD to adjust diet per protocol? No        21 1044    21 1446  Room Service  Once     Question:  Type of Service  Answer:  Room Service-Appropriate    21 1445                Ins/Outs:   I/O       701 -  07 -  07 07 -  07    P  O   480     I V  (mL/kg) 200 (2 8)      Total Intake(mL/kg) 200 (2 8) 480 (6 5)     Urine (mL/kg/hr) 650 1100 (0 6)     Total Output 650 1100     Net -450 -620                  Lines/Drains:  Invasive Devices     Peripheral Intravenous Line            Peripheral IV 21 Right Forearm 2 days                 Active medications:  Scheduled Meds:  Current Facility-Administered Medications   Medication Dose Route Frequency Provider Last Rate    acetaminophen  650 mg Oral Q6H PRN CRISTAL Hatch      amLODIPine  10 mg Oral Daily CRISTAL Hatch      atorvastatin  40 mg Oral Daily With Dinner CRISTAL Hatch      calcium carbonate-vitamin D  1 tablet Oral Daily With Breakfast CRISTAL Hatch      dapsone  100 mg Oral Daily CRISTAL Hatch      methylPREDNISolone sodium succinate  250 mg Intravenous Q6H [facility] 250 mg (21 0600)    metoprolol succinate  50 mg Oral Daily CRISTAL Hatch      multivitamin-minerals  1 tablet Oral Daily Christiano Pang, CRNP      ondansetron  4 mg Intravenous Q6H PRN Christiano Pang, CRNP      pantoprazole  40 mg Oral Early Morning Christiano Pang, CRNP      sevelamer  1,600 mg Oral TID With Meals Christiano Pang, CRNP      sodium bicarbonate  650 mg Oral BID after meals Christiano Pang, CRNP       PRN Meds:acetaminophen, 650 mg, Q6H PRN  ondansetron, 4 mg, Q6H PRN      ____________________________________________________________________    Physical Exam  Constitutional:       General: He is not in acute distress  Appearance: He is well-developed  HENT:      Head: Normocephalic and atraumatic  Mouth/Throat:      Pharynx: No oropharyngeal exudate  Eyes:      Pupils: Pupils are equal, round, and reactive to light  Neck:      Musculoskeletal: Normal range of motion and neck supple  Thyroid: No thyromegaly  Vascular: No JVD  Trachea: No tracheal deviation  Cardiovascular:      Heart sounds: No murmur  No friction rub  No gallop  Pulmonary:      Effort: Pulmonary effort is normal  No respiratory distress  Breath sounds: No stridor  Examination of the right-upper field reveals rhonchi  Examination of the left-upper field reveals rhonchi  Examination of the right-middle field reveals rhonchi  Examination of the left-middle field reveals rhonchi  Examination of the right-lower field reveals rhonchi  Examination of the left-lower field reveals rhonchi  Rhonchi present  No wheezing or rales  Comments: 95% on 6 L NC   Diffuse Rhonchi  Chest:      Chest wall: No tenderness  Abdominal:      General: There is no distension  Tenderness: There is no abdominal tenderness  There is no guarding  Musculoskeletal: Normal range of motion  Right lower leg: No edema  Left lower leg: No edema  Skin:     General: Skin is warm and dry  Findings: No erythema or rash     Neurological:      Mental Status: He is alert and oriented to person, place, and time  ____________________________________________________________________    Invasive/non-invasive ventilation settings   Respiratory    Lab Data (Last 4 hours)    None         O2/Vent Data (Last 4 hours)    None                Laboratory and Diagnostics:  Results from last 7 days   Lab Units 01/06/21  0540 01/05/21  0646 01/04/21  0505 12/30/20  1435   WBC Thousand/uL 8 66 8 64 12 07* 13 72*   HEMOGLOBIN g/dL 7 2* 7 7* 7 9* 8 4*   HEMATOCRIT % 23 5* 24 6* 24 7* 26 5*   PLATELETS Thousands/uL 160 138* 160 241   NEUTROS PCT %  --   --  78*  --    BANDS PCT %  --   --   --  1   MONOS PCT %  --   --  9  --    MONO PCT %  --  5  --  4     Results from last 7 days   Lab Units 01/06/21  0540 01/05/21  0646 01/04/21  0505 12/31/20  0842 12/30/20  1435   SODIUM mmol/L 138 140 141 139 137   POTASSIUM mmol/L 5 0 5 2 4 3 4 5 6 1*   CHLORIDE mmol/L 103 105 104 102 101   CO2 mmol/L 23 25 26 28 28   ANION GAP mmol/L 12 10 11 9 8   BUN mg/dL 104* 104* 103* 106* 109*   CREATININE mg/dL 5 03* 4 69* 4 70* 4 59* 4 41*   CALCIUM mg/dL 8 8 8 8 9 1 8 9 9 2   GLUCOSE RANDOM mg/dL 152* 102 105 118 127   ALT U/L  --  27 37  --  42   AST U/L  --  18 17  --  19   ALK PHOS U/L  --  73 86  --  73   ALBUMIN g/dL  --  2 9* 3 3*  --  3 6   TOTAL BILIRUBIN mg/dL  --  0 70 0 70  --  0 49     Results from last 7 days   Lab Units 01/05/21  0646   MAGNESIUM mg/dL 2 6   PHOSPHORUS mg/dL 6 9*      Results from last 7 days   Lab Units 01/04/21  0505   INR  1 52*   PTT seconds 26      Results from last 7 days   Lab Units 01/05/21 2023 01/05/21  1820 01/04/21  0505   TROPONIN I ng/mL 0 07* 0 07* 0 11*         ABG:    VBG:          Micro  Results from last 7 days   Lab Units 01/04/21  1520   GRAM STAIN RESULT  1+ Polys*  2+ Gram positive cocci in pairs*       Imaging:   XR chest portable    (Results Pending)       Micro:   Lab Results   Component Value Date    BLOODCX No Growth After 5 Days   11/24/2020 BLOODCX No Growth After 5 Days  11/24/2020    SPUTUMCULTUR 4+ Growth of  12/19/2020    SPUTUMCULTUR  12/19/2020     Commensal respiratory tobias only; No significant growth of Staph aureus/MRSA or Pseudomonas aeruginosa  MRSACULTURE  12/21/2020     No Methicillin Resistant Staphlyococcus aureus (MRSA) isolated    BRONCHIALCUL Culture results to follow   01/04/2021    BRONCHIALCUL 1+ Growth of  12/19/2020            Invalid input(s): Abhishek Bradford

## 2021-01-06 NOTE — ASSESSMENT & PLAN NOTE
Lab Results   Component Value Date    EGFR 12 01/06/2021    EGFR 13 01/05/2021    EGFR 13 01/04/2021    CREATININE 5 03 (H) 01/06/2021    CREATININE 4 69 (H) 01/05/2021    CREATININE 4 70 (H) 01/04/2021     In the setting of known Lupus Nephritis  Progressively worsening with recent creatinine 2 44 -> 3 69 -> 4 59  Cr 4 7 on admission   Known to Dr Haja Chanel and Dr Zach Hall renal U/S 12/16 showed increased cortical echogenicity bilaterally, suggestive of underlying renal parenchymal disease and no hydronephrosis  Pt underwent kidney biopsy in November  · Nephrology input appreciated  · Patient initially received gentle IV hydration with normal saline which were discontinued today  · Avoid nephrotoxic agents and hypotension  · Worsening creatinine likely secondary to lupus nephritis/glomerulonephritis  · Patient's creatinine continues to get worse with poor urine output  · Patient was given 80 milligrams of IV Lasix today as patient also noted to have some hyperkalemia  · If patient continues to have poor urine output and creatinine continues to get worse patient might need renal replacement therapy in the next 24-48 hours  Results from last 7 days   Lab Units 01/06/21  0540 01/05/21  0646 01/04/21  0505 12/31/20  0842   BUN mg/dL 104* 104* 103* 106*   CREATININE mg/dL 5 03* 4 69* 4 70* 4 59*

## 2021-01-06 NOTE — ASSESSMENT & PLAN NOTE
Diagnosed lupus in his 20's  Cellcept was discontinued recently  S/p plasmapheresis x 5 treatments recently  S/P cytoxan 12/18 with plan for every other week for 6 doses  Patient was on prednisone 20 milligram p o   Daily  · Initially started on Solu-Medrol 40 milligram q 12 hours which was changed to methylprednisolone 250 milligram IV q 6 hours  · Rheumatology was consulted and appreciate recommendations  · Patient has had 2 doses of IV cyclophosphamide so far and the plan was to receive 6 doses total every 2 weeks as outpatient  · Pulmonology discussed called of care with [facility] who recommended rituxan in the place of cyclophosphamide  · Further treatment plant based on pulmonology and Rheumatology recommendations

## 2021-01-06 NOTE — ASSESSMENT & PLAN NOTE
Chronic anemia with baseline hemoglobin near 8 5  · Hemoglobin is slightly down trending in the setting of hemoptysis  · Hemoglobin continues to drop  · Might need transfusion if hemoglobin drops less than 7  Results from last 7 days   Lab Units 01/06/21  0540 01/05/21  0646 01/04/21  0505   HEMOGLOBIN g/dL 7 2* 7 7* 7 9*   HEMATOCRIT % 23 5* 24 6* 24 7*   MCV fL 106* 106* 102*   ·

## 2021-01-06 NOTE — PLAN OF CARE
Problem: Potential for Falls  Goal: Patient will remain free of falls  Description: INTERVENTIONS:  - Assess patient frequently for physical needs  -  Identify cognitive and physical deficits and behaviors that affect risk of falls    -  [place] fall precautions as indicated by assessment   - Educate patient/family on patient safety including physical limitations  - Instruct patient to call for assistance with activity based on assessment  - Modify environment to reduce risk of injury  - Consider OT/PT consult to assist with strengthening/mobility  Outcome: Progressing     Problem: DISCHARGE PLANNING - CARE MANAGEMENT  Goal: Discharge to post-acute care or home with appropriate resources  Description: INTERVENTIONS:  - Conduct assessment to determine patient/family and health care team treatment goals, and need for post-acute services based on payer coverage, community resources, and patient preferences, and barriers to discharge  - Address psychosocial, clinical, and financial barriers to discharge as identified in assessment in conjunction with the patient/family and health care team  - Arrange appropriate level of post-acute services according to patients   needs and preference and payer coverage in collaboration with the physician and health care team  - Communicate with and update the patient/family, physician, and health care team regarding progress on the discharge plan  - Arrange appropriate transportation to post-acute venues  Outcome: Progressing     Problem: PAIN - ADULT  Goal: Verbalizes/displays adequate comfort level or baseline comfort level  Description: Interventions:  - Encourage patient to monitor pain and request assistance  - Assess pain using appropriate pain scale  - Administer analgesics based on type and severity of pain and evaluate response  - Implement non-pharmacological measures as appropriate and evaluate response  - Consider cultural and social influences on pain and pain management  - Notify physician/advanced practitioner if interventions unsuccessful or patient reports new pain  Outcome: Progressing     Problem: INFECTION - ADULT  Goal: Absence or prevention of progression during hospitalization  Description: INTERVENTIONS:  - Assess and monitor for signs and symptoms of infection  - Monitor lab/diagnostic results  - Monitor all insertion sites, i e  indwelling lines, tubes, and drains  - Monitor endotracheal if appropriate and nasal secretions for changes in amount and color  - [place] appropriate cooling/warming therapies per order  - Administer medications as ordered  - Instruct and encourage patient and family to use good hand hygiene technique  - Identify and instruct in appropriate isolation precautions for identified infection/condition  Outcome: Progressing  Goal: Absence of fever/infection during neutropenic period  Description: INTERVENTIONS:  - Monitor WBC    Outcome: Progressing     Problem: SAFETY ADULT  Goal: Patient will remain free of falls  Description: INTERVENTIONS:  - Assess patient frequently for physical needs  -  Identify cognitive and physical deficits and behaviors that affect risk of falls    -  [place] fall precautions as indicated by assessment   - Educate patient/family on patient safety including physical limitations  - Instruct patient to call for assistance with activity based on assessment  - Modify environment to reduce risk of injury  - Consider OT/PT consult to assist with strengthening/mobility  Outcome: Progressing  Goal: Maintain or return to baseline ADL function  Description: INTERVENTIONS:  -  Assess patient's ability to carry out ADLs; assess patient's baseline for ADL function and identify physical deficits which impact ability to perform ADLs (bathing, care of mouth/teeth, toileting, grooming, dressing, etc )  - Assess/evaluate cause of self-care deficits   - Assess range of motion  - Assess patient's mobility; develop plan if impaired  - Assess patient's need for assistive devices and provide as appropriate  - Encourage maximum independence but intervene and supervise when necessary  - Involve family in performance of ADLs  - Assess for home care needs following discharge   - Consider OT consult to assist with ADL evaluation and planning for discharge  - Provide patient education as appropriate  Outcome: Progressing  Goal: Maintain or return mobility status to optimal level  Description: INTERVENTIONS:  - Assess patient's baseline mobility status (ambulation, transfers, stairs, etc )    - Identify cognitive and physical deficits and behaviors that affect mobility  - Identify mobility aids required to assist with transfers and/or ambulation (gait belt, sit-to-stand, lift, walker, cane, etc )  - [place] fall precautions as indicated by assessment  - Record patient progress and toleration of activity level on Mobility SBAR; progress patient to next Phase/Stage  - Instruct patient to call for assistance with activity based on assessment  - Consider rehabilitation consult to assist with strengthening/weightbearing, etc   Outcome: Progressing     Problem: DISCHARGE PLANNING  Goal: Discharge to home or other facility with appropriate resources  Description: INTERVENTIONS:  - Identify barriers to discharge w/patient and caregiver  - Arrange for needed discharge resources and transportation as appropriate  - Identify discharge learning needs (meds, wound care, etc )  - Arrange for interpretive services to assist at discharge as needed  - Refer to Case Management Department for coordinating discharge planning if the patient needs post-hospital services based on physician/advanced practitioner order or complex needs related to functional status, cognitive ability, or social support system  Outcome: Progressing     Problem: Knowledge Deficit  Goal: Patient/family/caregiver demonstrates understanding of disease process, treatment plan, medications, and discharge instructions  Description: Complete learning assessment and assess knowledge base    Interventions:  - Provide teaching at level of understanding  - Provide teaching via preferred learning methods  Outcome: Progressing     Problem: CARDIOVASCULAR - ADULT  Goal: Maintains optimal cardiac output and hemodynamic stability  Description: INTERVENTIONS:  - Monitor I/O, vital signs and rhythm  - Monitor for S/S and trends of decreased cardiac output  - Administer and titrate ordered vasoactive medications to optimize hemodynamic stability  - Assess quality of pulses, skin color and temperature  - Assess for signs of decreased coronary artery perfusion  - Instruct patient to report change in severity of symptoms  Outcome: Progressing     Problem: RESPIRATORY - ADULT  Goal: Achieves optimal ventilation and oxygenation  Description: INTERVENTIONS:  - Assess for changes in respiratory status  - Assess for changes in mentation and behavior  - Position to facilitate oxygenation and minimize respiratory effort  - Oxygen administered by appropriate delivery if ordered  - Initiate smoking cessation education as indicated  - Encourage broncho-pulmonary hygiene including cough, deep breathe, Incentive Spirometry  - Assess the need for suctioning and aspirate as needed  - Assess and instruct to report SOB or any respiratory difficulty  - Respiratory Therapy support as indicated  Outcome: Progressing     Problem: GASTROINTESTINAL - ADULT  Goal: Maintains or returns to baseline bowel function  Description: INTERVENTIONS:  - Assess bowel function  - Encourage oral fluids to ensure adequate hydration  - Administer IV fluids if ordered to ensure adequate hydration  - Administer ordered medications as needed  - Encourage mobilization and activity  - Consider nutritional services referral to assist patient with adequate nutrition and appropriate food choices  Outcome: Progressing     Problem: GENITOURINARY - ADULT  Goal: Maintains or returns to baseline urinary function  Description: INTERVENTIONS:  - Assess urinary function  - Encourage oral fluids to ensure adequate hydration if ordered  - Administer IV fluids as ordered to ensure adequate hydration  - Administer ordered medications as needed  - Offer frequent toileting  - Follow urinary retention protocol if ordered  Outcome: Progressing     Problem: METABOLIC, FLUID AND ELECTROLYTES - ADULT  Goal: Fluid balance maintained  Description: INTERVENTIONS:  - Monitor labs   - Monitor I/O and WT  - Instruct patient on fluid and nutrition as appropriate  - Assess for signs & symptoms of volume excess or deficit  Outcome: Progressing     Problem: HEMATOLOGIC - ADULT  Goal: Maintains hematologic stability  Description: INTERVENTIONS  - Assess for signs and symptoms of bleeding or hemorrhage  - Monitor labs  - Administer supportive blood products/factors as ordered and appropriate  Outcome: Progressing

## 2021-01-06 NOTE — ASSESSMENT & PLAN NOTE
Diagnosed with a left lower extremity DVT on 12/21/2020 for which she was started on Eliquis   · Hold Eliquis in the setting of hemoptysis and anticipated bronchoscopy today or tomorrow  · Consider need for IVC filter if anticoagulation is contraindicated in the future  · Repeat lower extremity venous Dopplers to check the status of the chronic DVT

## 2021-01-06 NOTE — PROGRESS NOTES
NEPHROLOGY PROGRESS NOTE   Oneta Jointer Schoenfield 48 y o  male MRN: [id number]  Unit/Bed#: [address] [place] [address] Encounter: [id number]    ASSESSMENT & PLAN:    Acute kidney injury, POA on chronic kidney disease stage 3  -follows outpatient with Dr Levin  -Patient creatinine was around 1 6-1 9 till December 6, 2020  Had acute kidney injury during recent hospital admission in December with peak creatinine of 4 3 on 12/23  Was discharged with a creatinine of 4 4 on 12/28 but as outpatient creatinine was around 4 5   -Admission creatinine on 01/04/2020 was 4 7  Currently not on Bactrim as per patient since discharge from hospital in December  Previously was on Bactrim 3 times a week for PCP prophylaxis  Currently on dapsone which as per patient was started during last hospital admission at [facility]   -likely has worsening of renal function due to underlying lupus nephritis/GN  Previously had positive C ANCA but negative PR3 as well as MPO antibodies on blood work on 12/22/2020  Repeat in December,  C ANCA was negative at less than 1:20    -anti cardiolipin antibody in December was negative  -continue to monitor function  Avoid nephrotoxins  Avoid hypotension   -Previous UA from 12/30 showed 4-10 RBC per high-power field as well as 2+ protein  -postvoid residual only 30 mL  -was initially treated with IV fluid and renal function improved to creatinine 4 69 on 01/05  IV fluid was stopped on 01/05  Currently patient complaining of shortness of breath and renal function worsening to creatinine 5 0 today  Will give IV Lasix 80 mg x 1 dose  At this time no urgent indication for dialysis but if renal function continue to worsen he may need dialysis in next 24-48 hours   -Discussed about risk and benefit of dialysis and about the dialysis procedure and patient verbalized understanding with his wife on the phone during the discussion    Patient has signed consent on 01/06 for hemodialysis treatment which was placed in the chart      Lupus nephritis/proteinuria  -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 and tubular interstitial scarring  Tubular atrophy and interstitial inflammation were mild  -as outpatient was on prednisone 60 mg daily and cyclophosphamide 500 mg IV every 2 weeks   Last dose of cyclophosphamide was on 12/31 which was the 2nd dose  Plan for Cytoxan 500 mg every 2 weeks for 6 doses initially along with high dose of prednisone  Currently on PCP prophylaxis with dapsone 100 mg daily  - Continue IV steroids-currently IV Solu-Medrol 1 g per day      Primary hypertension  -blood pressure slightly on higher side, steroids could be contributing  -avoid hypotension, continue medication:  Amlodipine and metoprolol   -ordered Lasix 80 mg IV x1 dose which would help  Hemoptysis:  Recent hospital admission in December at [facility] for similar complaint for which patient underwent plasmapheresis from 12/20 to 12/27 due to concern for diffuse alveolar hemorrhage  Was also treated with IV Solu-Medrol at that time  - was seen by Pulmonary and underwent bronchoscopy as well as bronchial lavage for culture and cytology  As per Pulmonary , was started on high-dose IV Solu-Medrol  Bronchoscopy was not suggestive of diffuse alveolar hemorrhage and so no indication for plasmapheresis at this time   -also seen by rheumatologist   Follow-up Pulmonary as well as Rheumatology recommendations     CKD/MBD/hyperphosphatemia on binders-Renagel 1600 mg t i d   Monitor phosphorus level, was found to be elevated at 6 9 on 01/05  Placed on low phosphorus diet on 01/05     Left lower extremity DVT on Eliquis as outpatient, currently on hold     Anemia:  Hemoglobin dropped to 7 2, continue to monitor per primary team   Hemoptysis could be contributing    Continue to monitor per primary team   Not a candidate for JESSEE due to left lower extremity DVT  If hemoglobin drops below 7 0, would recommend PRBC  Check iron panel-pending     Discussed with primary team - Dr Cassandra Avilez:  Complaining of shortness of breath and hemoptysis    OBJECTIVE:  Current Weight: Weight - Scale: 74 3 kg (163 lb 12 8 oz)  Vitals:    01/06/21 0832   BP: (!) 176/102   Pulse: 96   Resp: 20   Temp: (!) 97 2 °F (36 2 °C)   SpO2: 93%       Intake/Output Summary (Last 24 hours) at 1/6/2021 0931  Last data filed at 1/6/2021 4408  Gross per 24 hour   Intake 480 ml   Output 800 ml   Net -320 ml       Physical Exam   General:  Ill looking, awake    Appears tachypneic  Eyes: Conjunctivae pink,  Sclera anicteric  ENT: lips and mucous membranes moist  Neck: supple   Chest:  Coarse crackles in the left lung base, bilateral equal air entry  CVS: S1 & S2 present, normal rate, regular rhythm, ejection systolic murmur in aortic area  Abdomen: soft, non-tender, non-distended, Bowel sounds normoactive  Extremities:  1+ edema of  legs  Skin: no rash  Neuro: awake, alert, oriented x 3   Psych: Mood and affect appropriate     Medications:    Current Facility-Administered Medications:     acetaminophen (TYLENOL) tablet 650 mg, 650 mg, Oral, Q6H PRN, CRISTAL Talavera    amLODIPine (NORVASC) tablet 10 mg, 10 mg, Oral, Daily, CRISTAL Niño, 10 mg at 01/06/21 8254    atorvastatin (LIPITOR) tablet 40 mg, 40 mg, Oral, Daily With Dinner, CRISTAL Talavera, 40 mg at 01/05/21 1645    calcium carbonate-vitamin D (OSCAL-D) 500 mg-200 units per tablet 1 tablet, 1 tablet, Oral, Daily With Breakfast, CRISTAL Talavera, 1 tablet at 01/06/21 0346    dapsone tablet 100 mg, 100 mg, Oral, Daily, CRISTAL Niño, 100 mg at 01/06/21 1462    furosemide (LASIX) injection 80 mg, 80 mg, Intravenous, Once, Jeffry Casillas MD    methylPREDNISolone sodium succinate (Solu-MEDROL) 250 mg in sodium chloride 0 9 % 250 mL IVPB, 250 mg, Intravenous, Q6H [address], Luisana Argueta Kiana Larry PA-C, Last Rate: 250 mL/hr at 01/06/21 0600, 250 mg at 01/06/21 0600    metoprolol succinate (TOPROL-XL) 24 hr tablet 50 mg, 50 mg, Oral, Daily, Yelena Carter, CRISTAL, 50 mg at 01/06/21 6162    multivitamin-minerals (CENTRUM) tablet 1 tablet, 1 tablet, Oral, Daily, Theresa Heimlich, CRNP, 1 tablet at 01/06/21 2611    [facility]etron [facility]) injection 4 mg, 4 mg, Intravenous, Q6H PRN, Theresa Heimlich, CRNP    pantoprazole (PROTONIX) EC tablet 40 mg, 40 mg, Oral, Early Morning, Theresa Heimlich, CRNP, 40 mg at 01/06/21 0600    sevelamer (RENAGEL) tablet 1,600 mg, 1,600 mg, Oral, TID With Meals, Theresa Heimlich, CRNP, 1,600 mg at 01/06/21 1902    sodium bicarbonate tablet 650 mg, 650 mg, Oral, BID after meals, Theresa Heimlich, CRNP, 650 mg at 01/06/21 8207    Invasive Devices:        Lab Results:   Results from last 7 days   Lab Units 01/06/21  0540 01/05/21  0646 01/04/21  0505  12/30/20  1435   WBC Thousand/uL 8 66 8 64 12 07*  --  13 72*   HEMOGLOBIN g/dL 7 2* 7 7* 7 9*  --  8 4*   HEMATOCRIT % 23 5* 24 6* 24 7*  --  26 5*   PLATELETS Thousands/uL 160 138* 160  --  241   POTASSIUM mmol/L 5 0 5 2 4 3   < > 6 1*   CHLORIDE mmol/L 103 105 104   < > 101   CO2 mmol/L 23 25 26   < > 28   BUN mg/dL 104* 104* 103*   < > 109*   CREATININE mg/dL 5 03* 4 69* 4 70*   < > 4 41*   CALCIUM mg/dL 8 8 8 8 9 1   < > 9 2   MAGNESIUM mg/dL  --  2 6  --   --   --    PHOSPHORUS mg/dL  --  6 9*  --   --   --    ALK PHOS U/L  --  73 86  --  73   ALT U/L  --  27 37  --  42   AST U/L  --  18 17  --  19    < > = values in this interval not displayed  Previous work up:         Portions of the record may have been created with voice recognition software  Occasional wrong word or "sound a like" substitutions may have occurred due to the inherent limitations of voice recognition software  Read the chart carefully and recognize, using context, where substitutions have occurred  If you have any questions, please contact the dictating provider

## 2021-01-06 NOTE — ASSESSMENT & PLAN NOTE
SLE  Pneumonitis  Continue Prednisone 60 mg / continue into outpatient setting  Superimposed on worsening renal failure  Improving  F/U repeat CXR in approx 6 weeks unless clinical worsening in interval timeframe       ANCA + prior    SD-3/MPO/Repeat ANCA negative    Currently on Pulse dose steroids  Had been treated w/ cytoxin  For Rituxan in outpatient setting

## 2021-01-07 ENCOUNTER — APPOINTMENT (INPATIENT)
Dept: RADIOLOGY | Facility: HOSPITAL | Age: 56
DRG: 545 | End: 2021-01-07
Payer: COMMERCIAL

## 2021-01-07 LAB
ABO GROUP BLD: NORMAL
ANION GAP SERPL CALCULATED.3IONS-SCNC: 15 MMOL/L (ref 4–13)
BASOPHILS # BLD AUTO: 0.01 THOUSANDS/ΜL (ref 0–0.1)
BASOPHILS NFR BLD AUTO: 0 % (ref 0–1)
BLD GP AB SCN SERPL QL: NEGATIVE
BUN SERPL-MCNC: 112 MG/DL (ref 5–25)
CALCIUM SERPL-MCNC: 8.8 MG/DL (ref 8.3–10.1)
CHLORIDE SERPL-SCNC: 104 MMOL/L (ref 100–108)
CO2 SERPL-SCNC: 20 MMOL/L (ref 21–32)
CREAT SERPL-MCNC: 5.55 MG/DL (ref 0.6–1.3)
EOSINOPHIL # BLD AUTO: 0 THOUSAND/ΜL (ref 0–0.61)
EOSINOPHIL NFR BLD AUTO: 0 % (ref 0–6)
ERYTHROCYTE [DISTWIDTH] IN BLOOD BY AUTOMATED COUNT: 16.5 % (ref 11.6–15.1)
GFR SERPL CREATININE-BSD FRML MDRD: 11 ML/MIN/1.73SQ M
GLUCOSE SERPL-MCNC: 137 MG/DL (ref 65–140)
HCT VFR BLD AUTO: 23.7 % (ref 36.5–49.3)
HGB BLD-MCNC: 7 G/DL (ref 12–17)
IGA SERPL-MCNC: 193 MG/DL (ref 70–400)
IGG SERPL-MCNC: 398 MG/DL (ref 700–1600)
IGM SERPL-MCNC: 16 MG/DL (ref 40–230)
IMM GRANULOCYTES # BLD AUTO: 0.26 THOUSAND/UL (ref 0–0.2)
IMM GRANULOCYTES NFR BLD AUTO: 2 % (ref 0–2)
INR PPP: 1.07 (ref 0.84–1.19)
LYMPHOCYTES # BLD AUTO: 0.28 THOUSANDS/ΜL (ref 0.6–4.47)
LYMPHOCYTES NFR BLD AUTO: 3 % (ref 14–44)
MAGNESIUM SERPL-MCNC: 2.6 MG/DL (ref 1.6–2.6)
MCH RBC QN AUTO: 31.7 PG (ref 26.8–34.3)
MCHC RBC AUTO-ENTMCNC: 29.5 G/DL (ref 31.4–37.4)
MCV RBC AUTO: 107 FL (ref 82–98)
MONOCYTES # BLD AUTO: 0.3 THOUSAND/ΜL (ref 0.17–1.22)
MONOCYTES NFR BLD AUTO: 3 % (ref 4–12)
MYELOPEROXIDASE AB SER IA-ACNC: <9 U/ML (ref 0–9)
NEUTROPHILS # BLD AUTO: 9.84 THOUSANDS/ΜL (ref 1.85–7.62)
NEUTS SEG NFR BLD AUTO: 92 % (ref 43–75)
NRBC BLD AUTO-RTO: 0 /100 WBCS
P JIROVECII AG SPEC QL IF: NORMAL
PHOSPHATE SERPL-MCNC: 7.6 MG/DL (ref 2.7–4.5)
PLATELET # BLD AUTO: 148 THOUSANDS/UL (ref 149–390)
PMV BLD AUTO: 11.1 FL (ref 8.9–12.7)
POTASSIUM SERPL-SCNC: 4.7 MMOL/L (ref 3.5–5.3)
PROTEINASE3 AB SER IA-ACNC: <3.5 U/ML (ref 0–3.5)
PROTHROMBIN TIME: 13.8 SECONDS (ref 11.6–14.5)
RBC # BLD AUTO: 2.21 MILLION/UL (ref 3.88–5.62)
RH BLD: POSITIVE
RHEUMATOID FACT SER QL LA: NEGATIVE
SODIUM SERPL-SCNC: 139 MMOL/L (ref 136–145)
SPECIMEN EXPIRATION DATE: NORMAL
WBC # BLD AUTO: 10.69 THOUSAND/UL (ref 4.31–10.16)

## 2021-01-07 PROCEDURE — 30233N1 TRANSFUSION OF NONAUTOLOGOUS RED BLOOD CELLS INTO PERIPHERAL VEIN, PERCUTANEOUS APPROACH: ICD-10-PCS | Performed by: FAMILY MEDICINE

## 2021-01-07 PROCEDURE — 80048 BASIC METABOLIC PNL TOTAL CA: CPT | Performed by: INTERNAL MEDICINE

## 2021-01-07 PROCEDURE — 83735 ASSAY OF MAGNESIUM: CPT | Performed by: INTERNAL MEDICINE

## 2021-01-07 PROCEDURE — NC001 PR NO CHARGE: Performed by: RADIOLOGY

## 2021-01-07 PROCEDURE — 86900 BLOOD TYPING SEROLOGIC ABO: CPT | Performed by: INTERNAL MEDICINE

## 2021-01-07 PROCEDURE — 85025 COMPLETE CBC W/AUTO DIFF WBC: CPT | Performed by: INTERNAL MEDICINE

## 2021-01-07 PROCEDURE — 71045 X-RAY EXAM CHEST 1 VIEW: CPT

## 2021-01-07 PROCEDURE — 86430 RHEUMATOID FACTOR TEST QUAL: CPT | Performed by: INTERNAL MEDICINE

## 2021-01-07 PROCEDURE — 93970 EXTREMITY STUDY: CPT

## 2021-01-07 PROCEDURE — 99233 SBSQ HOSP IP/OBS HIGH 50: CPT | Performed by: INTERNAL MEDICINE

## 2021-01-07 PROCEDURE — 94003 VENT MGMT INPAT SUBQ DAY: CPT

## 2021-01-07 PROCEDURE — 99232 SBSQ HOSP IP/OBS MODERATE 35: CPT | Performed by: INTERNAL MEDICINE

## 2021-01-07 PROCEDURE — 86901 BLOOD TYPING SEROLOGIC RH(D): CPT | Performed by: INTERNAL MEDICINE

## 2021-01-07 PROCEDURE — 82784 ASSAY IGA/IGD/IGG/IGM EACH: CPT | Performed by: INTERNAL MEDICINE

## 2021-01-07 PROCEDURE — 85610 PROTHROMBIN TIME: CPT | Performed by: INTERNAL MEDICINE

## 2021-01-07 PROCEDURE — P9016 RBC LEUKOCYTES REDUCED: HCPCS

## 2021-01-07 PROCEDURE — 86920 COMPATIBILITY TEST SPIN: CPT

## 2021-01-07 PROCEDURE — 94760 N-INVAS EAR/PLS OXIMETRY 1: CPT

## 2021-01-07 PROCEDURE — 93970 EXTREMITY STUDY: CPT | Performed by: SURGERY

## 2021-01-07 PROCEDURE — 84100 ASSAY OF PHOSPHORUS: CPT | Performed by: INTERNAL MEDICINE

## 2021-01-07 PROCEDURE — 86850 RBC ANTIBODY SCREEN: CPT | Performed by: INTERNAL MEDICINE

## 2021-01-07 RX ORDER — FUROSEMIDE 10 MG/ML
80 INJECTION INTRAMUSCULAR; INTRAVENOUS ONCE
Status: COMPLETED | OUTPATIENT
Start: 2021-01-07 | End: 2021-01-07

## 2021-01-07 RX ORDER — SODIUM BICARBONATE 650 MG/1
1300 TABLET ORAL
Status: DISCONTINUED | OUTPATIENT
Start: 2021-01-07 | End: 2021-01-08

## 2021-01-07 RX ORDER — HYDRALAZINE HYDROCHLORIDE 20 MG/ML
5 INJECTION INTRAMUSCULAR; INTRAVENOUS ONCE
Status: COMPLETED | OUTPATIENT
Start: 2021-01-07 | End: 2021-01-07

## 2021-01-07 RX ORDER — METOPROLOL SUCCINATE 50 MG/1
50 TABLET, EXTENDED RELEASE ORAL ONCE
Status: COMPLETED | OUTPATIENT
Start: 2021-01-07 | End: 2021-01-07

## 2021-01-07 RX ORDER — METOPROLOL SUCCINATE 100 MG/1
100 TABLET, EXTENDED RELEASE ORAL DAILY
Status: DISCONTINUED | OUTPATIENT
Start: 2021-01-08 | End: 2021-01-13 | Stop reason: HOSPADM

## 2021-01-07 RX ADMIN — AMLODIPINE BESYLATE 10 MG: 10 TABLET ORAL at 08:31

## 2021-01-07 RX ADMIN — PANTOPRAZOLE SODIUM 40 MG: 40 TABLET, DELAYED RELEASE ORAL at 06:03

## 2021-01-07 RX ADMIN — FUROSEMIDE 80 MG: 10 INJECTION, SOLUTION INTRAMUSCULAR; INTRAVENOUS at 10:25

## 2021-01-07 RX ADMIN — SEVELAMER HYDROCHLORIDE 1600 MG: 800 TABLET, FILM COATED PARENTERAL at 17:33

## 2021-01-07 RX ADMIN — SEVELAMER HYDROCHLORIDE 1600 MG: 800 TABLET, FILM COATED PARENTERAL at 08:31

## 2021-01-07 RX ADMIN — DAPSONE 100 MG: 100 TABLET ORAL at 08:31

## 2021-01-07 RX ADMIN — SODIUM BICARBONATE 650 MG TABLET 650 MG: at 08:31

## 2021-01-07 RX ADMIN — METOPROLOL SUCCINATE 50 MG: 50 TABLET, EXTENDED RELEASE ORAL at 10:26

## 2021-01-07 RX ADMIN — ATORVASTATIN CALCIUM 40 MG: 40 TABLET, FILM COATED ORAL at 17:33

## 2021-01-07 RX ADMIN — HYDRALAZINE HYDROCHLORIDE 5 MG: 20 INJECTION INTRAMUSCULAR; INTRAVENOUS at 00:50

## 2021-01-07 RX ADMIN — OYSTER SHELL CALCIUM WITH VITAMIN D 1 TABLET: 500; 200 TABLET, FILM COATED ORAL at 08:31

## 2021-01-07 RX ADMIN — SODIUM BICARBONATE 650 MG TABLET 1300 MG: at 17:33

## 2021-01-07 RX ADMIN — SEVELAMER HYDROCHLORIDE 1600 MG: 800 TABLET, FILM COATED PARENTERAL at 12:52

## 2021-01-07 RX ADMIN — SODIUM CHLORIDE 250 MG: 0.9 INJECTION, SOLUTION INTRAVENOUS at 20:01

## 2021-01-07 RX ADMIN — SODIUM CHLORIDE 250 MG: 0.9 INJECTION, SOLUTION INTRAVENOUS at 12:52

## 2021-01-07 RX ADMIN — METOPROLOL SUCCINATE 50 MG: 50 TABLET, EXTENDED RELEASE ORAL at 08:31

## 2021-01-07 RX ADMIN — Medication 1 TABLET: at 08:31

## 2021-01-07 RX ADMIN — SODIUM CHLORIDE 250 MG: 0.9 INJECTION, SOLUTION INTRAVENOUS at 06:09

## 2021-01-07 NOTE — PROGRESS NOTES
Progress Note - Pulmonary   [facility] 48 [facility]  [facility] MRN: [id number]  Unit/Bed#: [address] [place] [address] 027- Encounter: [id number]  Code Status: Level 1 - Full Code    Leeanne Pallas is a 48 y o     Past Medical History:   Diagnosis Date    CHF (congestive heart failure) ([facility] )     Hypertension     [facility]us ([facility]     Osteoporosis     Rheumatoid arthritis ([facility] )        Assessment/Plan:   Pneumonitis  Assessment & Plan  SLE V  Vasculitic Pneumonitis  Pulse dose steroids > w/ consideration for dose reduction today   Superimposed on worsening renal failure  ANCA + prior    HI-3/MPO/Repeat ANCA negative    Currently on Pulse dose steroids  Had been treated w/ cytoxin  For Rituxan in outpatient setting      Acute respiratory failure ([facility] )  Assessment & Plan  Hypoxemic  Currently on 5 L   Titrated to 3 L NC  Tolerates at 93%     Pulmonary infiltrates  Assessment & Plan      DVT (deep venous thrombosis) ([facility] )  Assessment & Plan  Hx of recent LLE DVT as of 12/21 venous duplex  Chronic  Eliquis remains on hold    Acute kidney injury superimposed on chronic kidney disease [facility])  Assessment & Plan  Lab Results   Component Value Date    EGFR 11 01/07/2021    EGFR 12 01/06/2021    EGFR 13 01/05/2021    CREATININE 5 55 (H) 01/07/2021    CREATININE 5 03 (H) 01/06/2021    CREATININE 4 69 (H) 01/05/2021     Worsening renal function  Progressive Mild Acidosis  For [facility] tomorrow and IHD     * Hemoptysis  Assessment & Plan  Recurrent and now w/ worsening hemoptysis  Likely SLE pneumonitis related   Non DAH by bronch  Considerations for Rituxan  Likely for use in outpatient setting      Case Report   Non-gynecologic Cytology                          Case: [id number]                                   Authorizing Provider: Ursula Nissen, DO      Collected:           01/04/2021 1520               Ordering Location:     [address] [place] [address] Received:            01/04/2021 [address] [place] [address]                                                                       Pathologist:           Alma Delia Kaur MD                                                         Specimens:   A) - Lung, Left Upper Lobe Bronchial Lavage                                                          B) - Lung, Left Upper Lobe Bronchial Lavage, cell block                                    Final Diagnosis   A  Lung, Left Upper Lobe Bronchial Lavage (Thin-prep and cell block preparations):   Negative for malignancy  Benign bronchial cells, benign squamous cells  Fungal organisms  morphologically compatible with Neida sp, present  Rare pulmonary macrophages and few white blood cells      Satisfactory for evaluation      Cell Count - see NOTE  Electronically signed by [id number], MD on 1/6/2021 at 10:59 AM   Note    NOTE:  An order for a cell count on this specimen is noted; however, the following conditions preclude the count being performed:     -paucity of alveolar macrophages on the prepared glass slides    -excessive numbers of epithelial cells exceeding the number of alveolar macrophages  Gross Description       A  35 mL of pink, slightly cloudy fluid, received in [employer]toLyt        B  Minimal cell button, white              _________________________________________________    Subjective: Pt seen and examined at bedside    Chief Complaint: "I'm still coughing up blood but it's getting a little bit better      Labs Reviewed: yes   Imaging Reviewed: 1/5- 1/4 comparison      Collaborative Discussion: case discussed w/ IM team Dr Sylvia Ibanez who has also discussed w/ nephro / Dr Nancy Ricks Events:     Vitals:   Temp:  [97 4 °F (36 3 °C)-98 1 °F (36 7 °C)] 97 6 °F (36 4 °C)  HR:  [88-94] 90  Resp:  [16-20] 16  BP: (152-181)/() 168/94  Weight (last 2 days)     Date/Time   Weight    01/07/21 0600   75 (165 35)    01/06/21 0547   74 3 (163 8)    01/05/21 0600   72 7 (160 3)            Vitals:    01/07/21 0031 01/07/21 0600 01/07/21 0830 01/07/21 1035   BP: (!) 181/102  (!) 174/99 168/94   BP Location:   Right arm    Pulse: 91  88 90   Resp: 19  16    Temp: (!) 97 4 °F (36 3 °C)  97 6 °F (36 4 °C)    TempSrc:   Tympanic    SpO2: 93%  97% 93%   Weight:  75 kg (165 lb 5 5 oz)     Height:         Temp (24hrs), Av 6 °F (36 4 °C), Min:97 4 °F (36 3 °C), Max:98 1 °F (36 7 °C)  Current: Temperature: 97 6 °F (36 4 °C)        SpO2: SpO2: 93 %, SpO2 Activity: SpO2 Activity: At Rest, SpO2 Device: O2 Device: Mid flow nasal cannula      IV Infusions:       Nutrition:        Diet Orders   (From admission, onward)             Start     Ordered    21 0001  Diet NPO; Sips with meds  Diet effective midnight     Question Answer Comment   Diet Type NPO    NPO Except: Sips with meds    RD to adjust diet per protocol? No        21 0958    21 1044  Diet Renal; Potassium 2 GM; No; Yes; Protein 80 GM; Lo Phos  Diet effective now     Question Answer Comment   Diet Type Renal    Renal Potassium 2 GM    Should patient have a fluid restriction? No    Should patient have a protein modifier? Yes    Protein Protein 80 GM    Other Restriction(s): Lo Phos    RD to adjust diet per protocol? No        21 1044    21 1446  Room Service  Once     Question:  Type of Service  Answer:  Room Service-Appropriate    21 1445                Ins/Outs:   I/O       701 -  0700 701 -  0700 701 -  0700    P  O  480 500 320    I V  (mL/kg)  10 (0 1)     Total Intake(mL/kg) 480 (6 5) 510 (6 8) 320 (4 3)    Urine (mL/kg/hr) 1100 (0 6) 1120 (0 6)     Total Output 1100 1120     Net -620 -610 +320                 Lines/Drains:  Invasive Devices     Peripheral Intravenous Line            Peripheral IV 21 Right Forearm 3 days                 Active medications:  Scheduled Meds:  Current Facility-Administered Medications   Medication Dose Route Frequency Provider Last Rate    acetaminophen  650 mg Oral Q6H PRN CRISTAL Hatch  amLODIPine  10 mg Oral Daily Erminio Simple, CRNP      atorvastatin  40 mg Oral Daily With Dinner Erminio Simple, CRNP      calcium carbonate-vitamin D  1 tablet Oral Daily With Breakfast Erminio Simple, CRNP      dapsone  100 mg Oral Daily Erminio Simple, CRNP      methylPREDNISolone sodium succinate  250 mg Intravenous Q6H [facility], PA-C 250 mg (01/07/21 0609)   Sydney Augustine [START ON 1/8/2021] metoprolol succinate  100 mg Oral Daily Abilio Rockwell MD      multivitamin-minerals  1 tablet Oral Daily Erminio Simple, CRNP      ondansetron  4 mg Intravenous Q6H PRN Erminio Simple, CRNP      pantoprazole  40 mg Oral Early Morning Erminio Simple, CRNP      sevelamer  1,600 mg Oral TID With Meals Erminio Simple, CRNP      sodium bicarbonate  1,300 mg Oral BID after meals Abilio Rockwell MD       PRN Meds:acetaminophen, 650 mg, Q6H PRN  ondansetron, 4 mg, Q6H PRN      ____________________________________________________________________    Physical Exam  Constitutional:       Appearance: He is well-developed  HENT:      Head: Normocephalic and atraumatic  Eyes:      Conjunctiva/sclera: Conjunctivae normal       Pupils: Pupils are equal, round, and reactive to light  Neck:      Musculoskeletal: Normal range of motion and neck supple  Cardiovascular:      Rate and Rhythm: Normal rate and regular rhythm  Heart sounds: Normal heart sounds  No murmur  No friction rub  No gallop  Pulmonary:      Effort: Pulmonary effort is normal  No respiratory distress  Breath sounds: Examination of the right-upper field reveals rales  Examination of the left-upper field reveals rhonchi  Examination of the right-middle field reveals rales  Examination of the left-middle field reveals rhonchi  Examination of the right-lower field reveals rhonchi and rales  Examination of the left-lower field reveals rhonchi  Rhonchi and rales present  No wheezing        Comments: 96% ON 5 l Decreased to 93% on 3 L   Chest:      Chest wall: No tenderness  Abdominal:      General: Bowel sounds are normal       Palpations: Abdomen is soft  Musculoskeletal: Normal range of motion  Right lower leg: Edema present  Left lower leg: Edema present  Skin:     General: Skin is warm and dry  Neurological:      Mental Status: He is alert and oriented to person, place, and time         ____________________________________________________________________    Invasive/non-invasive ventilation settings   Respiratory    Lab Data (Last 4 hours)    None         O2/Vent Data (Last 4 hours)    None                Laboratory and Diagnostics:  Results from last 7 days   Lab Units 01/07/21  0601 01/06/21  0540 01/05/21  0646 01/04/21  0505   WBC Thousand/uL 10 69* 8 66 8 64 12 07*   HEMOGLOBIN g/dL 7 0* 7 2* 7 7* 7 9*   HEMATOCRIT % 23 7* 23 5* 24 6* 24 7*   PLATELETS Thousands/uL 148* 160 138* 160   NEUTROS PCT % 92*  --   --  78*   MONOS PCT % 3*  --   --  9   MONO PCT %  --   --  5  --      Results from last 7 days   Lab Units 01/07/21  0601 01/06/21  0540 01/05/21  0646 01/04/21  0505   SODIUM mmol/L 139 138 140 141   POTASSIUM mmol/L 4 7 5 0 5 2 4 3   CHLORIDE mmol/L 104 103 105 104   CO2 mmol/L 20* 23 25 26   ANION GAP mmol/L 15* 12 10 11   BUN mg/dL 112* 104* 104* 103*   CREATININE mg/dL 5 55* 5 03* 4 69* 4 70*   CALCIUM mg/dL 8 8 8 8 8 8 9 1   GLUCOSE RANDOM mg/dL 137 152* 102 105   ALT U/L  --   --  27 37   AST U/L  --   --  18 17   ALK PHOS U/L  --   --  73 86   ALBUMIN g/dL  --   --  2 9* 3 3*   TOTAL BILIRUBIN mg/dL  --   --  0 70 0 70     Results from last 7 days   Lab Units 01/07/21  0601 01/05/21  0646   MAGNESIUM mg/dL 2 6 2 6   PHOSPHORUS mg/dL 7 6* 6 9*      Results from last 7 days   Lab Units 01/07/21  0601 01/04/21  0505   INR  1 07 1 52*   PTT seconds  --  26      Results from last 7 days   Lab Units 01/05/21  2023 01/05/21  1820 01/04/21  0505   TROPONIN I ng/mL 0 07* 0 07* 0 11* ABG:    VBG:          Micro  Results from last 7 days   Lab Units 01/04/21  1520   GRAM STAIN RESULT  1+ Polys*  2+ Gram positive cocci in pairs*       Imaging:   XR chest portable   Final Result by Rolanda Abrams MD (01/06 4404)      Diffuse patchy airspace disease bilaterally, worsened since the prior examination  Workstation performed: [id number]         VAS lower limb venous duplex study, complete bilateral    (Results Pending)       Micro:   Lab Results   Component Value Date    BLOODCX No Growth After 5 Days  11/24/2020    BLOODCX No Growth After 5 Days  11/24/2020    SPUTUMCULTUR 4+ Growth of  12/19/2020    SPUTUMCULTUR  12/19/2020     Commensal respiratory tobias only; No significant growth of Staph aureus/MRSA or Pseudomonas aeruginosa      MRSACULTURE  12/21/2020     No Methicillin Resistant Staphlyococcus aureus (MRSA) isolated    BRONCHIALCUL 2+ Growth of Stenotrophomonas maltophilia (A) 01/04/2021    BRONCHIALCUL 2+ Growth of  01/04/2021    BRONCHIALCUL 1+ Growth of  12/19/2020            Invalid input(s): Ashtyn Archibald

## 2021-01-07 NOTE — ASSESSMENT & PLAN NOTE
Wt Readings from Last 3 Encounters:   01/07/21 75 kg (165 lb 5 5 oz)   01/04/21 71 2 kg (157 lb)   12/22/20 75 6 kg (166 lb 10 7 oz)     Patient with volume overload on recent admission to [facility] in December 2020 for which she was managed with IV Lasix the later transitioned to Demadex 40 mg daily  · Continue to hold Demadex  · Patient was initially on gentle IV hydration which was later stopped  · No clinical evidence of fluid overload  · Patient was given a dose of Lasix 80 milligram IV yesterday with urine output of 1000 cubic centimeters and patient had another dose of Lasix 80 milligram IV today  · Repeat chest x-ray today showed worsening diffuse airspace opacities

## 2021-01-07 NOTE — PROGRESS NOTES
Progress Note Laya Leigh 1965, 48 y o  male MRN: [id number]    Unit/Bed#: [address] [address] Encounter: [id number]    Primary Care Provider: Phil Crouch MD   Date and time admitted to hospital: 1/4/2021  9:54 AM        * Hemoptysis  Assessment & Plan  Admitted with persistent hemoptysis  Recent hospital admission in December at [facility] for which he underwent bronchoscopy by Pulmonary  Bronchoscopy revealed diffuse erythema and prominent vasculature without overt sequential increases in hemorrhage on serial aliquots   Still appears to be a diffuse inflammatory process rather than focal pneumonia   Diffuse pneumonia not ruled out  Patient underwent pulse steroids and plasmapheresis at that time  Hemoptysis resolved  Patient was discharged home on prednisone and Eliquis for left lower extremity DVT  · Pulmonary following, appreciate input  · Patient underwent repeat bronchoscopy on January 4, 2021 which showed no active bleeding or endobronchial lesions with some scattered thin bloody secretions throughout the airway  BAL of the left upper lobe was sent for cultures and cytology along with cell count  · Continue to hold Eliquis  · Rheumatology was consulted  · Patient might need IVC filter if anticoagulation is contraindication in the future  · Patient was started on pulse dose steroids with Solu-Medrol 250 milligram IV q 6 hours  Day 3    · Patient continues to have persistent symptoms with hemoptysis and shortness of breath  · Prolonged discussion with patient and family in coordination with pulmonology, Nephrology  · Pulmonology discussed coordination of care with potentially [place] at DR MARTHA THAKKAR [facility] further intervention was recommended by test during [facility]  Rheumatology suggesting changing cyclophosphamide to Rituxan  · Patient's bronchial cultures growing mixed respiratory tobias and also stenotrophomonas-likely contaminant    [employer] and Pneumocystis were negative  Acute respiratory failure [facility])  Assessment & Plan  Patient does not use oxygen at home  Patient is currently on 5 liters oxygen with O2 saturation in mid 90s  In the setting of bilateral pulmonary infiltrates suggesting lupus vasculitis/pneumonitis  Patient is on high-dose steroids  Repeat chest x-ray shows diffuse airspace opacities suggesting possible pulmonary edema/diffuse pneumonia    Acute kidney injury superimposed on chronic kidney disease [facility])  Assessment & Plan  Lab Results   Component Value Date    EGFR 11 01/07/2021    EGFR 12 01/06/2021    EGFR 13 01/05/2021    CREATININE 5 55 (H) 01/07/2021    CREATININE 5 03 (H) 01/06/2021    CREATININE 4 69 (H) 01/05/2021     In the setting of known Lupus Nephritis  Progressively worsening with recent creatinine 2 44 -> 3 69 -> 4 59  Cr 4 7 on admission   Known to Dr Aneudy Og and Dr Asad Carrington renal U/S 12/16 showed increased cortical echogenicity bilaterally, suggestive of underlying renal parenchymal disease and no hydronephrosis  Pt underwent kidney biopsy in November  · Nephrology input appreciated  · Patient initially received gentle IV hydration with normal saline which were discontinued today  · Avoid nephrotoxic agents and hypotension  · Worsening creatinine likely secondary to lupus nephritis/glomerulonephritis  · Patient's creatinine continues to get worse with poor urine output  · Patient was given 80 milligrams of IV Lasix today as patient also noted to have some hyperkalemia  · Patient continues to have worsening creatinine with urine output of 1000 milliliters with a dose of Lasix  Patient be given another dose of Lasix 80 milligram IV today  · Plan is for PermCath placement in a m   With starting dialysis tomorrow  · Discussed with Nephrology and IR department  Results from last 7 days   Lab Units 01/07/21  0601 01/06/21  0540 01/05/21  0646 01/04/21  0505   BUN mg/dL 112* 104* 104* 103*   CREATININE mg/dL 5 55* 5 03* 4 69* 4 70* Anemia  Assessment & Plan  Chronic anemia with baseline hemoglobin near 8 5  · Hemoglobin is slightly down trending in the setting of hemoptysis  · Hemoglobin continues to drop  · Patient ordered for 1 unit of PRBC transfusion  Results from last 7 days   Lab Units 01/07/21  0601 01/06/21  0540 01/05/21  0646 01/04/21  0505   HEMOGLOBIN g/dL 7 0* 7 2* 7 7* 7 9*   HEMATOCRIT % 23 7* 23 5* 24 6* 24 7*   MCV fL 107* 106* 106* 102*       SLE (systemic lupus erythematosus) ([facility])  Assessment & Plan  Diagnosed lupus in his 20's  Cellcept was discontinued recently  S/p plasmapheresis x 5 treatments recently  S/P cytoxan 12/18 with plan for every other week for 6 doses  Patient was on prednisone 20 milligram p o   Daily  · Initially started on Solu-Medrol 40 milligram q 12 hours which was changed to methylprednisolone 250 milligram IV q 6 hours  · Rheumatology was consulted and appreciate recommendations  · Patient has had 2 doses of IV cyclophosphamide so far and the plan was to receive 6 doses total every 2 weeks as outpatient  · Pulmonology discussed called of care with [facility] who recommended rituxan in the place of cyclophosphamide  · Further treatment plant based on pulmonology and Rheumatology recommendations  · Patient's repeat NPO, SSA and B, anti PR3 were all negative    DVT (deep venous thrombosis) ([facility] [address] )  Assessment & Plan  Diagnosed with a left lower extremity DVT on 12/21/2020 for which she was started on Eliquis   · Hold Eliquis in the setting of hemoptysis and anticipated bronchoscopy today or tomorrow  · Consider need for IVC filter if anticoagulation is contraindicated in the future  · Repeat lower extremity venous Dopplers showed chronic nonocclusive DVT in the popliteal vein and proximal gastrocnemius vein with evidence of superficial thrombophlebitis below-knee in the greater saphenous vein    Elevated troponin  Assessment & Plan  Troponin 0 11, likely non MI elevated troponin elevation in the setting of CAROLINE, respiratory failure  Presently, chest pain-free  · Serial troponins continued remained flat  Results from last 7 days   Lab Units 01/05/21 2023 01/05/21  1820 01/04/21  0505   TROPONIN I ng/mL 0 07* 0 07* 0 11*       Chronic diastolic congestive heart failure (HCC)  Assessment & Plan  Wt Readings from Last 3 Encounters:   01/07/21 75 kg (165 lb 5 5 oz)   01/04/21 71 2 kg (157 lb)   12/22/20 75 6 kg (166 lb 10 7 oz)     Patient with volume overload on recent admission to [facility] in December 2020 for which she was managed with IV Lasix the later transitioned to Demadex 40 mg daily  · Continue to hold Demadex  · Patient was initially on gentle IV hydration which was later stopped  · No clinical evidence of fluid overload  · Patient was given a dose of Lasix 80 milligram IV yesterday with urine output of 1000 cubic centimeters and patient had another dose of Lasix 80 milligram IV today  · Repeat chest x-ray today showed worsening diffuse airspace opacities        VTE Pharmacologic Prophylaxis:   Pharmacologic: Pharmacologic VTE Prophylaxis contraindicated due to Hemoptysis  Mechanical VTE Prophylaxis in Place: Yes    Patient Centered Rounds: I have performed bedside rounds with nursing staff today  Discussions with Specialists or Other Care Team Provider: Aide Kate    Education and Discussions with Family / Patient: yes    Time Spent for Care: 45 minutes  More than 50% of total time spent on counseling and coordination of care as described above  Current Length of Stay: 3 day(s)    Current Patient Status: Inpatient   Certification Statement: The patient will continue to require additional inpatient hospital stay due to Acute respiratory failure, acute kidney injury came anemia, hemoptysis    Discharge Plan:  Pending course    Code Status: Level 1 - Full Code      Subjective:   Patient continues to have blood in his how phlegm but has more mucus than blood    Denies any shortness of breath, abdominal pain, chest pain    Objective:     Vitals:   Temp (24hrs), Av 6 °F (36 4 °C), Min:97 2 °F (36 2 °C), Max:98 1 °F (36 7 °C)    Temp:  [97 2 °F (36 2 °C)-98 1 °F (36 7 °C)] 97 9 °F (36 6 °C)  HR:  [85-91] 87  Resp:  [16-20] 16  BP: (152-181)/() 159/86  SpO2:  [90 %-97 %] 90 %  Body mass index is 24 07 kg/m²  Input and Output Summary (last 24 hours): Intake/Output Summary (Last 24 hours) at 2021 185  Last data filed at 2021 1818  Gross per 24 hour   Intake 830 ml   Output 1120 ml   Net -290 ml       Physical Exam:     Physical Exam  Constitutional:       General: He is not in acute distress  HENT:      Head: Normocephalic and atraumatic  Eyes:      Conjunctiva/sclera: Conjunctivae normal       Pupils: Pupils are equal, round, and reactive to light  Neck:      Musculoskeletal: Normal range of motion and neck supple  Cardiovascular:      Rate and Rhythm: Normal rate and regular rhythm  Heart sounds: Normal heart sounds  Pulmonary:      Effort: Pulmonary effort is normal  No respiratory distress  Breath sounds: No wheezing, rhonchi or rales  Chest:      Chest wall: No tenderness  Abdominal:      General: Bowel sounds are normal  There is no distension  Palpations: Abdomen is soft  Tenderness: There is no abdominal tenderness  There is no guarding or rebound  Skin:     General: Skin is warm and dry  Findings: No rash  Neurological:      Mental Status: He is alert  Cranial Nerves: No cranial nerve deficit           Additional Data:     Labs:    Results from last 7 days   Lab Units 21  0601   WBC Thousand/uL 10 69*   HEMOGLOBIN g/dL 7 0*   HEMATOCRIT % 23 7*   PLATELETS Thousands/uL 148*   NEUTROS PCT % 92*   LYMPHS PCT % 3*   MONOS PCT % 3*   EOS PCT % 0     Results from last 7 days   Lab Units 21  0601  21  0646   POTASSIUM mmol/L 4 7   < > 5 2   CHLORIDE mmol/L 104   < > 105   CO2 mmol/L 20*   < > 25 BUN mg/dL 112*   < > 104*   CREATININE mg/dL 5 55*   < > 4 69*   CALCIUM mg/dL 8 8   < > 8 8   ALK PHOS U/L  --   --  73   ALT U/L  --   --  27   AST U/L  --   --  18    < > = values in this interval not displayed  Results from last 7 days   Lab Units 01/07/21  0601   INR  1 07       * I Have Reviewed All Lab Data Listed Above  * Additional Pertinent Lab Tests Reviewed: [address] Admission Reviewed    Imaging:    Imaging Reports Reviewed Today Include:  Chest x-ray  Imaging Personally Reviewed by Myself Includes:  Chest x-ray    Recent Cultures (last 7 days):     Results from last 7 days   Lab Units 01/04/21  1520   GRAM STAIN RESULT  1+ Polys*  2+ Gram positive cocci in pairs*       Last 24 Hours Medication List:   Current Facility-Administered Medications   Medication Dose Route Frequency Provider Last Rate    acetaminophen  650 mg Oral Q6H PRN CRISTAL Solis      amLODIPine  10 mg Oral Daily CRISTAL Solis      atorvastatin  40 mg Oral Daily With Dinner CRISTAL Solis      calcium carbonate-vitamin D  1 tablet Oral Daily With Breakfast CRISTAL Solis      dapsone  100 mg Oral Daily CRISTAL Solis      methylPREDNISolone sodium succinate  250 mg Intravenous Q6H [facility] 250 mg (01/07/21 1252)    [START ON 1/8/2021] metoprolol succinate  100 mg Oral Daily Nanda English MD      multivitamin-minerals  1 tablet Oral Daily CRISTAL Solis      ondansetron  4 mg Intravenous Q6H PRN CRISTAL Solis      pantoprazole  40 mg Oral Early Morning CRISTAL Solis      sevelamer  1,600 mg Oral TID With Meals CRISTAL Solis      sodium bicarbonate  1,300 mg Oral BID after meals Nanda English MD          Today, Patient Was Seen By: Kaity Davies MD    ** Please Note: Dictation voice to text software may have been used in the creation of this document   **

## 2021-01-07 NOTE — CONSULTS
INTERPROFESSIONAL (PHONE) CONSULTATION - Interventional Radiology  Buzzy Mor Schoenfield 48 y o  male MRN: [id number]  Unit/Bed#: [address] 168-82 Encounter: [id number]    IR has been consulted to evaluate the patient, determine the appropriate procedure, and whether or not a procedure can and should be performed regarding the care of Timm L Schoenfield  We were consulted by Dr Diana Nickerson concerning ESRD, and to possibly perform a tunneled dialysis catheter placement if medically appropriate for the patient  IP Consult to IR  Consult performed by: Rashid Frost MD  Consult ordered by: Jody Fontenot MD        01/07/21      Assessment/Recommendation:   IR to place tunneled dialysis catheter tomorrow  NPO p MN  Total time spent in review of data, discussion with requesting provider and rendering advice was 15 minutes  Patient or appropriate family member was verbally informed by Dr Diana Nickerson of this consultative service on their behalf to provide more timely access to specialty care in lieu of an in person consultation  They were informed it is a billable service unless the it was determined an in person follow up was medically necessary by me within the next 14 days at which time only the in person consult would be billed  Verbal consent was obtained  Thank you for allowing Interventional Radiology to participate in the care of [address] [place] [address]  Please don't hesitate to call or TigerText us with any questions       Rashid Frost MD

## 2021-01-07 NOTE — PLAN OF CARE
Problem: Potential for Falls  Goal: Patient will remain free of falls  Description: INTERVENTIONS:  - Assess patient frequently for physical needs  -  Identify cognitive and physical deficits and behaviors that affect risk of falls    -  [place] fall precautions as indicated by assessment   - Educate patient/family on patient safety including physical limitations  - Instruct patient to call for assistance with activity based on assessment  - Modify environment to reduce risk of injury  - Consider OT/PT consult to assist with strengthening/mobility  Outcome: Progressing     Problem: DISCHARGE PLANNING - CARE MANAGEMENT  Goal: Discharge to post-acute care or home with appropriate resources  Description: INTERVENTIONS:  - Conduct assessment to determine patient/family and health care team treatment goals, and need for post-acute services based on payer coverage, community resources, and patient preferences, and barriers to discharge  - Address psychosocial, clinical, and financial barriers to discharge as identified in assessment in conjunction with the patient/family and health care team  - Arrange appropriate level of post-acute services according to patients   needs and preference and payer coverage in collaboration with the physician and health care team  - Communicate with and update the patient/family, physician, and health care team regarding progress on the discharge plan  - Arrange appropriate transportation to post-acute venues  Outcome: Progressing     Problem: PAIN - ADULT  Goal: Verbalizes/displays adequate comfort level or baseline comfort level  Description: Interventions:  - Encourage patient to monitor pain and request assistance  - Assess pain using appropriate pain scale  - Administer analgesics based on type and severity of pain and evaluate response  - Implement non-pharmacological measures as appropriate and evaluate response  - Consider cultural and social influences on pain and pain management  - Notify physician/advanced practitioner if interventions unsuccessful or patient reports new pain  Outcome: Progressing     Problem: INFECTION - ADULT  Goal: Absence or prevention of progression during hospitalization  Description: INTERVENTIONS:  - Assess and monitor for signs and symptoms of infection  - Monitor lab/diagnostic results  - Monitor all insertion sites, i e  indwelling lines, tubes, and drains  - Monitor endotracheal if appropriate and nasal secretions for changes in amount and color  - [place] appropriate cooling/warming therapies per order  - Administer medications as ordered  - Instruct and encourage patient and family to use good hand hygiene technique  - Identify and instruct in appropriate isolation precautions for identified infection/condition  Outcome: Progressing  Goal: Absence of fever/infection during neutropenic period  Description: INTERVENTIONS:  - Monitor WBC    Outcome: Progressing     Problem: SAFETY ADULT  Goal: Patient will remain free of falls  Description: INTERVENTIONS:  - Assess patient frequently for physical needs  -  Identify cognitive and physical deficits and behaviors that affect risk of falls    -  [place] fall precautions as indicated by assessment   - Educate patient/family on patient safety including physical limitations  - Instruct patient to call for assistance with activity based on assessment  - Modify environment to reduce risk of injury  - Consider OT/PT consult to assist with strengthening/mobility  Outcome: Progressing  Goal: Maintain or return to baseline ADL function  Description: INTERVENTIONS:  -  Assess patient's ability to carry out ADLs; assess patient's baseline for ADL function and identify physical deficits which impact ability to perform ADLs (bathing, care of mouth/teeth, toileting, grooming, dressing, etc )  - Assess/evaluate cause of self-care deficits   - Assess range of motion  - Assess patient's mobility; develop plan if impaired  - Assess patient's need for assistive devices and provide as appropriate  - Encourage maximum independence but intervene and supervise when necessary  - Involve family in performance of ADLs  - Assess for home care needs following discharge   - Consider OT consult to assist with ADL evaluation and planning for discharge  - Provide patient education as appropriate  Outcome: Progressing  Goal: Maintain or return mobility status to optimal level  Description: INTERVENTIONS:  - Assess patient's baseline mobility status (ambulation, transfers, stairs, etc )    - Identify cognitive and physical deficits and behaviors that affect mobility  - Identify mobility aids required to assist with transfers and/or ambulation (gait belt, sit-to-stand, lift, walker, cane, etc )  - [place] fall precautions as indicated by assessment  - Record patient progress and toleration of activity level on Mobility SBAR; progress patient to next Phase/Stage  - Instruct patient to call for assistance with activity based on assessment  - Consider rehabilitation consult to assist with strengthening/weightbearing, etc   Outcome: Progressing     Problem: DISCHARGE PLANNING  Goal: Discharge to home or other facility with appropriate resources  Description: INTERVENTIONS:  - Identify barriers to discharge w/patient and caregiver  - Arrange for needed discharge resources and transportation as appropriate  - Identify discharge learning needs (meds, wound care, etc )  - Arrange for interpretive services to assist at discharge as needed  - Refer to Case Management Department for coordinating discharge planning if the patient needs post-hospital services based on physician/advanced practitioner order or complex needs related to functional status, cognitive ability, or social support system  Outcome: Progressing     Problem: Knowledge Deficit  Goal: Patient/family/caregiver demonstrates understanding of disease process, treatment plan, medications, and discharge instructions  Description: Complete learning assessment and assess knowledge base    Interventions:  - Provide teaching at level of understanding  - Provide teaching via preferred learning methods  Outcome: Progressing     Problem: CARDIOVASCULAR - ADULT  Goal: Maintains optimal cardiac output and hemodynamic stability  Description: INTERVENTIONS:  - Monitor I/O, vital signs and rhythm  - Monitor for S/S and trends of decreased cardiac output  - Administer and titrate ordered vasoactive medications to optimize hemodynamic stability  - Assess quality of pulses, skin color and temperature  - Assess for signs of decreased coronary artery perfusion  - Instruct patient to report change in severity of symptoms  Outcome: Progressing     Problem: RESPIRATORY - ADULT  Goal: Achieves optimal ventilation and oxygenation  Description: INTERVENTIONS:  - Assess for changes in respiratory status  - Assess for changes in mentation and behavior  - Position to facilitate oxygenation and minimize respiratory effort  - Oxygen administered by appropriate delivery if ordered  - Initiate smoking cessation education as indicated  - Encourage broncho-pulmonary hygiene including cough, deep breathe, Incentive Spirometry  - Assess the need for suctioning and aspirate as needed  - Assess and instruct to report SOB or any respiratory difficulty  - Respiratory Therapy support as indicated  Outcome: Progressing     Problem: GASTROINTESTINAL - ADULT  Goal: Maintains or returns to baseline bowel function  Description: INTERVENTIONS:  - Assess bowel function  - Encourage oral fluids to ensure adequate hydration  - Administer IV fluids if ordered to ensure adequate hydration  - Administer ordered medications as needed  - Encourage mobilization and activity  - Consider nutritional services referral to assist patient with adequate nutrition and appropriate food choices  Outcome: Progressing     Problem: GENITOURINARY - ADULT  Goal: Maintains or returns to baseline urinary function  Description: INTERVENTIONS:  - Assess urinary function  - Encourage oral fluids to ensure adequate hydration if ordered  - Administer IV fluids as ordered to ensure adequate hydration  - Administer ordered medications as needed  - Offer frequent toileting  - Follow urinary retention protocol if ordered  Outcome: Progressing     Problem: METABOLIC, FLUID AND ELECTROLYTES - ADULT  Goal: Fluid balance maintained  Description: INTERVENTIONS:  - Monitor labs   - Monitor I/O and WT  - Instruct patient on fluid and nutrition as appropriate  - Assess for signs & symptoms of volume excess or deficit  Outcome: Progressing     Problem: HEMATOLOGIC - ADULT  Goal: Maintains hematologic stability  Description: INTERVENTIONS  - Assess for signs and symptoms of bleeding or hemorrhage  - Monitor labs  - Administer supportive blood products/factors as ordered and appropriate  Outcome: Progressing

## 2021-01-07 NOTE — UTILIZATION REVIEW
Continued Stay Review    Date: 1/7/21                          Current Patient Class: inpatient  Current Level of Care: med surg    HPI:55 y o  male w/hx ckd, sle, rheum arthritis, anemia initially admitted on 1/4/21, transferred from 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, uncertain without a biopsy  Being treated with cyclophosphamide  Has persistent proteinuria and elevated creat  Continue high dose steroids  1/6: 93% on 5 liters o2, 88%RA with persistent mild hemoptysis and mild exertional sob  PCXR yesterday showed worsened persistent bilat airspace dz  bronch cultures negative so far, pneumocystis pending  Suspect chronic interstitial pneumonitis or vasculitis  No notable improvement on day 2 of pulse dose steroids  Renal failure is worsening  consented to start hemodialysis  Anemic with hgb further drop to 7 2  recommend transfusion if <7  Case discussed with physicians at [facility], he is not candidate for open lung bx or additional plasmapheresis  Additional rituxan will be started  IV lasix given x 1      1/7: persistent sob and hemoptysis  Renal function, hyperphosphatemia, acidosis, hgb, and fluid overload continue to worsen  Hemodialysis needs to be started  PermCath placement will occur in IR tomorrow after which HD will be started  IV diuretic, IV steroids continue  Renal and pulm are following  Pertinent Labs/Diagnostic Results:   1/5 PCXR:Diffuse patchy airspace disease bilaterally, worsened since the prior examination      Results from last 7 days   Lab Units 01/04/21  1250   SARS-COV-2  Negative     Results from last 7 days   Lab Units 01/07/21  0601 01/06/21  0540 01/05/21  0646 01/04/21  0505   WBC Thousand/uL 10 69* 8 66 8 64 12 07*   HEMOGLOBIN g/dL 7 0* 7 2* 7 7* 7 9*   HEMATOCRIT % 23 7* 23 5* 24 6* 24 7*   PLATELETS Thousands/uL 148* 160 138* 160   NEUTROS ABS Thousands/µL 9 84*  --   --  9 36*     Results from last 7 days   Lab Units 01/07/21  0601 01/06/21  0540 01/05/21  0646 01/04/21  0505   SODIUM mmol/L 139 138 140 141   POTASSIUM mmol/L 4 7 5 0 5 2 4 3   CHLORIDE mmol/L 104 103 105 104   CO2 mmol/L 20* 23 25 26   ANION GAP mmol/L 15* 12 10 11   BUN mg/dL 112* 104* 104* 103*   CREATININE mg/dL 5 55* 5 03* 4 69* 4 70*   EGFR ml/min/1 73sq m 11 12 13 13   CALCIUM mg/dL 8 8 8 8 8 8 9 1   MAGNESIUM mg/dL 2 6  --  2 6  --    PHOSPHORUS mg/dL 7 6*  --  6 9*  --      Results from last 7 days   Lab Units 01/05/21  0646 01/04/21  0505   AST U/L 18 17   ALT U/L 27 37   ALK PHOS U/L 73 86   TOTAL PROTEIN g/dL 5 8* 5 9*   ALBUMIN g/dL 2 9* 3 3*   TOTAL BILIRUBIN mg/dL 0 70 0 70     Results from last 7 days   Lab Units 01/07/21  0601 01/06/21  0540 01/05/21  0646 01/04/21  0505   GLUCOSE RANDOM mg/dL 137 152* 102 105     Results from last 7 days   Lab Units 01/05/21  2023 01/05/21  1820 01/04/21  0505   TROPONIN I ng/mL 0 07* 0 07* 0 11*         Results from last 7 days   Lab Units 01/07/21  0601 01/04/21  0505   PROTIME seconds 13 8 18 4*   INR  1 07 1 52*   PTT seconds  --  26       Results from last 7 days   Lab Units 01/04/21  0505   NT-PRO BNP pg/mL 24,658*     Results from last 7 days   Lab Units 01/06/21  0540   FERRITIN ng/mL 347       Results from last 7 days   Lab Units 01/06/21  0540   CRP mg/L 30 8*     Results from last 7 days   Lab Units 01/04/21  1250   INFLUENZA A PCR  Negative   INFLUENZA B PCR  Negative   RSV PCR  Negative     Results from last 7 days   Lab Units 01/04/21  1520   GRAM STAIN RESULT  1+ Polys*  2+ Gram positive cocci in pairs*     Results from last 7 days   Lab Units 01/05/21  0646   TOTAL COUNTED  100           Vital Signs:   Date/Time  Temp  Pulse  Resp  BP MAP (mmHg)  SpO2   Nasal Cannula O2 Flow Rate (L/min)  O2 Device    01/07/21 1035    90    168/94 119  93 %         01/07/21 0830  97 6 °F (36 4 °C)  88  16  174/99Abnormal  124  97 %   5 L/min  Mid flow nasal cannula    01/07/21 00:31:02  97 4 °F (36 3 °C)Abnormal   91  19  181/102Abnormal  128  93 %         01/06/21 18:56:33  98 1 °F (36 7 °C)  88  20  152/89 110  95 %         01/06/21 14:56:51  97 4 °F (36 3 °C)Abnormal   94  19     95 %         01/06/21 08:32:56  97 2 °F (36 2 °C)Abnormal   96  20  176/102Abnormal  127  93 %         01/06/21 03:26:06  97 5 °F (36 4 °C)  88  16  166/93 117  93 %         01/05/21 23:41:06  97 5 °F (36 4 °C)  91  19  164/92 116  91 %         01/05/21 19:14:39  97 7 °F (36 5 °C)  89  19  165/94 118  93 %         01/05/21 15:17:35  98 1 °F (36 7 °C)  91  19  145/82 103  91 %         01/05/21 08:05:15  97 5 °F (36 4 °C)  93  14  164/95 118  92 %         01/05/21 04:08:18  98 °F (36 7 °C)  93  17  162/94 117  92 %         01/05/21 01:29:55  97 1 °F (36 2 °C)Abnormal     18  162/94 117                 Medications:   Scheduled Medications:  amLODIPine, 10 mg, Oral, Daily  atorvastatin, 40 mg, Oral, Daily With Dinner  calcium carbonate-vitamin D, 1 tablet, Oral, Daily With Breakfast  dapsone, 100 mg, Oral, Daily  methylPREDNISolone sodium succinate, 250 mg, Intravenous, Q6H [facility]  multivitamin-minerals, 1 tablet, Oral, Daily  pantoprazole, 40 mg, Oral, Early Morning  sevelamer, 1,600 mg, Oral, TID With Meals  sodium bicarbonate, 1,300 mg, Oral, BID after meals  PO metoprolol 50mg daily  IV lasix 80mg x 1 1/6, x 1 1/7  IV hydralazine x 1 1/7  IV solucortef 250mg x 2 on 1/5  Continuous IV Infusions:  IVF dc on 1/5     PRN Meds:  acetaminophen, 650 mg, Oral, Q6H PRN  ondansetron, 4 mg, Intravenous, Q6H PRN      Discharge Plan: TBD    Network Utilization Review Department  ATTENTION: Please call with any questions or concerns to [phone] and carefully listen to the prompts so that you are directed to the right person   All voicemails are confidential   Kirill Hallmark all requests for admission clinical reviews, approved or denied determinations and any other requests to dedicated fax number below belonging to the campus where the patient is receiving treatment   List of dedicated fax numbers for the Facilities:  [address] [place] [address] DENIALS (Administrative/Medical Necessity) [phone]   [address]  [address] (Maternity/NICU/Pediatrics) [phone]   [address] [place] [address] Dr Camden Diamond 7448 (  [facility]" 103) [address] [address] Travon Hunter 1481 [address] [place] [id number] [phone]

## 2021-01-07 NOTE — ASSESSMENT & PLAN NOTE
Patient does not use oxygen at home    Patient is currently on 5 liters oxygen with O2 saturation in mid 90s  In the setting of bilateral pulmonary infiltrates suggesting lupus vasculitis/pneumonitis  Patient is on high-dose steroids  Repeat chest x-ray shows diffuse airspace opacities suggesting possible pulmonary edema/diffuse pneumonia

## 2021-01-07 NOTE — ASSESSMENT & PLAN NOTE
Chronic anemia with baseline hemoglobin near 8 5  · Hemoglobin is slightly down trending in the setting of hemoptysis  · Hemoglobin continues to drop  · Patient ordered for 1 unit of PRBC transfusion  Results from last 7 days   Lab Units 01/07/21  0601 01/06/21  0540 01/05/21  0646 01/04/21  0505   HEMOGLOBIN g/dL 7 0* 7 2* 7 7* 7 9*   HEMATOCRIT % 23 7* 23 5* 24 6* 24 7*   MCV fL 107* 106* 106* 102*

## 2021-01-07 NOTE — ASSESSMENT & PLAN NOTE
Lab Results   Component Value Date    EGFR 11 01/07/2021    EGFR 12 01/06/2021    EGFR 13 01/05/2021    CREATININE 5 55 (H) 01/07/2021    CREATININE 5 03 (H) 01/06/2021    CREATININE 4 69 (H) 01/05/2021     In the setting of known Lupus Nephritis  Progressively worsening with recent creatinine 2 44 -> 3 69 -> 4 59  Cr 4 7 on admission   Known to Dr David Rodriguez and Dr Dave Elaine renal U/S 12/16 showed increased cortical echogenicity bilaterally, suggestive of underlying renal parenchymal disease and no hydronephrosis  Pt underwent kidney biopsy in November  · Nephrology input appreciated  · Patient initially received gentle IV hydration with normal saline which were discontinued today  · Avoid nephrotoxic agents and hypotension  · Worsening creatinine likely secondary to lupus nephritis/glomerulonephritis  · Patient's creatinine continues to get worse with poor urine output  · Patient was given 80 milligrams of IV Lasix today as patient also noted to have some hyperkalemia  · Patient continues to have worsening creatinine with urine output of 1000 milliliters with a dose of Lasix  Patient be given another dose of Lasix 80 milligram IV today  · Plan is for PermCath placement in a m   With starting dialysis tomorrow  · Discussed with Nephrology and IR department  Results from last 7 days   Lab Units 01/07/21  0601 01/06/21  0540 01/05/21  0646 01/04/21  0505   BUN mg/dL 112* 104* 104* 103*   CREATININE mg/dL 5 55* 5 03* 4 69* 4 70*

## 2021-01-07 NOTE — PROGRESS NOTES
NEPHROLOGY PROGRESS NOTE   Benita Mike Schoenfield 48 y o  male MRN: [id number]  Unit/Bed#: [address] [place] [address] Encounter: [id number]    ASSESSMENT & PLAN:    Acute kidney injury, POA on chronic kidney disease stage 3  -follows outpatient with Dr Levin  -Patient creatinine was around 1 6-1 9 till December 6, 2020  Had acute kidney injury during recent hospital admission in December with peak creatinine of 4 3 on 12/23  Was discharged with a creatinine of 4 4 on 12/28 but as outpatient creatinine was around 4 5   -Admission creatinine on 01/04/2020 was 4 7  Currently not on Bactrim as per patient since discharge from hospital in December  Previously was on Bactrim 3 times a week for PCP prophylaxis  Currently on dapsone which as per patient was started during last hospital admission at [facility]   -likely has worsening of renal function due to underlying lupus nephritis/GN  Previously had positive C ANCA but negative PR3 as well as MPO antibodies on blood work on 12/22/2020  Repeat in December,  C ANCA was negative at less than 1:20    -anti cardiolipin antibody in December was negative  -continue to monitor function  Avoid nephrotoxins  Avoid hypotension   -Previous UA from 12/30 showed 4-10 RBC per high-power field as well as 2+ protein  -postvoid residual only 30 mL  -was initially treated with IV fluid and renal function improved to creatinine 4 69 on 01/05  IV fluid was stopped on 01/05 and was given Lasix 80 mg January 6th  for complain of shortness of breath and fluid overload    -urine output around 1000 mL, not significant urine output despite IV Lasix given yesterday  Will give another dose of IV Lasix today  Renal function has continued to worsen with creatinine elevated to 5 5 on January 7th, worsening hyperphosphatemia and acidosis and fluid overload  -continue pulse dose of steroids    -noted discussion between pulmonary here and pulmonary as well as rheumatologist at [facility] and agree with plan for rituximab instead of Cytoxan to see if it helps  Also noted plan for no plasmapheresis as it would not benefit  -due to worsening renal function and fluid overload, worsening acidosis and hyperphosphatemia plan for initiation of hemodialysis treatment tomorrow after placement of PermCath by intervention Radiology tomorrow      -Discussed about risk and benefit of dialysis and about the dialysis procedure and patient verbalized understanding with his wife on the phone during the discussion  Patient has signed consent on 01/06 for hemodialysis treatment which was placed in the chart   -had a long discussion with the patient as well as his wife Rasheed Ross on the phone  All questions were answered including questions regarding kidney transplant     Lupus nephritis/proteinuria  -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 and tubular interstitial scarring  Tubular atrophy and interstitial inflammation were mild  -as outpatient was on prednisone 60 mg daily and cyclophosphamide 500 mg IV every 2 weeks   Last dose of cyclophosphamide was on 12/31 which was the 2nd dose  Plan for Cytoxan 500 mg every 2 weeks for 6 doses initially along with high dose of prednisone  Currently on PCP prophylaxis with dapsone 100 mg daily  - Continue IV steroids-currently IV Solu-Medrol 1 g per day      Primary hypertension  -blood pressure slightly on higher side, steroids could be contributing  -avoid hypotension, continue medication:  Amlodipine and metoprolol   -ordered Lasix 80 mg IV x1 dose again today which would help  Hemoptysis:  Recent hospital admission in December at [facility] for similar complaint for which patient underwent plasmapheresis from 12/20 to 12/27 due to concern for diffuse alveolar hemorrhage  Was also treated with IV Solu-Medrol at that time      - was seen by Pulmonary and underwent bronchoscopy as well as bronchial lavage for culture and cytology     -As per Pulmonary , was started on high-dose IV Solu-Medrol  Bronchoscopy was not suggestive of diffuse alveolar hemorrhage and so no indication for plasmapheresis at this time   -also seen by rheumatologist     -Follow-up Pulmonary as well as Rheumatology recommendations- Agree with plan of switching to Rituximab from cyclophosphamide  Fluid overload/lower extremity edema:  Ordered for IV Lasix 80 mg x 1 dose     CKD/MBD/hyperphosphatemia on binders-Renagel 1600 mg t i d   Monitor phosphorus level, was found to be elevated at 7 6  Placed on low phosphorus diet on 01/05  Expect to improve after initiation of dialysis      Left lower extremity DVT on Eliquis as outpatient, currently on hold    Low bicarbonate/ Acidosis: due to CAROLINE worsening  Increase sodium bicarbonate tablets to 1300 mg b i d      Anemia:  Hemoglobin dropped to 7 0, continue to monitor per primary team   Hemoptysis could be contributing  Continue to monitor per primary team   Not a candidate for JESSEE due to left lower extremity DVT  If hemoglobin drops below 7 0, would recommend PRBC  -iron saturation 41%  Ferritin 347      Discussed with primary team - Dr Jane Braun:  Still complaining of shortness of breath and hemoptysis  no  Nausea or vomiting    OBJECTIVE:  Current Weight: Weight - Scale: 75 kg (165 lb 5 5 oz)  Vitals:    01/07/21 0830   BP: (!) 174/99   Pulse: 88   Resp: 16   Temp: 97 6 °F (36 4 °C)   SpO2: 97%       Intake/Output Summary (Last 24 hours) at 1/7/2021 0943  Last data filed at 1/7/2021 0600  Gross per 24 hour   Intake 450 ml   Output 1000 ml   Net -550 ml       Physical Exam   General:  Ill looking, awake    Eyes: Conjunctivae pink,  Sclera anicteric  ENT: lips and mucous membranes moist  Neck: supple   Chest:  Coarse crackles in the right base  CVS: S1 & S2 present, normal rate, regular rhythm, ESM+  Abdomen: soft, non-tender, non-distended, Bowel sounds normoactive  Extremities:  1 + edema of  legs  Skin: no rash  Neuro: awake, alert, oriented x 3   Psych: Mood and affect appropriate       Medications:    Current Facility-Administered Medications:     acetaminophen (TYLENOL) tablet 650 mg, 650 mg, Oral, Q6H PRN, CRISTAL Felix    amLODIPine (NORVASC) tablet 10 mg, 10 mg, Oral, Daily, CRISTAL Niño, 10 mg at 01/07/21 0831    atorvastatin (LIPITOR) tablet 40 mg, 40 mg, Oral, Daily With Dinner, CRISTAL Felix, 40 mg at 01/06/21 1641    calcium carbonate-vitamin D (OSCAL-D) 500 mg-200 units per tablet 1 tablet, 1 tablet, Oral, Daily With Breakfast, CRISTAL Felix, 1 tablet at 01/07/21 0831    dapsone tablet 100 mg, 100 mg, Oral, Daily, CRISTAL Niño, 100 mg at 01/07/21 0831    methylPREDNISolone sodium succinate (Solu-MEDROL) 250 mg in sodium chloride 0 9 % 250 mL IVPB, 250 mg, Intravenous, Q6H [address], Rory Horton PA-C, Last Rate: 250 mL/hr at 01/07/21 0609, 250 mg at 01/07/21 0609    metoprolol succinate (TOPROL-XL) 24 hr tablet 50 mg, 50 mg, Oral, Daily, CRISTAL Niño, 50 mg at 01/07/21 0831    multivitamin-minerals (CENTRUM) tablet 1 tablet, 1 tablet, Oral, Daily, CRISTAL Felix, 1 tablet at 01/07/21 0831    ondansetron (ZOFRAN) injection 4 mg, 4 mg, Intravenous, Q6H PRN, CRISTAL Felix    pantoprazole (PROTONIX) EC tablet 40 mg, 40 mg, Oral, Early Morning, CRISTAL Felix, 40 mg at 01/07/21 0603    sevelamer (RENAGEL) tablet 1,600 mg, 1,600 mg, Oral, TID With Meals, CRISTAL Felix, 1,600 mg at 01/07/21 0831    sodium bicarbonate tablet 650 mg, 650 mg, Oral, BID after meals, CRISTAL Felix, 650 mg at 01/07/21 0831    Invasive Devices:        Lab Results:   Results from last 7 days   Lab Units 01/07/21  0601 01/06/21  0540 01/05/21  0646 01/04/21  0505   WBC Thousand/uL 10 69* 8 66 8 64 12 07*   HEMOGLOBIN g/dL 7 0* 7 2* 7 7* 7 9*   HEMATOCRIT % 23 7* 23 5* 24 6* 24 7*   PLATELETS Thousands/uL 148* 160 138* 160   POTASSIUM mmol/L 4 7 5 0 5 2 4 3   CHLORIDE mmol/L 104 103 105 104   CO2 mmol/L 20* 23 25 26   BUN mg/dL 112* 104* 104* 103*   CREATININE mg/dL 5 55* 5 03* 4 69* 4 70*   CALCIUM mg/dL 8 8 8 8 8 8 9 1   MAGNESIUM mg/dL 2 6  --  2 6  --    PHOSPHORUS mg/dL 7 6*  --  6 9*  --    ALK PHOS U/L  --   --  73 86   ALT U/L  --   --  27 37   AST U/L  --   --  18 17       Previous work up:         Portions of the record may have been created with voice recognition software  Occasional wrong word or "sound a like" substitutions may have occurred due to the inherent limitations of voice recognition software  Read the chart carefully and recognize, using context, where substitutions have occurred  If you have any questions, please contact the dictating provider

## 2021-01-07 NOTE — ASSESSMENT & PLAN NOTE
Diagnosed lupus in his 20's  Cellcept was discontinued recently  S/p plasmapheresis x 5 treatments recently  S/P cytoxan 12/18 with plan for every other week for 6 doses  Patient was on prednisone 20 milligram p o   Daily  · Initially started on Solu-Medrol 40 milligram q 12 hours which was changed to methylprednisolone 250 milligram IV q 6 hours  · Rheumatology was consulted and appreciate recommendations  · Patient has had 2 doses of IV cyclophosphamide so far and the plan was to receive 6 doses total every 2 weeks as outpatient  · Pulmonology discussed called of care with [facility] who recommended rituxan in the place of cyclophosphamide  · Further treatment plant based on pulmonology and Rheumatology recommendations  · Patient's repeat NPO, SSA and B, anti PR3 were all negative

## 2021-01-07 NOTE — CASE MANAGEMENT
LOS - 3 days    CPR2  Readmission    SW met with pt to assess needs and discuss plans  Discussed goals of making sure pt's needs are met upon discharge and that Freedom of Choice is offered  Pt is a 30 day readmission  Admissions are related  Pt was recently at [place] [address]  Discharged home on 12/28/20  Explored with pt factors that may have led to readmission  Pt could not pinpoint reason except for ongoing health issues  Pt lives at home with his wife and daughter  Pt was independent with ADLs and mobility, driving, employed P[employer]  Pt has cane and shower chair from previous surgery if needed  Pt had home care several years ago  No STR history  No MH or D&A issues  Pt's reports having PCP  Per pt he has prescription coverage and has no difficulty getting his medication as prescribed  Preferred pharmacy is [facility]  Pt does not have POA/advanced directives  Offered information on and assistance with completing advanced directive  Pt declined at this time  Per pt his wife would be his healthcare representative if needed  Discussed discharge plans and needs with pt  Per physicians pt will need to start dialysis  SW discussed dialysis plan and clinic options with pt  Pt's preference is to arrange outpatient treatment at a clinic where his current nephrologists can follow him  Referral will be made to [facility] Admissions based on request   Pt's plan is to return home with family when discharged  Planning on wife transporting him home upon discharge and also to treatment until he can transport himself  Offered ongoing support and assistance  Pt has SW card for future reference  SW will continue to follow to monitor progress and assist with planning as needed

## 2021-01-07 NOTE — ASSESSMENT & PLAN NOTE
Admitted with persistent hemoptysis  Recent hospital admission in December at [facility] for which he underwent bronchoscopy by Pulmonary  Bronchoscopy revealed diffuse erythema and prominent vasculature without overt sequential increases in hemorrhage on serial aliquots   Still appears to be a diffuse inflammatory process rather than focal pneumonia   Diffuse pneumonia not ruled out  Patient underwent pulse steroids and plasmapheresis at that time  Hemoptysis resolved  Patient was discharged home on prednisone and Eliquis for left lower extremity DVT  · Pulmonary following, appreciate input  · Patient underwent repeat bronchoscopy on January 4, 2021 which showed no active bleeding or endobronchial lesions with some scattered thin bloody secretions throughout the airway  BAL of the left upper lobe was sent for cultures and cytology along with cell count  · Continue to hold Eliquis  · Rheumatology was consulted  · Patient might need IVC filter if anticoagulation is contraindication in the future  · Patient was started on pulse dose steroids with Solu-Medrol 250 milligram IV q 6 hours  Day 3    · Patient continues to have persistent symptoms with hemoptysis and shortness of breath  · Prolonged discussion with patient and family in coordination with pulmonology, Nephrology  · Pulmonology discussed coordination of care with potentially [place] at DR MARTHA THAKKAR [facility] further intervention was recommended by test during [facility]  Rheumatology suggesting changing cyclophosphamide to Rituxan  · Patient's bronchial cultures growing mixed respiratory tobias and also stenotrophomonas-likely contaminant  [employer] and Pneumocystis were negative

## 2021-01-07 NOTE — ASSESSMENT & PLAN NOTE
Diagnosed with a left lower extremity DVT on 12/21/2020 for which she was started on Eliquis   · Hold Eliquis in the setting of hemoptysis and anticipated bronchoscopy today or tomorrow  · Consider need for IVC filter if anticoagulation is contraindicated in the future  · Repeat lower extremity venous Dopplers showed chronic nonocclusive DVT in the popliteal vein and proximal gastrocnemius vein with evidence of superficial thrombophlebitis below-knee in the greater saphenous vein

## 2021-01-08 ENCOUNTER — APPOINTMENT (INPATIENT)
Dept: NON INVASIVE DIAGNOSTICS | Facility: HOSPITAL | Age: 56
DRG: 545 | End: 2021-01-08
Attending: RADIOLOGY
Payer: COMMERCIAL

## 2021-01-08 ENCOUNTER — APPOINTMENT (INPATIENT)
Dept: DIALYSIS | Facility: HOSPITAL | Age: 56
DRG: 545 | End: 2021-01-08
Payer: COMMERCIAL

## 2021-01-08 LAB
ABO GROUP BLD BPU: NORMAL
ANION GAP SERPL CALCULATED.3IONS-SCNC: 12 MMOL/L (ref 4–13)
BACTERIA BRONCH AEROBE CULT: ABNORMAL
BACTERIA BRONCH AEROBE CULT: ABNORMAL
BPU ID: NORMAL
BUN SERPL-MCNC: 119 MG/DL (ref 5–25)
C-ANCA TITR SER IF: NORMAL TITER
CALCIUM SERPL-MCNC: 8.8 MG/DL (ref 8.3–10.1)
CHLORIDE SERPL-SCNC: 105 MMOL/L (ref 100–108)
CO2 SERPL-SCNC: 21 MMOL/L (ref 21–32)
CREAT SERPL-MCNC: 5.79 MG/DL (ref 0.6–1.3)
CROSSMATCH: NORMAL
CRP SERPL QL: 5.6 MG/L
ERYTHROCYTE [DISTWIDTH] IN BLOOD BY AUTOMATED COUNT: 17.9 % (ref 11.6–15.1)
GFR SERPL CREATININE-BSD FRML MDRD: 10 ML/MIN/1.73SQ M
GLUCOSE SERPL-MCNC: 132 MG/DL (ref 65–140)
GRAM STN SPEC: ABNORMAL
GRAM STN SPEC: ABNORMAL
HCT VFR BLD AUTO: 26.2 % (ref 36.5–49.3)
HGB BLD-MCNC: 8 G/DL (ref 12–17)
MCH RBC QN AUTO: 31.7 PG (ref 26.8–34.3)
MCHC RBC AUTO-ENTMCNC: 30.5 G/DL (ref 31.4–37.4)
MCV RBC AUTO: 104 FL (ref 82–98)
MYELOPEROXIDASE AB SER IA-ACNC: <9 U/ML (ref 0–9)
NRBC BLD AUTO-RTO: 0 /100 WBCS
P-ANCA ATYPICAL TITR SER IF: NORMAL TITER
P-ANCA TITR SER IF: NORMAL TITER
PLATELET # BLD AUTO: 147 THOUSANDS/UL (ref 149–390)
PMV BLD AUTO: 11 FL (ref 8.9–12.7)
POTASSIUM SERPL-SCNC: 4.8 MMOL/L (ref 3.5–5.3)
PROCALCITONIN SERPL-MCNC: 0.25 NG/ML
PROTEINASE3 AB SER IA-ACNC: <3.5 U/ML (ref 0–3.5)
RBC # BLD AUTO: 2.52 MILLION/UL (ref 3.88–5.62)
SODIUM SERPL-SCNC: 138 MMOL/L (ref 136–145)
UNIT DISPENSE STATUS: NORMAL
UNIT PRODUCT CODE: NORMAL
UNIT RH: NORMAL
WBC # BLD AUTO: 12.18 THOUSAND/UL (ref 4.31–10.16)

## 2021-01-08 PROCEDURE — 76937 US GUIDE VASCULAR ACCESS: CPT | Performed by: RADIOLOGY

## 2021-01-08 PROCEDURE — C1750 CATH, HEMODIALYSIS,LONG-TERM: HCPCS

## 2021-01-08 PROCEDURE — 86140 C-REACTIVE PROTEIN: CPT | Performed by: INTERNAL MEDICINE

## 2021-01-08 PROCEDURE — 90935 HEMODIALYSIS ONE EVALUATION: CPT | Performed by: INTERNAL MEDICINE

## 2021-01-08 PROCEDURE — 99152 MOD SED SAME PHYS/QHP 5/>YRS: CPT

## 2021-01-08 PROCEDURE — 80048 BASIC METABOLIC PNL TOTAL CA: CPT | Performed by: INTERNAL MEDICINE

## 2021-01-08 PROCEDURE — 84145 PROCALCITONIN (PCT): CPT | Performed by: INTERNAL MEDICINE

## 2021-01-08 PROCEDURE — 99232 SBSQ HOSP IP/OBS MODERATE 35: CPT | Performed by: INTERNAL MEDICINE

## 2021-01-08 PROCEDURE — NC001 PR NO CHARGE: Performed by: RADIOLOGY

## 2021-01-08 PROCEDURE — 36558 INSERT TUNNELED CV CATH: CPT

## 2021-01-08 PROCEDURE — 02H633Z INSERTION OF INFUSION DEVICE INTO RIGHT ATRIUM, PERCUTANEOUS APPROACH: ICD-10-PCS | Performed by: INTERNAL MEDICINE

## 2021-01-08 PROCEDURE — 76937 US GUIDE VASCULAR ACCESS: CPT

## 2021-01-08 PROCEDURE — 5A1D70Z PERFORMANCE OF URINARY FILTRATION, INTERMITTENT, LESS THAN 6 HOURS PER DAY: ICD-10-PCS | Performed by: FAMILY MEDICINE

## 2021-01-08 PROCEDURE — 36558 INSERT TUNNELED CV CATH: CPT | Performed by: RADIOLOGY

## 2021-01-08 PROCEDURE — NC001 PR NO CHARGE: Performed by: INTERNAL MEDICINE

## 2021-01-08 PROCEDURE — C1894 INTRO/SHEATH, NON-LASER: HCPCS

## 2021-01-08 PROCEDURE — 77001 FLUOROGUIDE FOR VEIN DEVICE: CPT | Performed by: RADIOLOGY

## 2021-01-08 PROCEDURE — 0JH63XZ INSERTION OF TUNNELED VASCULAR ACCESS DEVICE INTO CHEST SUBCUTANEOUS TISSUE AND FASCIA, PERCUTANEOUS APPROACH: ICD-10-PCS | Performed by: INTERNAL MEDICINE

## 2021-01-08 PROCEDURE — 85027 COMPLETE CBC AUTOMATED: CPT | Performed by: INTERNAL MEDICINE

## 2021-01-08 PROCEDURE — 99152 MOD SED SAME PHYS/QHP 5/>YRS: CPT | Performed by: RADIOLOGY

## 2021-01-08 PROCEDURE — 99153 MOD SED SAME PHYS/QHP EA: CPT

## 2021-01-08 PROCEDURE — 99233 SBSQ HOSP IP/OBS HIGH 50: CPT | Performed by: INTERNAL MEDICINE

## 2021-01-08 RX ORDER — MIDAZOLAM HYDROCHLORIDE 2 MG/2ML
INJECTION, SOLUTION INTRAMUSCULAR; INTRAVENOUS CODE/TRAUMA/SEDATION MEDICATION
Status: COMPLETED | OUTPATIENT
Start: 2021-01-08 | End: 2021-01-08

## 2021-01-08 RX ORDER — METHYLPREDNISOLONE SODIUM SUCCINATE 40 MG/ML
40 INJECTION, POWDER, LYOPHILIZED, FOR SOLUTION INTRAMUSCULAR; INTRAVENOUS EVERY 12 HOURS SCHEDULED
Status: DISCONTINUED | OUTPATIENT
Start: 2021-01-08 | End: 2021-01-09

## 2021-01-08 RX ORDER — FUROSEMIDE 10 MG/ML
80 INJECTION INTRAMUSCULAR; INTRAVENOUS ONCE
Status: DISCONTINUED | OUTPATIENT
Start: 2021-01-08 | End: 2021-01-09

## 2021-01-08 RX ORDER — HEPARIN SODIUM 1000 [USP'U]/ML
INJECTION, SOLUTION INTRAVENOUS; SUBCUTANEOUS CODE/TRAUMA/SEDATION MEDICATION
Status: COMPLETED | OUTPATIENT
Start: 2021-01-08 | End: 2021-01-08

## 2021-01-08 RX ORDER — LIDOCAINE HYDROCHLORIDE AND EPINEPHRINE 10; 10 MG/ML; UG/ML
INJECTION, SOLUTION INFILTRATION; PERINEURAL CODE/TRAUMA/SEDATION MEDICATION
Status: COMPLETED | OUTPATIENT
Start: 2021-01-08 | End: 2021-01-08

## 2021-01-08 RX ORDER — CEFAZOLIN SODIUM 1 G/50ML
1000 SOLUTION INTRAVENOUS ONCE
Status: COMPLETED | OUTPATIENT
Start: 2021-01-08 | End: 2021-01-08

## 2021-01-08 RX ORDER — FENTANYL CITRATE 50 UG/ML
INJECTION, SOLUTION INTRAMUSCULAR; INTRAVENOUS CODE/TRAUMA/SEDATION MEDICATION
Status: COMPLETED | OUTPATIENT
Start: 2021-01-08 | End: 2021-01-08

## 2021-01-08 RX ADMIN — MIDAZOLAM 0.5 MG: 1 INJECTION INTRAMUSCULAR; INTRAVENOUS at 11:14

## 2021-01-08 RX ADMIN — FENTANYL CITRATE 25 MCG: 50 INJECTION, SOLUTION INTRAMUSCULAR; INTRAVENOUS at 11:13

## 2021-01-08 RX ADMIN — LIDOCAINE HYDROCHLORIDE AND EPINEPHRINE 10 ML: 10; 10 INJECTION, SOLUTION INFILTRATION; PERINEURAL at 11:27

## 2021-01-08 RX ADMIN — SODIUM CHLORIDE 250 MG: 0.9 INJECTION, SOLUTION INTRAVENOUS at 05:52

## 2021-01-08 RX ADMIN — METOPROLOL SUCCINATE 100 MG: 100 TABLET, EXTENDED RELEASE ORAL at 08:10

## 2021-01-08 RX ADMIN — PANTOPRAZOLE SODIUM 40 MG: 40 TABLET, DELAYED RELEASE ORAL at 05:52

## 2021-01-08 RX ADMIN — CEFAZOLIN SODIUM 1000 MG: 1 SOLUTION INTRAVENOUS at 11:11

## 2021-01-08 RX ADMIN — FENTANYL CITRATE 25 MCG: 50 INJECTION, SOLUTION INTRAMUSCULAR; INTRAVENOUS at 11:23

## 2021-01-08 RX ADMIN — ACETAMINOPHEN 650 MG: 325 TABLET, FILM COATED ORAL at 17:32

## 2021-01-08 RX ADMIN — SODIUM BICARBONATE 650 MG TABLET 1300 MG: at 08:10

## 2021-01-08 RX ADMIN — HEPARIN SODIUM 2100 UNITS: 1000 INJECTION INTRAVENOUS; SUBCUTANEOUS at 11:30

## 2021-01-08 RX ADMIN — SEVELAMER HYDROCHLORIDE 1600 MG: 800 TABLET, FILM COATED PARENTERAL at 16:37

## 2021-01-08 RX ADMIN — AMLODIPINE BESYLATE 10 MG: 10 TABLET ORAL at 08:11

## 2021-01-08 RX ADMIN — MIDAZOLAM 0.5 MG: 1 INJECTION INTRAMUSCULAR; INTRAVENOUS at 11:27

## 2021-01-08 RX ADMIN — Medication 1 TABLET: at 08:11

## 2021-01-08 RX ADMIN — METHYLPREDNISOLONE SODIUM SUCCINATE 40 MG: 40 INJECTION, POWDER, FOR SOLUTION INTRAMUSCULAR; INTRAVENOUS at 20:46

## 2021-01-08 RX ADMIN — SEVELAMER HYDROCHLORIDE 1600 MG: 800 TABLET, FILM COATED PARENTERAL at 12:12

## 2021-01-08 RX ADMIN — DAPSONE 100 MG: 100 TABLET ORAL at 08:12

## 2021-01-08 RX ADMIN — HEPARIN SODIUM 2200 UNITS: 1000 INJECTION INTRAVENOUS; SUBCUTANEOUS at 11:29

## 2021-01-08 RX ADMIN — SODIUM CHLORIDE 250 MG: 0.9 INJECTION, SOLUTION INTRAVENOUS at 12:10

## 2021-01-08 RX ADMIN — SODIUM CHLORIDE 250 MG: 0.9 INJECTION, SOLUTION INTRAVENOUS at 00:15

## 2021-01-08 RX ADMIN — LIDOCAINE HYDROCHLORIDE AND EPINEPHRINE 10 ML: 10; 10 INJECTION, SOLUTION INFILTRATION; PERINEURAL at 11:20

## 2021-01-08 RX ADMIN — ATORVASTATIN CALCIUM 40 MG: 40 TABLET, FILM COATED ORAL at 16:37

## 2021-01-08 NOTE — PROGRESS NOTES
NEPHROLOGY PROGRESS NOTE   [facility] 48 [facility]  [facility] MRN: [id number]  Unit/Bed#: [address] [place] [address] Encounter: [id number]    ASSESSMENT & PLAN:    Acute kidney injury, POA on chronic kidney disease stage 3  -follows outpatient with Dr Levin  -Patient creatinine was around 1 6-1 9 till December 6, 2020  Had acute kidney injury during recent hospital admission in December with peak creatinine of 4 3 on 12/23  Was discharged with a creatinine of 4 4 on 12/28 but as outpatient creatinine was around 4 5   -Admission creatinine on 01/04/2020 was 4 7  Currently not on Bactrim as per patient since discharge from hospital in December  Previously was on Bactrim 3 times a week for PCP prophylaxis  Currently on dapsone which as per patient was started during last hospital admission at [address] [place] [address]   -likely has worsening of renal function due to underlying lupus nephritis/GN  Previously had positive C ANCA but negative PR3 as well as MPO antibodies on blood work on 12/22/2020  Repeat in December,  C ANCA was negative at less than 1:20    -anti cardiolipin antibody in December was negative  -continue to monitor function  Avoid nephrotoxins  Avoid hypotension   -Previous UA from 12/30 showed 4-10 RBC per high-power field as well as 2+ protein  -postvoid residual only 30 mL  -was initially treated with IV fluid and renal function improved to creatinine 4 69 on 01/05  IV fluid was stopped on 01/05 and was given Lasix 80 mg January 6th and 7th  for complain of shortness of breath and fluid overload    -received 3 days of pulse steroids- Okay to change to prednisone 60 mg daily but would defer to Rheumatologist and pulmonary   -noted discussion between pulmonary here and pulmonary as well as rheumatologist at [facility] and agree with plan for rituximab instead of Cytoxan to see if it helps  Also noted plan for no plasmapheresis as it would not benefit  -Renal function worsened to cr of 5 79 on Jan 8th   Due to worsening renal function and fluid overload, worsening acidosis and hyperphosphatemia plan for initiation of hemodialysis treatment today after placement of PermCath by intervention Radiology  Orders placed  Repeat HD tomorrow and will arrange for outpatient HD    -Discussed about risk and benefit of dialysis and about the dialysis procedure and patient verbalized understanding with his wife on the phone during the discussion  Patient has signed consent on 01/06 for hemodialysis treatment which was placed in the chart   -had a long discussion with the patient as well as his wife Chase Aguiar on the phone  All questions were answered   -will give another dose of iv lasix 80 mg       Lupus nephritis/proteinuria  -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 and tubular interstitial scarring  Tubular atrophy and interstitial inflammation were mild  -as outpatient was on prednisone 60 mg daily and cyclophosphamide 500 mg IV every 2 weeks   Last dose of cyclophosphamide was on 12/31 which was the 2nd dose  Plan for Cytoxan 500 mg every 2 weeks for 6 doses initially along with high dose of prednisone  Currently on PCP prophylaxis with dapsone 100 mg daily  - Currently on iv solumedrol per pulmonary, okay to switch to oral prednisone as completed 3 days of high dose of solumedrol     Primary hypertension  -blood pressure slightly on higher side, steroids could be contributing  -avoid hypotension, continue medication:  Amlodipine and metoprolol   -ordered Lasix 80 mg IV x1 dose again today which would help    -UF on HD would also help lower BP    Hemoptysis:  Recent hospital admission in December at [facility] for similar complaint for which patient underwent plasmapheresis from 12/20 to 12/27 due to concern for diffuse alveolar hemorrhage  Was also treated with IV Solu-Medrol at that time      - was seen by Pulmonary and underwent bronchoscopy as well as bronchial lavage for culture and cytology     -As per Pulmonary , was started on high-dose IV Solu-Medrol  Bronchoscopy was not suggestive of diffuse alveolar hemorrhage and so no indication for plasmapheresis at this time   -also seen by rheumatologist     -Follow-up Pulmonary as well as Rheumatology recommendations- Agree with plan of switching to Rituximab from cyclophosphamide  Fluid overload/lower extremity edema:  Ordered for IV Lasix 80 mg x 1 dose, Plan for UF on HD      CKD/MBD/hyperphosphatemia on binders-Renagel 1600 mg t i d   Monitor phosphorus level, was found to be elevated at 7 6  Placed on low phosphorus diet on 01/05  Expect to improve after initiation of dialysis      Left lower extremity DVT on Eliquis as outpatient, currently on hold  Low bicarbonate/ Acidosis: due to CAROLINE worsening  Starting HD , so stopped oral bicarb tablets on Jan 8th     Anemia:  Hemoglobin dropped to 7 0 on Jan7th and received PRBC, HB today improved to 8 0, continue to monitor per primary team   Hemoptysis could be contributing  Continue to monitor per primary team   Not a candidate for JESSEE due to left lower extremity DVT  If hemoglobin drops below 7 0, would recommend PRBC  -iron saturation 41%  Ferritin 347      Discussed with primary team - Dr Liz Stratton:  Still with SOB and hemoptysis  No nausea or vomiting     OBJECTIVE:  Current Weight: Weight - Scale: 76 1 kg (167 lb 12 3 oz)  Vitals:    01/08/21 0922   BP: 154/98   Pulse:    Resp: 18   Temp:    SpO2:        Intake/Output Summary (Last 24 hours) at 1/8/2021 1006  Last data filed at 1/8/2021 0555  Gross per 24 hour   Intake 860 ml   Output 1145 ml   Net -285 ml       Physical Exam   General:  Ill looking, awake  Eyes: Conjunctivae pink,  Sclera anicteric  ENT: lips and mucous membranes moist  Neck: supple   Chest: Clear to Auscultation both lungs,  no crackles, ronchus or wheezing    CVS: S1 & S2 present, normal rate, regular rhythm, ESM+   Abdomen: soft, non-tender, non-distended, Bowel sounds normoactive  Extremities: 1 + edema of  legs  Skin: no rash  Neuro: awake, alert, oriented x 3   Psych: Mood and affect appropriate       Medications:    Current Facility-Administered Medications:     acetaminophen (TYLENOL) tablet 650 mg, 650 mg, Oral, Q6H PRN, CRISTAL Felix    amLODIPine (NORVASC) tablet 10 mg, 10 mg, Oral, Daily, CRISTAL Niño, 10 mg at 01/08/21 8313    atorvastatin (LIPITOR) tablet 40 mg, 40 mg, Oral, Daily With Dinner, CRISTAL Felix, 40 mg at 01/07/21 1733    calcium carbonate-vitamin D (OSCAL-D) 500 mg-200 units per tablet 1 tablet, 1 tablet, Oral, Daily With Breakfast, CRISTAL Felix, 1 tablet at 01/07/21 0831    dapsone tablet 100 mg, 100 mg, Oral, Daily, CRISTAL Niño, 100 mg at 01/08/21 4214    furosemide (LASIX) injection 80 mg, 80 mg, Intravenous, Once, Sayra Kunz MD    methylPREDNISolone sodium succinate (Solu-MEDROL) 250 mg in sodium chloride 0 9 % 250 mL IVPB, 250 mg, Intravenous, Q6H [facility], Rory Horton PA-C, Last Rate: 250 mL/hr at 01/08/21 0552, 250 mg at 01/08/21 0552    metoprolol succinate (TOPROL-XL) 24 hr tablet 100 mg, 100 mg, Oral, Daily, Sayra Kunz MD, 100 mg at 01/08/21 0810    multivitamin-minerals (CENTRUM) tablet 1 tablet, 1 tablet, Oral, Daily, CRISTAL Felix, 1 tablet at 01/08/21 0811    ondansetron (ZOFRAN) injection 4 mg, 4 mg, Intravenous, Q6H PRN, CRISTAL Felix    pantoprazole (PROTONIX) EC tablet 40 mg, 40 mg, Oral, Early Morning, CRISTAL Felix, 40 mg at 01/08/21 4347    sevelamer (RENAGEL) tablet 1,600 mg, 1,600 mg, Oral, TID With Meals, CRISTAL Felix, 1,600 mg at 01/07/21 2945    Invasive Devices:        Lab Results:   Results from last 7 days   Lab Units 01/08/21  0611 01/07/21  0601 01/06/21  0540 01/05/21  0646 01/04/21  0505   WBC Thousand/uL 12 18* 10 69* 8 66 8 64 12 07*   HEMOGLOBIN g/dL 8 0* 7 0* 7 2* 7 7* 7 9*   HEMATOCRIT % 26 2* 23 7* 23 5* 24 6* 24 7*   PLATELETS Thousands/uL 147* 148* 160 138* 160   POTASSIUM mmol/L 4 8 4 7 5 0 5 2 4 3   CHLORIDE mmol/L 105 104 103 105 104   CO2 mmol/L 21 20* 23 25 26   BUN mg/dL 119* 112* 104* 104* 103*   CREATININE mg/dL 5 79* 5 55* 5 03* 4 69* 4 70*   CALCIUM mg/dL 8 8 8 8 8 8 8 8 9 1   MAGNESIUM mg/dL  --  2 6  --  2 6  --    PHOSPHORUS mg/dL  --  7 6*  --  6 9*  --    ALK PHOS U/L  --   --   --  73 86   ALT U/L  --   --   --  27 37   AST U/L  --   --   --  18 17       Previous work up:         Portions of the record may have been created with voice recognition software  Occasional wrong word or "sound a like" substitutions may have occurred due to the inherent limitations of voice recognition software  Read the chart carefully and recognize, using context, where substitutions have occurred  If you have any questions, please contact the dictating provider

## 2021-01-08 NOTE — ASSESSMENT & PLAN NOTE
Diagnosed lupus in his 20's  Cellcept was discontinued recently  S/p plasmapheresis x 5 treatments recently  S/P cytoxan 12/18 with plan for every other week for 6 doses  Patient was on prednisone 20 milligram p o   Daily  · Initially started on Solu-Medrol 40 milligram q 12 hours which was changed to methylprednisolone 250 milligram IV q 6 hours  · Rheumatology was consulted and appreciate recommendations  · Patient has had 2 doses of IV cyclophosphamide so far and the plan was to receive 6 doses total every 2 weeks as outpatient  · Pulmonology discussed coordination of care with [facility] who recommended rituxan in the place of cyclophosphamide  · Further treatment plant based on pulmonology and Rheumatology recommendations  · Patient's repeat MPO, SSA and B, anti PR3 were all negative  · Rheumatology likely to set up patient for outpatient Rituxan  treatment

## 2021-01-08 NOTE — PROGRESS NOTES
Progress Note - Pulmonary   Margie Pinal Schoenfield 48 y o  male MRN: [id number]  Unit/Bed#: [address] [place] [address] [id number] Encounter: [id number]  Code Status: Level 1 - Full Code    Helen Ibrahim is a 48 y o     Past Medical History:   Diagnosis Date    CHF (congestive [facility] failure) ([facility]     Hypertension     Lupus ([facility] )     Osteoporosis     Rheumatoid arthritis ([facility] )        Assessment/Plan:   Pneumonitis  Assessment & Plan  SLE  Pneumonitis  Pulse dose steroids wean to 40 mg Q 12 today   Superimposed on worsening renal failure      ANCA + prior    KY-3/MPO/Repeat ANCA negative    Currently on Pulse dose steroids  Had been treated w/ cytoxin  For Rituxan in outpatient setting      Acute respiratory failure ([facility] )  Assessment & Plan  Hypoxemic  Currently on 3 L   Titrated to 2 L NC  Tolerates at 93%     Pulmonary infiltrates  Assessment & Plan      DVT (deep venous thrombosis) ([facility] )  Assessment & Plan  Hx of recent LLE DVT as of 12/21 venous duplex  Chronic  Eliquis remains on hold    Acute kidney injury superimposed on chronic kidney disease [facility])  Assessment & Plan  Lab Results   Component Value Date    EGFR 10 01/08/2021    EGFR 11 01/07/2021    EGFR 12 01/06/2021    CREATININE 5 79 (H) 01/08/2021    CREATININE 5 55 (H) 01/07/2021    CREATININE 5 03 (H) 01/06/2021   Had [place] cath placed today  Patient is scheduled for HD today     * Hemoptysis  Assessment & Plan  Stable hemoptysis  Likely SLE pneumonitis related   Non DAH by bronch  Considerations for Rituxan > to be initiated by Rheumatology  for treatment in outpatient setting      Case Report   Non-gynecologic Cytology                          Case: [id number]                                   Authorizing Provider: Olivia Martinez DO      Collected:           01/04/2021 1520               Ordering Location:     [place] [id number] Received:            01/04/2021 1713                                      [address] [place] [address]                                                                       Pathologist:           [place] Delia Kaur MD                                                         Specimens:   A) - Lung, Left Upper Lobe Bronchial Lavage                                                          B) - Lung, Left Upper Lobe Bronchial Lavage, cell block                                    Final Diagnosis   A  Lung, Left Upper Lobe Bronchial Lavage (Thin-prep and cell block preparations):   Negative for malignancy  Benign bronchial cells, benign squamous cells  Fungal organisms  morphologically compatible with Neida sp, present  Rare pulmonary macrophages and few white blood cells      Satisfactory for evaluation      Cell Count - see NOTE  Electronically signed by [id number], MD on 1/6/2021 at 10:59 AM   Note    NOTE:  An order for a cell count on this specimen is noted; however, the following conditions preclude the count being performed:     -paucity of alveolar macrophages on the prepared glass slides    -excessive numbers of epithelial cells exceeding the number of alveolar macrophages  Gross Description       A  35 mL of pink, slightly cloudy fluid, received in CytoLyt        B   Minimal cell button, white              _________________________________________________    Subjective: Pt seen and examined at bedside  Had 1 PRBC on 1/7  Improved HB     Chief Complaint:      Labs Reviewed: yes   Imaging Reviewed:   1/7    Echo Reviewed:* EF55 g1DD  Collaborative Discussion: case discussed w/ Dr Gayathri Varghese of Pulm / IM team and Nephrology   Tele Events:     Vitals:   Temp:  [97 2 °F (36 2 °C)-97 9 °F (36 6 °C)] 97 4 °F (36 3 °C)  HR:  [82-94] 94  Resp:  [16-24] 18  BP: (148-184)/() 149/70  Weight (last 2 days)     Date/Time   Weight    01/08/21 0600   76 1 (167 77)    01/07/21 0600   75 (165 35)    01/06/21 0547   74 3 (163 8)            Vitals:    01/08/21 1118 01/08/21 1123 01/08/21 1128 01/08/21 1133   BP: 156/75 158/76 148/68 149/70   BP Location:       Pulse: 86 89 93 94 Resp: 17 20 17 18   Temp:       TempSrc:       SpO2: 95% 96% 96% 97%   Weight:       Height:         Temp (24hrs), Av 4 °F (36 3 °C), Min:97 2 °F (36 2 °C), Max:97 9 °F (36 6 °C)  Current: Temperature: (!) 97 4 °F (36 3 °C)        SpO2: SpO2: 97 %, SpO2 Activity: SpO2 Activity: At Rest, SpO2 Device: O2 Device: Nasal cannula      IV Infusions:       Nutrition:        Diet Orders   (From admission, onward)             Start     Ordered    21 0001  Diet NPO; Sips with meds  Diet effective midnight     Question Answer Comment   Diet Type NPO    NPO Except: Sips with meds    RD to adjust diet per protocol? Yes        21 1843    21 1446  Room Service  Once     Question:  Type of Service  Answer:  Room Service-Appropriate    21 1445                Ins/Outs:   I/O       701 -  07 -  -  07    P  O  500 820     I V  (mL/kg) 10 (0 1) 10 (0 1)     Blood  350     Total Intake(mL/kg) 510 (6 8) 1180 (15 5)     Urine (mL/kg/hr) 1120 (0 6) 1145 (0 6)     Total Output 1120 1145     Net -610 +35                  Lines/Drains:  Invasive Devices     Peripheral Intravenous Line            Peripheral IV 21 Right Forearm 4 days          Hemodialysis Catheter            HD Permanent Double Catheter less than 1 day                 Active medications:  Scheduled Meds:  Current Facility-Administered Medications   Medication Dose Route Frequency Provider Last Rate    acetaminophen  650 mg Oral Q6H PRN CRISTAL Velasquez      amLODIPine  10 mg Oral Daily CRISTAL Velasquez      atorvastatin  40 mg Oral Daily With Dinner CRISTAL Velasquez      calcium carbonate-vitamin D  1 tablet Oral Daily With Breakfast CRISTAL Velasquez      cefazolin  1,000 mg Intravenous Once Emile Ca MD 1,000 mg (21 1111)    dapsone  100 mg Oral Daily CRISTAL Velasquez      fentanyl citrate (PF)   Intravenous Code/Trauma/Sedation Med Mahamed Ramos MD      furosemide  80 mg Intravenous Once Kvng Greenwood MD      heparin (porcine)    Code/Trauma/Sedation Med Mahamed Ramos MD      lidocaine-epinephrine    Code/Trauma/Sedation Med Mahamed Ramos MD      methylPREDNISolone sodium succinate  250 mg Intravenous Q6H [facility] 250 mg (01/08/21 2185)    metoprolol succinate  100 mg Oral Daily Kvng Greenwood MD      midazolam    Code/Trauma/Sedation Med Mahamed Ramos MD      multivitamin-minerals  1 tablet Oral Daily EdCRISTAL Brady      ondansetron  4 mg Intravenous Q6H PRN Edyth Maddie, CRNP      pantoprazole  40 mg Oral Early Morning Edyth Maddie CRNP      sevelamer  1,600 mg Oral TID With Meals EdCRISTAL Brady       PRN Meds:acetaminophen, 650 mg, Q6H PRN  fentanyl citrate (PF), , Code/Trauma/Sedation Med  heparin (porcine), , Code/Trauma/Sedation Med  lidocaine-epinephrine, , Code/Trauma/Sedation Med  midazolam, , Code/Trauma/Sedation Med  ondansetron, 4 mg, Q6H PRN      ____________________________________________________________________    Physical Exam  Constitutional:       Appearance: He is well-developed  Comments: Currently receiving HD    HENT:      Head: Normocephalic and atraumatic  Eyes:      Conjunctiva/sclera: Conjunctivae normal       Pupils: Pupils are equal, round, and reactive to light  Neck:      Musculoskeletal: Normal range of motion and neck supple  Cardiovascular:      Rate and Rhythm: Normal rate and regular rhythm  Heart sounds: Normal heart sounds  No murmur  No friction rub  No gallop  Pulmonary:      Effort: Pulmonary effort is normal  No respiratory distress  Breath sounds: Examination of the right-lower field reveals rhonchi  Examination of the left-lower field reveals rhonchi  Rhonchi present  No wheezing or rales  Chest:      Chest wall: No tenderness        Comments: Diffuse B/L Rhonchi    95% 3 L Tolerated titration to 2 L NC     Has r sided chest HD port   Abdominal:      General: Bowel sounds are normal       Palpations: Abdomen is soft  Musculoskeletal: Normal range of motion  Right lower leg: Edema present  Left lower leg: Edema present  Skin:     General: Skin is warm and dry  Neurological:      Mental Status: He is alert and oriented to person, place, and time         ____________________________________________________________________    Invasive/non-invasive ventilation settings   Respiratory    Lab Data (Last 4 hours)    None         O2/Vent Data (Last 4 hours)    None                Laboratory and Diagnostics:  Results from last 7 days   Lab Units 01/08/21  0611 01/07/21  0601 01/06/21  0540 01/05/21  0646 01/04/21  0505   WBC Thousand/uL 12 18* 10 69* 8 66 8 64 12 07*   HEMOGLOBIN g/dL 8 0* 7 0* 7 2* 7 7* 7 9*   HEMATOCRIT % 26 2* 23 7* 23 5* 24 6* 24 7*   PLATELETS Thousands/uL 147* 148* 160 138* 160   NEUTROS PCT %  --  92*  --   --  78*   MONOS PCT %  --  3*  --   --  9   MONO PCT %  --   --   --  5  --      Results from last 7 days   Lab Units 01/08/21  0611 01/07/21  0601 01/06/21  0540 01/05/21  0646 01/04/21  0505   SODIUM mmol/L 138 139 138 140 141   POTASSIUM mmol/L 4 8 4 7 5 0 5 2 4 3   CHLORIDE mmol/L 105 104 103 105 104   CO2 mmol/L 21 20* 23 25 26   ANION GAP mmol/L 12 15* 12 10 11   BUN mg/dL 119* 112* 104* 104* 103*   CREATININE mg/dL 5 79* 5 55* 5 03* 4 69* 4 70*   CALCIUM mg/dL 8 8 8 8 8 8 8 8 9 1   GLUCOSE RANDOM mg/dL 132 137 152* 102 105   ALT U/L  --   --   --  27 37   AST U/L  --   --   --  18 17   ALK PHOS U/L  --   --   --  73 86   ALBUMIN g/dL  --   --   --  2 9* 3 3*   TOTAL BILIRUBIN mg/dL  --   --   --  0 70 0 70     Results from last 7 days   Lab Units 01/07/21  0601 01/05/21  0646   MAGNESIUM mg/dL 2 6 2 6   PHOSPHORUS mg/dL 7 6* 6 9*      Results from last 7 days   Lab Units 01/07/21  0601 01/04/21  0505   INR  1 07 1 52*   PTT seconds  --  26 Results from last 7 days   Lab Units 01/05/21 2023 01/05/21  1820 01/04/21  0505   TROPONIN I ng/mL 0 07* 0 07* 0 11*         ABG:    VBG:          Micro  Results from last 7 days   Lab Units 01/04/21  1520   GRAM STAIN RESULT  1+ Polys*  2+ Gram positive cocci in pairs*       Imaging:   VAS lower limb venous duplex study, complete bilateral   Final Result by Frank Franklin MD (01/07 2037)      XR chest portable   Final Result by Amanda Stephen MD (01/07 1615)      Diffuse airspace opacities have worsened and suggests possibility of pulmonary edema or diffuse pneumonia  The study was marked in [facility] for immediate notification  Workstation performed: [id number]         XR chest portable   Final Result by Faustino Chinchilla MD (01/06 1644)      Diffuse patchy airspace disease bilaterally, worsened since the prior examination  Workstation performed: [id number]         IR tunneled dialysis catheter placement    (Results Pending)       Micro:   Lab Results   Component Value Date    BLOODCX No Growth After 5 Days  11/24/2020    BLOODCX No Growth After 5 Days  11/24/2020    SPUTUMCULTUR 4+ Growth of  12/19/2020    SPUTUMCULTUR  12/19/2020     Commensal respiratory tobias only; No significant growth of Staph aureus/MRSA or Pseudomonas aeruginosa      MRSACULTURE  12/21/2020     No Methicillin Resistant Staphlyococcus aureus (MRSA) isolated    BRONCHIALCUL 2+ Growth of Stenotrophomonas maltophilia (A) 01/04/2021    BRONCHIALCUL 2+ Growth of  01/04/2021    BRONCHIALCUL 1+ Growth of  12/19/2020            Invalid input(s): Kristi Rodriguez

## 2021-01-08 NOTE — ASSESSMENT & PLAN NOTE
Patient does not use oxygen at home  Patient was on 5 liters oxygen which is titrated down to 3 liters and O2 saturation is in mid 90s  In the setting of bilateral pulmonary infiltrates suggesting lupus vasculitis/pneumonitis  Patient was on high doses start which is tapered to Solu-Medrol 40 milligram IV q 12 hours  Repeat chest x-ray shows worsening diffuse airspace opacities suggesting possible pulmonary edema/diffuse pneumonia  Procalcitonin level was 0 25  Bronchial cultures grew stenotrophomonas-possible contaminant  Oxygen requirements have improved

## 2021-01-08 NOTE — BRIEF OP NOTE (RAD/CATH)
INTERVENTIONAL RADIOLOGY PROCEDURE NOTE    Date: 1/8/2021    Procedure: IR TUNNELED DIALYSIS CATHETER PLACEMENT     Preoperative diagnosis:   1  Hemoptysis    2  Acute kidney injury superimposed on chronic kidney disease ([facility] )    3  Rheumatoid arthritis involving multiple sites [facility])         Postoperative diagnosis: Same  Surgeon: Daniel Russo MD     Assistant: None  No qualified resident was available  Blood loss: minimal    Specimens: none    Findings: Successful tunneled dialysis catheter placement  May use immediately  Complications: None immediate      Anesthesia: conscious sedation

## 2021-01-08 NOTE — PLAN OF CARE
Problem: Potential for Falls  Goal: Patient will remain free of falls  Description: INTERVENTIONS:  - Assess patient frequently for physical needs  -  Identify cognitive and physical deficits and behaviors that affect risk of falls    -  [place] fall precautions as indicated by assessment   - Educate patient/family on patient safety including physical limitations  - Instruct patient to call for assistance with activity based on assessment  - Modify environment to reduce risk of injury  - Consider OT/PT consult to assist with strengthening/mobility  1/8/2021 1335 by Leilani Carter RN  Outcome: Progressing  1/8/2021 1200 by Leilani Carter RN  Outcome: Progressing     Problem: DISCHARGE PLANNING - CARE MANAGEMENT  Goal: Discharge to post-acute care or home with appropriate resources  Description: INTERVENTIONS:  - Conduct assessment to determine patient/family and health care team treatment goals, and need for post-acute services based on payer coverage, community resources, and patient preferences, and barriers to discharge  - Address psychosocial, clinical, and financial barriers to discharge as identified in assessment in conjunction with the patient/family and health care team  - Arrange appropriate level of post-acute services according to patients   needs and preference and payer coverage in collaboration with the physician and health care team  - Communicate with and update the patient/family, physician, and health care team regarding progress on the discharge plan  - Arrange appropriate transportation to post-acute venues  1/8/2021 1335 by Leilani Carter RN  Outcome: Progressing  1/8/2021 1200 by Leilani Carter RN  Outcome: Progressing     Problem: PAIN - ADULT  Goal: Verbalizes/displays adequate comfort level or baseline comfort level  Description: Interventions:  - Encourage patient to monitor pain and request assistance  - Assess pain using appropriate pain scale  - Administer analgesics based on type and severity of pain and evaluate response  - Implement non-pharmacological measures as appropriate and evaluate response  - Consider cultural and social influences on pain and pain management  - Notify physician/advanced practitioner if interventions unsuccessful or patient reports new pain  1/8/2021 1335 by Jeanne Vidales RN  Outcome: Progressing  1/8/2021 1200 by Jeanne Vidales RN  Outcome: Progressing     Problem: INFECTION - ADULT  Goal: Absence or prevention of progression during hospitalization  Description: INTERVENTIONS:  - Assess and monitor for signs and symptoms of infection  - Monitor lab/diagnostic results  - Monitor all insertion sites, i e  indwelling lines, tubes, and drains  - Monitor endotracheal if appropriate and nasal secretions for changes in amount and color  - [place] appropriate cooling/warming therapies per order  - Administer medications as ordered  - Instruct and encourage patient and family to use good hand hygiene technique  - Identify and instruct in appropriate isolation precautions for identified infection/condition  1/8/2021 1335 by Jeanne Vidales RN  Outcome: Progressing  1/8/2021 1200 by Jeanne Vidales RN  Outcome: Progressing  Goal: Absence of fever/infection during neutropenic period  Description: INTERVENTIONS:  - Monitor WBC    1/8/2021 1335 by Jeanne Vidales RN  Outcome: Progressing  1/8/2021 1200 by Jeanne Vidales RN  Outcome: Progressing     Problem: SAFETY ADULT  Goal: Patient will remain free of falls  Description: INTERVENTIONS:  - Assess patient frequently for physical needs  -  Identify cognitive and physical deficits and behaviors that affect risk of falls    -  [place] fall precautions as indicated by assessment   - Educate patient/family on patient safety including physical limitations  - Instruct patient to call for assistance with activity based on assessment  - Modify environment to reduce risk of injury  - Consider OT/PT consult to assist with strengthening/mobility  1/8/2021 1335 by Brook Amato RN  Outcome: Progressing  1/8/2021 1200 by Brook Amato RN  Outcome: Progressing  Goal: Maintain or return to baseline ADL function  Description: INTERVENTIONS:  -  Assess patient's ability to carry out ADLs; assess patient's baseline for ADL function and identify physical deficits which impact ability to perform ADLs (bathing, care of mouth/teeth, toileting, grooming, dressing, etc )  - Assess/evaluate cause of self-care deficits   - Assess range of motion  - Assess patient's mobility; develop plan if impaired  - Assess patient's need for assistive devices and provide as appropriate  - Encourage maximum independence but intervene and supervise when necessary  - Involve family in performance of ADLs  - Assess for home care needs following discharge   - Consider OT consult to assist with ADL evaluation and planning for discharge  - Provide patient education as appropriate  1/8/2021 1335 by Brook Amato RN  Outcome: Progressing  1/8/2021 1200 by Brook Amato RN  Outcome: Progressing  Goal: Maintain or return mobility status to optimal level  Description: INTERVENTIONS:  - Assess patient's baseline mobility status (ambulation, transfers, stairs, etc )    - Identify cognitive and physical deficits and behaviors that affect mobility  - Identify mobility aids required to assist with transfers and/or ambulation (gait belt, sit-to-stand, lift, walker, cane, etc )  - [place] fall precautions as indicated by assessment  - Record patient progress and toleration of activity level on Mobility SBAR; progress patient to next Phase/Stage  - Instruct patient to call for assistance with activity based on assessment  - Consider rehabilitation consult to assist with strengthening/weightbearing, etc   1/8/2021 1335 by Brook Amato RN  Outcome: Progressing  1/8/2021 1200 by Brook Amato RN  Outcome: Progressing     Problem: DISCHARGE PLANNING  Goal: Discharge to home or other facility with appropriate resources  Description: INTERVENTIONS:  - Identify barriers to discharge w/patient and caregiver  - Arrange for needed discharge resources and transportation as appropriate  - Identify discharge learning needs (meds, wound care, etc )  - Arrange for interpretive services to assist at discharge as needed  - Refer to Case Management Department for coordinating discharge planning if the patient needs post-hospital services based on physician/advanced practitioner order or complex needs related to functional status, cognitive ability, or social support system  1/8/2021 1335 by Fam Maravilla RN  Outcome: Progressing  1/8/2021 1200 by Fam Maravilla RN  Outcome: Progressing     Problem: Knowledge Deficit  Goal: Patient/family/caregiver demonstrates understanding of disease process, treatment plan, medications, and discharge instructions  Description: Complete learning assessment and assess knowledge base    Interventions:  - Provide teaching at level of understanding  - Provide teaching via preferred learning methods  1/8/2021 1335 by Fam Maravilla RN  Outcome: Progressing  1/8/2021 1200 by Fam Maravilla RN  Outcome: Progressing     Problem: CARDIOVASCULAR - ADULT  Goal: Maintains optimal cardiac output and hemodynamic stability  Description: INTERVENTIONS:  - Monitor I/O, vital signs and rhythm  - Monitor for S/S and trends of decreased cardiac output  - Administer and titrate ordered vasoactive medications to optimize hemodynamic stability  - Assess quality of pulses, skin color and temperature  - Assess for signs of decreased coronary artery perfusion  - Instruct patient to report change in severity of symptoms  1/8/2021 1335 by Fam Maravilla RN  Outcome: Progressing  1/8/2021 1200 by Fam Maravilla RN  Outcome: Progressing     Problem: RESPIRATORY - ADULT  Goal: Achieves optimal ventilation and oxygenation  Description: INTERVENTIONS:  - Assess for changes in respiratory status  - Assess for changes in mentation and behavior  - Position to facilitate oxygenation and minimize respiratory effort  - Oxygen administered by appropriate delivery if ordered  - Initiate smoking cessation education as indicated  - Encourage broncho-pulmonary hygiene including cough, deep breathe, Incentive Spirometry  - Assess the need for suctioning and aspirate as needed  - Assess and instruct to report SOB or any respiratory difficulty  - Respiratory Therapy support as indicated  1/8/2021 1335 by Seven Lockhart RN  Outcome: Progressing  1/8/2021 1200 by Seven Lockhart RN  Outcome: Progressing     Problem: GENITOURINARY - ADULT  Goal: Maintains or returns to baseline urinary function  Description: INTERVENTIONS:  - Assess urinary function  - Encourage oral fluids to ensure adequate hydration if ordered  - Administer IV fluids as ordered to ensure adequate hydration  - Administer ordered medications as needed  - Offer frequent toileting  - Follow urinary retention protocol if ordered  1/8/2021 1335 by Seven Lockhart RN  Outcome: Progressing  1/8/2021 1200 by Seven Lockhart RN  Outcome: Progressing     Problem: METABOLIC, FLUID AND ELECTROLYTES - ADULT  Goal: Fluid balance maintained  Description: INTERVENTIONS:  - Monitor labs   - Monitor I/O and WT  - Instruct patient on fluid and nutrition as appropriate  - Assess for signs & symptoms of volume excess or deficit  1/8/2021 1335 by Seven Lockhart RN  Outcome: Progressing  1/8/2021 1200 by Seven Lockhart RN  Outcome: Progressing  Goal: Electrolytes maintained within normal limits  Description: INTERVENTIONS:  - Monitor labs and assess patient for signs and symptoms of electrolyte imbalances  - Administer electrolyte replacement as ordered  - Monitor response to electrolyte replacements, including repeat lab results as appropriate  - Instruct patient on fluid and nutrition as appropriate  Outcome: Progressing     Problem: GASTROINTESTINAL - ADULT  Goal: Maintains or returns to baseline bowel function  Description: INTERVENTIONS:  - Assess bowel function  - Encourage oral fluids to ensure adequate hydration  - Administer IV fluids if ordered to ensure adequate hydration  - Administer ordered medications as needed  - Encourage mobilization and activity  - Consider nutritional services referral to assist patient with adequate nutrition and appropriate food choices  1/8/2021 1335 by Dada Mccoy RN  Outcome: Progressing  1/8/2021 1200 by Dada Mccoy RN  Outcome: Progressing     Problem: HEMATOLOGIC - ADULT  Goal: Maintains hematologic stability  Description: INTERVENTIONS  - Assess for signs and symptoms of bleeding or hemorrhage  - Monitor labs  - Administer supportive blood products/factors as ordered and appropriate  1/8/2021 1335 by Dada Mccoy RN  Outcome: Progressing  1/8/2021 1200 by Dada Mccoy RN  Outcome: Progressing

## 2021-01-08 NOTE — ASSESSMENT & PLAN NOTE
Chronic anemia with baseline hemoglobin near 8 5  · Hemoglobin is slightly down trending in the setting of hemoptysis  · Hemoglobin continued to drop to 7  · Patient received 1 unit of PRBC transfusion on January 7, 2021 with improved hemoglobin to 8     Results from last 7 days   Lab Units 01/08/21  0611 01/07/21  0601 01/06/21  0540 01/05/21  0646 01/04/21  0505   HEMOGLOBIN g/dL 8 0* 7 0* 7 2* 7 7* 7 9*   HEMATOCRIT % 26 2* 23 7* 23 5* 24 6* 24 7*   MCV fL 104* 107* 106* 106* 102*

## 2021-01-08 NOTE — PROCEDURES
Central Line    Date/Time: 1/8/2021 12:01 PM  Performed by: Jo Ann Lr MD  Authorized by: Jo Ann Lr MD     Patient location:  IR  Consent:     Consent obtained:  Written    Consent given by:  Patient    Risks discussed:  Bleeding and infection    Alternatives discussed:  No treatment and delayed treatment  Universal protocol:     Procedure explained and questions answered to patient or proxy's satisfaction: yes      Relevant documents present and verified: yes      Test results available and properly labeled: yes      Radiology Images displayed and confirmed  If images not available, report reviewed: yes      Required blood products, implants, devices, and special equipment available: yes      Site/side marked: yes      Immediately prior to procedure, a time out was called: yes      Patient identity confirmed:  Verbally with patient and arm band  Pre-procedure details:     Hand hygiene: Hand hygiene performed prior to insertion      Sterile barrier technique: All elements of maximal sterile technique followed      Skin preparation:  2% chlorhexidine    Skin preparation agent: Skin preparation agent completely dried prior to procedure    Indications:     Central line indications: dialysis    Sedation:     Sedation type: Moderate (conscious) sedation  Anesthesia (see MAR for exact dosages):      Anesthesia method:  Local infiltration    Local anesthetic:  Lidocaine 1% WITH epi  Procedure details:     Location:  Right internal jugular    Vessel type: vein      Laterality:  Right    Approach: percutaneous technique used      Patient position:  Flat    Catheter type:  Double lumen    Catheter size:  15 5 Fr    Landmarks identified: yes      Ultrasound guidance: yes      Sterile ultrasound techniques: Sterile gel and sterile probe covers were used      Number of attempts:  1    Successful placement: yes    Post-procedure details:     Post-procedure:  Dressing applied and line sutured    Assessment:  Blood return through all ports and placement verified by x-ray    Post-procedure complications: none      Patient tolerance of procedure:   Tolerated well, no immediate complications

## 2021-01-08 NOTE — PROCEDURES
Patient was seen and examined on hemodialysis treatment, single visit   First hemodialysis treatment  Discussed with dialysis nurse, no issues on initiation  Placed on telemetry  Na+ 138 mEq/L    Na+ Modeling No    K+ 2 mEq/L    Ca++ 2 5 mEq/L    Bicarb 35 mEq/L    Dialyzer F160    BFR-As tolerated to a maximum of: 250ml/min    DFR 1 5x BFR    Duration of Treatment 2 5    Dialysate Temperature (C) 35 5    Dry Weight: TBD    Fluid Removal (Liters):   0  5     Next HD tomorrow

## 2021-01-08 NOTE — ASSESSMENT & PLAN NOTE
Lab Results   Component Value Date    EGFR 10 01/08/2021    EGFR 11 01/07/2021    EGFR 12 01/06/2021    CREATININE 5 79 (H) 01/08/2021    CREATININE 5 55 (H) 01/07/2021    CREATININE 5 03 (H) 01/06/2021     In the setting of known Lupus Nephritis  Progressively worsening with recent creatinine 2 44 -> 3 69 -> 4 59  Cr 4 7 on admission   Known to Dr Priscilla Bradley and Dr Merril Mcardle renal U/S 12/16 showed increased cortical echogenicity bilaterally, suggestive of underlying renal parenchymal disease and no hydronephrosis  Pt underwent kidney biopsy in November  · Nephrology input appreciated  · Patient initially received gentle IV hydration with normal saline which were discontinued today  · Avoid nephrotoxic agents and hypotension  · Worsening creatinine likely secondary to lupus nephritis/glomerulonephritis  · Patient's creatinine continues to get worse with poor urine output  · Patient was given 80 milligrams of IV Lasix today as patient also noted to have some hyperkalemia  · Patient continues to have worsening creatinine with urine output of 1000 milliliters with a dose of Lasix  Patient be given another dose of Lasix 80 milligram IV today  · Patient scheduled to receive another dose of Lasix 80 milligram IV today  · Patient got PermCath placed by IR today  Patient is scheduled for was sensation of the hemodialysis today  · Patient is scheduled for another hemodialysis session tomorrow and possible set up for outpatient hemodialysis    Results from last 7 days   Lab Units 01/08/21  0611 01/07/21  0601 01/06/21  0540 01/05/21  0646 01/04/21  0505   BUN mg/dL 119* 112* 104* 104* 103*   CREATININE mg/dL 5 79* 5 55* 5 03* 4 69* 4 70*

## 2021-01-08 NOTE — PLAN OF CARE
Problem: Potential for Falls  Goal: Patient will remain free of falls  Description: INTERVENTIONS:  - Assess patient frequently for physical needs  -  Identify cognitive and physical deficits and behaviors that affect risk of falls    -  [place] fall precautions as indicated by assessment   - Educate patient/family on patient safety including physical limitations  - Instruct patient to call for assistance with activity based on assessment  - Modify environment to reduce risk of injury  - Consider OT/PT consult to assist with strengthening/mobility  Outcome: Progressing     Problem: DISCHARGE PLANNING - CARE MANAGEMENT  Goal: Discharge to post-acute care or home with appropriate resources  Description: INTERVENTIONS:  - Conduct assessment to determine patient/family and health care team treatment goals, and need for post-acute services based on payer coverage, community resources, and patient preferences, and barriers to discharge  - Address psychosocial, clinical, and financial barriers to discharge as identified in assessment in conjunction with the patient/family and health care team  - Arrange appropriate level of post-acute services according to patients   needs and preference and payer coverage in collaboration with the physician and health care team  - Communicate with and update the patient/family, physician, and health care team regarding progress on the discharge plan  - Arrange appropriate transportation to post-acute venues  Outcome: Progressing     Problem: PAIN - ADULT  Goal: Verbalizes/displays adequate comfort level or baseline comfort level  Description: Interventions:  - Encourage patient to monitor pain and request assistance  - Assess pain using appropriate pain scale  - Administer analgesics based on type and severity of pain and evaluate response  - Implement non-pharmacological measures as appropriate and evaluate response  - Consider cultural and social influences on pain and pain management  - Notify physician/advanced practitioner if interventions unsuccessful or patient reports new pain  Outcome: Progressing     Problem: INFECTION - ADULT  Goal: Absence or prevention of progression during hospitalization  Description: INTERVENTIONS:  - Assess and monitor for signs and symptoms of infection  - Monitor lab/diagnostic results  - Monitor all insertion sites, i e  indwelling lines, tubes, and drains  - Monitor endotracheal if appropriate and nasal secretions for changes in amount and color  - [place] appropriate cooling/warming therapies per order  - Administer medications as ordered  - Instruct and encourage patient and family to use good hand hygiene technique  - Identify and instruct in appropriate isolation precautions for identified infection/condition  Outcome: Progressing  Goal: Absence of fever/infection during neutropenic period  Description: INTERVENTIONS:  - Monitor WBC    Outcome: Progressing     Problem: SAFETY ADULT  Goal: Patient will remain free of falls  Description: INTERVENTIONS:  - Assess patient frequently for physical needs  -  Identify cognitive and physical deficits and behaviors that affect risk of falls    -  [place] fall precautions as indicated by assessment   - Educate patient/family on patient safety including physical limitations  - Instruct patient to call for assistance with activity based on assessment  - Modify environment to reduce risk of injury  - Consider OT/PT consult to assist with strengthening/mobility  Outcome: Progressing  Goal: Maintain or return to baseline ADL function  Description: INTERVENTIONS:  -  Assess patient's ability to carry out ADLs; assess patient's baseline for ADL function and identify physical deficits which impact ability to perform ADLs (bathing, care of mouth/teeth, toileting, grooming, dressing, etc )  - Assess/evaluate cause of self-care deficits   - Assess range of motion  - Assess patient's mobility; develop plan if impaired  - Assess patient's need for assistive devices and provide as appropriate  - Encourage maximum independence but intervene and supervise when necessary  - Involve family in performance of ADLs  - Assess for home care needs following discharge   - Consider OT consult to assist with ADL evaluation and planning for discharge  - Provide patient education as appropriate  Outcome: Progressing  Goal: Maintain or return mobility status to optimal level  Description: INTERVENTIONS:  - Assess patient's baseline mobility status (ambulation, transfers, stairs, etc )    - Identify cognitive and physical deficits and behaviors that affect mobility  - Identify mobility aids required to assist with transfers and/or ambulation (gait belt, sit-to-stand, lift, walker, cane, etc )  - [place] fall precautions as indicated by assessment  - Record patient progress and toleration of activity level on Mobility SBAR; progress patient to next Phase/Stage  - Instruct patient to call for assistance with activity based on assessment  - Consider rehabilitation consult to assist with strengthening/weightbearing, etc   Outcome: Progressing     Problem: DISCHARGE PLANNING  Goal: Discharge to home or other facility with appropriate resources  Description: INTERVENTIONS:  - Identify barriers to discharge w/patient and caregiver  - Arrange for needed discharge resources and transportation as appropriate  - Identify discharge learning needs (meds, wound care, etc )  - Arrange for interpretive services to assist at discharge as needed  - Refer to Case Management Department for coordinating discharge planning if the patient needs post-hospital services based on physician/advanced practitioner order or complex needs related to functional status, cognitive ability, or social support system  Outcome: Progressing     Problem: Knowledge Deficit  Goal: Patient/family/caregiver demonstrates understanding of disease process, treatment plan, medications, and discharge instructions  Description: Complete learning assessment and assess knowledge base    Interventions:  - Provide teaching at level of understanding  - Provide teaching via preferred learning methods  Outcome: Progressing     Problem: CARDIOVASCULAR - ADULT  Goal: Maintains optimal cardiac output and hemodynamic stability  Description: INTERVENTIONS:  - Monitor I/O, vital signs and rhythm  - Monitor for S/S and trends of decreased cardiac output  - Administer and titrate ordered vasoactive medications to optimize hemodynamic stability  - Assess quality of pulses, skin color and temperature  - Assess for signs of decreased coronary artery perfusion  - Instruct patient to report change in severity of symptoms  Outcome: Progressing     Problem: RESPIRATORY - ADULT  Goal: Achieves optimal ventilation and oxygenation  Description: INTERVENTIONS:  - Assess for changes in respiratory status  - Assess for changes in mentation and behavior  - Position to facilitate oxygenation and minimize respiratory effort  - Oxygen administered by appropriate delivery if ordered  - Initiate smoking cessation education as indicated  - Encourage broncho-pulmonary hygiene including cough, deep breathe, Incentive Spirometry  - Assess the need for suctioning and aspirate as needed  - Assess and instruct to report SOB or any respiratory difficulty  - Respiratory Therapy support as indicated  Outcome: Progressing     Problem: GASTROINTESTINAL - ADULT  Goal: Maintains or returns to baseline bowel function  Description: INTERVENTIONS:  - Assess bowel function  - Encourage oral fluids to ensure adequate hydration  - Administer IV fluids if ordered to ensure adequate hydration  - Administer ordered medications as needed  - Encourage mobilization and activity  - Consider nutritional services referral to assist patient with adequate nutrition and appropriate food choices  Outcome: Progressing     Problem: METABOLIC, FLUID AND ELECTROLYTES - ADULT  Goal: Fluid balance maintained  Description: INTERVENTIONS:  - Monitor labs   - Monitor I/O and WT  - Instruct patient on fluid and nutrition as appropriate  - Assess for signs & symptoms of volume excess or deficit  Outcome: Progressing     Problem: HEMATOLOGIC - ADULT  Goal: Maintains hematologic stability  Description: INTERVENTIONS  - Assess for signs and symptoms of bleeding or hemorrhage  - Monitor labs  - Administer supportive blood products/factors as ordered and appropriate  Outcome: Progressing

## 2021-01-08 NOTE — PROGRESS NOTES
Progress Note Mehran Yun 1965, 48 y o  male MRN: [id number]    Unit/Bed#: [address] [address] Encounter: [id number]    Primary Care Provider: Reyna Dickerson MD   Date and time admitted to hospital: 1/4/2021  9:54 AM        * Hemoptysis  Assessment & Plan  Admitted with persistent hemoptysis  Recent hospital admission in December at [facility] for which he underwent bronchoscopy by Pulmonary  Bronchoscopy revealed diffuse erythema and prominent vasculature without overt sequential increases in hemorrhage on serial aliquots   Still appears to be a diffuse inflammatory process rather than focal pneumonia   Diffuse pneumonia not ruled out  Patient underwent pulse steroids and plasmapheresis at that time  Hemoptysis resolved  Patient was discharged home on prednisone and Eliquis for left lower extremity DVT  · Pulmonary following, appreciate input  · Patient underwent repeat bronchoscopy on January 4, 2021 which showed no active bleeding or endobronchial lesions with some scattered thin bloody secretions throughout the airway  BAL of the left upper lobe was sent for cultures and cytology along with cell count  · Continue to hold Eliquis  · Rheumatology was consulted  · Patient might need IVC filter if anticoagulation is contraindication in the future  · Patient was started on pulse dose steroids with Solu-Medrol 250 milligram IV q 6 hours which she received for 3 days  Patient will be tapered to Solu-Medrol  · Patient continues to have persistent symptoms with hemoptysis and shortness of breath  · Prolonged discussion with patient and family in coordination with pulmonology, Nephrology  · Pulmonology discussed coordination of care with potentially [place] at DR MARTHA THAKKAR [facility] further intervention was recommended by test during [facility]    Rheumatology suggesting changing cyclophosphamide to Rituxan  · Patient's bronchial cultures growing mixed respiratory tobias and also stenotrophomonas-likely contaminant  Bronch AFB and Pneumocystis were negative  · Procalcitonin level was slightly higher 0 25    Acute respiratory failure [facility])  Assessment & Plan  Patient does not use oxygen at home  Patient was on 5 liters oxygen which is titrated down to 3 liters and O2 saturation is in mid 90s  In the setting of bilateral pulmonary infiltrates suggesting lupus vasculitis/pneumonitis  Patient was on high doses start which is tapered to Solu-Medrol 40 milligram IV q 12 hours  Repeat chest x-ray shows worsening diffuse airspace opacities suggesting possible pulmonary edema/diffuse pneumonia  Procalcitonin level was 0 25  Bronchial cultures grew stenotrophomonas-possible contaminant  Oxygen requirements have improved  Acute kidney injury superimposed on chronic kidney disease [facility])  Assessment & Plan  Lab Results   Component Value Date    EGFR 10 01/08/2021    EGFR 11 01/07/2021    EGFR 12 01/06/2021    CREATININE 5 79 (H) 01/08/2021    CREATININE 5 55 (H) 01/07/2021    CREATININE 5 03 (H) 01/06/2021     In the setting of known Lupus Nephritis  Progressively worsening with recent creatinine 2 44 -> 3 69 -> 4 59  Cr 4 7 on admission   Known to Dr Braden Smoker and Dr Odilon Gonzalez renal U/S 12/16 showed increased cortical echogenicity bilaterally, suggestive of underlying renal parenchymal disease and no hydronephrosis  Pt underwent kidney biopsy in November  · Nephrology input appreciated  · Patient initially received gentle IV hydration with normal saline which were discontinued today  · Avoid nephrotoxic agents and hypotension  · Worsening creatinine likely secondary to lupus nephritis/glomerulonephritis  · Patient's creatinine continues to get worse with poor urine output  · Patient was given 80 milligrams of IV Lasix today as patient also noted to have some hyperkalemia  · Patient continues to have worsening creatinine with urine output of 1000 milliliters with a dose of Lasix    Patient be given another dose of Lasix 80 milligram IV today  · Patient scheduled to receive another dose of Lasix 80 milligram IV today  · Patient got PermCath placed by IR today  Patient is scheduled for was sensation of the hemodialysis today  · Patient is scheduled for another hemodialysis session tomorrow and possible set up for outpatient hemodialysis  Results from last 7 days   Lab Units 01/08/21  0611 01/07/21  0601 01/06/21  0540 01/05/21  0646 01/04/21  0505   BUN mg/dL 119* 112* 104* 104* 103*   CREATININE mg/dL 5 79* 5 55* 5 03* 4 69* 4 70*       Anemia  Assessment & Plan  Chronic anemia with baseline hemoglobin near 8 5  · Hemoglobin is slightly down trending in the setting of hemoptysis  · Hemoglobin continued to drop to 7  · Patient received 1 unit of PRBC transfusion on January 7, 2021 with improved hemoglobin to 8  Results from last 7 days   Lab Units 01/08/21  0611 01/07/21  0601 01/06/21  0540 01/05/21  0646 01/04/21  0505   HEMOGLOBIN g/dL 8 0* 7 0* 7 2* 7 7* 7 9*   HEMATOCRIT % 26 2* 23 7* 23 5* 24 6* 24 7*   MCV fL 104* 107* 106* 106* 102*       SLE (systemic lupus erythematosus) (HCC)  Assessment & Plan  Diagnosed lupus in his 20's  Cellcept was discontinued recently  S/p plasmapheresis x 5 treatments recently  S/P cytoxan 12/18 with plan for every other week for 6 doses  Patient was on prednisone 20 milligram p o   Daily  · Initially started on Solu-Medrol 40 milligram q 12 hours which was changed to methylprednisolone 250 milligram IV q 6 hours  · Rheumatology was consulted and appreciate recommendations  · Patient has had 2 doses of IV cyclophosphamide so far and the plan was to receive 6 doses total every 2 weeks as outpatient  · Pulmonology discussed coordination of care with [facility] who recommended rituxan in the place of cyclophosphamide  · Further treatment plant based on pulmonology and Rheumatology recommendations  · Patient's repeat MPO, SSA and B, anti PR3 were all negative  · Rheumatology likely to set up patient for outpatient Rituxan  treatment    DVT (deep venous thrombosis) ([facility] [address] )  Assessment & Plan  Diagnosed with a left lower extremity DVT on 12/21/2020 for which she was started on Eliquis   · Hold Eliquis in the setting of hemoptysis and anticipated bronchoscopy today or tomorrow  · Consider need for IVC filter if anticoagulation is contraindicated in the future  · Repeat lower extremity venous Dopplers showed chronic nonocclusive DVT in the popliteal vein and proximal gastrocnemius vein with evidence of superficial thrombophlebitis below-knee in the greater saphenous vein    Elevated troponin  Assessment & Plan  Troponin 0 11, likely non MI elevated troponin elevation in the setting of CAROLINE, respiratory failure  Presently, chest pain-free  · Serial troponins continued remained flat  Results from last 7 days   Lab Units 01/05/21 2023 01/05/21  1820 01/04/21  0505   TROPONIN I ng/mL 0 07* 0 07* 0 11*       Chronic diastolic congestive heart failure (HCC)  Assessment & Plan  Wt Readings from Last 3 Encounters:   01/08/21 76 1 kg (167 lb 12 3 oz)   01/04/21 71 2 kg (157 lb)   12/22/20 75 6 kg (166 lb 10 7 oz)     Patient with volume overload on recent admission to [facility] in December 2020 for which she was managed with IV Lasix the later transitioned to Demadex 40 mg daily  · Continue to hold Demadex  · Patient was initially on gentle IV hydration which was later stopped  · No clinical evidence of fluid overload  · Patient has been receiving Lasix 80 milligram IV daily for the past 3 days  · Repeat chest x-ray  showed worsening diffuse airspace opacities      VTE Pharmacologic Prophylaxis:   Pharmacologic: Pharmacologic VTE Prophylaxis contraindicated due to Hemoptysis  Mechanical VTE Prophylaxis in Place: Yes    Patient Centered Rounds: I have performed bedside rounds with nursing staff today      Discussions with Specialists or Other Care Team Provider:  Dr Sabiha Reddy    Education and Discussions with Family / Patient: yes    Time Spent for Care: 45 minutes  More than 50% of total time spent on counseling and coordination of care as described above  Current Length of Stay: 4 day(s)    Current Patient Status: Inpatient   Certification Statement: The patient will continue to require additional inpatient hospital stay due to Acute respiratory failure, acute kidney injury with initial dialysis    Discharge Plan:  Pending course    Code Status: Level 1 - Full Code      Subjective:   Patient has improved shortness of breath  Patient's bringing out more mucus than blood over the past 24-48 hours  Patient did have some shortness of breath when he was lay down flat in the cath lab  Patient otherwise denies any chest pain, abdominal pain, nausea or vomiting    Objective:     Vitals:   Temp (24hrs), Av 5 °F (36 4 °C), Min:97 2 °F (36 2 °C), Max:97 9 °F (36 6 °C)    Temp:  [97 2 °F (36 2 °C)-97 9 °F (36 6 °C)] 97 7 °F (36 5 °C)  HR:  [77-94] 77  Resp:  [16-24] 18  BP: (132-184)/() 132/79  SpO2:  [90 %-97 %] 94 %  Body mass index is 24 42 kg/m²  Input and Output Summary (last 24 hours): Intake/Output Summary (Last 24 hours) at 2021 1344  Last data filed at 2021 1235  Gross per 24 hour   Intake 640 ml   Output 1145 ml   Net -505 ml       Physical Exam:     Physical Exam  Constitutional:       General: He is not in acute distress  HENT:      Head: Normocephalic and atraumatic  Eyes:      Conjunctiva/sclera: Conjunctivae normal       Pupils: Pupils are equal, round, and reactive to light  Neck:      Musculoskeletal: Normal range of motion and neck supple  Cardiovascular:      Rate and Rhythm: Normal rate and regular rhythm  Heart sounds: Normal heart sounds  Pulmonary:      Effort: Pulmonary effort is normal  No respiratory distress  Breath sounds: Rhonchi present  No wheezing or rales     Chest:      Chest wall: No tenderness  Abdominal:      General: Bowel sounds are normal  There is no distension  Palpations: Abdomen is soft  Tenderness: There is no abdominal tenderness  There is no guarding or rebound  Skin:     General: Skin is warm and dry  Findings: No rash  Neurological:      Mental Status: He is alert  Cranial Nerves: No cranial nerve deficit  Additional Data:     Labs:    Results from last 7 days   Lab Units 01/08/21  0611 01/07/21  0601   WBC Thousand/uL 12 18* 10 69*   HEMOGLOBIN g/dL 8 0* 7 0*   HEMATOCRIT % 26 2* 23 7*   PLATELETS Thousands/uL 147* 148*   NEUTROS PCT %  --  92*   LYMPHS PCT %  --  3*   MONOS PCT %  --  3*   EOS PCT %  --  0     Results from last 7 days   Lab Units 01/08/21  0611  01/05/21  0646   POTASSIUM mmol/L 4 8   < > 5 2   CHLORIDE mmol/L 105   < > 105   CO2 mmol/L 21   < > 25   BUN mg/dL 119*   < > 104*   CREATININE mg/dL 5 79*   < > 4 69*   CALCIUM mg/dL 8 8   < > 8 8   ALK PHOS U/L  --   --  73   ALT U/L  --   --  27   AST U/L  --   --  18    < > = values in this interval not displayed  Results from last 7 days   Lab Units 01/07/21  0601   INR  1 07       * I Have Reviewed All Lab Data Listed Above  * Additional Pertinent Lab Tests Reviewed:  [address] Admission Reviewed      Recent Cultures (last 7 days):     Results from last 7 days   Lab Units 01/04/21  1520   GRAM STAIN RESULT  1+ Polys*  2+ Gram positive cocci in pairs*       Last 24 Hours Medication List:   Current Facility-Administered Medications   Medication Dose Route Frequency Provider Last Rate    acetaminophen  650 mg Oral Q6H PRN Bonnie Angeline, ATULNP      amLODIPine  10 mg Oral Daily CRISTAL Dee      atorvastatin  40 mg Oral Daily With Dinner CRISTAL Dee      calcium carbonate-vitamin D  1 tablet Oral Daily With Breakfast CRISTAL Dee      dapsone  100 mg Oral Daily CRISTAL Dee      furosemide  80 mg Intravenous Once Jody Fontenot MD      methylPREDNISolone sodium succinate  40 mg Intravenous Q12H [address]      metoprolol succinate  100 mg Oral Daily Jody Fontenot MD      multivitamin-minerals  1 tablet Oral Daily CRISTAL Ball      ondansetron  4 mg Intravenous Q6H PRN CRISTAL Ball      pantoprazole  40 mg Oral Early Morning CRISTAL Ball      sevelamer  1,600 mg Oral TID With Meals CRISTAL Ball          Today, Patient Was Seen By: Valarie Clark MD    ** Please Note: Dictation voice to text software may have been used in the creation of this document   **

## 2021-01-08 NOTE — ASSESSMENT & PLAN NOTE
Wt Readings from Last 3 Encounters:   01/08/21 76 1 kg (167 lb 12 3 oz)   01/04/21 71 2 kg (157 lb)   12/22/20 75 6 kg (166 lb 10 7 oz)     Patient with volume overload on recent admission to [facility] in December 2020 for which she was managed with IV Lasix the later transitioned to Demadex 40 mg daily  · Continue to hold Demadex  · Patient was initially on gentle IV hydration which was later stopped  · No clinical evidence of fluid overload  · Patient has been receiving Lasix 80 milligram IV daily for the past 3 days    · Repeat chest x-ray  showed worsening diffuse airspace opacities

## 2021-01-08 NOTE — SEDATION DOCUMENTATION
Procedure ended  Right IJ HD permacath placed without incident  Dressing applied  Patient tolerated well and transferred to inpatient room via bed

## 2021-01-08 NOTE — ASSESSMENT & PLAN NOTE
Admitted with persistent hemoptysis  Recent hospital admission in December at [facility] for which he underwent bronchoscopy by Pulmonary  Bronchoscopy revealed diffuse erythema and prominent vasculature without overt sequential increases in hemorrhage on serial aliquots   Still appears to be a diffuse inflammatory process rather than focal pneumonia   Diffuse pneumonia not ruled out  Patient underwent pulse steroids and plasmapheresis at that time  Hemoptysis resolved  Patient was discharged home on prednisone and Eliquis for left lower extremity DVT  · Pulmonary following, appreciate input  · Patient underwent repeat bronchoscopy on January 4, 2021 which showed no active bleeding or endobronchial lesions with some scattered thin bloody secretions throughout the airway  BAL of the left upper lobe was sent for cultures and cytology along with cell count  · Continue to hold Eliquis  · Rheumatology was consulted  · Patient might need IVC filter if anticoagulation is contraindication in the future  · Patient was started on pulse dose steroids with Solu-Medrol 250 milligram IV q 6 hours which she received for 3 days  Patient will be tapered to Solu-Medrol  · Patient continues to have persistent symptoms with hemoptysis and shortness of breath  · Prolonged discussion with patient and family in coordination with pulmonology, Nephrology  · Pulmonology discussed coordination of care with potentially [place] at DR MARTHA THAKKAR [facility] further intervention was recommended by test during [facility]  Rheumatology suggesting changing cyclophosphamide to Rituxan  · Patient's bronchial cultures growing mixed respiratory tobias and also stenotrophomonas-likely contaminant  Bronch AFB and Pneumocystis were negative    · Procalcitonin level was slightly higher 0 25

## 2021-01-08 NOTE — PROGRESS NOTES
Progress Note - Rhys Semen Schoenfield 48 y o  male MRN: [id number]    Unit/Bed#: [address] [place] [address] Encounter: [id number]        Assessment and Plan:  1  Lupus nephritis and proteinuria:  Nephrology and Pulmonary evaluations noted  He has minimall urine output and no improvement in renal function  He is going to start dialysis after having a catheter placed tomorrow  Agree with the prospect of starting rituximab for his renal and pulmonary process  I understand this will need to be started as an outpatient after discharge  Cyclophosphamide would be discontinued at that point  2  Hemoptysis and pulmonary infiltrates:  Ground-glass appearance CT December 19  He is now being transfused  Bronchoscopy cultures negative so far  I spoke with Dr Debby Morelos and we discussed his conversation with specialists at [facility]  Rituximab was suggested as a treatment since he does not seem to be responding adequately to current therapy with pulse IV steroids, cyclophosphamide, and previous plasmapheresis  I agree with proceeding with rituximab  3  DVT lower extremities:   Eliquis is now on hold because of hemoptysis  For repeat venous Doppler study on legs  4  Positive C-ANCA:   repeat ANCA antibodies last month were negative including C-ANCA on December 28 and December 22  At this point, it is not clear if he has a ANCA associated vasculitis  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 lupus  5  Rheumatoid arthritis:  He has no complaints of joint pain now while on high-dose steroids  He has no swelling or synovitis in any joint now  Subjective:     He is resting comfortably in bed  He has no complaints shortness of breath now while resting and no joint pain or rashes  Objective:     Vitals: Blood pressure 155/87, pulse 84, temperature 97 7 °F (36 5 °C), resp  rate 19, height 5' 9 5" (1 765 m), weight 75 kg (165 lb 5 5 oz), SpO2 91 %  ,Body mass index is 24 07 kg/m²        Intake/Output Summary (Last 24 hours) at 1/7/2021 1909  Last data filed at 1/7/2021 1818  Gross per 24 hour   Intake 830 ml   Output 920 ml   Net -90 ml       Review of Systems:  Review of Systems    Physical Exam:   Physical Exam    Constitutional:   He is awake and alert in no acute distress while resting in bed  He is thinner and appears weaker than in past   Eyes:  NELIA, conjunctiva normal, sclera white   HENT:  Atraumatic, external ears normal, nose normal, oropharynx moist    Neck: normal range of motion, no tenderness, supple   Respiratory:  Good pulmonary effort, no respiratory distress resting in bed, scattered rhonchi  Cardiovascular:  Normal rate, normal rhythm, no murmurs  GI:  Soft, nondistended, normal bowel sounds, nontender, no rebound, no guarding  :  No costovertebral angle tenderness  Integument:   no rash, he has no Raynaud's in the fingers now  Lymphatic:  No lymphadenopathy noted   Neurologic:  Alert & oriented x 3, CN 2-12 normal, he moves all extremities with grossly normal strength, sensation intact touch    Psychiatric:  Speech and behavior appropriate  Musculoskeletal:   He has no tenderness or swelling in any of the finger joints, wrists, elbows, or shoulders  The right shoulder is very limited range of motion with abduction to only about 90 degrees and little internal external rotation but no swelling  Bilateral hip prosthesis have good range of motion without pain  Right total knee arthroplasty nontender without swelling, left knee total knee arthroplasty nontender without swelling  Ankles and toes are nontender without swelling  Invasive Devices     Peripheral Intravenous Line            Peripheral IV 01/04/21 Right Forearm 3 days              Labs on January 5, 2021:  SSA negative, SSB negative, C4 normal 21, C3 decreased at 74 7, myeloperoxidase antibody negative, proteinase 3 antibody negative, rheumatoid factor negative, serum IgG decrease 398, IgM decreased 16, IgA normal 193  On December 28, 2020 and December 22, 2020:  C-ANCA negative, p-ANCA negative, myeloperoxidase antibody negative, proteinase 3 antibody negative  On December 19, 2020:   C ANCA reported positive 1:160, p-ANCA negative, myeloperoxidase antibody negative, proteinase 3 antibody negative, C3 normal, C4 normal, double-stranded DNA antibodies elevated 139  On July 1, 2020: ANDRE was negative(ANDRE has been positive in the past)  Lab, Imaging and other studies: I have personally reviewed pertinent reports      CBC:   Lab Results   Component Value Date    WBC 10 69 (H) 01/07/2021    RBC 2 21 (L) 01/07/2021     BMP:   Lab Results   Component Value Date    SODIUM 139 01/07/2021    SODIUM 139 07/21/2020    CO2 20 (L) 01/07/2021    CO2 27 07/21/2020     (H) 01/07/2021    BUN 15 07/21/2020    CREATININE 5 55 (H) 01/07/2021    CALCIUM 8 8 01/07/2021    CALCIUM 8 8 07/21/2020     Coagulation:   Lab Results   Component Value Date    INR 1 07 01/07/2021     Cardiac markers: No results found for: CKMB, TROPONINT, MYOGLOBIN  ABGs:   Lab Results   Component Value Date    PH 6 0 07/21/2020

## 2021-01-08 NOTE — CASE MANAGEMENT
RANDEE sent a referral to [facility]ain Ruiz in [facility] for new start dialysis  RANDEE will follow up with referral and send updated clinicals as needed  Responsive to PPI and ipratropium. Returned with Bell's Palsy. Monitor

## 2021-01-08 NOTE — PLAN OF CARE
Post-Dialysis RN Treatment Note    Blood Pressure:  Pre 135/77 mm/Hg  Post 145/90 mmHg   EDW  TBD    Weight:  Pre 77 7 kg   Post 77 2 kg   Mode of weight measurement: Bed Scale   Volume Removed  500 ml    Treatment duration 150 minutes    NS given  No    Treatment shortened?  No   Medications given during Rx Not Applicable   Estimated Kt/V  Not Applicable   Access type: Permacath/TDC   Access Status: No    Report called to primary nurse   Yes Krupa Vasquez  Problem: METABOLIC, FLUID AND ELECTROLYTES - ADULT  Goal: Fluid balance maintained  Description: INTERVENTIONS:  - Monitor labs   - Monitor I/O and WT  - Instruct patient on fluid and nutrition as appropriate  - Assess for signs & symptoms of volume excess or deficit  Outcome: Progressing  Goal: Electrolytes maintained within normal limits  Description: INTERVENTIONS:  - Monitor labs and assess patient for signs and symptoms of electrolyte imbalances  - Administer electrolyte replacement as ordered  - Monitor response to electrolyte replacements, including repeat lab results as appropriate  - Instruct patient on fluid and nutrition as appropriate  Outcome: Progressing

## 2021-01-09 ENCOUNTER — APPOINTMENT (INPATIENT)
Dept: DIALYSIS | Facility: HOSPITAL | Age: 56
DRG: 545 | End: 2021-01-09
Payer: COMMERCIAL

## 2021-01-09 LAB
ANION GAP SERPL CALCULATED.3IONS-SCNC: 10 MMOL/L (ref 4–13)
BASOPHILS # BLD AUTO: 0.01 THOUSANDS/ΜL (ref 0–0.1)
BASOPHILS NFR BLD AUTO: 0 % (ref 0–1)
BUN SERPL-MCNC: 85 MG/DL (ref 5–25)
CALCIUM SERPL-MCNC: 8.3 MG/DL (ref 8.3–10.1)
CHLORIDE SERPL-SCNC: 104 MMOL/L (ref 100–108)
CO2 SERPL-SCNC: 25 MMOL/L (ref 21–32)
CREAT SERPL-MCNC: 4.4 MG/DL (ref 0.6–1.3)
EOSINOPHIL # BLD AUTO: 0 THOUSAND/ΜL (ref 0–0.61)
EOSINOPHIL NFR BLD AUTO: 0 % (ref 0–6)
ERYTHROCYTE [DISTWIDTH] IN BLOOD BY AUTOMATED COUNT: 17.7 % (ref 11.6–15.1)
GFR SERPL CREATININE-BSD FRML MDRD: 14 ML/MIN/1.73SQ M
GLUCOSE SERPL-MCNC: 180 MG/DL (ref 65–140)
HCT VFR BLD AUTO: 26.1 % (ref 36.5–49.3)
HGB BLD-MCNC: 7.9 G/DL (ref 12–17)
IMM GRANULOCYTES # BLD AUTO: 0.15 THOUSAND/UL (ref 0–0.2)
IMM GRANULOCYTES NFR BLD AUTO: 1 % (ref 0–2)
LYMPHOCYTES # BLD AUTO: 0.26 THOUSANDS/ΜL (ref 0.6–4.47)
LYMPHOCYTES NFR BLD AUTO: 2 % (ref 14–44)
MCH RBC QN AUTO: 31.6 PG (ref 26.8–34.3)
MCHC RBC AUTO-ENTMCNC: 30.3 G/DL (ref 31.4–37.4)
MCV RBC AUTO: 104 FL (ref 82–98)
MONOCYTES # BLD AUTO: 0.55 THOUSAND/ΜL (ref 0.17–1.22)
MONOCYTES NFR BLD AUTO: 5 % (ref 4–12)
NEUTROPHILS # BLD AUTO: 10.02 THOUSANDS/ΜL (ref 1.85–7.62)
NEUTS SEG NFR BLD AUTO: 92 % (ref 43–75)
NRBC BLD AUTO-RTO: 0 /100 WBCS
PHOSPHATE SERPL-MCNC: 6.3 MG/DL (ref 2.7–4.5)
PLATELET # BLD AUTO: 111 THOUSANDS/UL (ref 149–390)
PMV BLD AUTO: 11.3 FL (ref 8.9–12.7)
POTASSIUM SERPL-SCNC: 4 MMOL/L (ref 3.5–5.3)
PROCALCITONIN SERPL-MCNC: 0.32 NG/ML
RBC # BLD AUTO: 2.5 MILLION/UL (ref 3.88–5.62)
SODIUM SERPL-SCNC: 139 MMOL/L (ref 136–145)
WBC # BLD AUTO: 10.99 THOUSAND/UL (ref 4.31–10.16)

## 2021-01-09 PROCEDURE — 5A1D70Z PERFORMANCE OF URINARY FILTRATION, INTERMITTENT, LESS THAN 6 HOURS PER DAY: ICD-10-PCS | Performed by: FAMILY MEDICINE

## 2021-01-09 PROCEDURE — 85025 COMPLETE CBC W/AUTO DIFF WBC: CPT | Performed by: INTERNAL MEDICINE

## 2021-01-09 PROCEDURE — 99233 SBSQ HOSP IP/OBS HIGH 50: CPT | Performed by: INTERNAL MEDICINE

## 2021-01-09 PROCEDURE — 84145 PROCALCITONIN (PCT): CPT | Performed by: INTERNAL MEDICINE

## 2021-01-09 PROCEDURE — 84100 ASSAY OF PHOSPHORUS: CPT | Performed by: INTERNAL MEDICINE

## 2021-01-09 PROCEDURE — 99232 SBSQ HOSP IP/OBS MODERATE 35: CPT | Performed by: INTERNAL MEDICINE

## 2021-01-09 PROCEDURE — 80048 BASIC METABOLIC PNL TOTAL CA: CPT | Performed by: INTERNAL MEDICINE

## 2021-01-09 RX ORDER — PREDNISONE 20 MG/1
60 TABLET ORAL DAILY
Status: DISCONTINUED | OUTPATIENT
Start: 2021-01-09 | End: 2021-01-13 | Stop reason: HOSPADM

## 2021-01-09 RX ORDER — TORSEMIDE 20 MG/1
40 TABLET ORAL DAILY
Status: DISCONTINUED | OUTPATIENT
Start: 2021-01-10 | End: 2021-01-13 | Stop reason: HOSPADM

## 2021-01-09 RX ADMIN — PREDNISONE 60 MG: 20 TABLET ORAL at 16:57

## 2021-01-09 RX ADMIN — METHYLPREDNISOLONE SODIUM SUCCINATE 40 MG: 40 INJECTION, POWDER, FOR SOLUTION INTRAMUSCULAR; INTRAVENOUS at 12:04

## 2021-01-09 RX ADMIN — SEVELAMER HYDROCHLORIDE 1600 MG: 800 TABLET, FILM COATED PARENTERAL at 16:56

## 2021-01-09 RX ADMIN — SEVELAMER HYDROCHLORIDE 1600 MG: 800 TABLET, FILM COATED PARENTERAL at 12:03

## 2021-01-09 RX ADMIN — METOPROLOL SUCCINATE 100 MG: 100 TABLET, EXTENDED RELEASE ORAL at 12:03

## 2021-01-09 RX ADMIN — DAPSONE 100 MG: 100 TABLET ORAL at 12:16

## 2021-01-09 RX ADMIN — PANTOPRAZOLE SODIUM 40 MG: 40 TABLET, DELAYED RELEASE ORAL at 12:15

## 2021-01-09 RX ADMIN — ATORVASTATIN CALCIUM 40 MG: 40 TABLET, FILM COATED ORAL at 16:57

## 2021-01-09 RX ADMIN — OYSTER SHELL CALCIUM WITH VITAMIN D 1 TABLET: 500; 200 TABLET, FILM COATED ORAL at 12:03

## 2021-01-09 RX ADMIN — Medication 1 TABLET: at 12:04

## 2021-01-09 RX ADMIN — AMLODIPINE BESYLATE 10 MG: 10 TABLET ORAL at 12:04

## 2021-01-09 NOTE — ASSESSMENT & PLAN NOTE
Admitted with persistent hemoptysis  Recent hospital admission in December at [facility] for which he underwent bronchoscopy by Pulmonary  Bronchoscopy revealed diffuse erythema and prominent vasculature without overt sequential increases in hemorrhage on serial aliquots   Still appears to be a diffuse inflammatory process rather than focal pneumonia   Diffuse pneumonia not ruled out  Patient underwent pulse steroids and plasmapheresis at that time  Hemoptysis resolved  Patient was discharged home on prednisone and Eliquis for left lower extremity DVT  · Pulmonary following, appreciate input  · Patient underwent repeat bronchoscopy on January 4, 2021 which showed no active bleeding or endobronchial lesions with some scattered thin bloody secretions throughout the airway  BAL of the left upper lobe was sent for cultures and cytology along with cell count  · Continue to hold Eliquis  · Rheumatology was consulted  · Patient might need IVC filter if anticoagulation is contraindication in the future  · Patient was started on pulse dose steroids with Solu-Medrol 250 milligram IV q 6 hours which she received for 3 days  Patient will be tapered to Solu-Medrol  · Patient continues to have persistent symptoms with hemoptysis and shortness of breath  · Prolonged discussion with patient and family in coordination with pulmonology, Nephrology  · Pulmonology discussed coordination of care with potentially [place] at [facility] further intervention was recommended tertiary [facility]  Rheumatology suggesting changing cyclophosphamide to Rituxan  · Patient's bronchial cultures growing mixed respiratory tobias and also stenotrophomonas-likely contaminant  Bronch AFB and Pneumocystis were negative  · Procalcitonin level was 0 25    CRP has improved to 5 6 from 30 8

## 2021-01-09 NOTE — PROGRESS NOTES
Progress Note - Nichola Shoulders [place] 48 y o  male MRN: [id number]    Unit/Bed#: 4 [facility] Encounter: [id number]        Assessment and Plan:  1  Lupus nephritis and proteinuria:  Nephrology and Pulmonary evaluations noted  He was started on dialysis and had a 2nd treatment today  Rituximab treatment is under consideration and would need to be coordinated with his nephrologist and she is now on dialysis  Rituximab would need to be authorized by his insurance  I understand this will need to be given as an outpatient after discharge  Cyclophosphamide would be discontinued at that point  If rituximab is not started this week he would be due for cyclophosphamide this week since his previous dose was December 31  He remains on dapsone for Pneumocystis prophylaxis  He is now on methylprednisolone 40 mg q 12   2  Hemoptysis and pulmonary infiltrates/Pulmonary vasculitis:  Bronchoscopy cultures negative so far  He has noted only dried blood in his mucus, no bright red blood today  3  DVT lower extremities:   Eliquis is now on hold because of hemoptysis   For repeat venous Doppler study on legs  4  Positive C-ANCA:  Other ANCA antibodies were negative    At this point, it is not clear if he has a ANCA associated vasculitis versus lupus vasculitis  5  Rheumatoid arthritis:  He has no complaints of joint pain now while on high-dose steroids   He has no swelling or synovitis in any joint now       Subjective:   He feels well today  He says he is breathing better after his 2nd dialysis treatment today with above complaints of shortness of breath  He has no joint pain  He has had no recurrence      Objective:     Vitals: Blood pressure 147/88, pulse 83, temperature 97 8 °F (36 6 °C), temperature source Oral, resp  rate 18, height 5' 9 5" (1 765 m), weight 78 9 kg (174 lb), SpO2 95 %  ,Body mass index is 25 33 kg/m²        Intake/Output Summary (Last 24 hours) at 1/9/2021 1223  Last data filed at 1/9/2021 1136  Gross per 24 hour   Intake 1000 ml   Output 3816 ml   Net -2816 ml       Review of Systems:  Review of Systems    Physical Exam:   Physical Exam  Constitutional:   He is awake and alert in no acute distress while resting in bed   He is thinner and appears weaker than in past   Respiratory:  Good pulmonary effort, no respiratory distress resting in bed, scattered rhonchi left lung posteriorly  Dialysis catheter present right subclavian area     Cardiovascular:  Normal rate, normal rhythm, no murmurs      GI:  Soft, nondistended, normal bowel sounds, nontender     Integument:   no rash, he has no Raynaud's fingers  Neurological:  Alert and did he moves all extremities with grossly normal strength, sensation intact touch    Psychiatric:  Speech and behavior appropriate     Musculoskeletal:   He has no tenderness or swelling in any of the finger joints, wrists, elbows, or shoulders   The right shoulder has very limited range of motion but no tenderness or swelling   Right total knee arthroplasty nontender without swelling, left knee total knee arthroplasty nontender without swelling   Ankles and toes are nontender without swelling  On January 9, 2021:  WBC 10 9, hemoglobin 7 9, platelets 268, neutrophils 10 02, creatinine 4 4, glucose 100  On January 8, 2021:  C reactive protein 5 6, creatinine 5 79  Invasive Devices     Central Venous Catheter Line            CVC Central Lines 01/08/21 Double 1 day          Peripheral Intravenous Line            Peripheral IV 01/04/21 Right Forearm 5 days          Hemodialysis Catheter            HD Permanent Double Catheter 1 day                          Lab, Imaging and other studies: I have personally reviewed pertinent reports      CBC:   Lab Results   Component Value Date    WBC 10 99 (H) 01/09/2021    RBC 2 50 (L) 01/09/2021     BMP:   Lab Results   Component Value Date    SODIUM 139 01/09/2021    SODIUM 139 07/21/2020    CO2 25 01/09/2021    CO2 27 07/21/2020    BUN 85 (H) 01/09/2021    BUN 15 07/21/2020    CREATININE 4 40 (H) 01/09/2021    CALCIUM 8 3 01/09/2021    CALCIUM 8 8 07/21/2020     Coagulation:   Lab Results   Component Value Date    INR 1 07 01/07/2021     Cardiac markers: No results found for: CKMB, TROPONINT, MYOGLOBIN  ABGs:   Lab Results   Component Value Date    PH 6 0 07/21/2020

## 2021-01-09 NOTE — ASSESSMENT & PLAN NOTE
Patient does not use oxygen at home  Patient was on 5 liters oxygen which is titrated down to 3 liters and O2 saturation is in mid 90s  In the setting of bilateral pulmonary infiltrates suggesting lupus vasculitis/pneumonitis  Patient was on high doses steroids which is tapered to Solu-Medrol 40 milligram IV q 12 hours  Will transition to prednisone 60 milligram p o   Daily  Repeat chest x-ray shows worsening diffuse airspace opacities suggesting possible pulmonary edema/diffuse pneumonia  Procalcitonin level was 0 25  Bronchial cultures grew stenotrophomonas-possible contaminant  Patient is currently on 2 liters oxygen which can be titrated off has tolerated  Will repeat chest x-ray tomorrow after 2 sessions of dialysis

## 2021-01-09 NOTE — PROGRESS NOTES
Progress Note Ben Orozco 1965, 48 y o  male MRN: [id number]    Unit/Bed#: [address] [address] Encounter: [id number]    Primary Care Provider: Bob Lr MD   Date and time admitted to hospital: 1/4/2021  9:54 AM        * Hemoptysis  Assessment & Plan  Admitted with persistent hemoptysis  Recent hospital admission in December at [facility] for which he underwent bronchoscopy by Pulmonary  Bronchoscopy revealed diffuse erythema and prominent vasculature without overt sequential increases in hemorrhage on serial aliquots   Still appears to be a diffuse inflammatory process rather than focal pneumonia   Diffuse pneumonia not ruled out  Patient underwent pulse steroids and plasmapheresis at that time  Hemoptysis resolved  Patient was discharged home on prednisone and Eliquis for left lower extremity DVT  · Pulmonary following, appreciate input  · Patient underwent repeat bronchoscopy on January 4, 2021 which showed no active bleeding or endobronchial lesions with some scattered thin bloody secretions throughout the airway  BAL of the left upper lobe was sent for cultures and cytology along with cell count  · Continue to hold Eliquis  · Rheumatology was consulted  · Patient might need IVC filter if anticoagulation is contraindication in the future  · Patient was started on pulse dose steroids with Solu-Medrol 250 milligram IV q 6 hours which she received for 3 days  Patient will be tapered to Solu-Medrol  · Patient continues to have persistent symptoms with hemoptysis and shortness of breath  · Prolonged discussion with patient and family in coordination with pulmonology, Nephrology  · Pulmonology discussed coordination of care with potentially [place] at DR MARTHA THAKKAR [facility] further intervention was recommended tertiary [facility]    Rheumatology suggesting changing cyclophosphamide to Rituxan  · Patient's bronchial cultures growing mixed respiratory tobias and also stenotrophomonas-likely contaminant  [employer] and Pneumocystis were negative  · Procalcitonin level was 0 25  CRP has improved to 5 6 from 30 8    Acute respiratory failure [facility])  Assessment & Plan  Patient does not use oxygen at home  Patient was on 5 liters oxygen which is titrated down to 3 liters and O2 saturation is in mid 90s  In the setting of bilateral pulmonary infiltrates suggesting lupus vasculitis/pneumonitis  Patient was on high doses steroids which is tapered to Solu-Medrol 40 milligram IV q 12 hours  Will transition to prednisone 60 milligram p o   Daily  Repeat chest x-ray shows worsening diffuse airspace opacities suggesting possible pulmonary edema/diffuse pneumonia  Procalcitonin level was 0 25  Bronchial cultures grew stenotrophomonas-possible contaminant  Patient is currently on 2 liters oxygen which can be titrated off has tolerated  Will repeat chest x-ray tomorrow after 2 sessions of dialysis    Acute kidney injury superimposed on chronic kidney disease [facility])  Assessment & Plan  Lab Results   Component Value Date    EGFR 14 01/09/2021    EGFR 10 01/08/2021    EGFR 11 01/07/2021    CREATININE 4 40 (H) 01/09/2021    CREATININE 5 79 (H) 01/08/2021    CREATININE 5 55 (H) 01/07/2021     In the setting of known Lupus Nephritis  Creatinine continued to get worse with poor urine output  Cr 4 7 on admission   Known to Dr Anjelica Peters and Dr Miles De La Rosa renal U/S 12/16 showed increased cortical echogenicity bilaterally, suggestive of underlying renal parenchymal disease and no hydronephrosis  Pt underwent kidney biopsy in November  · Nephrology input appreciated  · Patient initially received gentle IV hydration with normal saline which were discontinued today  · Avoid nephrotoxic agents and hypotension  · Worsening creatinine likely secondary to lupus nephritis/glomerulonephritis  · Patient received Lasix 80 milligram IV daily for the past 3 days with poor urine output  · Patient got PermCath placed by IR on January 8, 2021 and was started on dialysis  · Patient had 2nd session of dialysis today with ultrafiltration of 2 liters  · Next dialysis scheduled for January 11, 2020  Results from last 7 days   Lab Units 01/09/21  0831 01/08/21  0611 01/07/21  0601 01/06/21  0540 01/05/21  0646 01/04/21  0505   BUN mg/dL 85* 119* 112* 104* 104* 103*   CREATININE mg/dL 4 40* 5 79* 5 55* 5 03* 4 69* 4 70*       Anemia  Assessment & Plan  Chronic anemia with baseline hemoglobin near 8 5  · Hemoglobin is slightly down trending in the setting of hemoptysis  · Hemoglobin continued to drop to 7  · Patient received 1 unit of PRBC transfusion on January 7, 2021 with improved hemoglobin to 8  Hemoglobin remained stable  Results from last 7 days   Lab Units 01/09/21  0830 01/08/21  0611 01/07/21  0601 01/06/21  0540 01/05/21  0646 01/04/21  0505   HEMOGLOBIN g/dL 7 9* 8 0* 7 0* 7 2* 7 7* 7 9*   HEMATOCRIT % 26 1* 26 2* 23 7* 23 5* 24 6* 24 7*   MCV fL 104* 104* 107* 106* 106* 102*       SLE (systemic lupus erythematosus) (HCC)  Assessment & Plan  Diagnosed lupus in his 20's  Cellcept was discontinued recently  S/p plasmapheresis x 5 treatments recently  S/P cytoxan 12/18 with plan for every other week for 6 doses  Patient was on prednisone 20 milligram p o   Daily  · Initially started on Solu-Medrol 40 milligram q 12 hours which was changed to methylprednisolone 250 milligram IV q 6 hours  · Rheumatology was consulted and appreciate recommendations  · Patient has had 2 doses of IV cyclophosphamide so far and the plan was to receive 6 doses total every 2 weeks as outpatient  · Pulmonology discussed coordination of care with [facility] who recommended rituxan in the place of cyclophosphamide  · Further treatment plant based on pulmonology and Rheumatology recommendations  · Patient's repeat MPO, SSA and B, anti PR3 were all negative  · Rheumatology, Nephrology working on setting up outpatient Rituxan treatment and working on insurance approvals    Elevated troponin  Assessment & Plan  Troponin 0 11, likely non MI elevated troponin elevation in the setting of CAROLINE, respiratory failure  Presently, chest pain-free  · Serial troponins continued remained flat  Results from last 7 days   Lab Units 01/05/21 2023 01/05/21  1820 01/04/21  0505   TROPONIN I ng/mL 0 07* 0 07* 0 11*       Chronic diastolic congestive heart failure ([facility] [address] )  Assessment & Plan  Wt Readings from Last 3 Encounters:   01/09/21 78 9 kg (174 lb)   01/04/21 71 2 kg (157 lb)   12/22/20 75 6 kg (166 lb 10 7 oz)     Patient with volume overload on recent admission to [facility] in December 2020 for which she was managed with IV Lasix the later transitioned to Demadex 40 mg daily  · Continue to hold Demadex  · Patient was initially on gentle IV hydration which was later stopped  · No clinical evidence of fluid overload  · Patient has been receiving Lasix 80 milligram IV daily for the past 3 days  · Repeat chest x-ray  showed worsening diffuse airspace opacities  · Repeat chest x-ray in a m  After ultrafiltration today        VTE Pharmacologic Prophylaxis:   Pharmacologic: Pharmacologic VTE Prophylaxis contraindicated due to Hemoptysis  Mechanical VTE Prophylaxis in Place: Yes    Patient Centered Rounds: I have performed bedside rounds with nursing staff today  Discussions with Specialists or Other Care Team Provider: Dr Winnie Gutierrez    Education and Discussions with Family / Patient: yes    Time Spent for Care: 45 minutes  More than 50% of total time spent on counseling and coordination of care as described above      Current Length of Stay: 5 day(s)    Current Patient Status: Inpatient   Certification Statement: The patient will continue to require additional inpatient hospital stay due to Acute kidney injury with new initiation of dialysis, hemoptysis and acute respiratory    Discharge Plan:  Pending course    Code Status: Level 1 - Full Code      Subjective:   Patient has improved shortness of breath  Patient continues to complain of cough productive of mucoid phlegm with small amount of old blood  Patient denies any chest pain, abdominal pain  Patient tolerated dialysis well today and even yesterday    Objective:     Vitals:   Temp (24hrs), Av 5 °F (36 4 °C), Min:96 8 °F (36 °C), Max:97 9 °F (36 6 °C)    Temp:  [96 8 °F (36 °C)-97 9 °F (36 6 °C)] 97 9 °F (36 6 °C)  HR:  [77-88] 85  Resp:  [18-20] 18  BP: (142-176)/() 176/108  SpO2:  [90 %-96 %] 95 %  Body mass index is 25 33 kg/m²  Input and Output Summary (last 24 hours): Intake/Output Summary (Last 24 hours) at 2021 1536  Last data filed at 2021 1301  Gross per 24 hour   Intake 500 ml   Output 2766 ml   Net -2266 ml       Physical Exam:     Physical Exam  Constitutional:       General: He is not in acute distress  HENT:      Head: Normocephalic and atraumatic  Eyes:      Conjunctiva/sclera: Conjunctivae normal       Pupils: Pupils are equal, round, and reactive to light  Neck:      Musculoskeletal: Normal range of motion and neck supple  Cardiovascular:      Rate and Rhythm: Normal rate and regular rhythm  Heart sounds: Normal heart sounds  Pulmonary:      Effort: Pulmonary effort is normal  No respiratory distress  Breath sounds: Rhonchi present  No wheezing or rales  Comments: Few scattered rhonchi  Chest:      Chest wall: No tenderness  Abdominal:      General: Bowel sounds are normal  There is no distension  Palpations: Abdomen is soft  Tenderness: There is no abdominal tenderness  There is no guarding or rebound  Skin:     General: Skin is warm and dry  Findings: No rash  Neurological:      Mental Status: He is alert  Cranial Nerves: No cranial nerve deficit           Additional Data:     Labs:    Results from last 7 days   Lab Units 21  0830   WBC Thousand/uL 10 99*   HEMOGLOBIN g/dL 7 9*   HEMATOCRIT % 26 1*   PLATELETS Thousands/uL 111*   NEUTROS PCT % 92*   LYMPHS PCT % 2*   MONOS PCT % 5   EOS PCT % 0     Results from last 7 days   Lab Units 01/09/21  0831  01/05/21  0646   POTASSIUM mmol/L 4 0   < > 5 2   CHLORIDE mmol/L 104   < > 105   CO2 mmol/L 25   < > 25   BUN mg/dL 85*   < > 104*   CREATININE mg/dL 4 40*   < > 4 69*   CALCIUM mg/dL 8 3   < > 8 8   ALK PHOS U/L  --   --  73   ALT U/L  --   --  27   AST U/L  --   --  18    < > = values in this interval not displayed  Results from last 7 days   Lab Units 01/07/21  0601   INR  1 07       * I Have Reviewed All Lab Data Listed Above  * Additional Pertinent Lab Tests Reviewed:  All [facility] Admission Reviewed        Recent Cultures (last 7 days):     Results from last 7 days   Lab Units 01/04/21  1520   GRAM STAIN RESULT  1+ Polys*  2+ Gram positive cocci in pairs*       Last 24 Hours Medication List:   Current Facility-Administered Medications   Medication Dose Route Frequency Provider Last Rate    acetaminophen  650 mg Oral Q6H PRN CRISTAL Brown      amLODIPine  10 mg Oral Daily CRISTAL Brown      atorvastatin  40 mg Oral Daily With Dinner CRISTAL Brown      calcium carbonate-vitamin D  1 tablet Oral Daily With Breakfast CRISTAL Brown      dapsone  100 mg Oral Daily CRISTAL Brown      metoprolol succinate  100 mg Oral Daily Margurite Cabot, MD      multivitamin-minerals  1 tablet Oral Daily CRISTAL Brown      ondansetron  4 mg Intravenous Q6H PRN CRISTAL Brown      pantoprazole  40 mg Oral Early Morning CRISTAL Brown      predniSONE  60 mg Oral Daily Meghna Martinez MD      sevelamer  1,600 mg Oral TID With Meals CRISTAL Brown      [START ON 1/10/2021] torsemide  40 mg Oral Daily Margurite Cabot, MD          Today, Patient Was Seen By: Meghna Martinez MD    ** Please Note: Dictation voice to text software may have been used in the creation of this document   **

## 2021-01-09 NOTE — ASSESSMENT & PLAN NOTE
Diagnosed lupus in his 19's  Cellcept was discontinued recently  S/p plasmapheresis x 5 treatments recently  S/P cytoxan 12/18 with plan for every other week for 6 doses  Patient was on prednisone 20 milligram p o   Daily  · Initially started on Solu-Medrol 40 milligram q 12 hours which was changed to methylprednisolone 250 milligram IV q 6 hours  · Rheumatology was consulted and appreciate recommendations  · Patient has had 2 doses of IV cyclophosphamide so far and the plan was to receive 6 doses total every 2 weeks as outpatient  · Pulmonology discussed coordination of care with [facility] who recommended rituxan in the place of cyclophosphamide  · Further treatment plant based on pulmonology and Rheumatology recommendations  · Patient's repeat MPO, SSA and B, anti PR3 were all negative  · Rheumatology, Nephrology working on setting up outpatient Rituxan treatment and working on [employer]

## 2021-01-09 NOTE — ASSESSMENT & PLAN NOTE
Chronic anemia with baseline hemoglobin near 8 5  · Hemoglobin is slightly down trending in the setting of hemoptysis  · Hemoglobin continued to drop to 7  · Patient received 1 unit of PRBC transfusion on January 7, 2021 with improved hemoglobin to 8   Hemoglobin remained stable  Results from last 7 days   Lab Units 01/09/21  0830 01/08/21  0611 01/07/21  0601 01/06/21  0540 01/05/21  0646 01/04/21  0505   HEMOGLOBIN g/dL 7 9* 8 0* 7 0* 7 2* 7 7* 7 9*   HEMATOCRIT % 26 1* 26 2* 23 7* 23 5* 24 6* 24 7*   MCV fL 104* 104* 107* 106* 106* 102*

## 2021-01-09 NOTE — PLAN OF CARE
Post-Dialysis RN Treatment Note    Blood Pressure:  Pre 173/93 mm/Hg  Post 147/88 mmHg   EDW  TBD    Weight:  Pre 77 kg   Post 74 6 kg   Mode of weight measurement: Bed Scale   Volume Removed  2000 ml    Treatment duration 180 minutes    NS given  No    Treatment shortened?  No   Medications given during Rx Not Applicable   Estimated Kt/V  Not Applicable   Access type: Permacath/TDC   Access Status: No    Report called to primary nurse   Yes Cecy Das  Problem: METABOLIC, FLUID AND ELECTROLYTES - ADULT  Goal: Fluid balance maintained  Description: INTERVENTIONS:  - Monitor labs   - Monitor I/O and WT  - Instruct patient on fluid and nutrition as appropriate  - Assess for signs & symptoms of volume excess or deficit  Outcome: Progressing  Goal: Electrolytes maintained within normal limits  Description: INTERVENTIONS:  - Monitor labs and assess patient for signs and symptoms of electrolyte imbalances  - Administer electrolyte replacement as ordered  - Monitor response to electrolyte replacements, including repeat lab results as appropriate  - Instruct patient on fluid and nutrition as appropriate  Outcome: Progressing

## 2021-01-09 NOTE — PROGRESS NOTES
NEPHROLOGY PROGRESS NOTE   Clarnce Crew Schoenfield 48 y o  male MRN: [id number]  Unit/Bed#: [address] [place] [address] Encounter: [id number]    ASSESSMENT & PLAN:  Acute kidney injury, POA on chronic kidney disease stage 3  -follows outpatient with Dr Levin  -Patient creatinine was around 1 6-1 9 till December 6, 2020  Had acute kidney injury during recent hospital admission in December with peak creatinine of 4 3 on 12/23   Was discharged with a creatinine of 4 4 on 12/28 but as outpatient creatinine was around 4 5   -Admission creatinine on 01/04/2020 was 4 7   Currently not on Bactrim as per patient since discharge from hospital in December   Previously was on Bactrim 3 times a week for PCP prophylaxis  Currently on dapsone which as per patient was started during last hospital admission at [facility]   -likely has worsening of renal function due to underlying lupus nephritis/GN   Previously had positive C ANCA but negative PR3 as well as MPO antibodies on blood work on 12/22/2020  Repeat in December,  C ANCA was negative at less than 1:20    -anti cardiolipin antibody in December was negative  -continue to monitor function   Avoid nephrotoxins   Avoid hypotension   -Previous UA from 12/30 showed 4-10 RBC per high-power field as well as 2+ protein  -postvoid residual only 30 mL  -received 3 days of pulse steroids, now on methylprednisone 40 mg every 12 hours  Okay to change to prednisone 60 mg daily but would defer to Rheumatologist and pulmonary   -noted discussion between pulmonary here and pulmonary as well as rheumatologist at [facility] and agree with plan for rituximab instead of Cytoxan to see if it helps  Also noted plan for no plasmapheresis as it would not benefit   -discussed with patient's outpatient nephrologist who will work on prior authorization for rituximab next week and plan to order rituximab at infusion center at [facility]   -Renal function worsened to cr of 5 79 on Jan 8th     -PermCath was placed by intervention Radiology on January 8 and 1st dialysis treatment was started on January 8th  -underwent 2nd dialysis treatment on January 9 with ultrafiltration 2 L  Next dialysis treatment on coming Monday   -had a long discussion with the patient as well as his wife Angie Liao on the phone  All questions were answered  -started on oral torsemide 40 mg daily from current dose of IV Lasix  -case management working on outpatient dialysis placement at [facility]     Lupus nephritis/proteinuria  -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  Olla 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 and tubular interstitial scarring   Tubular atrophy and interstitial inflammation were mild  -as outpatient was on prednisone 60 mg daily and cyclophosphamide 500 mg IV every 2 weeks    Last dose of cyclophosphamide was on 12/31 which was the 2nd dose   Currently on PCP prophylaxis with dapsone 100 mg daily  -  status post stroke pulse dose of steroids currently down to Solu-Medrol 40 mg Q 12  Plan for rituximab as out patient and stopping cyclophosphamide      Primary hypertension  -blood pressure improving with ultrafiltration   -avoid hypotension, continue medication:  Amlodipine and metoprolol   -switching IV Lasix to oral torsemide on January 9    -would continue ultrafiltration on HD     Hemoptysis:  Recent hospital admission in December at [facility] for similar complaint for which patient underwent plasmapheresis from 12/20 to 12/27 due to concern for diffuse alveolar hemorrhage   Was also treated with IV Solu-Medrol at that time     - was seen by Pulmonary and underwent bronchoscopy as well as bronchial lavage for culture and cytology     -As per Pulmonary , was started on high-dose IV Solu-Medrol    Bronchoscopy was not suggestive of diffuse alveolar hemorrhage and so no indication for plasmapheresis at this time   -also seen by rheumatologist     -Follow-up Pulmonary as well as Rheumatology recommendations  - Agree with plan of switching to Rituximab from cyclophosphamide       Fluid overload/lower extremity edema:   -continue ultrafiltration on hemodialysis treatment   -switched IV Lasix to oral torsemide 40 mg daily from January 10th 2020     CKD/MBD/hyperphosphatemia on binders-Renagel 1600 mg t i d   Placed on low phosphorus diet on 01/05  Expect to improve with further dialysis treatment  Already trending down     Left lower extremity DVT on Eliquis as outpatient, currently on hold        Low bicarbonate/ Acidosis:  Resolved with initiation of dialysis     Anemia:  Hemoglobin dropped to 7 0 on Jan7th and received PRBC, HB today stable at 7 9   continue to monitor per primary team   Hemoptysis could be contributing  Continue to monitor per primary team   Not a candidate for JESSEE due to left lower extremity DVT  If hemoglobin drops below 7 0, would recommend PRBC  -iron saturation 41%  Ferritin 347      Discussed with primary team - Dr Jeanna Rodríguez:  Shortness of breath and hemoptysis improving  No nausea vomiting    OBJECTIVE:  Current Weight: Weight - Scale: 78 9 kg (174 lb)  Vitals:    01/09/21 1130   BP: 147/88   Pulse: 83   Resp: 18   Temp: 97 8 °F (36 6 °C)   SpO2: 95%       Intake/Output Summary (Last 24 hours) at 1/9/2021 1301  Last data filed at 1/9/2021 1136  Gross per 24 hour   Intake 800 ml   Output 3816 ml   Net -3016 ml       Physical Exam  General:  Ill looking, awake  Eyes: Conjunctivae pink,  Sclera anicteric  ENT: lips and mucous membranes moist  Neck: supple   Chest: Clear to Auscultation both lungs,  no crackles, ronchus or wheezing  CVS: S1 & S2 present, normal rate, regular rhythm, no murmur    Abdomen: soft, non-tender, non-distended, Bowel sounds normoactive  Extremities: 1+ edema of both legs  Skin: no rash   Neuro: awake, alert, oriented x 3   Psych: Mood and affect appropriate       Medications:    Current Facility-Administered Medications:     acetaminophen (TYLENOL) tablet 650 mg, 650 mg, Oral, Q6H PRN, CRISTAL Aguilar, 650 mg at 01/08/21 1732    amLODIPine (NORVASC) tablet 10 mg, 10 mg, Oral, Daily, CRISTAL Niño, 10 mg at 01/09/21 1204    atorvastatin (LIPITOR) tablet 40 mg, 40 mg, Oral, Daily With Dinner, CRISTAL Aguilar, 40 mg at 01/08/21 1637    calcium carbonate-vitamin D (OSCAL-D) 500 mg-200 units per tablet 1 tablet, 1 tablet, Oral, Daily With Breakfast, CRISTAL Aguilar, 1 tablet at 01/09/21 1203    dapsone tablet 100 mg, 100 mg, Oral, Daily, CRISTAL Beverly, 100 mg at 01/09/21 1216    furosemide (LASIX) injection 80 mg, 80 mg, Intravenous, Once, Mahnaz Siegel MD, Stopped at 01/08/21 1203    methylPREDNISolone sodium succinate (Solu-MEDROL) injection 40 mg, 40 mg, Intravenous, Q12H [address], Warden Tarik PA-C, 40 mg at 01/09/21 1204    metoprolol succinate (TOPROL-XL) 24 hr tablet 100 mg, 100 mg, Oral, Daily, Mahnaz Siegel MD, 100 mg at 01/09/21 1203    multivitamin-minerals (CENTRUM) tablet 1 tablet, 1 tablet, Oral, Daily, CRISTAL Aguilar, 1 tablet at 01/09/21 1204    ondansetron (ZOFRAN) injection 4 mg, 4 mg, Intravenous, Q6H PRN, CRISTAL Aguilar    pantoprazole (PROTONIX) EC tablet 40 mg, 40 mg, Oral, Early Morning, CRISTAL Aguilar, 40 mg at 01/09/21 1215    sevelamer (RENAGEL) tablet 1,600 mg, 1,600 mg, Oral, TID With Meals, CRISTAL Aguilar, 1,600 mg at 01/09/21 1203    Invasive Devices:        Lab Results:   Results from last 7 days   Lab Units 01/09/21  0831 01/09/21  0830 01/08/21  0611 01/07/21  0601  01/05/21  0646 01/04/21  0505   WBC Thousand/uL  --  10 99* 12 18* 10 69*   < > 8 64 12 07*   HEMOGLOBIN g/dL  --  7 9* 8 0* 7 0*   < > 7 7* 7 9*   HEMATOCRIT %  --  26 1* 26 2* 23 7*   < > 24 6* 24 7*   PLATELETS Thousands/uL  --  111* 147* 148*   < > 138* 160   POTASSIUM mmol/L 4 0  --  4 8 4 7   < > 5 2 4 3   CHLORIDE mmol/L 104  --  105 104   < > 105 104   CO2 mmol/L 25  --  21 20*   < > 25 26   BUN mg/dL 85*  --  119* 112*   < > 104* 103*   CREATININE mg/dL 4 40*  --  5 79* 5 55*   < > 4 69* 4 70*   CALCIUM mg/dL 8 3  --  8 8 8 8   < > 8 8 9 1   MAGNESIUM mg/dL  --   --   --  2 6  --  2 6  --    PHOSPHORUS mg/dL 6 3*  --   --  7 6*  --  6 9*  --    ALK PHOS U/L  --   --   --   --   --  73 86   ALT U/L  --   --   --   --   --  27 37   AST U/L  --   --   --   --   --  18 17    < > = values in this interval not displayed  Previous work up:         Portions of the record may have been created with voice recognition software  Occasional wrong word or "sound a like" substitutions may have occurred due to the inherent limitations of voice recognition software  Read the chart carefully and recognize, using context, where substitutions have occurred  If you have any questions, please contact the dictating provider

## 2021-01-09 NOTE — ASSESSMENT & PLAN NOTE
Wt Readings from Last 3 Encounters:   01/09/21 78 9 kg (174 lb)   01/04/21 71 2 kg (157 lb)   12/22/20 75 6 kg (166 lb 10 7 oz)     Patient with volume overload on recent admission to [facility] in December 2020 for which she was managed with IV Lasix the later transitioned to Demadex 40 mg daily  · Continue to hold Demadex  · Patient was initially on gentle IV hydration which was later stopped  · No clinical evidence of fluid overload  · Patient has been receiving Lasix 80 milligram IV daily for the past 3 days  · Repeat chest x-ray  showed worsening diffuse airspace opacities  · Repeat chest x-ray in a m   After ultrafiltration today

## 2021-01-09 NOTE — PLAN OF CARE
Problem: Potential for Falls  Goal: Patient will remain free of falls  Description: INTERVENTIONS:  - Assess patient frequently for physical needs  -  Identify cognitive and physical deficits and behaviors that affect risk of falls    -  [place] fall precautions as indicated by assessment   - Educate patient/family on patient safety including physical limitations  - Instruct patient to call for assistance with activity based on assessment  - Modify environment to reduce risk of injury  - Consider OT/PT consult to assist with strengthening/mobility  Outcome: Progressing

## 2021-01-09 NOTE — ASSESSMENT & PLAN NOTE
Lab Results   Component Value Date    EGFR 14 01/09/2021    EGFR 10 01/08/2021    EGFR 11 01/07/2021    CREATININE 4 40 (H) 01/09/2021    CREATININE 5 79 (H) 01/08/2021    CREATININE 5 55 (H) 01/07/2021     In the setting of known Lupus Nephritis  Creatinine continued to get worse with poor urine output  Cr 4 7 on admission   Known to Dr Zonia Bowen and Dr Warden Cruz renal U/S 12/16 showed increased cortical echogenicity bilaterally, suggestive of underlying renal parenchymal disease and no hydronephrosis  Pt underwent kidney biopsy in November  · Nephrology input appreciated  · Patient initially received gentle IV hydration with normal saline which were discontinued today  · Avoid nephrotoxic agents and hypotension  · Worsening creatinine likely secondary to lupus nephritis/glomerulonephritis  · Patient received Lasix 80 milligram IV daily for the past 3 days with poor urine output  · Patient got PermCath placed by IR on January 8, 2021 and was started on dialysis    · Patient had 2nd session of dialysis today with ultrafiltration of 2 liters  · Next dialysis scheduled for January 11, 2020  Results from last 7 days   Lab Units 01/09/21  0831 01/08/21  1384 01/07/21  0601 01/06/21  0540 01/05/21  0646 01/04/21  0505   BUN mg/dL 85* 119* 112* 104* 104* 103*   CREATININE mg/dL 4 40* 5 79* 5 55* 5 03* 4 69* 4 70*

## 2021-01-10 ENCOUNTER — APPOINTMENT (INPATIENT)
Dept: RADIOLOGY | Facility: HOSPITAL | Age: 56
DRG: 545 | End: 2021-01-10
Payer: COMMERCIAL

## 2021-01-10 LAB
ANION GAP SERPL CALCULATED.3IONS-SCNC: 8 MMOL/L (ref 4–13)
BASOPHILS # BLD AUTO: 0 THOUSANDS/ΜL (ref 0–0.1)
BASOPHILS NFR BLD AUTO: 0 % (ref 0–1)
BUN SERPL-MCNC: 66 MG/DL (ref 5–25)
CALCIUM SERPL-MCNC: 8.3 MG/DL (ref 8.3–10.1)
CHLORIDE SERPL-SCNC: 101 MMOL/L (ref 100–108)
CO2 SERPL-SCNC: 26 MMOL/L (ref 21–32)
CREAT SERPL-MCNC: 3.43 MG/DL (ref 0.6–1.3)
EOSINOPHIL # BLD AUTO: 0 THOUSAND/ΜL (ref 0–0.61)
EOSINOPHIL NFR BLD AUTO: 0 % (ref 0–6)
ERYTHROCYTE [DISTWIDTH] IN BLOOD BY AUTOMATED COUNT: 16.8 % (ref 11.6–15.1)
GFR SERPL CREATININE-BSD FRML MDRD: 19 ML/MIN/1.73SQ M
GLUCOSE SERPL-MCNC: 101 MG/DL (ref 65–140)
HCT VFR BLD AUTO: 23.8 % (ref 36.5–49.3)
HGB BLD-MCNC: 7.2 G/DL (ref 12–17)
IMM GRANULOCYTES # BLD AUTO: 0.18 THOUSAND/UL (ref 0–0.2)
IMM GRANULOCYTES NFR BLD AUTO: 2 % (ref 0–2)
LYMPHOCYTES # BLD AUTO: 0.27 THOUSANDS/ΜL (ref 0.6–4.47)
LYMPHOCYTES NFR BLD AUTO: 4 % (ref 14–44)
MCH RBC QN AUTO: 31.4 PG (ref 26.8–34.3)
MCHC RBC AUTO-ENTMCNC: 30.3 G/DL (ref 31.4–37.4)
MCV RBC AUTO: 104 FL (ref 82–98)
MONOCYTES # BLD AUTO: 0.35 THOUSAND/ΜL (ref 0.17–1.22)
MONOCYTES NFR BLD AUTO: 5 % (ref 4–12)
NEUTROPHILS # BLD AUTO: 6.91 THOUSANDS/ΜL (ref 1.85–7.62)
NEUTS SEG NFR BLD AUTO: 89 % (ref 43–75)
NRBC BLD AUTO-RTO: 0 /100 WBCS
PLATELET # BLD AUTO: 96 THOUSANDS/UL (ref 149–390)
PMV BLD AUTO: 11.2 FL (ref 8.9–12.7)
POTASSIUM SERPL-SCNC: 4.5 MMOL/L (ref 3.5–5.3)
RBC # BLD AUTO: 2.29 MILLION/UL (ref 3.88–5.62)
SODIUM SERPL-SCNC: 135 MMOL/L (ref 136–145)
WBC # BLD AUTO: 7.71 THOUSAND/UL (ref 4.31–10.16)

## 2021-01-10 PROCEDURE — 71045 X-RAY EXAM CHEST 1 VIEW: CPT

## 2021-01-10 PROCEDURE — 80048 BASIC METABOLIC PNL TOTAL CA: CPT | Performed by: INTERNAL MEDICINE

## 2021-01-10 PROCEDURE — 85025 COMPLETE CBC W/AUTO DIFF WBC: CPT | Performed by: INTERNAL MEDICINE

## 2021-01-10 PROCEDURE — 87081 CULTURE SCREEN ONLY: CPT | Performed by: INTERNAL MEDICINE

## 2021-01-10 PROCEDURE — 99232 SBSQ HOSP IP/OBS MODERATE 35: CPT | Performed by: INTERNAL MEDICINE

## 2021-01-10 RX ADMIN — DAPSONE 100 MG: 100 TABLET ORAL at 09:49

## 2021-01-10 RX ADMIN — PREDNISONE 60 MG: 20 TABLET ORAL at 09:50

## 2021-01-10 RX ADMIN — OYSTER SHELL CALCIUM WITH VITAMIN D 1 TABLET: 500; 200 TABLET, FILM COATED ORAL at 08:14

## 2021-01-10 RX ADMIN — SEVELAMER HYDROCHLORIDE 1600 MG: 800 TABLET, FILM COATED PARENTERAL at 12:12

## 2021-01-10 RX ADMIN — AMLODIPINE BESYLATE 10 MG: 10 TABLET ORAL at 09:50

## 2021-01-10 RX ADMIN — METOPROLOL SUCCINATE 100 MG: 100 TABLET, EXTENDED RELEASE ORAL at 09:50

## 2021-01-10 RX ADMIN — ATORVASTATIN CALCIUM 40 MG: 40 TABLET, FILM COATED ORAL at 16:25

## 2021-01-10 RX ADMIN — PANTOPRAZOLE SODIUM 40 MG: 40 TABLET, DELAYED RELEASE ORAL at 05:47

## 2021-01-10 RX ADMIN — SEVELAMER HYDROCHLORIDE 1600 MG: 800 TABLET, FILM COATED PARENTERAL at 08:14

## 2021-01-10 RX ADMIN — TORSEMIDE 40 MG: 20 TABLET ORAL at 09:49

## 2021-01-10 RX ADMIN — ACETAMINOPHEN 650 MG: 325 TABLET, FILM COATED ORAL at 01:43

## 2021-01-10 RX ADMIN — SEVELAMER HYDROCHLORIDE 1600 MG: 800 TABLET, FILM COATED PARENTERAL at 16:25

## 2021-01-10 RX ADMIN — Medication 1 TABLET: at 09:50

## 2021-01-10 NOTE — ASSESSMENT & PLAN NOTE
Chronic anemia with baseline hemoglobin near 8 5  · Hemoglobin is slightly down trending in the setting of hemoptysis  · Hemoglobin continued to drop to 7  · Patient received 1 unit of PRBC transfusion on January 7, 2021 with improved hemoglobin to 8  Hemoglobin again dropped to 7 2  If hemoglobin continues to remain low Will transfuse 1 unit of PRBC transfusion in a m    Results from last 7 days   Lab Units 01/10/21  0550 01/09/21  0830 01/08/21  7287 01/07/21  0601 01/06/21  0540 01/05/21  0646 01/04/21  0505   HEMOGLOBIN g/dL 7 2* 7 9* 8 0* 7 0* 7 2* 7 7* 7 9*   HEMATOCRIT % 23 8* 26 1* 26 2* 23 7* 23 5* 24 6* 24 7*   MCV fL 104* 104* 104* 107* 106* 106* 102*

## 2021-01-10 NOTE — ASSESSMENT & PLAN NOTE
Patient does not use oxygen at home  Patient was on 5 liters oxygen which is titrated down to 3 liters and O2 saturation is in mid 90s  In the setting of bilateral pulmonary infiltrates suggesting lupus vasculitis/pneumonitis  Patient was on high doses steroids which is tapered to Solu-Medrol 40 milligram IV q 12 hours  Will transition to prednisone 60 milligram p o   Daily  Repeat chest x-ray shows worsening diffuse airspace opacities suggesting possible pulmonary edema/diffuse pneumonia  Procalcitonin level was 0 25  Bronchial cultures grew stenotrophomonas-possible contaminant  Patient is currently on 2 liters oxygen which can be titrated off has tolerated  Chest x-ray showed improving bilateral infiltrates after the dialysis  Will check ambulatory pulse oximetry

## 2021-01-10 NOTE — ASSESSMENT & PLAN NOTE
Lab Results   Component Value Date    EGFR 19 01/10/2021    EGFR 14 01/09/2021    EGFR 10 01/08/2021    CREATININE 3 43 (H) 01/10/2021    CREATININE 4 40 (H) 01/09/2021    CREATININE 5 79 (H) 01/08/2021     In the setting of known Lupus Nephritis  Creatinine continued to get worse with poor urine output  Cr 4 7 on admission   Known to Dr Chinedu Khanna and Dr Hartman Fenwick renal U/S 12/16 showed increased cortical echogenicity bilaterally, suggestive of underlying renal parenchymal disease and no hydronephrosis  Pt underwent kidney biopsy in November  · Nephrology input appreciated  · Patient initially received gentle IV hydration with normal saline which were discontinued today  · Avoid nephrotoxic agents and hypotension  · Worsening creatinine likely secondary to lupus nephritis/glomerulonephritis  · Patient received Lasix 80 milligram IV daily for the past 3 days with poor urine output  · Patient got PermCath placed by IR on January 8, 2021 and was started on dialysis  · Patient had 2nd session of dialysis on January 9, 2021 with ultrafiltration of 2 liters  · Next dialysis scheduled for January 11, 2020  · Patient started on oral torsemide 40 milligram p o   Daily on January 9th  Results from last 7 days   Lab Units 01/10/21  0550 01/09/21  0831 01/08/21  3661 01/07/21  0601 01/06/21  0540 01/05/21  0646 01/04/21  0505   BUN mg/dL 66* 85* 119* 112* 104* 104* 103*   CREATININE mg/dL 3 43* 4 40* 5 79* 5 55* 5 03* 4 69* 4 70*

## 2021-01-10 NOTE — PROGRESS NOTES
Progress Note - Pulmonary   Steven Reams Schoenfield 48 y o  male MRN: [id number]  Unit/Bed#: [address] [place] [address] Encounter: [id number]    Assessment:  Minor hemoptysis secondary to lupus pneumonitis-vasculitis  This has improved probably related to pulse dose steroids patient just received  Acute hypoxemic respiratory failure improving  Acute kidney injury superimposed on chronic kidney disease  Patient has lupus nephritis with worsening renal function  He has had 2 dialysis treatments thus far  Bronchoscopy culture 1/4  revealed Pseudomonas maltophilia which is likely colonizer  Procalcitonin level only minimally elevated 0 32 and this may be due to his renal dysfunction and not infection  C reactive protein level decreased to 5 6    Plan:  Follow-up chest x-ray tomorrow   IV Solu-Medrol discontinued patient now started on prednisone 60 mg daily  Will do ambulatory oximetry tomorrow to help assess if patient may need home oxygen on discharged  Would recommend follow-up venous Doppler study of left lower extremity in 7-10 days as outpatient      Subjective:   States his breathing is better today  Only has occasional minor hemoptysis states this is improved  Had 2nd hemodialysis session today  Objective:     Vitals: Blood pressure 154/91, pulse 85, temperature 98 4 °F (36 9 °C), resp  rate 16, height 5' 9 5" (1 765 m), weight 78 9 kg (174 lb), SpO2 95 %  ,Body mass index is 25 33 kg/m²  Intake/Output Summary (Last 24 hours) at 1/9/2021 2050  Last data filed at 1/9/2021 1800  Gross per 24 hour   Intake 500 ml   Output 2841 ml   Net -2341 ml       Physical Exam: Physical Exam  Vitals signs reviewed  Constitutional:       General: He is not in acute distress  Appearance: He is well-developed  Comments: On 2 L of oxygen O2 saturation is 96%  No dyspnea   HENT:      Head: Normocephalic  Nose: Nose normal       Mouth/Throat:      Mouth: Mucous membranes are moist       Pharynx: Oropharynx is clear  No oropharyngeal exudate  Eyes:      Conjunctiva/sclera: Conjunctivae normal       Pupils: Pupils are equal, round, and reactive to light  Neck:      Musculoskeletal: Neck supple  Vascular: No JVD  Trachea: No tracheal deviation  Cardiovascular:      Rate and Rhythm: Normal rate and regular rhythm  Heart sounds: Normal heart sounds  Pulmonary:      Effort: Pulmonary effort is normal       Comments: There are few inspiratory crackles in both lower lobes  Abdominal:      General: There is no distension  Palpations: Abdomen is soft  Tenderness: There is no abdominal tenderness  There is no guarding  Musculoskeletal:      Comments: No edema cyanosis or clubbing   Lymphadenopathy:      Cervical: No cervical adenopathy  Skin:     General: Skin is warm and dry  Findings: No rash  Neurological:      Mental Status: He is alert and oriented to person, place, and time  Psychiatric:         Behavior: Behavior normal          Thought Content: Thought content normal           Labs: I have personally reviewed pertinent lab results  , ABG: No results found for: PHART, GJE8BIK, PO2ART, VSM5NUT, P8CNKCVK, BEART, SOURCE, BNP: No results found for: BNP, CBC:   Lab Results   Component Value Date    WBC 10 99 (H) 01/09/2021    HGB 7 9 (L) 01/09/2021    HCT 26 1 (L) 01/09/2021     (H) 01/09/2021     (L) 01/09/2021    MCH 31 6 01/09/2021    MCHC 30 3 (L) 01/09/2021    RDW 17 7 (H) 01/09/2021    MPV 11 3 01/09/2021    NRBC 0 01/09/2021   , CMP:   Lab Results   Component Value Date    K 4 0 01/09/2021    K 4 7 07/21/2020     01/09/2021     07/21/2020    CO2 25 01/09/2021    CO2 27 07/21/2020    BUN 85 (H) 01/09/2021    BUN 15 07/21/2020    CREATININE 4 40 (H) 01/09/2021    CALCIUM 8 3 01/09/2021    CALCIUM 8 8 07/21/2020    AST 18 01/05/2021    AST 32 07/21/2020    ALT 27 01/05/2021    ALT 22 07/21/2020    ALKPHOS 73 01/05/2021    ALKPHOS 103 07/21/2020    EGFR 14 01/09/2021   , PT/INR:   Lab Results   Component Value Date    INR 1 07 01/07/2021   , Troponin: No results found for: TROPONIN    Imaging and other studies: I have personally reviewed pertinent reports     and I have personally reviewed pertinent films in PACS

## 2021-01-10 NOTE — PROGRESS NOTES
NEPHROLOGY PROGRESS NOTE   Buzzy Mor Schoenfield 48 y o  male MRN: [id number]  Unit/Bed#: [address] [place] [address] Encounter: [id number]    ASSESSMENT & PLAN:  Acute kidney injury, POA on chronic kidney disease stage 3  -follows outpatient with Dr Levin  -Patient creatinine was around 1 6-1 9 till December 6, 2020  Had acute kidney injury during recent hospital admission in December with peak creatinine of 4 3 on 12/23   Was discharged with a creatinine of 4 4 on 12/28 but as outpatient creatinine was around 4 5   -Admission creatinine on 01/04/2020 was 4 7   Currently not on Bactrim as per patient since discharge from hospital in December   Previously was on Bactrim 3 times a week for PCP prophylaxis  Currently on dapsone which as per patient was started during last hospital admission at [facility]   -likely has worsening of renal function due to underlying lupus nephritis/GN   Previously had positive C ANCA but negative PR3 as well as MPO antibodies on blood work on 12/22/2020  Repeat in December,  C ANCA was negative at less than 1:20    -anti cardiolipin antibody in December was negative  -continue to monitor function   Avoid nephrotoxins   Avoid hypotension   -Previous UA from 12/30 showed 4-10 RBC per high-power field as well as 2+ protein  -postvoid residual only 30 mL  -received 3 days of pulse steroids, then  methylprednisone 40 mg every 12 hours, now on prednisone 60 mg daily since January 9th  -noted discussion between pulmonary here and pulmonary as well as rheumatologist at [facility] and agree with plan for rituximab instead of Cytoxan to see if it helps  Also noted plan for no plasmapheresis as it would not benefit   -discussed with patient's outpatient nephrologist Dr Yadiel Mitchell who will work on prior authorization for rituximab next week and plan to order rituximab at infusion center at [facility]   -Renal function worsened to cr of 5 79 on Jan 8th     -PermCath was placed by intervention Radiology on January 8 and 1st dialysis treatment was started on January 8th  -underwent 2nd dialysis treatment on January 9 with ultrafiltration 2 L  Next dialysis treatment on coming Monday   -started on oral torsemide 40 mg daily on January 9th , previously was receiving IV Lasix, continue torsemide at current dose  -case management working on outpatient dialysis placement at [facility]  -once outpatient dialysis is confound, stable from Nephrology side for outpatient dialysis      Lupus nephritis/proteinuria  -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  Madolyn Rear 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 and tubular interstitial scarring   Tubular atrophy and interstitial inflammation were mild  -as outpatient was on prednisone 60 mg daily and cyclophosphamide 500 mg IV every 2 weeks    Last dose of cyclophosphamide was on 12/31 which was the 2nd dose   Currently on PCP prophylaxis with dapsone 100 mg daily  -  status post pulse dose of steroids and now on prednisone 60 mg daily  Plan for rituximab as out patient and stopping cyclophosphamide      Primary hypertension  -blood pressure slightly on higher side  -avoid hypotension, continue medication:  Amlodipine and metoprolol   -switched IV Lasix to oral torsemide on January 9    -would continue ultrafiltration on HD, plan for more ultrafiltration during dialysis treatment tomorrow which would help blood pressure control     Hemoptysis:  Recent hospital admission in December at [facility] for similar complaint for which patient underwent plasmapheresis from 12/20 to 12/27 due to concern for diffuse alveolar hemorrhage   Was also treated with IV Solu-Medrol at that time     - was seen by Pulmonary and underwent bronchoscopy as well as bronchial lavage for culture and cytology     -As per Pulmonary , was started on high-dose IV Solu-Medrol and now on oral prednisone    Bronchoscopy was not suggestive of diffuse alveolar hemorrhage and so no indication for plasmapheresis at this time   -also seen by rheumatologist     -Follow-up Pulmonary as well as Rheumatology recommendations  - Agree with plan of switching to Rituximab from cyclophosphamide       Fluid overload/lower extremity edema:   -continue ultrafiltration on hemodialysis treatment   -switched IV Lasix to oral torsemide 40 mg daily from January 10th 2020     CKD/MBD/hyperphosphatemia on binders-Renagel 1600 mg t i d   Placed on low phosphorus diet on 01/05  Expect to improve with further dialysis treatment  Already trending down     Left lower extremity DVT on Eliquis as outpatient, currently on hold  , noted plan for repeat duplex     Low bicarbonate/ Acidosis:  Resolved with initiation of dialysis     Anemia:  Hemoglobin dropped to 7 0 on Jan7th and received PRBC, HB today stable at 7 2   continue to monitor per primary team   Hemoptysis could be contributing  Continue to monitor per primary team   Not a candidate for JESSEE due to left lower extremity DVT  If hemoglobin drops below 7 0, would recommend PRBC  -iron saturation 41%  Ferritin 347      Discussed with primary team - Dr Bella Queen and discussed with Pulmonary      SUBJECTIVE:  Shortness of breath and hemoptysis improving  Still has lower extremity edema, no nausea vomiting    OBJECTIVE:  Current Weight: Weight - Scale: 76 4 kg (168 lb 6 9 oz)  Vitals:    01/10/21 1132   BP:    Pulse: 87   Resp:    Temp:    SpO2: 94%   /95   Pulse 87   Temp 97 7 °F (36 5 °C)   Resp 20   Ht 5' 9 5" (1 765 m)   Wt 76 4 kg (168 lb 6 9 oz)   SpO2 94%   BMI 24 52 kg/m²       Intake/Output Summary (Last 24 hours) at 1/10/2021 1155  Last data filed at 1/10/2021 0931  Gross per 24 hour   Intake    Output 650 ml   Net -650 ml       Physical Exam  General:  Ill looking, awake    Eyes: Conjunctivae pink,  Sclera anicteric  ENT: lips and mucous membranes moist  Neck: supple   Chest: Clear to Auscultation both lungs,  no crackles, ronchus or wheezing    Right IJ PermCath  CVS:   Bilateral basal coarse crackles improving  Abdomen: soft, non-tender, non-distended, Bowel sounds normoactive  Extremities: 1 + edema of  legs  Skin: no rash  Neuro: awake, alert, oriented x 3   Psych: Mood and affect appropriate         Medications:    Current Facility-Administered Medications:     acetaminophen (TYLENOL) tablet 650 mg, 650 mg, Oral, Q6H PRN, CRISTAL Sanchez, 650 mg at 01/10/21 0143    amLODIPine (NORVASC) tablet 10 mg, 10 mg, Oral, Daily, CRISTAL Niño, 10 mg at 01/10/21 0950    atorvastatin (LIPITOR) tablet 40 mg, 40 mg, Oral, Daily With Dinner, CRISTAL Sanchez, 40 mg at 01/09/21 1657    calcium carbonate-vitamin D (OSCAL-D) 500 mg-200 units per tablet 1 tablet, 1 tablet, Oral, Daily With Breakfast, CRISTAL Sanchez, 1 tablet at 01/10/21 7236    dapsone tablet 100 mg, 100 mg, Oral, Daily, CRISTAL Niño, 100 mg at 01/10/21 1678    metoprolol succinate (TOPROL-XL) 24 hr tablet 100 mg, 100 mg, Oral, Daily, Brissa Castillo MD, 100 mg at 01/10/21 0950    multivitamin-minerals (CENTRUM) tablet 1 tablet, 1 tablet, Oral, Daily, CRISTAL Sanchez, 1 tablet at 01/10/21 0950    ondansetron (ZOFRAN) injection 4 mg, 4 mg, Intravenous, Q6H PRN, CRISTAL Sanchez    pantoprazole (PROTONIX) EC tablet 40 mg, 40 mg, Oral, Early Morning, CRISTAL Sanchez, 40 mg at 01/10/21 0547    predniSONE tablet 60 mg, 60 mg, Oral, Daily, Randy Yuen MD, 60 mg at 01/10/21 0950    sevelamer (RENAGEL) tablet 1,600 mg, 1,600 mg, Oral, TID With Meals, CRISTAL Sanchez, 1,600 mg at 01/10/21 0814    torsemide (DEMADEX) tablet 40 mg, 40 mg, Oral, Daily, Brissa Castillo MD, 40 mg at 01/10/21 0949    Invasive Devices:        Lab Results:   Results from last 7 days   Lab Units 01/10/21  0550 01/09/21  0831 01/09/21  0830 01/08/21  6259 01/07/21  0601  01/05/21  5757 01/04/21  0503 WBC Thousand/uL 7 71  --  10 99* 12 18* 10 69*   < > 8 64 12 07*   HEMOGLOBIN g/dL 7 2*  --  7 9* 8 0* 7 0*   < > 7 7* 7 9*   HEMATOCRIT % 23 8*  --  26 1* 26 2* 23 7*   < > 24 6* 24 7*   PLATELETS Thousands/uL 96*  --  111* 147* 148*   < > 138* 160   POTASSIUM mmol/L 4 5 4 0  --  4 8 4 7   < > 5 2 4 3   CHLORIDE mmol/L 101 104  --  105 104   < > 105 104   CO2 mmol/L 26 25  --  21 20*   < > 25 26   BUN mg/dL 66* 85*  --  119* 112*   < > 104* 103*   CREATININE mg/dL 3 43* 4 40*  --  5 79* 5 55*   < > 4 69* 4 70*   CALCIUM mg/dL 8 3 8 3  --  8 8 8 8   < > 8 8 9 1   MAGNESIUM mg/dL  --   --   --   --  2 6  --  2 6  --    PHOSPHORUS mg/dL  --  6 3*  --   --  7 6*  --  6 9*  --    ALK PHOS U/L  --   --   --   --   --   --  73 86   ALT U/L  --   --   --   --   --   --  27 37   AST U/L  --   --   --   --   --   --  18 17    < > = values in this interval not displayed  Previous work up:         Portions of the record may have been created with voice recognition software  Occasional wrong word or "sound a like" substitutions may have occurred due to the inherent limitations of voice recognition software  Read the chart carefully and recognize, using context, where substitutions have occurred  If you have any questions, please contact the dictating provider

## 2021-01-10 NOTE — ASSESSMENT & PLAN NOTE
Wt Readings from Last 3 Encounters:   01/10/21 76 4 kg (168 lb 6 9 oz)   01/04/21 71 2 kg (157 lb)   12/22/20 75 6 kg (166 lb 10 7 oz)     Patient with volume overload on recent admission to [facility] in December 2020 for which she was managed with IV Lasix the later transitioned to Demadex 40 mg daily  · Continue to hold Demadex  · Patient was initially on gentle IV hydration which was later stopped  · No clinical evidence of fluid overload  · Patient has been receiving Lasix 80 milligram IV daily for the past 3 days    · Repeat chest x-ray  showed worsening diffuse airspace opacities  · Repeat chest x-ray this morning shows improving bilateral opacities

## 2021-01-10 NOTE — ASSESSMENT & PLAN NOTE
Diagnosed lupus in his 19's  Cellcept was discontinued recently  S/p plasmapheresis x 5 treatments recently  S/P cytoxan 12/18 with plan for every other week for 6 doses  Patient was on prednisone 20 milligram p o   Daily  · Initially started on Solu-Medrol 40 milligram q 12 hours which was changed to methylprednisolone 250 milligram IV q 6 hours  · Rheumatology was consulted and appreciate recommendations  · Patient has had 2 doses of IV cyclophosphamide so far and the plan was to receive 6 doses total every 2 weeks as outpatient  · Pulmonology discussed coordination of care with [facility] who recommended rituxan in the place of cyclophosphamide  · Further treatment plant based on pulmonology and Rheumatology recommendations  · Patient's repeat MPO, SSA and B, anti PR3 were all negative  · Rheumatology, Nephrology working on setting up outpatient Rituxan treatment and working on [employer]

## 2021-01-10 NOTE — ASSESSMENT & PLAN NOTE
Admitted with persistent hemoptysis  Recent hospital admission in December at [facility] for which he underwent bronchoscopy by Pulmonary  Bronchoscopy revealed diffuse erythema and prominent vasculature without overt sequential increases in hemorrhage on serial aliquots   Still appears to be a diffuse inflammatory process rather than focal pneumonia   Diffuse pneumonia not ruled out  Patient underwent pulse steroids and plasmapheresis at that time  Hemoptysis resolved  Patient was discharged home on prednisone and Eliquis for left lower extremity DVT  · Pulmonary following, appreciate input  · Patient underwent repeat bronchoscopy on January 4, 2021 which showed no active bleeding or endobronchial lesions with some scattered thin bloody secretions throughout the airway  BAL of the left upper lobe was sent for cultures and cytology along with cell count  · Continue to hold Eliquis  · Rheumatology was consulted  · Patient might need IVC filter if anticoagulation is contraindication in the future  · Patient was started on pulse dose steroids with Solu-Medrol 250 milligram IV q 6 hours which she received for 3 days  Patient will be tapered to Solu-Medrol  · Patient continues to have persistent symptoms with hemoptysis and shortness of breath  · Prolonged discussion with patient and family in coordination with pulmonology, Nephrology  · Pulmonology discussed coordination of care with potentially [place] at DR MARTHA THAKKAR [facility] further intervention was recommended tertiary [facility]  Rheumatology suggesting changing cyclophosphamide to Rituxan  · Patient's bronchial cultures growing mixed respiratory tobias and also stenotrophomonas-likely contaminant  Bronchial  AFB and Pneumocystis were negative  · Procalcitonin level was 0 25    CRP has improved to 5 6 from 30 8  · Chest x-ray showing improving bilateral infiltrates after the dialysis as read by me

## 2021-01-10 NOTE — PROGRESS NOTES
Progress Note Balaji Brood 1965, 48 y o  male MRN: [id number]    Unit/Bed#: [address] [place] [address] Encounter: [id number]    Primary Care Provider: Jacklyn Treadwell MD   Date and time admitted to hospital: 1/4/2021  9:54 AM        * Hemoptysis  Assessment & Plan  Admitted with persistent hemoptysis  Recent hospital admission in December at [facility] for which he underwent bronchoscopy by Pulmonary  Bronchoscopy revealed diffuse erythema and prominent vasculature without overt sequential increases in hemorrhage on serial aliquots   Still appears to be a diffuse inflammatory process rather than focal pneumonia   Diffuse pneumonia not ruled out  Patient underwent pulse steroids and plasmapheresis at that time  Hemoptysis resolved  Patient was discharged home on prednisone and Eliquis for left lower extremity DVT  · Pulmonary following, appreciate input  · Patient underwent repeat bronchoscopy on January 4, 2021 which showed no active bleeding or endobronchial lesions with some scattered thin bloody secretions throughout the airway  BAL of the left upper lobe was sent for cultures and cytology along with cell count  · Continue to hold Eliquis  · Rheumatology was consulted  · Patient might need IVC filter if anticoagulation is contraindication in the future  · Patient was started on pulse dose steroids with Solu-Medrol 250 milligram IV q 6 hours which she received for 3 days  Patient will be tapered to Solu-Medrol  · Patient continues to have persistent symptoms with hemoptysis and shortness of breath  · Prolonged discussion with patient and family in coordination with pulmonology, Nephrology  · Pulmonology discussed coordination of care with potentially [place] at DR MARTHA THAKKAR [facility] further intervention was recommended tertiary [facility]    Rheumatology suggesting changing cyclophosphamide to Rituxan  · Patient's bronchial cultures growing mixed respiratory tobias and also stenotrophomonas-likely contaminant  Bronchial  AFB and Pneumocystis were negative  · Procalcitonin level was 0 25  CRP has improved to 5 6 from 30 8  · Chest x-ray showing improving bilateral infiltrates after the dialysis as read by me      Acute respiratory failure [facility])  Assessment & Plan  Patient does not use oxygen at home  Patient was on 5 liters oxygen which is titrated down to 3 liters and O2 saturation is in mid 90s  In the setting of bilateral pulmonary infiltrates suggesting lupus vasculitis/pneumonitis  Patient was on high doses steroids which is tapered to Solu-Medrol 40 milligram IV q 12 hours  Will transition to prednisone 60 milligram p o   Daily  Repeat chest x-ray shows worsening diffuse airspace opacities suggesting possible pulmonary edema/diffuse pneumonia  Procalcitonin level was 0 25  Bronchial cultures grew stenotrophomonas-possible contaminant  Patient is currently on 2 liters oxygen which can be titrated off has tolerated  Chest x-ray showed improving bilateral infiltrates after the dialysis  Will check ambulatory pulse oximetry    Acute kidney injury superimposed on chronic kidney disease [facility])  Assessment & Plan  Lab Results   Component Value Date    EGFR 19 01/10/2021    EGFR 14 01/09/2021    EGFR 10 01/08/2021    CREATININE 3 43 (H) 01/10/2021    CREATININE 4 40 (H) 01/09/2021    CREATININE 5 79 (H) 01/08/2021     In the setting of known Lupus Nephritis  Creatinine continued to get worse with poor urine output  Cr 4 7 on admission   Known to Dr Christy Wallace and Dr Patti oWlfe renal U/S 12/16 showed increased cortical echogenicity bilaterally, suggestive of underlying renal parenchymal disease and no hydronephrosis  Pt underwent kidney biopsy in November  · Nephrology input appreciated  · Patient initially received gentle IV hydration with normal saline which were discontinued today  · Avoid nephrotoxic agents and hypotension  · Worsening creatinine likely secondary to lupus nephritis/glomerulonephritis  · Patient received Lasix 80 milligram IV daily for the past 3 days with poor urine output  · Patient got PermCath placed by IR on January 8, 2021 and was started on dialysis  · Patient had 2nd session of dialysis on January 9, 2021 with ultrafiltration of 2 liters  · Next dialysis scheduled for January 11, 2020  · Patient started on oral torsemide 40 milligram p o  Daily on January 9th  Results from last 7 days   Lab Units 01/10/21  0550 01/09/21  0831 01/08/21  0611 01/07/21  0601 01/06/21  0540 01/05/21  0646 01/04/21  0505   BUN mg/dL 66* 85* 119* 112* 104* 104* 103*   CREATININE mg/dL 3 43* 4 40* 5 79* 5 55* 5 03* 4 69* 4 70*       Anemia  Assessment & Plan  Chronic anemia with baseline hemoglobin near 8 5  · Hemoglobin is slightly down trending in the setting of hemoptysis  · Hemoglobin continued to drop to 7  · Patient received 1 unit of PRBC transfusion on January 7, 2021 with improved hemoglobin to 8  Hemoglobin again dropped to 7 2  If hemoglobin continues to remain low Will transfuse 1 unit of PRBC transfusion in a m  Results from last 7 days   Lab Units 01/10/21  0550 01/09/21  0830 01/08/21  0611 01/07/21  0601 01/06/21  0540 01/05/21  0646 01/04/21  0505   HEMOGLOBIN g/dL 7 2* 7 9* 8 0* 7 0* 7 2* 7 7* 7 9*   HEMATOCRIT % 23 8* 26 1* 26 2* 23 7* 23 5* 24 6* 24 7*   MCV fL 104* 104* 104* 107* 106* 106* 102*       SLE (systemic lupus erythematosus) (HCC)  Assessment & Plan  Diagnosed lupus in his 20's  Cellcept was discontinued recently  S/p plasmapheresis x 5 treatments recently  S/P cytoxan 12/18 with plan for every other week for 6 doses  Patient was on prednisone 20 milligram p o   Daily  · Initially started on Solu-Medrol 40 milligram q 12 hours which was changed to methylprednisolone 250 milligram IV q 6 hours  · Rheumatology was consulted and appreciate recommendations  · Patient has had 2 doses of IV cyclophosphamide so far and the plan was to receive 6 doses total every 2 weeks as outpatient  · Pulmonology discussed coordination of care with tertiary  [facility] who recommended rituxan in the place of cyclophosphamide  · Further treatment plant based on pulmonology and Rheumatology recommendations  · Patient's repeat MPO, SSA and B, anti PR3 were all negative  · Rheumatology, Nephrology working on setting up outpatient Rituxan treatment and working on insurance approvals    DVT (deep venous thrombosis) ([facility] )  Assessment & Plan  Diagnosed with a left lower extremity DVT on 12/21/2020 for which she was started on Eliquis   · Hold Eliquis in the setting of hemoptysis and anticipated bronchoscopy today or tomorrow  · Consider need for IVC filter if anticoagulation is contraindicated in the future  · Repeat lower extremity venous Dopplers showed chronic nonocclusive DVT in the popliteal vein and proximal gastrocnemius vein with evidence of superficial thrombophlebitis below-knee in the greater saphenous vein    Elevated troponin  Assessment & Plan  Troponin 0 11, likely non MI elevated troponin elevation in the setting of CAROLINE, respiratory failure  Presently, chest pain-free  · Serial troponins continued remained flat  Results from last 7 days   Lab Units 01/05/21 2023 01/05/21  1820 01/04/21  0505   TROPONIN I ng/mL 0 07* 0 07* 0 11*       Chronic diastolic congestive heart failure ([facility] )  Assessment & Plan  Wt Readings from Last 3 Encounters:   01/10/21 76 4 kg (168 lb 6 9 oz)   01/04/21 71 2 kg (157 lb)   12/22/20 75 6 kg (166 lb 10 7 oz)     Patient with volume overload on recent admission to [facility] in December 2020 for which she was managed with IV Lasix the later transitioned to Demadex 40 mg daily  · Continue to hold Demadex  · Patient was initially on gentle IV hydration which was later stopped  · No clinical evidence of fluid overload  · Patient has been receiving Lasix 80 milligram IV daily for the past 3 days    · Repeat chest x-ray  showed worsening diffuse airspace opacities  · Repeat chest x-ray this morning shows improving bilateral opacities      VTE Pharmacologic Prophylaxis:   Pharmacologic: Pharmacologic VTE Prophylaxis contraindicated due to Hemoptysis and anemia  Mechanical VTE Prophylaxis in Place: Yes    Patient Centered Rounds: I have performed bedside rounds with nursing staff today  Discussions with Specialists or Other Care Team Provider: Dr Giovanna Christina    Education and Discussions with Family / Patient: yes    Time Spent for Care: 45 minutes  More than 50% of total time spent on counseling and coordination of care as described above  Current Length of Stay: 6 day(s)    Current Patient Status: Inpatient   Certification Statement: The patient will continue to require additional inpatient hospital stay due to Acute kidney injury with new onset dialysis, acute respiratory failure    Discharge Plan:  Home    Code Status: Level 1 - Full Code      Subjective:   Patient is feeling better with breathing  Patient continues to have cough productive of mucoid phlegm with blood in the sputum  Denies any chest pain, abdominal pain, nausea or vomiting  Objective:     Vitals:   Temp (24hrs), Av 9 °F (36 6 °C), Min:97 5 °F (36 4 °C), Max:98 4 °F (36 9 °C)    Temp:  [97 5 °F (36 4 °C)-98 4 °F (36 9 °C)] 97 5 °F (36 4 °C)  HR:  [75-87] 86  Resp:  [16-20] 20  BP: (154-165)/(91-95) 165/93  SpO2:  [92 %-95 %] 93 %  Body mass index is 24 52 kg/m²  Input and Output Summary (last 24 hours): Intake/Output Summary (Last 24 hours) at 1/10/2021 1536  Last data filed at 1/10/2021 1452  Gross per 24 hour   Intake 240 ml   Output 950 ml   Net -710 ml       Physical Exam:     Physical Exam  Constitutional:       General: He is not in acute distress  HENT:      Head: Normocephalic and atraumatic  Eyes:      Conjunctiva/sclera: Conjunctivae normal       Pupils: Pupils are equal, round, and reactive to light     Neck:      Musculoskeletal: Normal range of motion and neck supple  Cardiovascular:      Rate and Rhythm: Normal rate and regular rhythm  Heart sounds: Normal heart sounds  Pulmonary:      Effort: Pulmonary effort is normal  No respiratory distress  Breath sounds: Rhonchi present  No wheezing or rales  Comments: Few scattered rhonchi  Chest:      Chest wall: No tenderness  Abdominal:      General: Bowel sounds are normal  There is no distension  Palpations: Abdomen is soft  Tenderness: There is no abdominal tenderness  There is no guarding or rebound  Skin:     General: Skin is warm and dry  Findings: No rash  Neurological:      Mental Status: He is alert  Cranial Nerves: No cranial nerve deficit  Additional Data:     Labs:    Results from last 7 days   Lab Units 01/10/21  0550   WBC Thousand/uL 7 71   HEMOGLOBIN g/dL 7 2*   HEMATOCRIT % 23 8*   PLATELETS Thousands/uL 96*   NEUTROS PCT % 89*   LYMPHS PCT % 4*   MONOS PCT % 5   EOS PCT % 0     Results from last 7 days   Lab Units 01/10/21  0550  01/05/21  0646   POTASSIUM mmol/L 4 5   < > 5 2   CHLORIDE mmol/L 101   < > 105   CO2 mmol/L 26   < > 25   BUN mg/dL 66*   < > 104*   CREATININE mg/dL 3 43*   < > 4 69*   CALCIUM mg/dL 8 3   < > 8 8   ALK PHOS U/L  --   --  73   ALT U/L  --   --  27   AST U/L  --   --  18    < > = values in this interval not displayed  Results from last 7 days   Lab Units 01/07/21  0601   INR  1 07       * I Have Reviewed All Lab Data Listed Above  * Additional Pertinent Lab Tests Reviewed:  [place] [facility] Admission Reviewed    Imaging:    Imaging Reports Reviewed Today Include:  Chest x-ray  Imaging Personally Reviewed by Myself Includes:  Chest x-ray    Recent Cultures (last 7 days):     Results from last 7 days   Lab Units 01/04/21  1520   GRAM STAIN RESULT  1+ Polys*  2+ Gram positive cocci in pairs*       Last 24 Hours Medication List:   Current Facility-Administered Medications   Medication Dose Route Frequency Provider Last Rate    acetaminophen  650 mg Oral Q6H PRN Jennifer Fernandez, CRISTAL      amLODIPine  10 mg Oral Daily Jennifer Fernandez, CRVASYL      atorvastatin  40 mg Oral Daily With Dinner CRISTAL Walsh      calcium carbonate-vitamin D  1 tablet Oral Daily With Breakfast CRISTAL Walsh      dapsone  100 mg Oral Daily Jennifer Fernandez, CRISTAL      metoprolol succinate  100 mg Oral Daily Ernesto Valle MD      multivitamin-minerals  1 tablet Oral Daily CRISTAL Walsh      ondansetron  4 mg Intravenous Q6H PRN Jennifer Fernandez, CRISTAL      pantoprazole  40 mg Oral Early Morning Jennifer Fernandez, CRNP      predniSONE  60 mg Oral Daily Jorge Keller MD      sevelamer  1,600 mg Oral TID With Meals CRISTAL Walsh      torsemide  40 mg Oral Daily Ernesto Valle MD          Today, Patient Was Seen By: Jorge Keller MD    ** Please Note: Dictation voice to text software may have been used in the creation of this document   **

## 2021-01-11 ENCOUNTER — APPOINTMENT (INPATIENT)
Dept: DIALYSIS | Facility: HOSPITAL | Age: 56
DRG: 545 | End: 2021-01-11
Payer: COMMERCIAL

## 2021-01-11 DIAGNOSIS — N18.9 ACUTE KIDNEY INJURY SUPERIMPOSED ON CHRONIC KIDNEY DISEASE (HCC): Primary | ICD-10-CM

## 2021-01-11 DIAGNOSIS — M32.13 OTHER SYSTEMIC LUPUS ERYTHEMATOSUS WITH LUNG INVOLVEMENT (HCC): ICD-10-CM

## 2021-01-11 DIAGNOSIS — N17.9 ACUTE KIDNEY INJURY SUPERIMPOSED ON CHRONIC KIDNEY DISEASE (HCC): Primary | ICD-10-CM

## 2021-01-11 LAB
ANION GAP SERPL CALCULATED.3IONS-SCNC: 11 MMOL/L (ref 4–13)
ANISOCYTOSIS BLD QL SMEAR: PRESENT
BASOPHILS # BLD MANUAL: 0 THOUSAND/UL (ref 0–0.1)
BASOPHILS NFR MAR MANUAL: 0 % (ref 0–1)
BUN SERPL-MCNC: 96 MG/DL (ref 5–25)
CALCIUM SERPL-MCNC: 8.5 MG/DL (ref 8.3–10.1)
CHLORIDE SERPL-SCNC: 103 MMOL/L (ref 100–108)
CO2 SERPL-SCNC: 25 MMOL/L (ref 21–32)
CREAT SERPL-MCNC: 4.2 MG/DL (ref 0.6–1.3)
EOSINOPHIL # BLD MANUAL: 0 THOUSAND/UL (ref 0–0.4)
EOSINOPHIL NFR BLD MANUAL: 0 % (ref 0–6)
ERYTHROCYTE [DISTWIDTH] IN BLOOD BY AUTOMATED COUNT: 16.1 % (ref 11.6–15.1)
GFR SERPL CREATININE-BSD FRML MDRD: 15 ML/MIN/1.73SQ M
GLUCOSE SERPL-MCNC: 135 MG/DL (ref 65–140)
HCT VFR BLD AUTO: 26 % (ref 36.5–49.3)
HGB BLD-MCNC: 8.1 G/DL (ref 12–17)
LYMPHOCYTES # BLD AUTO: 0.34 THOUSAND/UL (ref 0.6–4.47)
LYMPHOCYTES # BLD AUTO: 4 % (ref 14–44)
MCH RBC QN AUTO: 31.6 PG (ref 26.8–34.3)
MCHC RBC AUTO-ENTMCNC: 31.2 G/DL (ref 31.4–37.4)
MCV RBC AUTO: 102 FL (ref 82–98)
MICROCYTES BLD QL AUTO: PRESENT
MONOCYTES # BLD AUTO: 0.34 THOUSAND/UL (ref 0–1.22)
MONOCYTES NFR BLD: 4 % (ref 4–12)
MRSA NOSE QL CULT: NORMAL
NEUTROPHILS # BLD MANUAL: 7.76 THOUSAND/UL (ref 1.85–7.62)
NEUTS BAND NFR BLD MANUAL: 7 % (ref 0–8)
NEUTS SEG NFR BLD AUTO: 85 % (ref 43–75)
NRBC BLD AUTO-RTO: 0 /100 WBCS
PHOSPHATE SERPL-MCNC: 4.2 MG/DL (ref 2.7–4.5)
PLATELET # BLD AUTO: 100 THOUSANDS/UL (ref 149–390)
PLATELET BLD QL SMEAR: ADEQUATE
PMV BLD AUTO: 11.2 FL (ref 8.9–12.7)
POTASSIUM SERPL-SCNC: 4.3 MMOL/L (ref 3.5–5.3)
RBC # BLD AUTO: 2.56 MILLION/UL (ref 3.88–5.62)
RBC MORPH BLD: PRESENT
SODIUM SERPL-SCNC: 139 MMOL/L (ref 136–145)
TOTAL CELLS COUNTED SPEC: 100
WBC # BLD AUTO: 8.44 THOUSAND/UL (ref 4.31–10.16)

## 2021-01-11 PROCEDURE — 99232 SBSQ HOSP IP/OBS MODERATE 35: CPT | Performed by: PHYSICIAN ASSISTANT

## 2021-01-11 PROCEDURE — 87340 HEPATITIS B SURFACE AG IA: CPT | Performed by: INTERNAL MEDICINE

## 2021-01-11 PROCEDURE — 80048 BASIC METABOLIC PNL TOTAL CA: CPT | Performed by: INTERNAL MEDICINE

## 2021-01-11 PROCEDURE — 86704 HEP B CORE ANTIBODY TOTAL: CPT | Performed by: INTERNAL MEDICINE

## 2021-01-11 PROCEDURE — 86706 HEP B SURFACE ANTIBODY: CPT | Performed by: INTERNAL MEDICINE

## 2021-01-11 PROCEDURE — 99232 SBSQ HOSP IP/OBS MODERATE 35: CPT | Performed by: INTERNAL MEDICINE

## 2021-01-11 PROCEDURE — 86705 HEP B CORE ANTIBODY IGM: CPT | Performed by: INTERNAL MEDICINE

## 2021-01-11 PROCEDURE — 84100 ASSAY OF PHOSPHORUS: CPT | Performed by: INTERNAL MEDICINE

## 2021-01-11 PROCEDURE — 85027 COMPLETE CBC AUTOMATED: CPT | Performed by: INTERNAL MEDICINE

## 2021-01-11 PROCEDURE — 86803 HEPATITIS C AB TEST: CPT | Performed by: INTERNAL MEDICINE

## 2021-01-11 PROCEDURE — 85007 BL SMEAR W/DIFF WBC COUNT: CPT | Performed by: INTERNAL MEDICINE

## 2021-01-11 PROCEDURE — 94760 N-INVAS EAR/PLS OXIMETRY 1: CPT

## 2021-01-11 RX ORDER — DIPHENHYDRAMINE HCL 25 MG
25 TABLET ORAL ONCE
Status: CANCELLED | OUTPATIENT
Start: 2021-01-19

## 2021-01-11 RX ORDER — ACETAMINOPHEN 325 MG/1
650 TABLET ORAL ONCE
Status: CANCELLED | OUTPATIENT
Start: 2021-01-19

## 2021-01-11 RX ORDER — SODIUM CHLORIDE 9 MG/ML
20 INJECTION, SOLUTION INTRAVENOUS ONCE
Status: CANCELLED | OUTPATIENT
Start: 2021-01-19

## 2021-01-11 RX ADMIN — DAPSONE 100 MG: 100 TABLET ORAL at 08:10

## 2021-01-11 RX ADMIN — METOPROLOL SUCCINATE 100 MG: 100 TABLET, EXTENDED RELEASE ORAL at 08:09

## 2021-01-11 RX ADMIN — TORSEMIDE 40 MG: 20 TABLET ORAL at 08:10

## 2021-01-11 RX ADMIN — PREDNISONE 60 MG: 20 TABLET ORAL at 08:09

## 2021-01-11 RX ADMIN — Medication 1 TABLET: at 08:10

## 2021-01-11 RX ADMIN — SEVELAMER HYDROCHLORIDE 1600 MG: 800 TABLET, FILM COATED PARENTERAL at 08:08

## 2021-01-11 RX ADMIN — SEVELAMER HYDROCHLORIDE 1600 MG: 800 TABLET, FILM COATED PARENTERAL at 12:34

## 2021-01-11 RX ADMIN — ATORVASTATIN CALCIUM 40 MG: 40 TABLET, FILM COATED ORAL at 17:49

## 2021-01-11 RX ADMIN — AMLODIPINE BESYLATE 10 MG: 10 TABLET ORAL at 08:09

## 2021-01-11 RX ADMIN — OYSTER SHELL CALCIUM WITH VITAMIN D 1 TABLET: 500; 200 TABLET, FILM COATED ORAL at 08:08

## 2021-01-11 RX ADMIN — PANTOPRAZOLE SODIUM 40 MG: 40 TABLET, DELAYED RELEASE ORAL at 06:40

## 2021-01-11 RX ADMIN — ACETAMINOPHEN 650 MG: 325 TABLET, FILM COATED ORAL at 19:46

## 2021-01-11 RX ADMIN — SEVELAMER HYDROCHLORIDE 1600 MG: 800 TABLET, FILM COATED PARENTERAL at 17:49

## 2021-01-11 NOTE — PROGRESS NOTES
Progress Note - Pulmonary   Ronell Gay Schoenfield 48 y o  male MRN: [id number]  Unit/Bed#: 4 [place] 412-01 Encounter: [id number]  Code Status: Level 1 - Full Code    Leslee Huerta is a 48 y o  Past Medical History:   Diagnosis Date    CHF (congestive heart failure) ([facility] [address] [facility]     Hypertension     Lupus ([facility] [address] [facility]     Osteoporosis     Rheumatoid arthritis ([facility] [address] )        Assessment/Plan:   * Hemoptysis  Assessment & Plan  Stable and continually improving hemoptysis  Likely SLE pneumonitis related   Non DAH by bronch  Considerations for Rituxan > to be initiated by Dr Komal Irwin in outpatient setting  No further role for bronchoscopy  Likely inflammation mediated  Monitor ongoing symptoms    Case Report   Non-gynecologic Cytology                          Case: [id number]                                   Authorizing Provider: Olinda Main DO      Collected:           01/04/2021 1520               Ordering Location:     [address] [place] [address] Received:            01/04/2021 1713                                      [address] [place]                                                                       Pathologist:           Leonid Merritt MD                                                         Specimens:   A) - Lung, Left Upper Lobe Bronchial Lavage                                                          B) - Lung, Left Upper Lobe Bronchial Lavage, cell block                                    Final Diagnosis   A  Lung, Left Upper Lobe Bronchial Lavage (Thin-prep and cell block preparations):   Negative for malignancy  Benign bronchial cells, benign squamous cells  Fungal organisms  morphologically compatible with Neida sp, present  Rare pulmonary macrophages and few white blood cells      Satisfactory for evaluation      Cell Count - see NOTE     Electronically signed by Robe Greenwood MD on 1/6/2021 at 10:59 AM   Note    NOTE:  An order for a cell count on this specimen is noted; however, the following conditions preclude the count being performed:     -paucity of alveolar macrophages on the prepared glass slides    -excessive numbers of epithelial cells exceeding the number of alveolar macrophages  Gross Description       A  35 mL of pink, slightly cloudy fluid, received in [employer]        B  Minimal cell button, white      Patient is stable for D/C from a Pulmonary standpoint  Pulmonary Signing off  Please call with questions, concerns, or need for reevaluation  Follow Up Items:  Dispo: f/u in 1 week  See d/c instrux for scheduled follow up appt  Medications: Continue 60 mg prednisone in outpatient setting until scheduled f/u  Studies Venous Duplex U/S in 1 week             ---------------------------------------------------------------------------------------------------------------------------------------------------------------------        Pneumonitis  Assessment & Plan  SLE  Pneumonitis  Continue Prednisone 60 mg / continue into outpatient setting  Superimposed on worsening renal failure  Improving  F/U repeat CXR in approx 6 weeks unless clinical worsening in interval timeframe       ANCA + prior    WV-3/MPO/Repeat ANCA negative    Currently on Pulse dose steroids  Had been treated w/ cytoxin  For Rituxan in outpatient setting      Acute respiratory failure ([address] )  Assessment & Plan  No longer hypoxemic  Home 02 eval assessment    Pulmonary infiltrates  Assessment & Plan      DVT (deep venous thrombosis) (HCC)  Assessment & Plan  Hx of recent LLE DVT as of 12/21 venous duplex  Chronic, however, prior venous duplex w/o evidence of DVT  Likely no need for A/C at this time for risk over benefit  Will order repeat venous duplex for outpatient setting to be performed in 7 days        Acute kidney injury superimposed on chronic kidney disease [facility])  Assessment & Plan  Lab Results   Component Value Date    EGFR 19 01/10/2021    EGFR 14 01/09/2021    EGFR 10 01/08/2021    CREATININE 3 43 (H) 01/10/2021    CREATININE 4 40 (H) 2021    CREATININE 5 79 (H) 2021   Had [place] cath placed   Patient is scheduled for HD today for third dose      _________________________________________________    Subjective: Pt seen and examined at bedside    Chief Complaint: 'I'm feeling ok, just wondering what the plan will be for when I'm leaving "    Labs Reviewed: yes   Imaging Reviewed:yes CXR w/ persistent pulm infiltrate  Echo Reviewed: EF NL G1DD  Collaborative Discussion: Case discussed w/ Dr Sal Patel > stable for d/c from Pulmonary Perspective  Tele Events:     Vitals:   Temp:  [97 5 °F (36 4 °C)-98 4 °F (36 9 °C)] 97 6 °F (36 4 °C)  HR:  [79-87] 85  Resp:  [18-20] 18  BP: (160-176)/() 176/95  Weight (last 2 days)     Date/Time   Weight    01/10/21 0549   76 4 (168 43)    21 0700   78 9 (174)    21 0600   78 9 (173 94)            Vitals:    01/10/21 2300 21 0000 21 0806 21 0806   BP:   (!) 176/95 (!) 176/95   BP Location:   Left arm    Pulse: 80 79 85 85   Resp:   18 18   Temp:   97 6 °F (36 4 °C) 97 6 °F (36 4 °C)   TempSrc:   Oral    SpO2: 93% 91% 92% 92%   Weight:       Height:         Temp (24hrs), Av 8 °F (36 6 °C), Min:97 5 °F (36 4 °C), Max:98 4 °F (36 9 °C)  Current: Temperature: 97 6 °F (36 4 °C)        SpO2: SpO2: 92 %, SpO2 Activity: SpO2 Activity: At Rest, SpO2 Device: O2 Device: None (Room air)      IV Infusions:       Nutrition:        Diet Orders   (From admission, onward)             Start     Ordered    01/10/21 1041  Diet Renal; Renal Timpson; No; No  Diet effective now     Question Answer Comment   Diet Type Renal    Renal Renal Timpson    Should patient have a fluid restriction? No    Should patient have a protein modifier? No    RD to adjust diet per protocol?  Yes        01/10/21 1040    21 144  Room Service  Once     Question:  Type of Service  Answer:  Room Service-Appropriate    21 1445                Ins/Outs:   I/O       701 - 01/10 0700 01/10 0701 - 01/11 0700 01/11 0701 - 01/12 0700    P  O   340     I V  (mL/kg) 500 (6 5) 10 (0 1)     Total Intake(mL/kg) 500 (6 5) 350 (4 6)     Urine (mL/kg/hr) 425 (0 2) 1775 (1) 450 (1 2)    Other 2516      Total Output 2941 1775 450    Net -2441 -1425 -450                 Lines/Drains:  Invasive Devices     Peripheral Intravenous Line            Peripheral IV 01/10/21 Right;Lateral Wrist 1 day          Hemodialysis Catheter            HD Permanent Double Catheter 3 days                 Active medications:  Scheduled Meds:  Current Facility-Administered Medications   Medication Dose Route Frequency Provider Last Rate    acetaminophen  650 mg Oral Q6H PRN Renelda Muster, ATULNP      amLODIPine  10 mg Oral Daily Sabihaa Muster, ATULNP      atorvastatin  40 mg Oral Daily With Dinner Sabihaa Holli, ATULNP      calcium carbonate-vitamin D  1 tablet Oral Daily With Breakfast Sabihaa Holli, ATULNP      dapsone  100 mg Oral Daily Sabihaa Mustgarth, ATULNP      metoprolol succinate  100 mg Oral Daily Jennifer De La O MD      multivitamin-minerals  1 tablet Oral Daily Renelda Muster, ATULNP      ondansetron  4 mg Intravenous Q6H PRN Renangela Muster, ATULNP      pantoprazole  40 mg Oral Early Morning Renelda Muster, ATULNP      predniSONE  60 mg Oral Daily Jolie Soler MD      sevelamer  1,600 mg Oral TID With Meals Renelda Mustgarth, CRISTAL      torsemide  40 mg Oral Daily Jennifer De La O MD       PRN Meds:acetaminophen, 650 mg, Q6H PRN  ondansetron, 4 mg, Q6H PRN      ____________________________________________________________________    Physical Exam  Constitutional:       General: He is not in acute distress  Appearance: He is well-developed  HENT:      Head: Normocephalic and atraumatic  Mouth/Throat:      Pharynx: No oropharyngeal exudate  Eyes:      Pupils: Pupils are equal, round, and reactive to light  Neck:      Musculoskeletal: Normal range of motion and neck supple  Thyroid: No thyromegaly  Vascular: No JVD  Trachea: No tracheal deviation  Cardiovascular:      Heart sounds: No murmur  No friction rub  No gallop  Pulmonary:      Effort: Pulmonary effort is normal  No respiratory distress  Breath sounds: No stridor  Examination of the right-middle field reveals wheezing  Examination of the right-lower field reveals wheezing  Wheezing present  No rales  Comments: 93% Ra   Chest:      Chest wall: No tenderness  Abdominal:      General: There is no distension  Tenderness: There is no abdominal tenderness  There is no guarding  Musculoskeletal: Normal range of motion  Skin:     General: Skin is warm and dry  Findings: No erythema or rash  Neurological:      Mental Status: He is alert and oriented to person, place, and time         ____________________________________________________________________    Invasive/non-invasive ventilation settings   Respiratory    Lab Data (Last 4 hours)    None         O2/Vent Data (Last 4 hours)    None                Laboratory and Diagnostics:  Results from last 7 days   Lab Units 01/10/21  0550 01/09/21  0830 01/08/21  0611 01/07/21  0601 01/06/21  0540 01/05/21  0646   WBC Thousand/uL 7 71 10 99* 12 18* 10 69* 8 66 8 64   HEMOGLOBIN g/dL 7 2* 7 9* 8 0* 7 0* 7 2* 7 7*   HEMATOCRIT % 23 8* 26 1* 26 2* 23 7* 23 5* 24 6*   PLATELETS Thousands/uL 96* 111* 147* 148* 160 138*   NEUTROS PCT % 89* 92*  --  92*  --   --    MONOS PCT % 5 5  --  3*  --   --    MONO PCT %  --   --   --   --   --  5     Results from last 7 days   Lab Units 01/10/21  0550 01/09/21  0831 01/08/21  0611 01/07/21  0601 01/06/21  0540 01/05/21  0646   SODIUM mmol/L 135* 139 138 139 138 140   POTASSIUM mmol/L 4 5 4 0 4 8 4 7 5 0 5 2   CHLORIDE mmol/L 101 104 105 104 103 105   CO2 mmol/L 26 25 21 20* 23 25   ANION GAP mmol/L 8 10 12 15* 12 10   BUN mg/dL 66* 85* 119* 112* 104* 104*   CREATININE mg/dL 3 43* 4 40* 5 79* 5 55* 5 03* 4 69* CALCIUM mg/dL 8 3 8 3 8 8 8 8 8 8 8 8   GLUCOSE RANDOM mg/dL 101 180* 132 137 152* 102   ALT U/L  --   --   --   --   --  27   AST U/L  --   --   --   --   --  18   ALK PHOS U/L  --   --   --   --   --  73   ALBUMIN g/dL  --   --   --   --   --  2 9*   TOTAL BILIRUBIN mg/dL  --   --   --   --   --  0 70     Results from last 7 days   Lab Units 01/09/21  0831 01/07/21  0601 01/05/21  0646   MAGNESIUM mg/dL  --  2 6 2 6   PHOSPHORUS mg/dL 6 3* 7 6* 6 9*      Results from last 7 days   Lab Units 01/07/21  0601   INR  1 07      Results from last 7 days   Lab Units 01/05/21 2023 01/05/21  1820   TROPONIN I ng/mL 0 07* 0 07*         ABG:    VBG:    Results from last 7 days   Lab Units 01/09/21  0831 01/08/21  0611   PROCALCITONIN ng/ml 0 32* 0 25       Micro  Results from last 7 days   Lab Units 01/04/21  1520   GRAM STAIN RESULT  1+ Polys*  2+ Gram positive cocci in pairs*       Imaging:   XR chest portable   Final Result by Ronna Johnson DO (01/10 2144)      No pneumothorax following central line placement  Persistent bilateral airspace disease with slight improvement in the left suprahilar region  Workstation performed: [id number]         IR tunneled dialysis catheter placement   Final Result by Vipul Mckenzie MD (01/08 4838)   Impression:   1  Successful ultrasound and fluoroscopically guided placement of a 28cm Titan dialysis catheter via the right internal jugular vein  The tip of the catheter is in the right atrium and may be used immediately  Workstation performed: [id number]         VAS lower limb venous duplex study, complete bilateral   Final Result by Neida Crockett MD (01/07 2037)      XR chest portable   Final Result by Scooter Reyonlds MD (01/07 1615)      Diffuse airspace opacities have worsened and suggests possibility of pulmonary edema or diffuse pneumonia  The study was marked in [facility] for immediate notification                    Workstation performed: [id number] XR chest portable   Final Result by Shannon Saldaña MD (01/06 5844)      Diffuse patchy airspace disease bilaterally, worsened since the prior examination  Workstation performed: [id number]             Micro:   Lab Results   Component Value Date    BLOODCX No Growth After 5 Days  11/24/2020    BLOODCX No Growth After 5 Days  11/24/2020    SPUTUMCULTUR 4+ Growth of  12/19/2020    SPUTUMCULTUR  12/19/2020     Commensal respiratory tobias only; No significant growth of Staph aureus/MRSA or Pseudomonas aeruginosa      MRSACULTURE  12/21/2020     No Methicillin Resistant Staphlyococcus aureus (MRSA) isolated    BRONCHIALCUL 2+ Growth of Stenotrophomonas maltophilia (A) 01/04/2021    BRONCHIALCUL 2+ Growth of  01/04/2021    BRONCHIALCUL 1+ Growth of  12/19/2020            Invalid input(s): Pedro Murphy

## 2021-01-11 NOTE — ASSESSMENT & PLAN NOTE
Admitted with persistent hemoptysis  Recent hospital admission in December at [facility] for which he underwent bronchoscopy by Pulmonary  Bronchoscopy revealed diffuse erythema and prominent vasculature without overt sequential increases in hemorrhage on serial aliquots   Still appears to be a diffuse inflammatory process rather than focal pneumonia   Diffuse pneumonia not ruled out  Patient underwent pulse steroids and plasmapheresis at that time  Hemoptysis resolved  Patient was discharged home on prednisone and Eliquis for left lower extremity DVT  · Pulmonary following, appreciate input  · Patient underwent repeat bronchoscopy on January 4, 2021 which showed no active bleeding or endobronchial lesions with some scattered thin bloody secretions throughout the airway  BAL of the left upper lobe was sent for cultures and cytology along with cell count  · Continue to hold Eliquis  · Rheumatology was consulted  · Patient might need IVC filter if anticoagulation is contraindication in the future  · Patient was started on pulse dose steroids with Solu-Medrol 250 milligram IV q 6 hours which she received for 3 days  Patient will be tapered to Solu-Medrol  · Patient continues to have persistent symptoms with hemoptysis and shortness of breath  · Prolonged discussion with patient and family in coordination with pulmonology, Nephrology  · Pulmonology discussed coordination of care with potentially [place] at DR MARTHA THAKKAR [facility] further intervention was recommended tertiary [facility]  Rheumatology suggesting changing cyclophosphamide to Rituxan  · Patient's bronchial cultures growing mixed respiratory tobias and also stenotrophomonas-likely contaminant  Bronchial  AFB and Pneumocystis were negative  · Procalcitonin level was 0 25    CRP has improved to 5 6 from 30 8  · Chest x-ray showing improving bilateral infiltrates after the dialysis

## 2021-01-11 NOTE — ASSESSMENT & PLAN NOTE
Troponin 0 11, likely non MI elevated troponin elevation in the setting of CAROLINE, respiratory failure  Presently, chest pain-free  · Serial troponins continued remained flat  Results from last 7 days   Lab Units 01/05/21 2023 01/05/21  1820   TROPONIN I ng/mL 0 07* 0 07*

## 2021-01-11 NOTE — PROGRESS NOTES
Progress Note Mehran Yun 1965, 48 y o  male MRN: [id number]    Unit/Bed#: [address] [place] [address] Encounter: [id number]    Primary Care Provider: Reyna Dickerson MD   Date and time admitted to hospital: 1/4/2021  9:54 AM        * Hemoptysis  Assessment & Plan  Admitted with persistent hemoptysis  Recent hospital admission in December at [facility] for which he underwent bronchoscopy by Pulmonary  Bronchoscopy revealed diffuse erythema and prominent vasculature without overt sequential increases in hemorrhage on serial aliquots   Still appears to be a diffuse inflammatory process rather than focal pneumonia   Diffuse pneumonia not ruled out  Patient underwent pulse steroids and plasmapheresis at that time  Hemoptysis resolved  Patient was discharged home on prednisone and Eliquis for left lower extremity DVT  · Pulmonary following, appreciate input  · Patient underwent repeat bronchoscopy on January 4, 2021 which showed no active bleeding or endobronchial lesions with some scattered thin bloody secretions throughout the airway  BAL of the left upper lobe was sent for cultures and cytology along with cell count  · Continue to hold Eliquis  · Rheumatology was consulted  · Patient might need IVC filter if anticoagulation is contraindication in the future  · Patient was started on pulse dose steroids with Solu-Medrol 250 milligram IV q 6 hours which she received for 3 days  Patient will be tapered to Solu-Medrol  · Patient continues to have persistent symptoms with hemoptysis and shortness of breath  · Prolonged discussion with patient and family in coordination with pulmonology, Nephrology  · Pulmonology discussed coordination of care with potentially [place] at DR MARTHA THAKKAR [facility] further intervention was recommended tertiary [facility]    Rheumatology suggesting changing cyclophosphamide to Rituxan  · Patient's bronchial cultures growing mixed respiratory tobias and also stenotrophomonas-likely contaminant  Bronchial  AFB and Pneumocystis were negative  · Procalcitonin level was 0 25  CRP has improved to 5 6 from 30 8  · Chest x-ray showing improving bilateral infiltrates after the dialysis       Acute respiratory failure [facility])  Assessment & Plan  Patient does not use oxygen at home  Patient was on 5 liters oxygen which is titrated down to 3 liters and O2 saturation is in mid 90s  In the setting of bilateral pulmonary infiltrates suggesting lupus vasculitis/pneumonitis  Patient was on high doses steroids which is tapered to Solu-Medrol 40 milligram IV q 12 hours  Will transition to prednisone 60 milligram p o   Daily  Repeat chest x-ray shows worsening diffuse airspace opacities suggesting possible pulmonary edema/diffuse pneumonia  Procalcitonin level was 0 25  Bronchial cultures grew stenotrophomonas-possible contaminant  Patient is currently on 2 liters oxygen which can be titrated off has tolerated  Chest x-ray showed improving bilateral infiltrates after the dialysis  Check ambulatory pulse oximetry    Acute kidney injury superimposed on chronic kidney disease [facility])  Assessment & Plan  Lab Results   Component Value Date    EGFR 15 01/11/2021    EGFR 19 01/10/2021    EGFR 14 01/09/2021    CREATININE 4 20 (H) 01/11/2021    CREATININE 3 43 (H) 01/10/2021    CREATININE 4 40 (H) 01/09/2021     In the setting of known Lupus Nephritis  Creatinine continued to get worse with poor urine output  Cr 4 7 on admission   Known to Dr Ji Solorzano and Dr Tesfaye Jorgensen renal U/S 12/16 showed increased cortical echogenicity bilaterally, suggestive of underlying renal parenchymal disease and no hydronephrosis  Pt underwent kidney biopsy in November  · Nephrology input appreciated  · Patient initially received gentle IV hydration with normal saline which were discontinued today  · Avoid nephrotoxic agents and hypotension  · Worsening creatinine likely secondary to lupus nephritis/glomerulonephritis  · Patient received Lasix 80 milligram IV daily for the past 3 days with poor urine output  · Patient got PermCath placed by IR on January 8, 2021 and was started on dialysis  · Patient had 2nd session of dialysis on January 9, 2021 with ultrafiltration of 2 liters  · Next dialysis scheduled for January 11, 2020  · Patient started on oral torsemide 40 milligram p o  Daily on January 9th  Results from last 7 days   Lab Units 01/11/21  1434 01/10/21  0550 01/09/21  0831 01/08/21  0611 01/07/21  0601 01/06/21  0540 01/05/21  0646   BUN mg/dL 96* 66* 85* 119* 112* 104* 104*   CREATININE mg/dL 4 20* 3 43* 4 40* 5 79* 5 55* 5 03* 4 69*       Anemia  Assessment & Plan  Chronic anemia with baseline hemoglobin near 8 5  · Hemoglobin is slightly down trending in the setting of hemoptysis  · Hemoglobin continued to drop to 7  · Patient received 1 unit of PRBC transfusion on January 7, 2021 with improved hemoglobin to 8  Hemoglobin again dropped to 7 2 but improved to 8 1  Results from last 7 days   Lab Units 01/11/21  1434 01/10/21  0550 01/09/21  0830 01/08/21  0611 01/07/21  0601 01/06/21  0540 01/05/21  0646   HEMOGLOBIN g/dL 8 1* 7 2* 7 9* 8 0* 7 0* 7 2* 7 7*   HEMATOCRIT % 26 0* 23 8* 26 1* 26 2* 23 7* 23 5* 24 6*   MCV fL 102* 104* 104* 104* 107* 106* 106*       SLE (systemic lupus erythematosus) ([facility])  Assessment & Plan  Diagnosed lupus in his 20's  Cellcept was discontinued recently  S/p plasmapheresis x 5 treatments recently  S/P cytoxan 12/18 with plan for every other week for 6 doses  Patient was on prednisone 20 milligram p o   Daily  · Initially started on Solu-Medrol 40 milligram q 12 hours which was changed to methylprednisolone 250 milligram IV q 6 hours  · Rheumatology was consulted and appreciate recommendations  · Patient has had 2 doses of IV cyclophosphamide so far and the plan was to receive 6 doses total every 2 weeks as outpatient  · Pulmonology discussed coordination of care with [facility] who recommended rituxan in the place of cyclophosphamide  · Further treatment plant based on pulmonology and Rheumatology recommendations  · Patient's repeat MPO, SSA and B, anti PR3 were all negative  · Rheumatology, Nephrology working on setting up outpatient Rituxan treatment and working on insurance approvals  · Patient's left upper lobe bronchial lavage was negative for malignancy  · Continue prednisone 60 milligram p o   Daily with outpatient follow-up with pulmonology    DVT (deep venous thrombosis) ([facility] [place] [address] )  Assessment & Plan  Diagnosed with a left lower extremity DVT on 12/21/2020 for which she was started on Eliquis   · Eliquis on hold in the setting of hemoptysis and anticipated bronchoscopy today or tomorrow  · Repeat lower extremity venous Dopplers showed chronic nonocclusive DVT in the popliteal vein and proximal gastrocnemius vein with evidence of superficial thrombophlebitis below-knee in the greater saphenous vein  · To be followed by Pulmonary with serial duplex    Elevated troponin  Assessment & Plan  Troponin 0 11, likely non MI elevated troponin elevation in the setting of CAROLINE, respiratory failure  Presently, chest pain-free  · Serial troponins continued remained flat  Results from last 7 days   Lab Units 01/05/21 2023 01/05/21  1820   TROPONIN I ng/mL 0 07* 0 07*       Chronic diastolic congestive heart failure (HCC)  Assessment & Plan  Wt Readings from Last 3 Encounters:   01/10/21 76 4 kg (168 lb 6 9 oz)   01/04/21 71 2 kg (157 lb)   12/22/20 75 6 kg (166 lb 10 7 oz)     Patient with volume overload on recent admission to [facility] in December 2020 for which she was managed with IV Lasix the later transitioned to Demadex 40 mg daily  · Continue to hold Demadex  · Patient was initially on gentle IV hydration which was later stopped  · No clinical evidence of fluid overload  · Patient has been receiving Lasix 80 milligram IV daily for the past 3 days   · Repeat chest x-ray from   showed worsening diffuse airspace opacities  · Repeat chest x-ray and 1/10 shows improving bilateral opacities      VTE Pharmacologic Prophylaxis:   Pharmacologic: Pharmacologic VTE Prophylaxis contraindicated due to Hemoptysis  Mechanical VTE Prophylaxis in Place: Yes    Patient Centered Rounds: I have performed bedside rounds with nursing staff today  Discussions with Specialists or Other Care Team Provider: Colin Mukherjee    Education and Discussions with Family / Patient:  Long discussion with wife Francois Ohs    Time Spent for Care: 45 minutes  More than 50% of total time spent on counseling and coordination of care as described above  Current Length of Stay: 7 day(s)    Current Patient Status: Inpatient   Certification Statement: The patient will continue to require additional inpatient hospital stay due to End-stage renal disease and dialysis    Discharge Plan:  Home    Code Status: Level 1 - Full Code      Subjective:   Patient is feeling much better with breathing  Patient denies any chest pain, abdominal pain, nausea or vomiting  Patient is on room air with O2 saturation around 96%    Objective:     Vitals:   Temp (24hrs), Av 8 °F (36 6 °C), Min:97 6 °F (36 4 °C), Max:98 4 °F (36 9 °C)    Temp:  [97 6 °F (36 4 °C)-98 4 °F (36 9 °C)] 97 6 °F (36 4 °C)  HR:  [74-87] 81  Resp:  [18-20] 18  BP: (135-176)/() 135/88  SpO2:  [91 %-93 %] 93 %  Body mass index is 24 52 kg/m²  Input and Output Summary (last 24 hours): Intake/Output Summary (Last 24 hours) at 2021 1817  Last data filed at 2021 1430  Gross per 24 hour   Intake 310 ml   Output 1525 ml   Net -1215 ml       Physical Exam:     Physical Exam  Constitutional:       General: He is not in acute distress  HENT:      Head: Normocephalic and atraumatic  Eyes:      Conjunctiva/sclera: Conjunctivae normal       Pupils: Pupils are equal, round, and reactive to light     Neck: Musculoskeletal: Normal range of motion and neck supple  Cardiovascular:      Rate and Rhythm: Normal rate and regular rhythm  Heart sounds: Normal heart sounds  Pulmonary:      Effort: Pulmonary effort is normal  No respiratory distress  Breath sounds: No wheezing, rhonchi or rales  Chest:      Chest wall: No tenderness  Abdominal:      General: Bowel sounds are normal  There is no distension  Palpations: Abdomen is soft  Tenderness: There is no abdominal tenderness  There is no guarding or rebound  Skin:     General: Skin is warm and dry  Findings: No rash  Neurological:      Mental Status: He is alert  Cranial Nerves: No cranial nerve deficit  Additional Data:     Labs:    Results from last 7 days   Lab Units 01/11/21  1434 01/10/21  0550   WBC Thousand/uL 8 44 7 71   HEMOGLOBIN g/dL 8 1* 7 2*   HEMATOCRIT % 26 0* 23 8*   PLATELETS Thousands/uL 100* 96*   NEUTROS PCT %  --  89*   LYMPHS PCT %  --  4*   LYMPHO PCT % 4*  --    MONOS PCT %  --  5   MONO PCT % 4  --    EOS PCT % 0 0     Results from last 7 days   Lab Units 01/11/21  1434  01/05/21  0646   POTASSIUM mmol/L 4 3   < > 5 2   CHLORIDE mmol/L 103   < > 105   CO2 mmol/L 25   < > 25   BUN mg/dL 96*   < > 104*   CREATININE mg/dL 4 20*   < > 4 69*   CALCIUM mg/dL 8 5   < > 8 8   ALK PHOS U/L  --   --  73   ALT U/L  --   --  27   AST U/L  --   --  18    < > = values in this interval not displayed  Results from last 7 days   Lab Units 01/07/21  0601   INR  1 07       * I Have Reviewed All Lab Data Listed Above  * Additional Pertinent Lab Tests Reviewed:  [place] [address] Admission Reviewed      Recent Cultures (last 7 days):           Last 24 Hours Medication List:   Current Facility-Administered Medications   Medication Dose Route Frequency Provider Last Rate    acetaminophen  650 mg Oral Q6H PRN CRISTAL Brown      amLODIPine  10 mg Oral Daily CRISTAL Brown      atorvastatin  40 mg Oral Daily With Dinner CRISTAL Stephenson      calcium carbonate-vitamin D  1 tablet Oral Daily With Breakfast CRISTAL Stephenson      dapsone  100 mg Oral Daily CRISTAL Stephenson      metoprolol succinate  100 mg Oral Daily Dustin Reardon MD      multivitamin-minerals  1 tablet Oral Daily CRISTAL Stephenson      ondansetron  4 mg Intravenous Q6H PRN CRISTAL Stephensno      pantoprazole  40 mg Oral Early Morning CRISTAL Stephenson      predniSONE  60 mg Oral Daily Bhumika Patton MD      sevelamer  1,600 mg Oral TID With Meals CRISTAL Stephenson      torsemide  40 mg Oral Daily Dustin Reardon MD          Today, Patient Was Seen By: Bhumika Patton MD    ** Please Note: Dictation voice to text software may have been used in the creation of this document   **

## 2021-01-11 NOTE — PLAN OF CARE
Post-Dialysis RN Treatment Note    Blood Pressure:  Pre 151/88 mm/Hg  Post 132/88 mmHg   EDW  tbd kg    Weight:  Pre 74 4 kg   Post 71 1 kg   Mode of weight measurement: Standing Scale   Volume Removed  3000 ml    Treatment duration 180 minutes    NS given  No    Treatment shortened?  No   Medications given during Rx Not Applicable   Estimated Kt/V  Not Applicable   Access type: Permacath/TDC   Access Status: Yes, describe: [email or web address]    Report called to primary nurse   Yes Eileen Jones RN      Problem: METABOLIC, FLUID AND ELECTROLYTES - ADULT  Goal: Fluid balance maintained  Description: INTERVENTIONS:  - Monitor labs   - Monitor I/O and WT  - Instruct patient on fluid and nutrition as appropriate  - Assess for signs & symptoms of volume excess or deficit  Outcome: Progressing  Goal: Electrolytes maintained within normal limits  Description: INTERVENTIONS:  - Monitor labs and assess patient for signs and symptoms of electrolyte imbalances  - Administer electrolyte replacement as ordered  - Monitor response to electrolyte replacements, including repeat lab results as appropriate  - Instruct patient on fluid and nutrition as appropriate  Outcome: Progressing

## 2021-01-11 NOTE — ASSESSMENT & PLAN NOTE
Wt Readings from Last 3 Encounters:   01/10/21 76 4 kg (168 lb 6 9 oz)   01/04/21 71 2 kg (157 lb)   12/22/20 75 6 kg (166 lb 10 7 oz)     Patient with volume overload on recent admission to [address] [place] [address] in December 2020 for which she was managed with IV Lasix the later transitioned to Demadex 40 mg daily  · Continue to hold Demadex  · Patient was initially on gentle IV hydration which was later stopped  · No clinical evidence of fluid overload  · Patient has been receiving Lasix 80 milligram IV daily for the past 3 days    · Repeat chest x-ray from 01/07  showed worsening diffuse airspace opacities  · Repeat chest x-ray and 1/10 shows improving bilateral opacities

## 2021-01-11 NOTE — ASSESSMENT & PLAN NOTE
Lab Results   Component Value Date    EGFR 15 01/11/2021    EGFR 19 01/10/2021    EGFR 14 01/09/2021    CREATININE 4 20 (H) 01/11/2021    CREATININE 3 43 (H) 01/10/2021    CREATININE 4 40 (H) 01/09/2021     In the setting of known Lupus Nephritis  Creatinine continued to get worse with poor urine output  Cr 4 7 on admission   Known to Dr Christy Wallace and Dr Patti Wolfe renal U/S 12/16 showed increased cortical echogenicity bilaterally, suggestive of underlying renal parenchymal disease and no hydronephrosis  Pt underwent kidney biopsy in November  · Nephrology input appreciated  · Patient initially received gentle IV hydration with normal saline which were discontinued today  · Avoid nephrotoxic agents and hypotension  · Worsening creatinine likely secondary to lupus nephritis/glomerulonephritis  · Patient received Lasix 80 milligram IV daily for the past 3 days with poor urine output  · Patient got PermCath placed by IR on January 8, 2021 and was started on dialysis  · Patient had 2nd session of dialysis on January 9, 2021 with ultrafiltration of 2 liters  · Next dialysis scheduled for January 11, 2020  · Patient started on oral torsemide 40 milligram p o   Daily on January 9th  Results from last 7 days   Lab Units 01/11/21  1434 01/10/21  0550 01/09/21  0831 01/08/21  0611 01/07/21  0601 01/06/21  0540 01/05/21  0646   BUN mg/dL 96* 66* 85* 119* 112* 104* 104*   CREATININE mg/dL 4 20* 3 43* 4 40* 5 79* 5 55* 5 03* 4 69*

## 2021-01-11 NOTE — ASSESSMENT & PLAN NOTE
Chronic anemia with baseline hemoglobin near 8 5  · Hemoglobin is slightly down trending in the setting of hemoptysis  · Hemoglobin continued to drop to 7  · Patient received 1 unit of PRBC transfusion on January 7, 2021 with improved hemoglobin to 8   Hemoglobin again dropped to 7 2 but improved to 8 1  Results from last 7 days   Lab Units 01/11/21  1434 01/10/21  0550 01/09/21  0830 01/08/21  0611 01/07/21  0601 01/06/21  0540 01/05/21  0646   HEMOGLOBIN g/dL 8 1* 7 2* 7 9* 8 0* 7 0* 7 2* 7 7*   HEMATOCRIT % 26 0* 23 8* 26 1* 26 2* 23 7* 23 5* 24 6*   MCV fL 102* 104* 104* 104* 107* 106* 106*

## 2021-01-11 NOTE — CASE MANAGEMENT
LOS-7 days    SW following to monitor progress and assist with planning  Current plan is for pt to return home and begin outpatient dialysis upon discharge  Referral process was started with [facility] last week  Call received from [employer] at [address] with update on referral   Chair time opened up today at [facility] which is pt's preferred location  Chair time would be Tues-Thurs-Sat second shift (11-11:45 start)  SW reviewed chair time offer with pt  Pt requested that chair time be reserved for him  SW informed [employer] that pt has accepted chair time offer  Chair time will be reserved pending medical clearance  Per [employer] only clinical outstanding is Hepatitis B surface antibody result  SW notified Dr Lynelle Schilder and order was written  Once results are available they will be faxed to [employer] for clearance and final acceptance  SW will continue to follow to monitor progress and assist with planning as needed

## 2021-01-11 NOTE — PROGRESS NOTES
Progress Note - Nephrology   Ronell Gay Schoenfield 48 y o  male MRN: [id number]  Unit/Bed#: [address] [place] [address] Encounter: [id number]    Assessment:  1  Acute kidney injury present on admission:  Admission creatinine was 4 7  He has a history of 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  The patient was started on dialysis this admission  The patient received 3 days of pulse steroids and has been on 60 mg of prednisone since January 9th  The patient had received intravenous Cytoxan in late December  His last dose was December 31st   He is on PCP prophylaxis with 100 mg daily  He began dialysis last week and today will be his 3rd treatment this afternoon  The plan is for rituximab as an outpatient stopping Cytoxan  2  Hemoptysis:  Patient had a recent hospitalization in December and [facility] for which he underwent plasmapheresis December 20th to December 27th for concern for diffuse alveolar hemorrhage  Bronchoscopy was not suggestive of diffuse alveolar hemorrhage on this admission and so no indication for plasmapheresis  Patient with intravenous steroids as above  Appreciate Rheumatology and Pulmonary input  3  Fluid overload lower extremity swelling: This seems to have improved  He is nonoliguric and -1 1 L over the past 24 hours  Maintain torsemide 40 mg daily also planning for dialysis today with ultrafiltration  4  Left lower extremity DVT:  Patient had been on Eliquis as an outpatient this is currently on hold on a plan for repeat duplex  5  Hypertension:  May be a fluid component to this continue with ultrafiltration with HD  Re-evaluate post hemodialysis with ultrafiltration  Plan: For dialysis with UF today  Subjective:   Patient seen in follow-up for acute kidney injury present on admission on chronic kidney disease stage 3 had hemoptysis  Chart notes reviewed and appreciated  He feels that his hemoptysis is up better he feels that his breathing is a lot better    He denies any hemoptysis  Objective:     Vitals: Blood pressure (!) 176/95, pulse 85, temperature 97 6 °F (36 4 °C), resp  rate 18, height 5' 9 5" (1 765 m), weight 76 4 kg (168 lb 6 9 oz), SpO2 92 %  ,Body mass index is 24 52 kg/m²  Weight (last 2 days)     Date/Time   Weight    01/10/21 0549   76 4 (168 43)    01/09/21 0700   78 9 (174)    01/09/21 0600   78 9 (173 94)                Intake/Output Summary (Last 24 hours) at 1/11/2021 8110  Last data filed at 1/11/2021 3196  Gross per 24 hour   Intake 300 ml   Output 1400 ml   Net -1100 ml       HD Permanent Double Catheter (Active)   Reasons to continue HD Cath Treatment Therapy 01/10/21 0821   Goal for Removal N/A- chronic HD catheter 01/10/21 0401   Line Necessity Reviewed Yes, reviewed with provider 01/09/21 1946   Site Assessment Clean;Dry; Intact 01/10/21 2000   Proximal Lumen Status Capped 01/10/21 2000   Distal Lumen Status Capped 01/10/21 2000   Dressing Type Chlorhexidine dressing 01/10/21 2000   Dressing Status Clean;Dry; Intact 01/10/21 2000   Dressing Intervention Dressing reinforced 01/09/21 1946   Dressing Change Due 01/15/21 01/09/21 1946       Physical Exam: General: patient is in NAD  Skin:  No new rash  Eyes:  No scleral icterus  Neck:  Supple; patient has right IJ PermCath  Chest:  Coarse breath sounds  CVS:  S1-S2  Abdomen:  Soft positive bowel sounds  Extremities:  No significant edema  Neuro:  Nonfocal  Psych:  Patient answers questions appropriately              Medications Prior to Admission   Medication    acetaminophen (TYLENOL) 500 mg tablet    amLODIPine (NORVASC) 10 mg tablet    apixaban (ELIQUIS) 5 mg    atorvastatin (LIPITOR) 40 mg tablet    calcium-vitamin D (OSCAL) 250-125 MG-UNIT per tablet    dapsone 100 mg tablet    metoprolol succinate (TOPROL-XL) 50 mg 24 hr tablet    Multiple Vitamins-Minerals (CENTRUM SILVER) tablet    pantoprazole (PROTONIX) 40 mg tablet    predniSONE 20 mg tablet    sevelamer (RENAGEL) 800 mg tablet    sodium bicarbonate 650 mg tablet    torsemide (DEMADEX) 20 mg tablet    Elastic Bandages & Supports (MEDICAL COMPRESSION STOCKINGS) MISC       Current Facility-Administered Medications   Medication Dose Route Frequency    acetaminophen (TYLENOL) tablet 650 mg  650 mg Oral Q6H PRN    amLODIPine (NORVASC) tablet 10 mg  10 mg Oral Daily    atorvastatin (LIPITOR) tablet 40 mg  40 mg Oral Daily With Dinner    calcium carbonate-vitamin D (OSCAL-D) 500 mg-200 units per tablet 1 tablet  1 tablet Oral Daily With Breakfast    dapsone tablet 100 mg  100 mg Oral Daily    metoprolol succinate (TOPROL-XL) 24 hr tablet 100 mg  100 mg Oral Daily    multivitamin-minerals (CENTRUM) tablet 1 tablet  1 tablet Oral Daily    ondansetron (ZOFRAN) injection 4 mg  4 mg Intravenous Q6H PRN    pantoprazole (PROTONIX) EC tablet 40 mg  40 mg Oral Early Morning    predniSONE tablet 60 mg  60 mg Oral Daily    sevelamer (RENAGEL) tablet 1,600 mg  1,600 mg Oral TID With Meals    torsemide (DEMADEX) tablet 40 mg  40 mg Oral Daily        Lab, Imaging and other studies: I have personally reviewed pertinent labs    CBC:   Lab Results   Component Value Date    WBC 7 71 01/10/2021    RBC 2 29 (L) 01/10/2021     CMP:   Lab Results   Component Value Date     01/10/2021     07/21/2020    CO2 26 01/10/2021    CO2 27 07/21/2020    BUN 66 (H) 01/10/2021    BUN 15 07/21/2020    CREATININE 3 43 (H) 01/10/2021    CALCIUM 8 3 01/10/2021    CALCIUM 8 8 07/21/2020    AST 18 01/05/2021    AST 32 07/21/2020    ALT 27 01/05/2021    ALT 22 07/21/2020    ALKPHOS 73 01/05/2021    ALKPHOS 103 07/21/2020    EGFR 19 01/10/2021     Phosphorus:   Lab Results   Component Value Date    PHOS 6 3 (H) 01/09/2021     Magnesium:   Lab Results   Component Value Date    MG 2 6 01/07/2021     Urinalysis:   Lab Results   Component Value Date    COLORU Yellow 12/30/2020    CLARITYU Clear 12/30/2020    SPECGRAV 1 015 12/30/2020    PHUR 5 5 12/30/2020 PHUR 7 0 11/25/2020    LEUKOCYTESUR Negative 12/30/2020    NITRITE Negative 12/30/2020    GLUCOSEU Negative 12/30/2020    KETONESU Negative 12/30/2020    BILIRUBINUR Negative 12/30/2020    BLOODU Moderate (A) 12/30/2020     BMP:   Lab Results   Component Value Date    SODIUM 135 (L) 01/10/2021    SODIUM 139 07/21/2020    CO2 26 01/10/2021    CO2 27 07/21/2020    BUN 66 (H) 01/10/2021    BUN 15 07/21/2020    CREATININE 3 43 (H) 01/10/2021    CALCIUM 8 3 01/10/2021    CALCIUM 8 8 07/21/2020     ABGs:   Lab Results   Component Value Date    PH 6 0 07/21/2020

## 2021-01-11 NOTE — ASSESSMENT & PLAN NOTE
Patient does not use oxygen at home  Patient was on 5 liters oxygen which is titrated down to 3 liters and O2 saturation is in mid 90s  In the setting of bilateral pulmonary infiltrates suggesting lupus vasculitis/pneumonitis  Patient was on high doses steroids which is tapered to Solu-Medrol 40 milligram IV q 12 hours  Will transition to prednisone 60 milligram p o   Daily  Repeat chest x-ray shows worsening diffuse airspace opacities suggesting possible pulmonary edema/diffuse pneumonia  Procalcitonin level was 0 25  Bronchial cultures grew stenotrophomonas-possible contaminant  Patient is currently on 2 liters oxygen which can be titrated off has tolerated  Chest x-ray showed improving bilateral infiltrates after the dialysis  Check ambulatory pulse oximetry

## 2021-01-11 NOTE — PROGRESS NOTES
Progress Note - Pulmonary   Tk Cromer Schoenfield 48 y o  male MRN: [id number]  Unit/Bed#: [address] [place] [address] Encounter: [id number]    Assessment:  Lupus pneumonitis-vasculitis  Patient clinically improved possibly due to pulse dose steroids  Now back on prednisone 60 mg daily  Minor hemoptysis secondary to above  Acute hypoxemic respiratory failure improving  At rest room air O2 saturation now 93%  Acute kidney injury superimposed on chronic kidney disease  Patient with lupus nephritis  Has had 2 hemodialysis treatments last 1 on 01/09  Patient is scheduled for hemodialysis tomorrow  Chest x-ray done today shows improvement in bilateral lung infiltrates  Bronchoscopy culture from 01/04 revealed Pseudomonas maltophilia which is likely a colonizer  Procalcitonin level only minimally elevated 0 32 and mild elevation may be due to his renal dysfunction  C reactive protein level decreased to 5 6  Pneumocystis smear negative  Thrombocytopenia  Platelet count has been decreasing and today is 96    Plan:  Home oxygen protocol has been ordered this is pending  Room air O2 saturation at rest satisfactory at 93%  Prednisone 60 mg per day  Patient's nephrologist Harvey Stone is working on arranging for outpatient IV Rituxin instead of further cyclophosphamide treatments  Patient is on dapsone for PCP prophylaxis  Eliquis discontinued on admission  Question of chronic DVT left popliteal vein on ultrasound of lower extremity done on 12/21 and subsequent ultrasound afterwards  Would recommend follow-up venous Doppler study of left lower extremity as outpatient and 7-10 days from discharge  Subjective:   States breathing is better  Has less cough  Occasion brings up a tiny bit of bloody mucus  Objective:     Vitals: Blood pressure (!) 173/121, pulse 87, temperature 98 4 °F (36 9 °C), resp  rate 18, height 5' 9 5" (1 765 m), weight 76 4 kg (168 lb 6 9 oz), SpO2 93 %  ,Body mass index is 24 52 kg/m²        Intake/Output Summary (Last 24 hours) at 1/10/2021 2053  Last data filed at 1/10/2021 1628  Gross per 24 hour   Intake 240 ml   Output 1025 ml   Net -785 ml       Physical Exam: Physical Exam  Vitals signs reviewed  Constitutional:       General: He is not in acute distress  Appearance: He is well-developed  Comments: Room air O2 saturation 93%   HENT:      Head: Normocephalic  Nose: Nose normal       Mouth/Throat:      Pharynx: No oropharyngeal exudate  Eyes:      Conjunctiva/sclera: Conjunctivae normal       Pupils: Pupils are equal, round, and reactive to light  Neck:      Musculoskeletal: Neck supple  Vascular: No JVD  Trachea: No tracheal deviation  Cardiovascular:      Rate and Rhythm: Normal rate and regular rhythm  Heart sounds: Normal heart sounds  Pulmonary:      Effort: Pulmonary effort is normal       Comments: Few inspiratory crackles left lower lobe otherwise clear  Abdominal:      General: There is no distension  Palpations: Abdomen is soft  Tenderness: There is no abdominal tenderness  There is no guarding  Musculoskeletal:      Comments: No edema or clubbing   Lymphadenopathy:      Cervical: No cervical adenopathy  Skin:     General: Skin is warm and dry  Findings: No rash  Neurological:      Mental Status: He is alert and oriented to person, place, and time  Psychiatric:         Behavior: Behavior normal          Thought Content: Thought content normal           Labs: I have personally reviewed pertinent lab results  , ABG: No results found for: PHART, PUJ0RGD, PO2ART, WGM7FUG, Z8WDOQXC, BEART, SOURCE, BNP: No results found for: BNP, CBC:   Lab Results   Component Value Date    WBC 7 71 01/10/2021    HGB 7 2 (L) 01/10/2021    HCT 23 8 (L) 01/10/2021     (H) 01/10/2021    PLT 96 (L) 01/10/2021    MCH 31 4 01/10/2021    MCHC 30 3 (L) 01/10/2021    RDW 16 8 (H) 01/10/2021    MPV 11 2 01/10/2021    NRBC 0 01/10/2021   , CMP:   Lab Results Component Value Date    K 4 5 01/10/2021    K 4 7 07/21/2020     01/10/2021     07/21/2020    CO2 26 01/10/2021    CO2 27 07/21/2020    BUN 66 (H) 01/10/2021    BUN 15 07/21/2020    CREATININE 3 43 (H) 01/10/2021    CALCIUM 8 3 01/10/2021    CALCIUM 8 8 07/21/2020    AST 18 01/05/2021    AST 32 07/21/2020    ALT 27 01/05/2021    ALT 22 07/21/2020    ALKPHOS 73 01/05/2021    ALKPHOS 103 07/21/2020    EGFR 19 01/10/2021   , PT/INR:   Lab Results   Component Value Date    INR 1 07 01/07/2021   , Troponin: No results found for: TROPONIN    Imaging and other studies: I have personally reviewed pertinent reports     and I have personally reviewed pertinent films in PACS

## 2021-01-11 NOTE — ASSESSMENT & PLAN NOTE
Diagnosed with a left lower extremity DVT on 12/21/2020 for which she was started on Eliquis   · Eliquis on hold in the setting of hemoptysis and anticipated bronchoscopy today or tomorrow  · Repeat lower extremity venous Dopplers showed chronic nonocclusive DVT in the popliteal vein and proximal gastrocnemius vein with evidence of superficial thrombophlebitis below-knee in the greater saphenous vein  · To be followed by Pulmonary with serial duplex

## 2021-01-11 NOTE — ASSESSMENT & PLAN NOTE
Diagnosed lupus in his 20's  Cellcept was discontinued recently  S/p plasmapheresis x 5 treatments recently  S/P cytoxan 12/18 with plan for every other week for 6 doses  Patient was on prednisone 20 milligram p o  Daily  · Initially started on Solu-Medrol 40 milligram q 12 hours which was changed to methylprednisolone 250 milligram IV q 6 hours  · Rheumatology was consulted and appreciate recommendations  · Patient has had 2 doses of IV cyclophosphamide so far and the plan was to receive 6 doses total every 2 weeks as outpatient  · Pulmonology discussed coordination of care with [facility] who recommended rituxan in the place of cyclophosphamide  · Further treatment plant based on pulmonology and Rheumatology recommendations  · Patient's repeat MPO, SSA and B, anti PR3 were all negative  · Rheumatology, Nephrology working on setting up outpatient Rituxan treatment and working on insurance approvals  · Patient's left upper lobe bronchial lavage was negative for malignancy  · Continue prednisone 60 milligram p o   Daily with outpatient follow-up with pulmonology

## 2021-01-12 ENCOUNTER — APPOINTMENT (INPATIENT)
Dept: RADIOLOGY | Facility: HOSPITAL | Age: 56
DRG: 545 | End: 2021-01-12
Payer: COMMERCIAL

## 2021-01-12 LAB
D DIMER PPP FEU-MCNC: 0.82 UG/ML FEU
HBV CORE AB SER QL: NORMAL
HBV CORE IGM SER QL: NORMAL
HBV SURFACE AB SER-ACNC: 10.18 MIU/ML
HBV SURFACE AG SER QL: NORMAL
HCV AB SER QL: NORMAL

## 2021-01-12 PROCEDURE — 93970 EXTREMITY STUDY: CPT

## 2021-01-12 PROCEDURE — 94761 N-INVAS EAR/PLS OXIMETRY MLT: CPT

## 2021-01-12 PROCEDURE — 99232 SBSQ HOSP IP/OBS MODERATE 35: CPT | Performed by: NURSE PRACTITIONER

## 2021-01-12 PROCEDURE — 85379 FIBRIN DEGRADATION QUANT: CPT | Performed by: PHYSICIAN ASSISTANT

## 2021-01-12 PROCEDURE — 99232 SBSQ HOSP IP/OBS MODERATE 35: CPT | Performed by: PHYSICIAN ASSISTANT

## 2021-01-12 PROCEDURE — 99233 SBSQ HOSP IP/OBS HIGH 50: CPT | Performed by: INTERNAL MEDICINE

## 2021-01-12 RX ORDER — AMLODIPINE BESYLATE 10 MG/1
10 TABLET ORAL DAILY
Status: DISCONTINUED | OUTPATIENT
Start: 2021-01-13 | End: 2021-01-13 | Stop reason: HOSPADM

## 2021-01-12 RX ADMIN — ACETAMINOPHEN 650 MG: 325 TABLET, FILM COATED ORAL at 08:52

## 2021-01-12 RX ADMIN — SEVELAMER HYDROCHLORIDE 1600 MG: 800 TABLET, FILM COATED PARENTERAL at 08:49

## 2021-01-12 RX ADMIN — PANTOPRAZOLE SODIUM 40 MG: 40 TABLET, DELAYED RELEASE ORAL at 08:51

## 2021-01-12 RX ADMIN — METOPROLOL SUCCINATE 100 MG: 100 TABLET, EXTENDED RELEASE ORAL at 08:50

## 2021-01-12 RX ADMIN — DAPSONE 100 MG: 100 TABLET ORAL at 08:50

## 2021-01-12 RX ADMIN — SEVELAMER HYDROCHLORIDE 1600 MG: 800 TABLET, FILM COATED PARENTERAL at 12:05

## 2021-01-12 RX ADMIN — ATORVASTATIN CALCIUM 40 MG: 40 TABLET, FILM COATED ORAL at 16:12

## 2021-01-12 RX ADMIN — AMLODIPINE BESYLATE 10 MG: 10 TABLET ORAL at 08:50

## 2021-01-12 RX ADMIN — TORSEMIDE 40 MG: 20 TABLET ORAL at 08:50

## 2021-01-12 RX ADMIN — OYSTER SHELL CALCIUM WITH VITAMIN D 1 TABLET: 500; 200 TABLET, FILM COATED ORAL at 08:50

## 2021-01-12 RX ADMIN — Medication 1 TABLET: at 08:50

## 2021-01-12 RX ADMIN — PREDNISONE 60 MG: 20 TABLET ORAL at 08:50

## 2021-01-12 RX ADMIN — SEVELAMER HYDROCHLORIDE 1600 MG: 800 TABLET, FILM COATED PARENTERAL at 16:11

## 2021-01-12 NOTE — ASSESSMENT & PLAN NOTE
Lab Results   Component Value Date    EGFR 15 01/11/2021    EGFR 19 01/10/2021    EGFR 14 01/09/2021    CREATININE 4 20 (H) 01/11/2021    CREATININE 3 43 (H) 01/10/2021    CREATININE 4 40 (H) 01/09/2021     In the setting of known Lupus Nephritis  Creatinine continued to get worse with poor urine output  Cr 4 7 on admission   Known to Dr Mohsen Mcfarland and Dr Ariella Villela renal U/S 12/16 showed increased cortical echogenicity bilaterally, suggestive of underlying renal parenchymal disease and no hydronephrosis  Pt underwent kidney biopsy in November  · Nephrology input appreciated  · Patient initially received gentle IV hydration with normal saline which were discontinued  · Avoid nephrotoxic agents and hypotension  · Worsening creatinine likely secondary to lupus nephritis/glomerulonephritis  · Patient received Lasix 80 milligram IV daily for  3 days with poor urine output  · Patient got PermCath placed by IR on January 8, 2021 and was started on dialysis  · Patient had 2nd session of dialysis on January 9, 2021 with ultrafiltration of 2 liters  · Had 3rd session of dialysis yesterday  · Patient started on oral torsemide 40 milligram p o  Daily on January 9th  · Outpatient chair time is set up - TTS at 1115AM at [facility]     · Plan for short HD tomorrow then full treatment on Thursday as outpatient      Results from last 7 days   Lab Units 01/11/21  1434 01/10/21  0550 01/09/21  0831 01/08/21  0611 01/07/21  0601 01/06/21  0540   BUN mg/dL 96* 66* 85* 119* 112* 104*   CREATININE mg/dL 4 20* 3 43* 4 40* 5 79* 5 55* 5 03*

## 2021-01-12 NOTE — PROGRESS NOTES
Progress Note - Pulmonary   Delaine Bidding [place] 48 y o  male MRN: [id number]  Unit/Bed#: 4 [place] 412-01 Encounter: [id number]  Code Status: Level 1 - Full Code    Sherley Sidhu is a 48 y o  Past Medical History:   Diagnosis Date    CHF (congestive heart failure) (HCC)     Hypertension     Lupus (HCC)     Osteoporosis     Rheumatoid arthritis (HCC)        Assessment/Plan:   * Hemoptysis  Assessment & Plan  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 and new onset renal failure  Now initiated on HD  Permcath placed 1/8  S/p 3 doses of HD on 1/11 and tolerated  Hypoxemia nearly resolved and pending home 02 eval    Pulmonary asked to reevaluate 2/2 increased BLE erythema / swelling / edema / recs for ongoing anticoagulation therapy pending +/- test    Stable and continually improving hemoptysis  Likely SLE pneumonitis related   Non DAH by [place] being organized by Nephro service into outpatient setting  No further role for bronchoscopy  Likely inflammation mediated  Monitor ongoing symptoms  Sputum cx + w/ Stenotrophomonas  / Colonization    Case Report   Non-gynecologic Cytology                          Case: [id number]                                   Authorizing Provider: Marcelo Muller DO      Collected:           01/04/2021 1520               Ordering Location:     [facility] Received:            01/04/2021 1713                                      [address] [place]                                                                       Pathologist:           Leonid Merritt MD                                                         Specimens:   A) - Lung, Left Upper Lobe Bronchial Lavage                                                          B) - Lung, Left Upper Lobe Bronchial Lavage, cell block                                    Final Diagnosis   A  Lung, Left Upper Lobe Bronchial Lavage (Thin-prep and cell block preparations):   Negative for malignancy    Benign bronchial cells, benign squamous cells  Fungal organisms  morphologically compatible with Neida sp, present  Rare pulmonary macrophages and few white blood cells      Satisfactory for evaluation      Cell Count - see NOTE  Electronically signed by Tavares Villasenor MD on 1/6/2021 at 10:59 AM   Note    NOTE:  An order for a cell count on this specimen is noted; however, the following conditions preclude the count being performed:     -paucity of alveolar macrophages on the prepared glass slides    -excessive numbers of epithelial cells exceeding the number of alveolar macrophages  Gross Description       A  35 mL of pink, slightly cloudy fluid, received in [facility]        B  Minimal cell button, white      Patient is stable for D/C from a Pulmonary standpoint  Pulmonary Signing off  Please call with questions, concerns, or need for reevaluation  Follow Up Items:  Dispo: f/u in 1 week  See d/c yoselin for scheduled follow up appt  Medications: Continue 60 mg prednisone in outpatient setting until scheduled f/u  If recurrent DVT may warrant intpatien trial of A/C given recent episodes of hemoptysis  Ultimately if recurrent need for A/C AND recurrence of Hemoptysis / may need IVC filter        Pneumonitis  Assessment & Plan  SLE  Pneumonitis  Continue Prednisone 60 mg / continue into outpatient setting  Superimposed on worsening renal failure  Improving  F/U repeat CXR in approx 6 weeks unless clinical worsening in interval timeframe       ANCA + prior    AZ-3/MPO/Repeat ANCA negative    Currently on Pulse dose steroids  Had been treated w/ cytoxin  For Rituxan in outpatient setting      Acute respiratory failure [facility])  Assessment & Plan  No longer hypoxemic  Home 02 eval assessment pending    Pulmonary infiltrates  Assessment & Plan      DVT (deep venous thrombosis) (HCC)  Assessment & Plan  Hx of recent LLE DVT as of 12/21 venous duplex  New BLE swelling and erythema  Will order venous duplex for today       Acute kidney injury superimposed on chronic kidney disease [facility])  Assessment & Plan  Lab Results   Component Value Date    EGFR 15 2021    EGFR 19 01/10/2021    EGFR 14 2021    CREATININE 4 20 (H) 2021    CREATININE 3 43 (H) 01/10/2021    CREATININE 4 40 (H) 2021   Had [place] cath placed   Had 3rd HD on   Overall - 10 0 L       _________________________________________________    Subjective: Pt seen and examined at bedside  Chief Complaint: "My left leg really hurts  My right leg does too, but I feel like they are both swelling up "    Labs Reviewed: yes   Imaging Reviewed: yes   Collaborative Discussion: case discussed in person with Elen Springer of medicine team     Tele Events:     Vitals:   Temp:  [97 6 °F (36 4 °C)-98 4 °F (36 9 °C)] 97 8 °F (36 6 °C)  HR:  [74-86] 78  Resp:  [18-20] 20  BP: (132-153)/(86-90) 152/87  Weight (last 2 days)     Date/Time   Weight    21 0555   76 3 (168 21)    01/10/21 0549   76 4 (168 43)            Vitals:    21 0245 21 0555 21 0849 21 0852   BP: 142/88  152/87    BP Location:   Left arm    Pulse: 76  78    Resp: 20  20    Temp: 98 4 °F (36 9 °C)  97 8 °F (36 6 °C)    TempSrc:   Oral    SpO2: 95%   97%   Weight:  76 3 kg (168 lb 3 4 oz)     Height:         Temp (24hrs), Av 9 °F (36 6 °C), Min:97 6 °F (36 4 °C), Max:98 4 °F (36 9 °C)  Current: Temperature: 97 8 °F (36 6 °C)        SpO2: SpO2: 97 %, SpO2 Activity: SpO2 Activity: At Rest, SpO2 Device: O2 Device: None (Room air)      IV Infusions:       Nutrition:        Diet Orders   (From admission, onward)             Start     Ordered    01/10/21 1041  Diet Renal; Renal [place]; No; No  Diet effective now     Question Answer Comment   Diet Type Renal    Renal Renal [place]    Should patient have a fluid restriction? No    Should patient have a protein modifier? No    RD to adjust diet per protocol?  Yes        01/10/21 1040    21 1446  Room Service  Once Question:  Type of Service  Answer:  Room Service-Appropriate    01/04/21 1445                Ins/Outs:   I/O       01/10 0701 - 01/11 0700 01/11 0701 - 01/12 0700 01/12 0701 - 01/13 0700    P  O  340 500     I V  (mL/kg) 10 (0 1) 510 (6 7)     Total Intake(mL/kg) 350 (4 6) 1010 (13 2)     Urine (mL/kg/hr) 1775 (1) 1200 (0 7) 200 (0 6)    Other  3500     Total Output 1775 4700 200    Net -1425 -3690 -200                 Lines/Drains:  Invasive Devices     Peripheral Intravenous Line            Peripheral IV 01/10/21 Right;Lateral Wrist 2 days          Hemodialysis Catheter            HD Permanent Double Catheter 3 days                 Active medications:  Scheduled Meds:  Current Facility-Administered Medications   Medication Dose Route Frequency Provider Last Rate    acetaminophen  650 mg Oral Q6H PRN CRISTAL Burgos      [START ON 1/13/2021] amLODIPine  10 mg Oral Daily Aleena Seo MD      atorvastatin  40 mg Oral Daily With Dinner CRISTAL Burgos      calcium carbonate-vitamin D  1 tablet Oral Daily With Breakfast CRISTAL Burgos      dapsone  100 mg Oral Daily CRISTAL Burgos      metoprolol succinate  100 mg Oral Daily Vickie Pino MD      multivitamin-minerals  1 tablet Oral Daily CRISTAL Burgos      ondansetron  4 mg Intravenous Q6H PRN CRISTAL Burgos      pantoprazole  40 mg Oral Early Morning CRISTAL Burgos      predniSONE  60 mg Oral Daily Sully Harmon MD      sevelamer  1,600 mg Oral TID With Meals CRISTAL Burgos      torsemide  40 mg Oral Daily Vickie Pino MD       PRN Meds:acetaminophen, 650 mg, Q6H PRN  ondansetron, 4 mg, Q6H PRN      ____________________________________________________________________    Physical Exam  Constitutional:       General: He is not in acute distress  Appearance: He is well-developed  HENT:      Head: Normocephalic and atraumatic        Mouth/Throat: Pharynx: No oropharyngeal exudate  Eyes:      Pupils: Pupils are equal, round, and reactive to light  Neck:      Musculoskeletal: Normal range of motion and neck supple  Thyroid: No thyromegaly  Vascular: No JVD  Trachea: No tracheal deviation  Cardiovascular:      Heart sounds: No murmur  No friction rub  No gallop  Pulmonary:      Effort: Pulmonary effort is normal  No respiratory distress  Breath sounds: Normal breath sounds  No stridor  No wheezing or rales  Chest:      Chest wall: No tenderness  Comments: R side chest port / intact / no S/S of infection  Abdominal:      General: There is no distension  Tenderness: There is no abdominal tenderness  There is no guarding  Musculoskeletal: Normal range of motion  Right lower leg: Edema present  Left lower leg: Edema present  Comments: BLE erythema / swelling     L > R    Skin:     General: Skin is warm and dry  Findings: No erythema or rash  Neurological:      Mental Status: He is alert and oriented to person, place, and time         ____________________________________________________________________    Invasive/non-invasive ventilation settings   Respiratory    Lab Data (Last 4 hours)    None         O2/Vent Data (Last 4 hours)    None                Laboratory and Diagnostics:  Results from last 7 days   Lab Units 01/11/21  1434 01/10/21  0550 01/09/21  0830 01/08/21  0611 01/07/21  0601 01/06/21  0540   WBC Thousand/uL 8 44 7 71 10 99* 12 18* 10 69* 8 66   HEMOGLOBIN g/dL 8 1* 7 2* 7 9* 8 0* 7 0* 7 2*   HEMATOCRIT % 26 0* 23 8* 26 1* 26 2* 23 7* 23 5*   PLATELETS Thousands/uL 100* 96* 111* 147* 148* 160   NEUTROS PCT %  --  89* 92*  --  92*  --    BANDS PCT % 7  --   --   --   --   --    MONOS PCT %  --  5 5  --  3*  --    MONO PCT % 4  --   --   --   --   --      Results from last 7 days   Lab Units 01/11/21  1434 01/10/21  0550 01/09/21  0831 01/08/21  0611 01/07/21  0601 01/06/21  0540   SODIUM mmol/L 139 135* 139 138 139 138   POTASSIUM mmol/L 4 3 4 5 4 0 4 8 4 7 5 0   CHLORIDE mmol/L 103 101 104 105 104 103   CO2 mmol/L 25 26 25 21 20* 23   ANION GAP mmol/L 11 8 10 12 15* 12   BUN mg/dL 96* 66* 85* 119* 112* 104*   CREATININE mg/dL 4 20* 3 43* 4 40* 5 79* 5 55* 5 03*   CALCIUM mg/dL 8 5 8 3 8 3 8 8 8 8 8 8   GLUCOSE RANDOM mg/dL 135 101 180* 132 137 152*     Results from last 7 days   Lab Units 01/11/21  1434 01/09/21  0831 01/07/21  0601   MAGNESIUM mg/dL  --   --  2 6   PHOSPHORUS mg/dL 4 2 6 3* 7 6*      Results from last 7 days   Lab Units 01/07/21  0601   INR  1 07      Results from last 7 days   Lab Units 01/05/21  2023 01/05/21  1820   TROPONIN I ng/mL 0 07* 0 07*         ABG:    VBG:    Results from last 7 days   Lab Units 01/09/21  0831 01/08/21  0611   PROCALCITONIN ng/ml 0 32* 0 25       Micro  Results from last 7 days   Lab Units 01/10/21  0823   MRSA CULTURE ONLY  No Methicillin Resistant Staphlyococcus aureus (MRSA) isolated       Imaging:   XR chest portable   Final Result by Jared Leonard DO (01/10 2144)      No pneumothorax following central line placement  Persistent bilateral airspace disease with slight improvement in the left suprahilar region  Workstation performed: [id number]         IR tunneled dialysis catheter placement   Final Result by Juliocesar Nielson MD (01/08 7978)   Impression:   1  Successful ultrasound and fluoroscopically guided placement of a 28cm Titan dialysis catheter via the right internal jugular vein  The tip of the catheter is in the right atrium and may be used immediately  Workstation performed: [id number]         VAS lower limb venous duplex study, complete bilateral   Final Result by Papito Varma MD (01/07 2037)      XR chest portable   Final Result by Natalie Walker MD (01/07 1615)      Diffuse airspace opacities have worsened and suggests possibility of pulmonary edema or diffuse pneumonia        The study was marked in [facility] for immediate notification  Workstation performed: [id number]         XR chest portable   Final Result by Oskar Bush MD (01/06 1644)      Diffuse patchy airspace disease bilaterally, worsened since the prior examination  Workstation performed: [id number]         VAS lower limb venous duplex study, complete bilateral    (Results Pending)   VAS lower limb venous duplex study, complete bilateral    (Results Pending)       Micro:   Lab Results   Component Value Date    BLOODCX No Growth After 5 Days  11/24/2020    BLOODCX No Growth After 5 Days  11/24/2020    SPUTUMCULTUR 4+ Growth of  12/19/2020    SPUTUMCULTUR  12/19/2020     Commensal respiratory tobias only; No significant growth of Staph aureus/MRSA or Pseudomonas aeruginosa      MRSACULTURE  01/10/2021     No Methicillin Resistant Staphlyococcus aureus (MRSA) isolated    BRONCHIALCUL 2+ Growth of Stenotrophomonas maltophilia (A) 01/04/2021    BRONCHIALCUL 2+ Growth of  01/04/2021    BRONCHIALCUL 1+ Growth of  12/19/2020            Invalid input(s): LEGIONELLAURINARYANTIGEN              +

## 2021-01-12 NOTE — PLAN OF CARE
Problem: Potential for Falls  Goal: Patient will remain free of falls  Description: INTERVENTIONS:  - Assess patient frequently for physical needs  -  Identify cognitive and physical deficits and behaviors that affect risk of falls    -  [place] fall precautions as indicated by assessment   - Educate patient/family on patient safety including physical limitations  - Instruct patient to call for assistance with activity based on assessment  - Modify environment to reduce risk of injury  - Consider OT/PT consult to assist with strengthening/mobility  Outcome: Progressing     Problem: DISCHARGE PLANNING - CARE MANAGEMENT  Goal: Discharge to post-acute care or home with appropriate resources  Description: INTERVENTIONS:  - Conduct assessment to determine patient/family and health care team treatment goals, and need for post-acute services based on payer coverage, community resources, and patient preferences, and barriers to discharge  - Address psychosocial, clinical, and financial barriers to discharge as identified in assessment in conjunction with the patient/family and health care team  - Arrange appropriate level of post-acute services according to patients   needs and preference and payer coverage in collaboration with the physician and health care team  - Communicate with and update the patient/family, physician, and health care team regarding progress on the discharge plan  - Arrange appropriate transportation to post-acute venues  Outcome: Progressing     Problem: PAIN - ADULT  Goal: Verbalizes/displays adequate comfort level or baseline comfort level  Description: Interventions:  - Encourage patient to monitor pain and request assistance  - Assess pain using appropriate pain scale  - Administer analgesics based on type and severity of pain and evaluate response  - Implement non-pharmacological measures as appropriate and evaluate response  - Consider cultural and social influences on pain and pain management  - Notify physician/advanced practitioner if interventions unsuccessful or patient reports new pain  Outcome: Progressing     Problem: INFECTION - ADULT  Goal: Absence or prevention of progression during hospitalization  Description: INTERVENTIONS:  - Assess and monitor for signs and symptoms of infection  - Monitor lab/diagnostic results  - Monitor all insertion sites, i e  indwelling lines, tubes, and drains  - Monitor endotracheal if appropriate and nasal secretions for changes in amount and color  - [place] appropriate cooling/warming therapies per order  - Administer medications as ordered  - Instruct and encourage patient and family to use good hand hygiene technique  - Identify and instruct in appropriate isolation precautions for identified infection/condition  Outcome: Progressing  Goal: Absence of fever/infection during neutropenic period  Description: INTERVENTIONS:  - Monitor WBC    Outcome: Progressing     Problem: SAFETY ADULT  Goal: Patient will remain free of falls  Description: INTERVENTIONS:  - Assess patient frequently for physical needs  -  Identify cognitive and physical deficits and behaviors that affect risk of falls    -  [place] fall precautions as indicated by assessment   - Educate patient/family on patient safety including physical limitations  - Instruct patient to call for assistance with activity based on assessment  - Modify environment to reduce risk of injury  - Consider OT/PT consult to assist with strengthening/mobility  Outcome: Progressing  Goal: Maintain or return to baseline ADL function  Description: INTERVENTIONS:  -  Assess patient's ability to carry out ADLs; assess patient's baseline for ADL function and identify physical deficits which impact ability to perform ADLs (bathing, care of mouth/teeth, toileting, grooming, dressing, etc )  - Assess/evaluate cause of self-care deficits   - Assess range of motion  - Assess patient's mobility; develop plan if impaired  - Assess patient's need for assistive devices and provide as appropriate  - Encourage maximum independence but intervene and supervise when necessary  - Involve family in performance of ADLs  - Assess for home care needs following discharge   - Consider OT consult to assist with ADL evaluation and planning for discharge  - Provide patient education as appropriate  Outcome: Progressing  Goal: Maintain or return mobility status to optimal level  Description: INTERVENTIONS:  - Assess patient's baseline mobility status (ambulation, transfers, stairs, etc )    - Identify cognitive and physical deficits and behaviors that affect mobility  - Identify mobility aids required to assist with transfers and/or ambulation (gait belt, sit-to-stand, lift, walker, cane, etc )  - [place] fall precautions as indicated by assessment  - Record patient progress and toleration of activity level on Mobility SBAR; progress patient to next Phase/Stage  - Instruct patient to call for assistance with activity based on assessment  - Consider rehabilitation consult to assist with strengthening/weightbearing, etc   Outcome: Progressing     Problem: DISCHARGE PLANNING  Goal: Discharge to home or other facility with appropriate resources  Description: INTERVENTIONS:  - Identify barriers to discharge w/patient and caregiver  - Arrange for needed discharge resources and transportation as appropriate  - Identify discharge learning needs (meds, wound care, etc )  - Arrange for interpretive services to assist at discharge as needed  - Refer to Case Management Department for coordinating discharge planning if the patient needs post-hospital services based on physician/advanced practitioner order or complex needs related to functional status, cognitive ability, or social support system  Outcome: Progressing     Problem: Knowledge Deficit  Goal: Patient/family/caregiver demonstrates understanding of disease process, treatment plan, medications, and discharge instructions  Description: Complete learning assessment and assess knowledge base    Interventions:  - Provide teaching at level of understanding  - Provide teaching via preferred learning methods  Outcome: Progressing     Problem: CARDIOVASCULAR - ADULT  Goal: Maintains optimal cardiac output and hemodynamic stability  Description: INTERVENTIONS:  - Monitor I/O, vital signs and rhythm  - Monitor for S/S and trends of decreased cardiac output  - Administer and titrate ordered vasoactive medications to optimize hemodynamic stability  - Assess quality of pulses, skin color and temperature  - Assess for signs of decreased coronary artery perfusion  - Instruct patient to report change in severity of symptoms  Outcome: Progressing     Problem: RESPIRATORY - ADULT  Goal: Achieves optimal ventilation and oxygenation  Description: INTERVENTIONS:  - Assess for changes in respiratory status  - Assess for changes in mentation and behavior  - Position to facilitate oxygenation and minimize respiratory effort  - Oxygen administered by appropriate delivery if ordered  - Initiate smoking cessation education as indicated  - Encourage broncho-pulmonary hygiene including cough, deep breathe, Incentive Spirometry  - Assess the need for suctioning and aspirate as needed  - Assess and instruct to report SOB or any respiratory difficulty  - Respiratory Therapy support as indicated  Outcome: Progressing     Problem: GASTROINTESTINAL - ADULT  Goal: Maintains or returns to baseline bowel function  Description: INTERVENTIONS:  - Assess bowel function  - Encourage oral fluids to ensure adequate hydration  - Administer IV fluids if ordered to ensure adequate hydration  - Administer ordered medications as needed  - Encourage mobilization and activity  - Consider nutritional services referral to assist patient with adequate nutrition and appropriate food choices  Outcome: Progressing     Problem: GENITOURINARY - ADULT  Goal: Maintains or returns to baseline urinary function  Description: INTERVENTIONS:  - Assess urinary function  - Encourage oral fluids to ensure adequate hydration if ordered  - Administer IV fluids as ordered to ensure adequate hydration  - Administer ordered medications as needed  - Offer frequent toileting  - Follow urinary retention protocol if ordered  Outcome: Progressing     Problem: METABOLIC, FLUID AND ELECTROLYTES - ADULT  Goal: Fluid balance maintained  Description: INTERVENTIONS:  - Monitor labs   - Monitor I/O and WT  - Instruct patient on fluid and nutrition as appropriate  - Assess for signs & symptoms of volume excess or deficit  Outcome: Progressing  Goal: Electrolytes maintained within normal limits  Description: INTERVENTIONS:  - Monitor labs and assess patient for signs and symptoms of electrolyte imbalances  - Administer electrolyte replacement as ordered  - Monitor response to electrolyte replacements, including repeat lab results as appropriate  - Instruct patient on fluid and nutrition as appropriate  Outcome: Progressing     Problem: HEMATOLOGIC - ADULT  Goal: Maintains hematologic stability  Description: INTERVENTIONS  - Assess for signs and symptoms of bleeding or hemorrhage  - Monitor labs  - Administer supportive blood products/factors as ordered and appropriate  Outcome: Progressing

## 2021-01-12 NOTE — UTILIZATION REVIEW
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, steroids continued  S/P 3rd HD tx 1/11, tolerated well, short tx planned for Wednesday per renal, then full tx Thursday for TTS schedule  Increased BLE pain, erythema & edema, scheduled for BLE duplex studies       Pertinent Labs/Diagnostic Results:   Results from last 7 days   Lab Units 01/11/21  1434 01/10/21  0550 01/09/21  0830 01/08/21  0611 01/07/21  0601   WBC Thousand/uL 8 44 7 71 10 99* 12 18* 10 69*   HEMOGLOBIN g/dL 8 1* 7 2* 7 9* 8 0* 7 0*   HEMATOCRIT % 26 0* 23 8* 26 1* 26 2* 23 7*   PLATELETS Thousands/uL 100* 96* 111* 147* 148*   NEUTROS ABS Thousands/µL  --  6 91 10 02*  --  9 84*   BANDS PCT % 7  --   --   --   --      Results from last 7 days   Lab Units 01/11/21  1434 01/10/21  0550 01/09/21  0831 01/08/21  0611 01/07/21  0601   SODIUM mmol/L 139 135* 139 138 139   POTASSIUM mmol/L 4 3 4 5 4 0 4 8 4 7   CHLORIDE mmol/L 103 101 104 105 104   CO2 mmol/L 25 26 25 21 20*   ANION GAP mmol/L 11 8 10 12 15*   BUN mg/dL 96* 66* 85* 119* 112*   CREATININE mg/dL 4 20* 3 43* 4 40* 5 79* 5 55*   EGFR ml/min/1 73sq m 15 19 14 10 11   CALCIUM mg/dL 8 5 8 3 8 3 8 8 8 8   MAGNESIUM mg/dL  --   --   --   --  2 6   PHOSPHORUS mg/dL 4 2  --  6 3*  --  7 6*     Results from last 7 days   Lab Units 01/11/21  1434 01/10/21  0550 01/09/21  0831 01/08/21  0611 01/07/21  0601 01/06/21  0540   GLUCOSE RANDOM mg/dL 135 101 180* 132 137 152*     Results from last 7 days   Lab Units 01/05/21 2023 01/05/21  1820   TROPONIN I ng/mL 0 07* 0 07*     Results from last 7 days   Lab Units 01/07/21  0601   PROTIME seconds 13 8   INR  1 07     Results from last 7 days   Lab Units 01/09/21  0831 01/08/21  0611   PROCALCITONIN ng/ml 0 32* 0 25     Results from last 7 days   Lab Units 01/06/21  0540   FERRITIN ng/mL 347     Results from last 7 days   Lab Units 01/11/21  1434   HEP B S AG  Non-reactive   HEP C AB  Non-reactive   HEP B C IGM  Non-reactive   HEP B C TOTAL AB  Non-reactive     Results from last 7 days   Lab Units 01/08/21  0611 01/06/21  0540   CRP mg/L 5 6* 30 8*     Results from last 7 days   Lab Units 01/11/21  1434   TOTAL COUNTED  100     1/10  Cxr=   No pneumothorax following central line placement  Persistent bilateral airspace disease with slight improvement in the left suprahilar region  1/12:  BLE venous duplex=  RIGHT LOWER LIMB:  No evidence of acute or chronic deep vein thrombosis  No evidence of superficial thrombophlebitis noted  Doppler evaluation shows a normal response to augmentation maneuvers  Popliteal, posterior tibial and anterior tibial arterial Doppler waveforms are triphasic  LEFT LOWER LIMB:  Resolution of the great saphenous vein below knee  Evidence of chronic deep vein thrombosis in the popliteal vein and  gastrocneimus vein  No evidence of superficial thrombophlebitis noted  Doppler evaluation shows a normal response to augmentation maneuvers  Popliteal, posterior tibial and anterior tibial arterial Doppler waveforms are Triphasic      Vital Signs: /87 (BP Location: Left arm)   Pulse 78   Temp 97 8 °F (36 6 °C) (Oral)   Resp 20   Ht 5' 9 5" (1 765 m)   Wt 76 3 kg (168 lb 3 4 oz)   SpO2 97%   BMI 24 48 kg/m²     Medications:   [START ON 1/13/2021] amLODIPine, 10 mg, Oral, Daily  atorvastatin, 40 mg, Oral, Daily With Dinner  calcium carbonate-vitamin D, 1 tablet, Oral, Daily With Breakfast  dapsone, 100 mg, Oral, Daily  metoprolol succinate, 100 mg, Oral, Daily  multivitamin-minerals, 1 tablet, Oral, Daily  pantoprazole, 40 mg, Oral, Early Morning  predniSONE, 60 mg, Oral, Daily  sevelamer, 1,600 mg, Oral, TID With Meals  torsemide, 40 mg, Oral, Daily    PRN Meds:  acetaminophen, 650 mg, Oral, Q6H PRN  ondansetron, 4 mg, Intravenous, Q6H PRN    Discharge Plan: d    Network Utilization Review Department  ATTENTION: Please call with any questions or concerns to [phone] and carefully listen to the prompts so that you are directed to the right person  All voicemails are confidential   María Reed all requests for admission clinical reviews, approved or denied determinations and any other requests to dedicated fax number below belonging to the campus where the patient is receiving treatment   List of dedicated fax numbers for the Facilities:  [address] DENIALS (Administrative/Medical Necessity) [phone]   [address] (Maternity/NICU/Pediatrics) [phone]   [address] [place] [address] Dr Camden Diamond 9125 (  Shaina Flaherty "Kylee" 103) [id number] [place] [address] Hussain Travon [address] 1481 [address] [place] [address] [phone]

## 2021-01-12 NOTE — ASSESSMENT & PLAN NOTE
Diagnosed lupus in his 19's  Cellcept was discontinued recently  S/p plasmapheresis x 5 treatments recently  S/P cytoxan 12/18 with plan for every other week for 6 doses  Patient was on prednisone 20 milligram p o  Daily  · Initially started on Solu-Medrol 40 milligram q 12 hours which was changed to methylprednisolone 250 milligram IV q 6 hours  Patient is prednisone 60mg p o  Daily currently  · Rheumatology was consulted and appreciate recommendations  · Patient has had 2 doses of IV cyclophosphamide so far and the plan was to receive 6 doses total every 2 weeks as outpatient  · Pulmonology discussed coordination of care with [facility] who recommended rituxan in the place of cyclophosphamide  · Further treatment plan based on pulmonology and Rheumatology recommendations  · Patient's repeat MPO, SSA and B, anti PR3 were all negative  · Rheumatology, Nephrology working on setting up outpatient Rituxan treatment and working on insurance approvals  · Patient's left upper lobe bronchial lavage was negative for malignancy  · Continue prednisone 60 milligram p o   Daily with outpatient follow-up with pulmonology

## 2021-01-12 NOTE — ASSESSMENT & PLAN NOTE
Diagnosed with a left lower extremity DVT on 12/21/2020 for which she was started on Eliquis   · Eliquis on hold in the setting of hemoptysis since admission  · Repeat lower extremity 1/7 venous Dopplers showed chronic nonocclusive DVT in the popliteal vein and proximal gastrocnemius vein with evidence of superficial thrombophlebitis below-knee in the greater saphenous vein  · Patient reports left calf pain and ankle pain started overnight and could not walk due to pain  Repeat venous Doppler ordered today  If + for acute DVT, patient will need IVC filter per discussion with Pulmonary  · Elevation legs

## 2021-01-12 NOTE — ASSESSMENT & PLAN NOTE
Chronic anemia with baseline hemoglobin near 8 5  · Hemoglobin is slightly down trending in the setting of hemoptysis  · Patient received 1 unit of PRBC transfusion on January 7, 2021 with improved hemoglobin to 8   Hemoglobin again dropped to 7 2 but improved to 8 1  Results from last 7 days   Lab Units 01/11/21  1434 01/10/21  0550 01/09/21  0830 01/08/21  0611 01/07/21  0601 01/06/21  0540   HEMOGLOBIN g/dL 8 1* 7 2* 7 9* 8 0* 7 0* 7 2*   HEMATOCRIT % 26 0* 23 8* 26 1* 26 2* 23 7* 23 5*   MCV fL 102* 104* 104* 104* 107* 106*

## 2021-01-12 NOTE — CASE MANAGEMENT
LOS - 8 days    SW following to assist with planning  Outpatient dialysis plans in place  Call received from Ely Wang at [facility] Admissions  Per Ely Wang pt has been accepted for treatment at [address] [place] [address] and his chair time will be 11:15 am on Tues-Thurs-Sat  SW met with pt and wife (over phone) to discuss above  Pt is anticipating discharge tomorrow  So first treatment will be Thursday, 1/14/21  SW will continue to follow to monitor progress and assist with transition home when ready

## 2021-01-12 NOTE — ASSESSMENT & PLAN NOTE
Patient does not use oxygen at home  Patient was on 5 liters oxygen which is titrated down to room air now  Satting mid to high 90s on room air this morning  In the setting of bilateral pulmonary infiltrates suggesting lupus vasculitis/pneumonitis  Patient was on high doses IV Solu-Medrol  Transitioned to prednisone 60 milligram p o  Daily  Repeat chest x-ray 1/7 shows worsening diffuse airspace opacities suggesting possible pulmonary edema/diffuse pneumonia  Procalcitonin level was 0 25  Bronchial cultures grew stenotrophomonas-possible contaminant  Chest x-ray showed improving bilateral infiltrates after the dialysis  Need home O2 eval prior to discharge

## 2021-01-12 NOTE — RESPIRATORY THERAPY NOTE
Home Oxygen Qualifying Test       Patient name: Catie King        : 1965   Date of Test:  2021  Diagnosis:      Home Oxygen Test:    **Medicare Guidelines require item(s) 1-5 on all ambulatory patients or 1 and 2 on non-ambulatory patients  1   Baseline SPO2 on Room Air at rest 94 %  2   SPO2 during exercise on Room Air 92-94 %  During exercise monitor SpO2  If SPO2 increases >=89% with ambulation do not add supplemental             oxygen  If <= 88% on room air add O2 via NC and titrate patient  Patient must be ambulated with O2 and titrated to > 88% with exertion  3   SPO2 on Oxygen at rest N/A % N/A lpm     4   SPO2 during exercise on Oxygen  N/A % a liter flow of N/A lpm     5   Exercise performed:          Walking          []  Supplemental Home Oxygen is indicated  [x]  Client does not qualify for home oxygen        Respiratory Additional Notes-     Doug Garcia, RT

## 2021-01-12 NOTE — ASSESSMENT & PLAN NOTE
Admitted with persistent hemoptysis  Recent hospital admission in December at [facility] for which he underwent bronchoscopy by Pulmonary  Bronchoscopy revealed diffuse erythema and prominent vasculature without overt sequential increases in hemorrhage on serial aliquots   Still appears to be a diffuse inflammatory process rather than focal pneumonia   Diffuse pneumonia not ruled out  Patient underwent pulse steroids and plasmapheresis at that time  Hemoptysis resolved  Patient was discharged home on prednisone and Eliquis for left lower extremity DVT  Likely SLE pneumonitis related hemoptysis  · Pulmonary following, appreciate input  · Patient underwent repeat bronchoscopy on January 4, 2021 which showed no active bleeding or endobronchial lesions with some scattered thin bloody secretions throughout the airway  · Continue to hold Eliquis  · Rheumatology was consulted  · Patient might need IVC filter if anticoagulation is contraindicated in the future  · Patient was started on pulse dose steroids with Solu-Medrol 250 milligram IV q 6 hours which he received for 3 days  Patient is tapered down to prednisone 60mg po daily  · Patient continues to have persistent symptoms with hemoptysis and shortness of breath  · Prolonged discussion with patient and family in coordination with pulmonology, Nephrology  · Pulmonology discussed coordination of care with potentially [place] at DR MARTHA THAKK[facility] further intervention was recommended by [facility]  Rheumatology suggesting changing cyclophosphamide to IV Rituxan  IV Rituxan being organized by Nephrology into outpatient setting  · Patient's bronchial cultures growing mixed respiratory tobias and also stenotrophomonas-likely contaminant  Bronchial  AFB and Pneumocystis were negative  Cytology negative for malignancy  · Procalcitonin level was 0 25    CRP has improved to 5 6 from 30 8  · Chest x-ray showing improving bilateral infiltrates after the dialysis on 1/10  · Continue dapsone for PCP prophylaxis

## 2021-01-12 NOTE — PROGRESS NOTES
[address] [facility] NOTE   Annalisa Shankar 48 y o  male MRN: [id number]  Unit/Bed#: [address] [place] [address] Encounter: [id number]  Reason for Consult: CAROLINE on CKD    ASSESSMENT and PLAN:    56-year-old male with a past medical history of lupus class 4 nephritis on renal biopsy in November 2020, prior treated with MMF, then Cytoxan in December 2020, positive C ANCA, negative PR3 and negative MPO, hypertension, CHF, lower extremity DVT who initially presents with hemoptysis in early January  Patient also had recent admission in December for hemoptysis also  During prior admission, patient was given pulse dose steroids and plasma exchange and was discharged on high-dose prednisone  Last dose of Cytoxan was December 31st     1) CAROLINE on CKD with lupus nephritis    - prior baseline creat 1 6-1 9 mg/dL  - peak creat on 12/23 on 4 3 mg/dL with discharge creat 4 59 mg/dL 12/31 mg/dL  - admission creat 1 4/2021 4 7 mg/dL  - outpatient Nephrologist Dr Sergio Hernandez  - patient of 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  Also showed 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 and tubular interstitial scarring  Tubular atrophy interstitial inflammation were mild  -prior urinalysis on December 30th with 4-10 RBC, 2+ protein  -PVR 30 cc  -patient with 2nd dose of cyclophosphamide on December 31st   - PermCath placed on January 8th and 1st dialysis treatment was started on January a  -2nd dialysis treatment on January 9th  -3rd dialysis treatment on January 11th with UF 3 L    - discussions by Pulmonary and Rheumatology team with tertiary center and plan is to dose rituximab instead of Cytoxan as an outpatient    Plan  - will plan for short HD tomorrow then full treatment Thursday to place on TTS schedule  - reviewed LLE pain with Pulm and Primary teams   Duplex pending  - prophylactically on dapsone  -outpatient dialysis placement-to be at   [facility] per  notes  - have messaged outpatient Nephrologist to review   - check quant gold    2) electrolytes - stable    3) acid/base - stable    4) hemoptysis and lupus nephritis also with prior positive C ANCA    -patient received plasma exchange on December 20th December 27 due to diffuse alveolar hemorrhage and IV also steroids  -status post pulse dose steroids and now on prednisone 60 mg  -plan is for rituximab as an outpatient  -Rheumatology and Pulmonary on board  -bronchoscopy not suggestive of alveolar hemorrhage therefore patient was not restarted on plasma exchange this admission  - to note pt had prior C ANCA positive  Neg PR3, neg MPO    5) volume    -transition to oral torsemide from intravenous furosemide on January 9    6) 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    -monitor phosphorus with dialysis    7) DVT    -Eliquis currently on hold  - duplex with chronic non occlusive DVT and superficial thrombophlebitis  - see above for new LLE pain    8) anemia-    -received transfusion this stay  -no JESSEE due to DVT  - iron sat 41%      SUBJECTIVE / 24H INTERVAL HISTORY:  Pt with new LLE pain and edema since last night  Otherwise no sob  Hemoptysis improving       OBJECTIVE:  Current Weight: Weight - Scale: 76 3 kg (168 lb 3 4 oz)  Vitals:    01/12/21 0245 01/12/21 0555 01/12/21 0849 01/12/21 0852   BP: 142/88  152/87    BP Location:   Left arm    Pulse: 76  78    Resp: 20  20    Temp: 98 4 °F (36 9 °C)  97 8 °F (36 6 °C)    TempSrc:   Oral    SpO2: 95%   97%   Weight:  76 3 kg (168 lb 3 4 oz)     Height:           Intake/Output Summary (Last 24 hours) at 1/12/2021 1020  Last data filed at 1/12/2021 0901  Gross per 24 hour   Intake 1010 ml   Output 4450 ml   Net -3440 ml     General: NAD  Skin: no rash  Eyes: anicteric sclera  ENT: moist mucous membrane  Neck: supple  Chest: coarse b/l, no ronchii, no wheeze, no rubs  CVS: s1s2, no murmur, no gallop, no rub  Abdomen: soft, nontender, nl sounds  Extremities: + LLE edema LLEedema LE b/l  : no bills  Neuro: AAOX3  Psych: normal affect    Medications:    Current Facility-Administered Medications:     acetaminophen (TYLENOL) tablet 650 mg, 650 mg, Oral, Q6H PRN, CRISTAL Alves, 650 mg at 01/12/21 0852    amLODIPine (NORVASC) tablet 10 mg, 10 mg, Oral, Daily, CRISTAL Niño, 10 mg at 01/12/21 0850    atorvastatin (LIPITOR) tablet 40 mg, 40 mg, Oral, Daily With CRISTAL Green, 40 mg at 01/11/21 1749    calcium carbonate-vitamin D (OSCAL-D) 500 mg-200 units per tablet 1 tablet, 1 tablet, Oral, Daily With Breakfast, CRISTAL Alves, 1 tablet at 01/12/21 0850    dapsone tablet 100 mg, 100 mg, Oral, Daily, CRISTAL Niño, 100 mg at 01/12/21 0850    metoprolol succinate (TOPROL-XL) 24 hr tablet 100 mg, 100 mg, Oral, Daily, May Chinchilla MD, 100 mg at 01/12/21 0850    multivitamin-minerals (CENTRUM) tablet 1 tablet, 1 tablet, Oral, Daily, CRISTAL Alves, 1 tablet at 01/12/21 0850    ondansetron (ZOFRAN) injection 4 mg, 4 mg, Intravenous, Q6H PRN, CRISTAL Alves    pantoprazole (PROTONIX) EC tablet 40 mg, 40 mg, Oral, Early Morning, CRISTAL Alves, 40 mg at 01/12/21 1873    predniSONE tablet 60 mg, 60 mg, Oral, Daily, Melani Yuen MD, 60 mg at 01/12/21 0850    sevelamer (RENAGEL) tablet 1,600 mg, 1,600 mg, Oral, TID With Meals, CRISTAL Alves, 1,600 mg at 01/12/21 0849    torsemide (DEMADEX) tablet 40 mg, 40 mg, Oral, Daily, May Chinchilla MD, 40 mg at 01/12/21 0850    Laboratory Results:  Results from last 7 days   Lab Units 01/11/21  1434 01/10/21  0550 01/09/21  0831 01/09/21  0830 01/08/21  0611 01/07/21  0601 01/06/21  0540   WBC Thousand/uL 8 44 7 71  --  10 99* 12 18* 10 69* 8 66   HEMOGLOBIN g/dL 8 1* 7 2*  --  7 9* 8 0* 7 0* 7 2*   HEMATOCRIT % 26 0* 23 8*  --  26 1* 26 2* 23 7* 23 5*   PLATELETS Thousands/uL 100* 96*  --  111* 147* 148* 160   POTASSIUM mmol/L 4 3 4 5 4 0  --  4 8 4 7 5 0   CHLORIDE mmol/L 103 101 104  --  105 104 103   CO2 mmol/L 25 26 25  --  21 20* 23   BUN mg/dL 96* 66* 85*  --  119* 112* 104*   CREATININE mg/dL 4 20* 3 43* 4 40*  --  5 79* 5 55* 5 03*   CALCIUM mg/dL 8 5 8 3 8 3  --  8 8 8 8 8 8   MAGNESIUM mg/dL  --   --   --   --   --  2 6  --    PHOSPHORUS mg/dL 4 2  --  6 3*  --   --  7 6*  --

## 2021-01-12 NOTE — PROGRESS NOTES
Progress Note Cristino Frank 1965, 48 y o  male MRN: [id number]    Unit/Bed#: [address] [place] [address] Encounter: [id number]    Primary Care Provider: Sinai Kelley MD   Date and time admitted to hospital: 1/4/2021  9:54 AM        * Hemoptysis  Assessment & Plan  Admitted with persistent hemoptysis  Recent hospital admission in December at [facility] for which he underwent bronchoscopy by Pulmonary  Bronchoscopy revealed diffuse erythema and prominent vasculature without overt sequential increases in hemorrhage on serial aliquots   Still appears to be a diffuse inflammatory process rather than focal pneumonia   Diffuse pneumonia not ruled out  Patient underwent pulse steroids and plasmapheresis at that time  Hemoptysis resolved  Patient was discharged home on prednisone and Eliquis for left lower extremity DVT  Likely SLE pneumonitis related hemoptysis  · Pulmonary following, appreciate input  · Patient underwent repeat bronchoscopy on January 4, 2021 which showed no active bleeding or endobronchial lesions with some scattered thin bloody secretions throughout the airway  · Continue to hold Eliquis  · Rheumatology was consulted  · Patient might need IVC filter if anticoagulation is contraindicated in the future  · Patient was started on pulse dose steroids with Solu-Medrol 250 milligram IV q 6 hours which he received for 3 days  Patient is tapered down to prednisone 60mg po daily  · Patient continues to have persistent symptoms with hemoptysis and shortness of breath  · Prolonged discussion with patient and family in coordination with pulmonology, Nephrology  · Pulmonology discussed coordination of care with stephan New Magdiel at DR MARTH[facility]AKK[facility] further intervention was recommended by [facility]  Rheumatology suggesting changing cyclophosphamide to IV Rituxan  IV Rituxan being organized by Nephrology into outpatient setting    · Patient's bronchial cultures growing mixed respiratory tobias and also stenotrophomonas-likely contaminant  Bronchial  AFB and Pneumocystis were negative  Cytology negative for malignancy  · Procalcitonin level was 0 25  CRP has improved to 5 6 from 30 8  · Chest x-ray showing improving bilateral infiltrates after the dialysis on 1/10  · Continue dapsone for PCP prophylaxis  Acute respiratory failure [facility])  Assessment & Plan  Patient does not use oxygen at home  Patient was on 5 liters oxygen which is titrated down to room air now  Satting mid to high 90s on room air this morning  In the setting of bilateral pulmonary infiltrates suggesting lupus vasculitis/pneumonitis  Patient was on high doses IV Solu-Medrol  Transitioned to prednisone 60 milligram p o  Daily  Repeat chest x-ray 1/7 shows worsening diffuse airspace opacities suggesting possible pulmonary edema/diffuse pneumonia  Procalcitonin level was 0 25  Bronchial cultures grew stenotrophomonas-possible contaminant  Chest x-ray showed improving bilateral infiltrates after the dialysis  Need home O2 eval prior to discharge      Acute kidney injury superimposed on chronic kidney disease [facility])  Assessment & Plan  Lab Results   Component Value Date    EGFR 15 01/11/2021    EGFR 19 01/10/2021    EGFR 14 01/09/2021    CREATININE 4 20 (H) 01/11/2021    CREATININE 3 43 (H) 01/10/2021    CREATININE 4 40 (H) 01/09/2021     In the setting of known Lupus Nephritis  Creatinine continued to get worse with poor urine output  Cr 4 7 on admission   Known to Dr Hernán Kim and Dr Alfonso Mclain renal U/S 12/16 showed increased cortical echogenicity bilaterally, suggestive of underlying renal parenchymal disease and no hydronephrosis  Pt underwent kidney biopsy in November  · Nephrology input appreciated  · Patient initially received gentle IV hydration with normal saline which were discontinued  · Avoid nephrotoxic agents and hypotension  · Worsening creatinine likely secondary to lupus nephritis/glomerulonephritis  · Patient received Lasix 80 milligram IV daily for  3 days with poor urine output  · Patient got PermCath placed by IR on January 8, 2021 and was started on dialysis  · Patient had 2nd session of dialysis on January 9, 2021 with ultrafiltration of 2 liters  · Had 3rd session of dialysis yesterday  · Patient started on oral torsemide 40 milligram p o  Daily on January 9th  · Outpatient chair time is set up - TTS at 1115AM at [facility]  · Plan for short HD tomorrow then full treatment on Thursday as outpatient      Results from last 7 days   Lab Units 01/11/21  1434 01/10/21  0550 01/09/21  0831 01/08/21  0611 01/07/21  0601 01/06/21  0540   BUN mg/dL 96* 66* 85* 119* 112* 104*   CREATININE mg/dL 4 20* 3 43* 4 40* 5 79* 5 55* 5 03*       DVT (deep venous thrombosis) (HCC)  Assessment & Plan  Diagnosed with a left lower extremity DVT on 12/21/2020 for which she was started on Eliquis   · Eliquis on hold in the setting of hemoptysis since admission  · Repeat lower extremity 1/7 venous Dopplers showed chronic nonocclusive DVT in the popliteal vein and proximal gastrocnemius vein with evidence of superficial thrombophlebitis below-knee in the greater saphenous vein  · Patient reports left calf pain and ankle pain started overnight and could not walk due to pain  Repeat venous Doppler ordered today  If + for acute DVT, patient will need IVC filter per discussion with Pulmonary  · Elevation legs  Anemia  Assessment & Plan  Chronic anemia with baseline hemoglobin near 8 5  · Hemoglobin is slightly down trending in the setting of hemoptysis  · Patient received 1 unit of PRBC transfusion on January 7, 2021 with improved hemoglobin to 8   Hemoglobin again dropped to 7 2 but improved to 8 1  Results from last 7 days   Lab Units 01/11/21  1434 01/10/21  0550 01/09/21  0830 01/08/21  0611 01/07/21  0601 01/06/21  0540   HEMOGLOBIN g/dL 8 1* 7 2* 7 9* 8 0* 7 0* 7 2*   HEMATOCRIT % 26 0* 23 8* 26 1* 26 2* 23 7* 23 5*   MCV fL 102* 104* 104* 104* 107* 106*       SLE (systemic lupus erythematosus) ([facility])  Assessment & Plan  Diagnosed lupus in his 20's  Cellcept was discontinued recently  S/p plasmapheresis x 5 treatments recently  S/P cytoxan 12/18 with plan for every other week for 6 doses  Patient was on prednisone 20 milligram p o  Daily  · Initially started on Solu-Medrol 40 milligram q 12 hours which was changed to methylprednisolone 250 milligram IV q 6 hours  Patient is prednisone 60mg p o  Daily currently  · Rheumatology was consulted and appreciate recommendations  · Patient has had 2 doses of IV cyclophosphamide so far and the plan was to receive 6 doses total every 2 weeks as outpatient  · Pulmonology discussed coordination of care with [facility] who recommended rituxan in the place of cyclophosphamide  · Further treatment plan based on pulmonology and Rheumatology recommendations  · Patient's repeat MPO, SSA and B, anti PR3 were all negative  · Rheumatology, Nephrology working on setting up outpatient Rituxan treatment and working on insurance approvals  · Patient's left upper lobe bronchial lavage was negative for malignancy  · Continue prednisone 60 milligram p o   Daily with outpatient follow-up with pulmonology    Elevated troponin  Assessment & Plan  Troponin 0 11, likely non MI elevated troponin elevation in the setting of CAROLINE, respiratory failure  Presently, chest pain-free  · Serial troponins continued remained flat  Results from last 7 days   Lab Units 01/05/21 2023 01/05/21  1820   TROPONIN I ng/mL 0 07* 0 07*       Chronic diastolic congestive heart failure ([address] )  Assessment & Plan  Wt Readings from Last 3 Encounters:   01/12/21 76 3 kg (168 lb 3 4 oz)   01/04/21 71 2 kg (157 lb)   12/22/20 75 6 kg (166 lb 10 7 oz)     Patient with volume overload on recent admission to [facility] in December 2020 for which she was managed with IV Lasix the later transitioned to Demadex 40 mg daily  · Patient was initially on gentle IV hydration which was later stopped  · No clinical evidence of fluid overload  · Patient has been receiving Lasix 80 milligram IV daily for the past 3 days  · Repeat chest x-ray from   showed worsening diffuse airspace opacities  · Repeat chest x-ray and 1/10 shows improving bilateral opacities  · Restarted Demadex 40mg po daily on 1/10  · Daily weight and I&Os        VTE Pharmacologic Prophylaxis:   Pharmacologic: Pharmacologic VTE Prophylaxis contraindicated due to Hemoptysis and anemia  Mechanical VTE Prophylaxis in Place: No    Patient Centered Rounds: I have performed bedside rounds with nursing staff today  Discussions with Specialists or Other Care Team Provider:  Pulmonary    Education and Discussions with Family / Patient:  Yes    Time Spent for Care: 20 minutes  More than 50% of total time spent on counseling and coordination of care as described above  Current Length of Stay: 8 day(s)    Current Patient Status: Inpatient   Certification Statement: The patient will continue to require additional inpatient hospital stay due to Leg pain, rule out acute DVT  Discharge Plan: home once medically cleared    Code Status: Level 1 - Full Code      Subjective:   Patient reports left calf and left ankle pain,could not walk due to pain,started overnight  Denies chest pain dizziness SOB, nausea vomiting, diarrhea, constipation, fever chills  Objective:     Vitals:   Temp (24hrs), Av °F (36 7 °C), Min:97 7 °F (36 5 °C), Max:98 4 °F (36 9 °C)    Temp:  [97 7 °F (36 5 °C)-98 4 °F (36 9 °C)] 97 8 °F (36 6 °C)  HR:  [74-86] 78  Resp:  [18-20] 20  BP: (132-153)/(86-90) 152/87  SpO2:  [91 %-97 %] 97 %  Body mass index is 24 48 kg/m²  Input and Output Summary (last 24 hours):        Intake/Output Summary (Last 24 hours) at 2021 1434  Last data filed at 2021 0901  Gross per 24 hour   Intake 810 ml   Output 4225 ml   Net -3415 ml Physical Exam:     Physical Exam  Vitals signs and nursing note reviewed  Constitutional:       Appearance: He is well-developed  HENT:      Head: Normocephalic and atraumatic  Neck:      Musculoskeletal: Neck supple  Thyroid: No thyromegaly  Vascular: No JVD  Trachea: No tracheal deviation  Cardiovascular:      Rate and Rhythm: Normal rate and regular rhythm  Heart sounds: Normal heart sounds  Comments: Right PermCath in situ  Pulmonary:      Effort: Pulmonary effort is normal  No respiratory distress  Breath sounds: Normal breath sounds  No wheezing or rales  Abdominal:      General: Bowel sounds are normal  There is no distension  Palpations: Abdomen is soft  Tenderness: There is no abdominal tenderness  There is no guarding  Musculoskeletal:         General: Swelling and deformity present  Right lower leg: Edema present  Left lower leg: Edema present  Comments: 2+ lower leg and feet edema  Left foot and lower leg tender  Discoloration to left lower leg  Skin:     General: Skin is warm and dry  Neurological:      General: No focal deficit present  Mental Status: He is alert and oriented to person, place, and time     Psychiatric:         Mood and Affect: Mood normal          Judgment: Judgment normal          Additional Data:     Labs:    Results from last 7 days   Lab Units 01/11/21  1434 01/10/21  0550   WBC Thousand/uL 8 44 7 71   HEMOGLOBIN g/dL 8 1* 7 2*   HEMATOCRIT % 26 0* 23 8*   PLATELETS Thousands/uL 100* 96*   NEUTROS PCT %  --  89*   LYMPHS PCT %  --  4*   LYMPHO PCT % 4*  --    MONOS PCT %  --  5   MONO PCT % 4  --    EOS PCT % 0 0     Results from last 7 days   Lab Units 01/11/21  1434   POTASSIUM mmol/L 4 3   CHLORIDE mmol/L 103   CO2 mmol/L 25   BUN mg/dL 96*   CREATININE mg/dL 4 20*   CALCIUM mg/dL 8 5     Results from last 7 days   Lab Units 01/07/21  0601   INR  1 07       * I Have Reviewed All Lab Data Listed Above   * Additional Pertinent Lab Tests Reviewed: [address] Admission Reviewed    Imaging:    Imaging Reports Reviewed Today Include:  Chest x-ray  Imaging Personally Reviewed by Myself Includes:  None    Recent Cultures (last 7 days):           Last 24 Hours Medication List:   Current Facility-Administered Medications   Medication Dose Route Frequency Provider Last Rate    acetaminophen  650 mg Oral Q6H PRN CRISTAL Moran      [START ON 1/13/2021] amLODIPine  10 mg Oral Daily Garrett Sherman MD      atorvastatin  40 mg Oral Daily With Dinner CRISTAL Moran      calcium carbonate-vitamin D  1 tablet Oral Daily With Breakfast CRISTAL Moran      dapsone  100 mg Oral Daily CRISTAL Moran      metoprolol succinate  100 mg Oral Daily Agustín Duran MD      multivitamin-minerals  1 tablet Oral Daily CRISTAL Moran      ondansetron  4 mg Intravenous Q6H PRN CRISTAL Moran      pantoprazole  40 mg Oral Early Morning CRISTAL Moran      predniSONE  60 mg Oral Daily Zach Lopez MD      sevelamer  1,600 mg Oral TID With Meals CRISTAL Moran      torsemide  40 mg Oral Daily Agustín Duran MD          Today, Patient Was Seen By: CRISTAL Larios    ** Please Note: Dragon 360 Dictation voice to text software may have been used in the creation of this document   **

## 2021-01-12 NOTE — ASSESSMENT & PLAN NOTE
Wt Readings from Last 3 Encounters:   01/12/21 76 3 kg (168 lb 3 4 oz)   01/04/21 71 2 kg (157 lb)   12/22/20 75 6 kg (166 lb 10 7 oz)     Patient with volume overload on recent admission to [facility] in December 2020 for which she was managed with IV Lasix the later transitioned to Demadex 40 mg daily  · Patient was initially on gentle IV hydration which was later stopped  · No clinical evidence of fluid overload  · Patient has been receiving Lasix 80 milligram IV daily for the past 3 days    · Repeat chest x-ray from 01/07  showed worsening diffuse airspace opacities  · Repeat chest x-ray and 1/10 shows improving bilateral opacities  · Restarted Demadex 40mg po daily on 1/10  · Daily weight and I&Os

## 2021-01-13 ENCOUNTER — APPOINTMENT (INPATIENT)
Dept: DIALYSIS | Facility: HOSPITAL | Age: 56
DRG: 545 | End: 2021-01-13
Payer: COMMERCIAL

## 2021-01-13 VITALS
OXYGEN SATURATION: 93 % | BODY MASS INDEX: 24.08 KG/M2 | SYSTOLIC BLOOD PRESSURE: 170 MMHG | DIASTOLIC BLOOD PRESSURE: 96 MMHG | HEIGHT: 70 IN | RESPIRATION RATE: 16 BRPM | WEIGHT: 168.21 LBS | TEMPERATURE: 97.9 F | HEART RATE: 81 BPM

## 2021-01-13 DIAGNOSIS — N17.9 AKI (ACUTE KIDNEY INJURY) (HCC): Primary | ICD-10-CM

## 2021-01-13 PROBLEM — B37.0 ORAL THRUSH: Status: ACTIVE | Noted: 2021-01-13

## 2021-01-13 LAB
ALBUMIN SERPL BCP-MCNC: 2.3 G/DL (ref 3.5–5)
ALP SERPL-CCNC: 84 U/L (ref 46–116)
ALT SERPL W P-5'-P-CCNC: 24 U/L (ref 12–78)
ANION GAP SERPL CALCULATED.3IONS-SCNC: 8 MMOL/L (ref 4–13)
AST SERPL W P-5'-P-CCNC: 19 U/L (ref 5–45)
BILIRUB SERPL-MCNC: 0.8 MG/DL (ref 0.2–1)
BUN SERPL-MCNC: 76 MG/DL (ref 5–25)
CALCIUM ALBUM COR SERPL-MCNC: 9.6 MG/DL (ref 8.3–10.1)
CALCIUM SERPL-MCNC: 8.2 MG/DL (ref 8.3–10.1)
CHLORIDE SERPL-SCNC: 100 MMOL/L (ref 100–108)
CO2 SERPL-SCNC: 26 MMOL/L (ref 21–32)
CREAT SERPL-MCNC: 3.74 MG/DL (ref 0.6–1.3)
ERYTHROCYTE [DISTWIDTH] IN BLOOD BY AUTOMATED COUNT: 15.7 % (ref 11.6–15.1)
FUNGUS SPEC CULT: ABNORMAL
GFR SERPL CREATININE-BSD FRML MDRD: 17 ML/MIN/1.73SQ M
GLUCOSE SERPL-MCNC: 78 MG/DL (ref 65–140)
HCT VFR BLD AUTO: 24.3 % (ref 36.5–49.3)
HGB BLD-MCNC: 7.5 G/DL (ref 12–17)
MAGNESIUM SERPL-MCNC: 2.2 MG/DL (ref 1.6–2.6)
MCH RBC QN AUTO: 31.6 PG (ref 26.8–34.3)
MCHC RBC AUTO-ENTMCNC: 30.9 G/DL (ref 31.4–37.4)
MCV RBC AUTO: 103 FL (ref 82–98)
PLATELET # BLD AUTO: 100 THOUSANDS/UL (ref 149–390)
PMV BLD AUTO: 11.5 FL (ref 8.9–12.7)
POTASSIUM SERPL-SCNC: 3.7 MMOL/L (ref 3.5–5.3)
PROT SERPL-MCNC: 5.2 G/DL (ref 6.4–8.2)
RBC # BLD AUTO: 2.37 MILLION/UL (ref 3.88–5.62)
SODIUM SERPL-SCNC: 134 MMOL/L (ref 136–145)
WBC # BLD AUTO: 7.56 THOUSAND/UL (ref 4.31–10.16)

## 2021-01-13 PROCEDURE — 85027 COMPLETE CBC AUTOMATED: CPT | Performed by: NURSE PRACTITIONER

## 2021-01-13 PROCEDURE — 80053 COMPREHEN METABOLIC PANEL: CPT | Performed by: NURSE PRACTITIONER

## 2021-01-13 PROCEDURE — 83735 ASSAY OF MAGNESIUM: CPT | Performed by: NURSE PRACTITIONER

## 2021-01-13 PROCEDURE — 90935 HEMODIALYSIS ONE EVALUATION: CPT | Performed by: INTERNAL MEDICINE

## 2021-01-13 PROCEDURE — 99239 HOSP IP/OBS DSCHRG MGMT >30: CPT | Performed by: NURSE PRACTITIONER

## 2021-01-13 PROCEDURE — 93970 EXTREMITY STUDY: CPT | Performed by: SURGERY

## 2021-01-13 PROCEDURE — 99223 1ST HOSP IP/OBS HIGH 75: CPT | Performed by: INTERNAL MEDICINE

## 2021-01-13 PROCEDURE — 99233 SBSQ HOSP IP/OBS HIGH 50: CPT | Performed by: INTERNAL MEDICINE

## 2021-01-13 RX ORDER — METOPROLOL SUCCINATE 100 MG/1
100 TABLET, EXTENDED RELEASE ORAL DAILY
Qty: 30 TABLET | Refills: 1 | Status: SHIPPED | OUTPATIENT
Start: 2021-01-13 | End: 2021-01-28 | Stop reason: SDUPTHER

## 2021-01-13 RX ADMIN — OYSTER SHELL CALCIUM WITH VITAMIN D 1 TABLET: 500; 200 TABLET, FILM COATED ORAL at 08:17

## 2021-01-13 RX ADMIN — PANTOPRAZOLE SODIUM 40 MG: 40 TABLET, DELAYED RELEASE ORAL at 06:07

## 2021-01-13 RX ADMIN — PREDNISONE 60 MG: 20 TABLET ORAL at 08:17

## 2021-01-13 RX ADMIN — SEVELAMER HYDROCHLORIDE 1600 MG: 800 TABLET, FILM COATED PARENTERAL at 12:00

## 2021-01-13 RX ADMIN — METOPROLOL SUCCINATE 100 MG: 100 TABLET, EXTENDED RELEASE ORAL at 08:17

## 2021-01-13 RX ADMIN — AMLODIPINE BESYLATE 10 MG: 10 TABLET ORAL at 09:41

## 2021-01-13 RX ADMIN — NYSTATIN 500000 UNITS: 100000 SUSPENSION ORAL at 12:03

## 2021-01-13 RX ADMIN — DAPSONE 100 MG: 100 TABLET ORAL at 08:17

## 2021-01-13 RX ADMIN — Medication 1 TABLET: at 08:16

## 2021-01-13 RX ADMIN — TORSEMIDE 40 MG: 20 TABLET ORAL at 08:17

## 2021-01-13 RX ADMIN — SEVELAMER HYDROCHLORIDE 1600 MG: 800 TABLET, FILM COATED PARENTERAL at 08:16

## 2021-01-13 NOTE — CASE MANAGEMENT
Discharge ordered  Pt will be returning home with wife and starting outpatient dialysis  Dialysis treatments are planned at [address] [place] [address]  Pt's schedule is TThS at 11:15 am   SW met with pt prior to discharge to review plans  Dialysis confirmation letter given to pt  No other discharge needs expressed by pt at this time  Pt's wife will be transporting pt home

## 2021-01-13 NOTE — HEMODIALYSIS
Post-Dialysis RN Treatment Note    Blood Pressure:  Pre 164/90  mm/Hg  Post 170/96 mmHg   EDW  TBD kg    Weight:  Pre 73 0  kg   Post 73 0 kg   Mode of weight measurement: Bed Scale   Volume Removed  100 ml    Treatment duration 50 minutes    NS given  No    Treatment shortened?  Yes, describe: per Dr Jenna Roach due to potential kidney recovery   Medications given during Rx Not Applicable   Estimated Kt/V  Not Applicable   Access type: Permacath/TDC   Access Status: Yes, describe: maintains BFR    Report called to primary nurse   Yes / Oscar Aguirre

## 2021-01-13 NOTE — PROGRESS NOTES
[address] [facility] NOTE   Dayton Morgan 48 y o  male MRN: [id number]  Unit/Bed#: [address] [place] [address] Encounter: [id number]  Reason for Consult: CAROLINE on CKD    ASSESSMENT and PLAN:    15-year-old male with a past medical history of lupus class 4 nephritis on renal biopsy in November 2020, prior treated with MMF, then Cytoxan in December 2020, positive C ANCA, negative PR3 and negative MPO, hypertension, CHF, lower extremity DVT who initially presents with hemoptysis in early January  Patient also had recent admission in December for hemoptysis also  During prior admission, patient was given pulse dose steroids and plasma exchange and was discharged on high-dose prednisone  Last dose of Cytoxan was December 31st      1) CAROLINE on CKD with lupus nephritis     - prior baseline creat 1 6-1 9 mg/dL  - peak creat on 12/23 on 4 3 mg/dL with discharge creat 4 59 mg/dL 12/31 mg/dL  - admission creat 1 4/2021 4 7 mg/dL  - outpatient Nephrologist Dr Mohsen Mcfarland  - patient of 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  Also showed 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 and tubular interstitial scarring  Tubular atrophy interstitial inflammation were mild  -prior urinalysis on December 30th with 4-10 RBC, 2+ protein  -PVR 30 cc  -patient with 2nd dose of cyclophosphamide on December 31st   - PermCath placed on January 8th and 1st dialysis treatment was started on January a  -2nd dialysis treatment on January 9th  -3rd dialysis treatment on January 11th with UF 3 L    - discussions by Pulmonary and Rheumatology team with tertiary center and plan is to dose rituximab instead of Cytoxan as an outpatient  - 1/13 - creat improving pre dialysis     Plan  -  patient was on dialysis this morning  Lab work has returned with predialysis creatinine that is improving  Urine output has improved overall    Physician is showing early signs of renal recovery therefore dialysis was discontinued after 1 hour   -patient was seen and examined on dialysis  -I will review with outpatient nephrologist     - hold dialysis for now  - hold dialysis tomorrow if remains inpatient  - BMP in AM  -fungal cultures positive from bronchoscopy-have message pulmonary soon to review and primary team to review  - prophylactically on dapsone  -outpatient dialysis placement-to be at [address] per SW notes  - check quant gold  - monitor for renal recovery    If pt is to be discharged, please call renal team so we can set up outpatient plan for lab checks, dialysis     2) electrolytes - stable     3) acid/base - stable     4) hemoptysis and lupus nephritis also with prior positive C ANCA     -patient received plasma exchange on December 20th December 27 due to diffuse alveolar hemorrhage and IV also steroids  -status post pulse dose steroids and now on prednisone 60 mg  -plan is for rituximab as an outpatient  -Rheumatology and Pulmonary on board  -bronchoscopy not suggestive of alveolar hemorrhage therefore patient was not restarted on plasma exchange this admission  - to note pt had prior C ANCA positive  Neg PR3, neg MPO     5) volume     -transition to oral torsemide from intravenous furosemide on January 9     6) 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     -monitor phosphorus with dialysis     7) DVT     -Eliquis currently on hold  - duplex with chronic non occlusive DVT and superficial thrombophlebitis  - repeat duplex positive for chronic DVT     8) anemia-     -received transfusion this stay  -no JESSEE due to DVT  - iron sat 41%    SUBJECTIVE / 24H INTERVAL HISTORY:    Pt seen on dialysis  Denies complaints  States pain LLE improving  Still edema  But able to walk       OBJECTIVE:  Current Weight: Weight - Scale: 76 3 kg (168 lb 3 4 oz)  Vitals:    01/13/21 0002 01/13/21 0735 01/13/21 0815 01/13/21 0830   BP: 153/89 156/89 163/92 164/90   BP Location:  Left arm     Pulse: 85 86 87 86   Resp: 18 18 16 16   Temp: 98 5 °F (36 9 °C) 97 9 °F (36 6 °C)  97 9 °F (36 6 °C)   TempSrc:  Oral  Oral   SpO2: 93% 92% 92% 92%   Weight:       Height:           Intake/Output Summary (Last 24 hours) at 1/13/2021 0910  Last data filed at 1/13/2021 0815  Gross per 24 hour   Intake 440 ml   Output 1200 ml   Net -760 ml     General: NAD  Skin: no rash  Eyes: anicteric sclera  ENT: moist mucous membrane  Neck: supple  Chest: CTA b/l, no ronchii, no wheeze, no rubs, no rales  CVS: s1s2, no murmur, no gallop, no rub  Abdomen: soft, nontender, nl sounds  Extremities: LLE>RLE edema  : no bills  Neuro: AAOX3  Psych: normal affect    Medications:    Current Facility-Administered Medications:     acetaminophen (TYLENOL) tablet 650 mg, 650 mg, Oral, Q6H PRN, Twan Alt, CRNP, 650 mg at 01/12/21 6252    amLODIPine (NORVASC) tablet 10 mg, 10 mg, Oral, Daily, Deborah Solorzano MD    atorvastatin (LIPITOR) tablet 40 mg, 40 mg, Oral, Daily With Dinner, Twan Fernandez CRNP, 40 mg at 01/12/21 1612    calcium carbonate-vitamin D (OSCAL-D) 500 mg-200 units per tablet 1 tablet, 1 tablet, Oral, Daily With Breakfast, Twan Fernandez CRNP, 1 tablet at 01/13/21 3032    dapsone tablet 100 mg, 100 mg, Oral, Daily, CRISTAL Niño, 100 mg at 01/13/21 1594    metoprolol succinate (TOPROL-XL) 24 hr tablet 100 mg, 100 mg, Oral, Daily, Alisia Favre, MD, 100 mg at 01/13/21 0817    multivitamin-minerals (CENTRUM) tablet 1 tablet, 1 tablet, Oral, Daily, Twan Fernandez CRNP, 1 tablet at 01/13/21 0816    ondansetron (ZOFRAN) injection 4 mg, 4 mg, Intravenous, Q6H PRN, Twan Alt, CRNP    pantoprazole (PROTONIX) EC tablet 40 mg, 40 mg, Oral, Early Morning, Twan Alt, CRNP, 40 mg at 01/13/21 0607    predniSONE tablet 60 mg, 60 mg, Oral, Daily, Randy Yuen MD, 60 mg at 01/13/21 0817    sevelamer (RENAGEL) tablet 1,600 mg, 1,600 mg, Oral, TID With Meals, CRISTAL Andrews, 1,600 mg at 01/13/21 0816    torsemide [facility]) tablet 40 mg, 40 mg, Oral, Daily, Jason Richardson MD, 40 mg at 01/13/21 0817    Laboratory Results:  Results from last 7 days   Lab Units 01/13/21  0611 01/11/21  1434 01/10/21  0550 01/09/21  0831 01/09/21  0830 01/08/21  0611 01/07/21  0601   WBC Thousand/uL 7 56 8 44 7 71  --  10 99* 12 18* 10 69*   HEMOGLOBIN g/dL 7 5* 8 1* 7 2*  --  7 9* 8 0* 7 0*   HEMATOCRIT % 24 3* 26 0* 23 8*  --  26 1* 26 2* 23 7*   PLATELETS Thousands/uL 100* 100* 96*  --  111* 147* 148*   POTASSIUM mmol/L 3 7 4 3 4 5 4 0  --  4 8 4 7   CHLORIDE mmol/L 100 103 101 104  --  105 104   CO2 mmol/L 26 25 26 25  --  21 20*   BUN mg/dL 76* 96* 66* 85*  --  119* 112*   CREATININE mg/dL 3 74* 4 20* 3 43* 4 40*  --  5 79* 5 55*   CALCIUM mg/dL 8 2* 8 5 8 3 8 3  --  8 8 8 8   MAGNESIUM mg/dL 2 2  --   --   --   --   --  2 6   PHOSPHORUS mg/dL  --  4 2  --  6 3*  --   --  7 6*

## 2021-01-13 NOTE — ASSESSMENT & PLAN NOTE
Chronic anemia with baseline hemoglobin near 8 5  · Hemoglobin is slightly down trending in the setting of hemoptysis  · Patient received 1 unit of PRBC transfusion on January 7, 2021   · Hemoglobin has remained stable  Results from last 7 days   Lab Units 01/13/21  0611 01/11/21  1434 01/10/21  0550 01/09/21  0830 01/08/21  0611 01/07/21  0601   HEMOGLOBIN g/dL 7 5* 8 1* 7 2* 7 9* 8 0* 7 0*   HEMATOCRIT % 24 3* 26 0* 23 8* 26 1* 26 2* 23 7*   MCV fL 103* 102* 104* 104* 104* 107*

## 2021-01-13 NOTE — ASSESSMENT & PLAN NOTE
Diagnosed lupus in his 19's  Cellcept was discontinued recently  S/p plasmapheresis x 5 treatments recently  S/P cytoxan 12/18 with plan for every other week for 6 doses  Patient was on prednisone 20 mg po daily  · Initially started on Solu-Medrol 40 milligram q 12 hours which was changed to methylprednisolone 250 milligram IV q 6 hours  Patient is prednisone 60mg p o  Daily currently    · Rheumatology was consulted and appreciate recommendations  · Patient has had 2 doses of IV cyclophosphamide so far and the plan was to receive 6 doses total every 2 weeks as outpatient  · Pulmonology discussed coordination of care with [facility] who recommended rituxan in the place of cyclophosphamide  · Further treatment plan based on pulmonology and Rheumatology recommendations  · Patient's repeat MPO, SSA and B, anti PR3 were all negative  · Rheumatology, Nephrology working on setting up outpatient Rituxan treatment and working on insurance approvals  · Patient's left upper lobe bronchial lavage was negative for malignancy  · Continue prednisone 60 mg po daily with outpatient follow-up with pulmonology

## 2021-01-13 NOTE — ASSESSMENT & PLAN NOTE
Troponin 0 11, likely non MI elevated troponin elevation in the setting of CAROLINE, respiratory failure  Presently, chest pain-free  · Serial troponins continued remained flat

## 2021-01-13 NOTE — ASSESSMENT & PLAN NOTE
Wt Readings from Last 3 Encounters:   01/12/21 76 3 kg (168 lb 3 4 oz)   01/04/21 71 2 kg (157 lb)   12/22/20 75 6 kg (166 lb 10 7 oz)     Patient with volume overload on recent admission to [address] [place] [address] in December 2020 for which she was managed with IV Lasix the later transitioned to Demadex 40 mg daily  · Patient was initially on gentle IV hydration which was later stopped  · No clinical evidence of fluid overload  · Patient has been receiving Lasix 80 milligram IV daily for the past 3 days    · Repeat chest x-ray from 01/07  showed worsening diffuse airspace opacities  · Repeat chest x-ray and 1/10 shows improving bilateral opacities  · Restarted Demadex 40mg po daily on 1/10  · Daily weight and I&Os

## 2021-01-13 NOTE — CONSULTS
Consultation - Infectious Disease   Michaelle Borer [place] 48 y o  male MRN: [id number]  Unit/Bed#: [address] [place] [address] Encounter: [id number]      IMPRESSION & RECOMMENDATIONS:   Impression/Recommendations:  1  Positive bronchoscopy cultures  Bronchoscopy findings were suggestive of SLE pneumonitis  BAL cultures showed 2+ stenotrophomonas and 4+ yeast   Suspect yeast is likely Candida  These organisms most likely reflect chronic airway colonization  No clinical evidence to suggest active pneumonia  Remains afebrile, clinically stable off antibiotics  No hypoxia at this time     -no treatment indicated  -monitor respiratory symptoms  -monitor temperatures  -no further ID workup indicated at this time    2  Hemoptysis  Suspect secondary to SLE pneumonitis  Status post bronchoscopy on 01/04 revealing diffuse erythema and prominent vasculature with no active hemorrhage  Clinically much improved on IV steroid  No further episodes hemoptysis  -steroid management per pulmonology/rheumatology  -monitor hemoptysis  -outpatient pulmonology follow-up    3  Acute kidney injury on CKD secondary to lupus nephritis  Biopsy-proven class 4 lupus nephritis in November 2020  Refractory to treatment with MMF and Cytoxan  Patient has been initiated on hemodialysis this admission  Nephrology, Rheumatology, pulmonology plan will be to initiate rituximab as outpatient     -outpatient Nephrology/rheumatology follow-up  -continue prophylactic dapsone    4  SLE vasculitis/nephritis/pneumonitis  Outpatient Rheumatology follow-up, as stated above  5  Mild oropharyngeal thrush  Denies dysphagia or odynophagia  Will start nystatin swish and swallow  Continue for 5 days  Antibiotics:  No antibiotic    I discussed above plan with patient and his wife over the phone, and with Wil Cannon from Internal Medicine Service  I personally reviewed previous hospitalization records  Thank you for this consultation    We will follow along with you         HISTORY OF PRESENT ILLNESS:  Reason for Consult:  Positive bronch cultures    HPI: Horacio Tucker is a 48 y o  male with SLE, lupus nephritis class 4 on biopsy in November 2020 refractory to treatment with MMF and Cytoxan, hypertension, DVT, positive C ANCA who originally presented on 01/04/2021 due to hemoptysis  Patient was previously hospitalized from 12/19 to 12/28 at [facility] for hemoptysis  He underwent bronchoscopy revealing diffuse erythema and prominent vasculature without overt hemorrhage  He was treated with pulse dose IV steroid and plasmapheresis, and then placed on prednisone taper  He was also found to have nonocclusive chronic DVT in left leg for which he was discharged on Eliquis  On this admission, chest x-ray showed some improvement in bilateral lung opacities  Patient was also noted to have worsening renal function with creatinine now up to 4 7  Patient was started on high-dose IV steroid  A bronchoscopy was performed on 01/04/2021 revealing some scattered thin bloody secretions throughout airway possibly due to diffuse alveolar hemorrhage  No active bleeding or lesions  BAL cultures ultimately revealed 2+ stenotrophomonas and 4+ yeast   We are consulted regarding these new culture results  On this admission, patient was also started on dialysis  He is being followed by Nephrology and Rheumatology with ultimate plan to start rituximab as outpatient  Patient is feeling much better after initiation of IV antibiotic  He is now off of oxygen  Minimal cough  No further hemoptysis  No fevers or chills  REVIEW OF SYSTEMS:  A complete system-based review of systems is otherwise negative      PAST MEDICAL HISTORY:  Past Medical History:   Diagnosis Date    [facility]est[facility] failure[facility] [address] [facility]     Hypertension     [facility]     Osteoporosis     [facility]      Past Surgical History:   Procedure Laterality Date    CT NEEDLE BIOPSY KIDNEY  11/3/2020    HERNIA REPAIR  199?  IR TUNNELED DIALYSIS CATHETER PLACEMENT  2021    REPLACEMENT TOTAL KNEE Bilateral     right  left 2016    REVISION TOTAL HIP ARTHROPLASTY Bilateral     right 2004 left 2006    TOTAL HIP ARTHROPLASTY Bilateral 6286/[id number]    Right / left        FAMILY HISTORY:  Non-contributory    SOCIAL HISTORY:  Social History     Substance and Sexual Activity   Alcohol Use Never    Frequency: Never     Social History     Substance and Sexual Activity   Drug Use Never     Social History     Tobacco Use   Smoking Status Former Smoker    Packs/day:  00    Types: Cigarettes   Smokeless Tobacco Former User    Types: Chew   Tobacco Comment    25 yrs        ALLERGIES:  No Known Allergies    MEDICATIONS:  All current active medications have been reviewed  PHYSICAL EXAM:  Vitals:  Temp:  [97 9 °F (36 6 °C)-98 5 °F (36 9 °C)] 97 9 °F (36 6 °C)  HR:  [81-87] 81  Resp:  [16-18] 16  BP: (147-175)/() 170/96  SpO2:  [91 %-93 %] 93 %  Temp (24hrs), Av 3 °F (36 8 °C), Min:97 9 °F (36 6 °C), Max:98 5 °F (36 9 °C)  Current: Temperature: 97 9 °F (36 6 °C)     Physical Exam:  General:  Well-nourished, well-developed, in no acute distress  Eyes:  Conjunctive clear with no hemorrhages or effusions  Oropharynx: very mild oropharyngeal thrush  Neck:  Supple, no lymphadenopathy  Lungs: nonlabored respirations  Cardiac:  Regular rate and rhythm  Abdomen:  Soft, non-tender, non-distended  Extremities:  No peripheral cyanosis, clubbing, or edema  Skin:  No rashes, no ulcers  Neurological:  Moves all four extremities spontaneously    LABS, IMAGING, & OTHER STUDIES:  Lab Results:  I have personally reviewed pertinent labs    Results from last 7 days   Lab Units 21  0611 21  1434 01/10/21  0550   POTASSIUM mmol/L 3 7 4 3 4 5   CHLORIDE mmol/L 100 103 101   CO2 mmol/L 26 25 26   BUN mg/dL 76* 96* 66*   CREATININE mg/dL 3 74* 4 20* 3 43*   EGFR ml/min/1 73sq m 17 15 19   CALCIUM mg/dL 8 2* 8 5 8 3   AST U/L 19  --   --    ALT U/L 24  --   --    ALK PHOS U/L 84  --   --      Results from last 7 days   Lab Units 01/13/21  0611 01/11/21  1434 01/10/21  0550   WBC Thousand/uL 7 56 8 44 7 71   HEMOGLOBIN g/dL 7 5* 8 1* 7 2*   PLATELETS Thousands/uL 100* 100* 96*     Results from last 7 days   Lab Units 01/10/21  0823   MRSA CULTURE ONLY  No Methicillin Resistant Staphlyococcus aureus (MRSA) isolated       Imaging Studies:   I have personally reviewed pertinent imaging study reports and images in PACS  Chest x-ray shows extensive bilateral airspace disease which may be secondary to pneumonia, hemorrhage or pulmonary edema  No pneumothorax  EKG, Pathology, and Other Studies:   I have personally reviewed pertinent reports

## 2021-01-13 NOTE — ASSESSMENT & PLAN NOTE
Mild oral pharyngeal thrush  Denies dysphagia  Appreciate Infectious Disease input  Will start nystatin swish and swallow 4 times a day for 5 days

## 2021-01-13 NOTE — PLAN OF CARE
Problem: Potential for Falls  Goal: Patient will remain free of falls  Description: INTERVENTIONS:  - Assess patient frequently for physical needs  -  Identify cognitive and physical deficits and behaviors that affect risk of falls    -  [place] fall precautions as indicated by assessment   - Educate patient/family on patient safety including physical limitations  - Instruct patient to call for assistance with activity based on assessment  - Modify environment to reduce risk of injury  - Consider OT/PT consult to assist with strengthening/mobility  Outcome: Progressing     Problem: DISCHARGE PLANNING - CARE MANAGEMENT  Goal: Discharge to post-acute care or home with appropriate resources  Description: INTERVENTIONS:  - Conduct assessment to determine patient/family and health care team treatment goals, and need for post-acute services based on payer coverage, community resources, and patient preferences, and barriers to discharge  - Address psychosocial, clinical, and financial barriers to discharge as identified in assessment in conjunction with the patient/family and health care team  - Arrange appropriate level of post-acute services according to patients   needs and preference and payer coverage in collaboration with the physician and health care team  - Communicate with and update the patient/family, physician, and health care team regarding progress on the discharge plan  - Arrange appropriate transportation to post-acute venues  Outcome: Progressing     Problem: PAIN - ADULT  Goal: Verbalizes/displays adequate comfort level or baseline comfort level  Description: Interventions:  - Encourage patient to monitor pain and request assistance  - Assess pain using appropriate pain scale  - Administer analgesics based on type and severity of pain and evaluate response  - Implement non-pharmacological measures as appropriate and evaluate response  - Consider cultural and social influences on pain and pain management  - Notify physician/advanced practitioner if interventions unsuccessful or patient reports new pain  Outcome: Progressing     Problem: INFECTION - ADULT  Goal: Absence or prevention of progression during hospitalization  Description: INTERVENTIONS:  - Assess and monitor for signs and symptoms of infection  - Monitor lab/diagnostic results  - Monitor all insertion sites, i e  indwelling lines, tubes, and drains  - Monitor endotracheal if appropriate and nasal secretions for changes in amount and color  - [place] appropriate cooling/warming therapies per order  - Administer medications as ordered  - Instruct and encourage patient and family to use good hand hygiene technique  - Identify and instruct in appropriate isolation precautions for identified infection/condition  Outcome: Progressing  Goal: Absence of fever/infection during neutropenic period  Description: INTERVENTIONS:  - Monitor WBC    Outcome: Progressing     Problem: SAFETY ADULT  Goal: Patient will remain free of falls  Description: INTERVENTIONS:  - Assess patient frequently for physical needs  -  Identify cognitive and physical deficits and behaviors that affect risk of falls    -  [place] fall precautions as indicated by assessment   - Educate patient/family on patient safety including physical limitations  - Instruct patient to call for assistance with activity based on assessment  - Modify environment to reduce risk of injury  - Consider OT/PT consult to assist with strengthening/mobility  Outcome: Progressing  Goal: Maintain or return to baseline ADL function  Description: INTERVENTIONS:  -  Assess patient's ability to carry out ADLs; assess patient's baseline for ADL function and identify physical deficits which impact ability to perform ADLs (bathing, care of mouth/teeth, toileting, grooming, dressing, etc )  - Assess/evaluate cause of self-care deficits   - Assess range of motion  - Assess patient's mobility; develop plan if impaired  - Assess patient's need for assistive devices and provide as appropriate  - Encourage maximum independence but intervene and supervise when necessary  - Involve family in performance of ADLs  - Assess for home care needs following discharge   - Consider OT consult to assist with ADL evaluation and planning for discharge  - Provide patient education as appropriate  Outcome: Progressing  Goal: Maintain or return mobility status to optimal level  Description: INTERVENTIONS:  - Assess patient's baseline mobility status (ambulation, transfers, stairs, etc )    - Identify cognitive and physical deficits and behaviors that affect mobility  - Identify mobility aids required to assist with transfers and/or ambulation (gait belt, sit-to-stand, lift, walker, cane, etc )  - [place] fall precautions as indicated by assessment  - Record patient progress and toleration of activity level on Mobility SBAR; progress patient to next Phase/Stage  - Instruct patient to call for assistance with activity based on assessment  - Consider rehabilitation consult to assist with strengthening/weightbearing, etc   Outcome: Progressing     Problem: DISCHARGE PLANNING  Goal: Discharge to home or other facility with appropriate resources  Description: INTERVENTIONS:  - Identify barriers to discharge w/patient and caregiver  - Arrange for needed discharge resources and transportation as appropriate  - Identify discharge learning needs (meds, wound care, etc )  - Arrange for interpretive services to assist at discharge as needed  - Refer to Case Management Department for coordinating discharge planning if the patient needs post-hospital services based on physician/advanced practitioner order or complex needs related to functional status, cognitive ability, or social support system  Outcome: Progressing     Problem: Knowledge Deficit  Goal: Patient/family/caregiver demonstrates understanding of disease process, treatment plan, medications, and discharge instructions  Description: Complete learning assessment and assess knowledge base    Interventions:  - Provide teaching at level of understanding  - Provide teaching via preferred learning methods  Outcome: Progressing     Problem: CARDIOVASCULAR - ADULT  Goal: Maintains optimal cardiac output and hemodynamic stability  Description: INTERVENTIONS:  - Monitor I/O, vital signs and rhythm  - Monitor for S/S and trends of decreased cardiac output  - Administer and titrate ordered vasoactive medications to optimize hemodynamic stability  - Assess quality of pulses, skin color and temperature  - Assess for signs of decreased coronary artery perfusion  - Instruct patient to report change in severity of symptoms  Outcome: Progressing     Problem: RESPIRATORY - ADULT  Goal: Achieves optimal ventilation and oxygenation  Description: INTERVENTIONS:  - Assess for changes in respiratory status  - Assess for changes in mentation and behavior  - Position to facilitate oxygenation and minimize respiratory effort  - Oxygen administered by appropriate delivery if ordered  - Initiate smoking cessation education as indicated  - Encourage broncho-pulmonary hygiene including cough, deep breathe, Incentive Spirometry  - Assess the need for suctioning and aspirate as needed  - Assess and instruct to report SOB or any respiratory difficulty  - Respiratory Therapy support as indicated  Outcome: Progressing     Problem: GASTROINTESTINAL - ADULT  Goal: Maintains or returns to baseline bowel function  Description: INTERVENTIONS:  - Assess bowel function  - Encourage oral fluids to ensure adequate hydration  - Administer IV fluids if ordered to ensure adequate hydration  - Administer ordered medications as needed  - Encourage mobilization and activity  - Consider nutritional services referral to assist patient with adequate nutrition and appropriate food choices  Outcome: Progressing     Problem: GENITOURINARY - ADULT  Goal: Maintains or returns to baseline urinary function  Description: INTERVENTIONS:  - Assess urinary function  - Encourage oral fluids to ensure adequate hydration if ordered  - Administer IV fluids as ordered to ensure adequate hydration  - Administer ordered medications as needed  - Offer frequent toileting  - Follow urinary retention protocol if ordered  Outcome: Progressing     Problem: METABOLIC, FLUID AND ELECTROLYTES - ADULT  Goal: Fluid balance maintained  Description: INTERVENTIONS:  - Monitor labs   - Monitor I/O and WT  - Instruct patient on fluid and nutrition as appropriate  - Assess for signs & symptoms of volume excess or deficit  Outcome: Progressing  Goal: Electrolytes maintained within normal limits  Description: INTERVENTIONS:  - Monitor labs and assess patient for signs and symptoms of electrolyte imbalances  - Administer electrolyte replacement as ordered  - Monitor response to electrolyte replacements, including repeat lab results as appropriate  - Instruct patient on fluid and nutrition as appropriate  Outcome: Progressing     Problem: HEMATOLOGIC - ADULT  Goal: Maintains hematologic stability  Description: INTERVENTIONS  - Assess for signs and symptoms of bleeding or hemorrhage  - Monitor labs  - Administer supportive blood products/factors as ordered and appropriate  Outcome: Progressing     Problem: Nutrition/Hydration-ADULT  Goal: Nutrient/Hydration intake appropriate for improving, restoring or maintaining nutritional needs  Description: Monitor and assess patient's nutrition/hydration status for malnutrition  Collaborate with interdisciplinary team and initiate plan and interventions as ordered  Monitor patient's weight and dietary intake as ordered or per policy  Utilize nutrition screening tool and intervene as necessary  Determine patient's food preferences and provide high-protein, high-caloric foods as appropriate       INTERVENTIONS:  - Monitor oral intake, urinary output, labs, and treatment plans  - Assess nutrition and hydration status and recommend course of action  - Evaluate amount of meals eaten  - Assist patient with eating if necessary   - Allow adequate time for meals  - Recommend/ encourage appropriate diets, oral nutritional supplements, and vitamin/mineral supplements  - Order, calculate, and assess calorie counts as needed  - Recommend, monitor, and adjust tube feedings and TPN/PPN based on assessed needs  - Assess need for intravenous fluids  - Provide specific nutrition/hydration education as appropriate  - Include patient/family/caregiver in decisions related to nutrition  Outcome: Progressing

## 2021-01-13 NOTE — NURSING NOTE
All discharge instructions gone over with patient  No questions  Understands he needs to get VAS and bloodwork by Monday and follow up with PMD by end of next week  All belongings with patient  Wheeled out to wife in a wheelchair

## 2021-01-13 NOTE — PLAN OF CARE
Problem: Potential for Falls  Goal: Patient will remain free of falls  Description: INTERVENTIONS:  - Assess patient frequently for physical needs  -  Identify cognitive and physical deficits and behaviors that affect risk of falls    -  [place] fall precautions as indicated by assessment   - Educate patient/family on patient safety including physical limitations  - Instruct patient to call for assistance with activity based on assessment  - Modify environment to reduce risk of injury  - Consider OT/PT consult to assist with strengthening/mobility  1/13/2021 1227 by Kemi Obando RN  Outcome: Adequate for Discharge  1/13/2021 1134 by Kemi Obando RN  Outcome: Progressing     Problem: DISCHARGE PLANNING - CARE MANAGEMENT  Goal: Discharge to post-acute care or home with appropriate resources  Description: INTERVENTIONS:  - Conduct assessment to determine patient/family and health care team treatment goals, and need for post-acute services based on payer coverage, community resources, and patient preferences, and barriers to discharge  - Address psychosocial, clinical, and financial barriers to discharge as identified in assessment in conjunction with the patient/family and health care team  - Arrange appropriate level of post-acute services according to patients   needs and preference and payer coverage in collaboration with the physician and health care team  - Communicate with and update the patient/family, physician, and health care team regarding progress on the discharge plan  - Arrange appropriate transportation to post-acute venues  1/13/2021 1227 by Kemi Obando RN  Outcome: Adequate for Discharge  1/13/2021 1134 by Kemi Obando RN  Outcome: Progressing     Problem: PAIN - ADULT  Goal: Verbalizes/displays adequate comfort level or baseline comfort level  Description: Interventions:  - Encourage patient to monitor pain and request assistance  - Assess pain using appropriate pain scale  - Administer analgesics based on type and severity of pain and evaluate response  - Implement non-pharmacological measures as appropriate and evaluate response  - Consider cultural and social influences on pain and pain management  - Notify physician/advanced practitioner if interventions unsuccessful or patient reports new pain  1/13/2021 1227 by Shavonne De La Torre RN  Outcome: Adequate for Discharge  1/13/2021 1134 by Shavonne De La Torre RN  Outcome: Progressing     Problem: INFECTION - ADULT  Goal: Absence or prevention of progression during hospitalization  Description: INTERVENTIONS:  - Assess and monitor for signs and symptoms of infection  - Monitor lab/diagnostic results  - Monitor all insertion sites, i e  indwelling lines, tubes, and drains  - Monitor endotracheal if appropriate and nasal secretions for changes in amount and color  - [place] appropriate cooling/warming therapies per order  - Administer medications as ordered  - Instruct and encourage patient and family to use good hand hygiene technique  - Identify and instruct in appropriate isolation precautions for identified infection/condition  1/13/2021 1227 by Shavonne De La Torre RN  Outcome: Adequate for Discharge  1/13/2021 1134 by Shavonne De La Torre RN  Outcome: Progressing  Goal: Absence of fever/infection during neutropenic period  Description: INTERVENTIONS:  - Monitor WBC    1/13/2021 1227 by Shavonne De La Torre RN  Outcome: Adequate for Discharge  1/13/2021 1134 by Shavonne De La Torre RN  Outcome: Progressing     Problem: SAFETY ADULT  Goal: Patient will remain free of falls  Description: INTERVENTIONS:  - Assess patient frequently for physical needs  -  Identify cognitive and physical deficits and behaviors that affect risk of falls    -  [place] fall precautions as indicated by assessment   - Educate patient/family on patient safety including physical limitations  - Instruct patient to call for assistance with activity based on assessment  - Modify environment to reduce risk of injury  - Consider OT/PT consult to assist with strengthening/mobility  1/13/2021 1227 by Jama Fuller RN  Outcome: Adequate for Discharge  1/13/2021 1134 by Jama Fuller RN  Outcome: Progressing  Goal: Maintain or return to baseline ADL function  Description: INTERVENTIONS:  -  Assess patient's ability to carry out ADLs; assess patient's baseline for ADL function and identify physical deficits which impact ability to perform ADLs (bathing, care of mouth/teeth, toileting, grooming, dressing, etc )  - Assess/evaluate cause of self-care deficits   - Assess range of motion  - Assess patient's mobility; develop plan if impaired  - Assess patient's need for assistive devices and provide as appropriate  - Encourage maximum independence but intervene and supervise when necessary  - Involve family in performance of ADLs  - Assess for home care needs following discharge   - Consider OT consult to assist with ADL evaluation and planning for discharge  - Provide patient education as appropriate  1/13/2021 1227 by Jama Fuller RN  Outcome: Adequate for Discharge  1/13/2021 1134 by Jama Fuller RN  Outcome: Progressing  Goal: Maintain or return mobility status to optimal level  Description: INTERVENTIONS:  - Assess patient's baseline mobility status (ambulation, transfers, stairs, etc )    - Identify cognitive and physical deficits and behaviors that affect mobility  - Identify mobility aids required to assist with transfers and/or ambulation (gait belt, sit-to-stand, lift, walker, cane, etc )  - [place] fall precautions as indicated by assessment  - Record patient progress and toleration of activity level on Mobility SBAR; progress patient to next Phase/Stage  - Instruct patient to call for assistance with activity based on assessment  - Consider rehabilitation consult to assist with strengthening/weightbearing, etc   1/13/2021 1227 by Jama Fuller RN  Outcome: Adequate for Discharge  1/13/2021 1134 by Zuleyka Krueger MATTY Hirsch  Outcome: Progressing     Problem: DISCHARGE PLANNING  Goal: Discharge to home or other facility with appropriate resources  Description: INTERVENTIONS:  - Identify barriers to discharge w/patient and caregiver  - Arrange for needed discharge resources and transportation as appropriate  - Identify discharge learning needs (meds, wound care, etc )  - Arrange for interpretive services to assist at discharge as needed  - Refer to Case Management Department for coordinating discharge planning if the patient needs post-hospital services based on physician/advanced practitioner order or complex needs related to functional status, cognitive ability, or social support system  1/13/2021 1227 by Cj Mercado RN  Outcome: Adequate for Discharge  1/13/2021 1134 by Cj Mercado RN  Outcome: Progressing     Problem: Knowledge Deficit  Goal: Patient/family/caregiver demonstrates understanding of disease process, treatment plan, medications, and discharge instructions  Description: Complete learning assessment and assess knowledge base    Interventions:  - Provide teaching at level of understanding  - Provide teaching via preferred learning methods  1/13/2021 1227 by Cj Mercado RN  Outcome: Adequate for Discharge  1/13/2021 1134 by Cj Mercado RN  Outcome: Progressing     Problem: CARDIOVASCULAR - ADULT  Goal: Maintains optimal cardiac output and hemodynamic stability  Description: INTERVENTIONS:  - Monitor I/O, vital signs and rhythm  - Monitor for S/S and trends of decreased cardiac output  - Administer and titrate ordered vasoactive medications to optimize hemodynamic stability  - Assess quality of pulses, skin color and temperature  - Assess for signs of decreased coronary artery perfusion  - Instruct patient to report change in severity of symptoms  1/13/2021 1227 by Cj Mercado RN  Outcome: Adequate for Discharge  1/13/2021 1134 by Cj Mercado RN  Outcome: Progressing     Problem: RESPIRATORY - ADULT  Goal: Achieves optimal ventilation and oxygenation  Description: INTERVENTIONS:  - Assess for changes in respiratory status  - Assess for changes in mentation and behavior  - Position to facilitate oxygenation and minimize respiratory effort  - Oxygen administered by appropriate delivery if ordered  - Initiate smoking cessation education as indicated  - Encourage broncho-pulmonary hygiene including cough, deep breathe, Incentive Spirometry  - Assess the need for suctioning and aspirate as needed  - Assess and instruct to report SOB or any respiratory difficulty  - Respiratory Therapy support as indicated  1/13/2021 1227 by Cj Mercado RN  Outcome: Adequate for Discharge  1/13/2021 1134 by Cj Mercado RN  Outcome: Progressing     Problem: GASTROINTESTINAL - ADULT  Goal: Maintains or returns to baseline bowel function  Description: INTERVENTIONS:  - Assess bowel function  - Encourage oral fluids to ensure adequate hydration  - Administer IV fluids if ordered to ensure adequate hydration  - Administer ordered medications as needed  - Encourage mobilization and activity  - Consider nutritional services referral to assist patient with adequate nutrition and appropriate food choices  1/13/2021 1227 by Cj Mercado RN  Outcome: Adequate for Discharge  1/13/2021 1134 by Cj Mercado RN  Outcome: Progressing     Problem: GENITOURINARY - ADULT  Goal: Maintains or returns to baseline urinary function  Description: INTERVENTIONS:  - Assess urinary function  - Encourage oral fluids to ensure adequate hydration if ordered  - Administer IV fluids as ordered to ensure adequate hydration  - Administer ordered medications as needed  - Offer frequent toileting  - Follow urinary retention protocol if ordered  1/13/2021 1227 by Cj Mercado RN  Outcome: Adequate for Discharge  1/13/2021 1134 by Cj Mercado RN  Outcome: Progressing     Problem: METABOLIC, FLUID AND ELECTROLYTES - ADULT  Goal: Fluid balance maintained  Description: INTERVENTIONS:  - Monitor labs   - Monitor I/O and WT  - Instruct patient on fluid and nutrition as appropriate  - Assess for signs & symptoms of volume excess or deficit  1/13/2021 1227 by Meghan Espino RN  Outcome: Adequate for Discharge  1/13/2021 1134 by Meghan Espino RN  Outcome: Progressing  Goal: Electrolytes maintained within normal limits  Description: INTERVENTIONS:  - Monitor labs and assess patient for signs and symptoms of electrolyte imbalances  - Administer electrolyte replacement as ordered  - Monitor response to electrolyte replacements, including repeat lab results as appropriate  - Instruct patient on fluid and nutrition as appropriate  1/13/2021 1227 by Meghan Espino RN  Outcome: Adequate for Discharge  1/13/2021 1134 by Meghan Espino RN  Outcome: Progressing     Problem: HEMATOLOGIC - ADULT  Goal: Maintains hematologic stability  Description: INTERVENTIONS  - Assess for signs and symptoms of bleeding or hemorrhage  - Monitor labs  - Administer supportive blood products/factors as ordered and appropriate  1/13/2021 1227 by Meghan Espino RN  Outcome: Adequate for Discharge  1/13/2021 1134 by Meghan Espino RN  Outcome: Progressing

## 2021-01-13 NOTE — ASSESSMENT & PLAN NOTE
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  · Pulmonary following, appreciate input  · Patient underwent repeat bronchoscopy on January 4, 2021 which showed no active bleeding or endobronchial lesions with some scattered thin bloody secretions throughout the airway  · Held Eliquis on admission  · Rheumatology was consulted  · Patient was started on pulse dose steroids with Solu-Medrol 250 milligram IV q 6 hours which he received for 3 days  Patient is tapered down to prednisone 60mg po daily  · Patient continues to have persistent symptoms with hemoptysis and shortness of breath  · Prolonged discussion with patient and family in coordination with pulmonology, Nephrology  · Pulmonology discussed coordination of care with [facility] at [facility]THA THAKKAR \[facility]\"" further intervention was recommended by [facility]  Rheumatology suggesting changing cyclophosphamide to IV Rituxan  IV Rituxan being organized by Nephrology into outpatient setting  · Patient's bronchial cultures growing mixed respiratory tobias and also stenotrophomonas-likely contaminant  Bronchial  AFB and Pneumocystis were negative  Cytology negative for malignancy  · Procalcitonin level was 0 25  CRP has improved to 5 6 from 30 8  · Chest x-ray showing improving bilateral infiltrates after the dialysis on 1/10  · Continue dapsone for PCP prophylaxis    · As hemoptysis has improved, discussed with pulmonology and Nephrology, okay to start Eliquis 2 5 mg BID to treat left lower extremity DVT - patient and patient's wife educated that there is limited data on using reduced dose of Eliquis  However, they accept the risks of using the reduced dose such as further acute DVTs or pulmonary embolism  If patient does well on 2 5 mg BID, patient will consider increasing to 5 mg BID in the near future    · Patient might need IVC filter if anticoagulation is contraindicated in the future

## 2021-01-13 NOTE — PROGRESS NOTES
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 and new onset renal failure  Now initiated on HD  Permcath placed 1/8  S/p 3 doses of HD on 1/11 and tolerated  Hypoxia resolved     Pulmonary asked to reevaluate 2/2 increased BLE erythema / swelling / edema / recs for ongoing anticoagulation therapy pending +/- test    Stable and continually improving hemoptysis  Likely SLE pneumonitis related   Non DAH by bronch x 2 Glance App on prior ADM and repeat here at [facility])  Rituxan being organized by Nephro service into outpatient setting  No further role for bronchoscopy at this time  Likely inflammation mediated  Monitor ongoing symptoms  Sputum cx + w/ Stenotrophomonas  / Colonization  Also + for Candida  ID has seen and evaluated > No clinical need for systemic treatment    Case Report   Non-gynecologic Cytology                          Case: [id number]                                   Authorizing Provider: Donya Gamble DO      Collected:           01/04/2021 1520               Ordering Location:     [address] [place] [address] Received:            01/04/2021 1713                                      [address] [place]                                                                       Pathologist:           Leonid Merritt MD                                                         Specimens:   A) - Lung, Left Upper Lobe Bronchial Lavage                                                          B) - Lung, Left Upper Lobe Bronchial Lavage, cell block                                    Final Diagnosis   A  Lung, Left Upper Lobe Bronchial Lavage (Thin-prep and cell block preparations):   Negative for malignancy  Benign bronchial cells, benign squamous cells  Fungal organisms  morphologically compatible with Neida sp, present  Rare pulmonary macrophages and few white blood cells      Satisfactory for evaluation      Cell Count - see NOTE  Electronically signed by Steven Rothman MD on 1/6/2021 at 10:59 AM   Note    NOTE:  An order for a cell count on this specimen is noted; however, the following conditions preclude the count being performed:     -paucity of alveolar macrophages on the prepared glass slides    -excessive numbers of epithelial cells exceeding the number of alveolar macrophages  Gross Description       A  35 mL of pink, slightly cloudy fluid, received in CytoLyt        B  Minimal cell button, white      Patient is stable for D/C from a Pulmonary standpoint  Pulmonary Signing off  Please call with questions, concerns, or need for reevaluation  Follow Up Items:  Dispo: f/u in 1 week  Pt has f/u on 1/21 w/ Dr Sirisha Hancock @ [facility]   Medications: Continue 60 mg prednisone in outpatient setting until scheduled f/u  Ultimately if recurrent need for A/C AND recurrence of Hemoptysis / may need IVC filter for need for D/C of anticoagulation  Acute respiratory failure (HCC)  Assessment & Plan  No longer hypoxemic  Home 02 eval > does not require home oxygen      Pulmonary infiltrates  Assessment & Plan      DVT (deep venous thrombosis) ([facility])  Assessment & Plan  Hx of recent LLE DVT as of 12/21 venous duplex  Venous duplex repeat on 1/12 - for acute DVT / Chronic LLE DVT  Plan is to restart pt on 1/2 dose eliquis for chronic DVT given recurrence of hemoptysis and hypoxemia on full dose A/C  Repeat venous duplex in approx 1 week on f/u         _________________________________________________    Subjective: Pt seen and examined at bedside  No complaints  Pt is going home today    He has discussed taking eliquis w/ IM team and he and patient would prefer 1/2 dose eliquis d/t recent hemoptysis  He is scheduled for OP f/u w/ Pulm  Discussed non guarantees for prevention of VTE but likely better than no A/C  Discussed if recurrence of VTE may need IVC filter  Tele Events:     Vitals:   Temp:  [97 9 °F (36 6 °C)-98 5 °F (36 9 °C)] 97 9 °F (36 6 °C)  HR:  [81-87] 81  Resp:  [16-18] 16  BP: (147-175)/() 170/96  Weight (last 2 days)     Date/Time   Weight    21 0555   76 3 (168 21)            Vitals:    21 0815 21 0830 21 0900 21 0905   BP: 163/92 164/90 (!) 175/104 170/96   BP Location:  Left arm     Pulse: 87 86 82 81   Resp: 16 16 18 16   Temp:  97 9 °F (36 6 °C)     TempSrc:  Oral     SpO2: 92% 92% 93% 93%   Weight:       Height:         Temp (24hrs), Av 3 °F (36 8 °C), Min:97 9 °F (36 6 °C), Max:98 5 °F (36 9 °C)  Current: Temperature: 97 9 °F (36 6 °C)        SpO2: SpO2: 93 %, SpO2 Activity: SpO2 Activity: At Rest, SpO2 Device: O2 Device: None (Room air)      IV Infusions:       Nutrition:        Diet Orders   (From admission, onward)             Start     Ordered    01/10/21 1041  Diet Renal; Renal [place]; No; No  Diet effective now     Question Answer Comment   Diet Type Renal    Renal Renal [place]    Should patient have a fluid restriction? No    Should patient have a protein modifier? No    RD to adjust diet per protocol? Yes        01/10/21 1040    21 1446  Room Service  Once     Question:  Type of Service  Answer:  Room Service-Appropriate    21 1445                Ins/Outs:   I/O       701 -  0700 701 -  0700  0700    P  O  500 360     I V  (mL/kg) 510 (6 7)  500 (6 6)    Total Intake(mL/kg) 1010 (13 2) 360 (4 7) 500 (6 6)    Urine (mL/kg/hr) 1200 (0 7) 1400 (0 8)     Other 3500  538    Total Output 4700 1400 538    Net -3690 -1040 -38                 Lines/Drains:  Invasive Devices Hemodialysis Catheter            HD Permanent Double Catheter 5 days                 Active medications:  Scheduled Meds:  Current Facility-Administered Medications   Medication Dose Route Frequency Provider Last Rate    acetaminophen  650 mg Oral Q6H PRN CRISTAL Sanchez      amLODIPine  10 mg Oral Daily Chinita Castleman, MD      atorvastatin  40 mg Oral Daily With Dinner CRISTAL Sanchez      calcium carbonate-vitamin D  1 tablet Oral Daily With Breakfast CRISTAL Sanchez      dapsone  100 mg Oral Daily CRISTAL Sanchez      metoprolol succinate  100 mg Oral Daily Brissa Castillo MD      multivitamin-minerals  1 tablet Oral Daily CRISTAL Sanchez      nystatin  500,000 Units Swish & Swallow 4x Daily Scarlet Ramirez DO      ondansetron  4 mg Intravenous Q6H PRN CRISTAL Sanchez      pantoprazole  40 mg Oral Early Morning CRISTAL Sanchez      predniSONE  60 mg Oral Daily Nan Paulino MD      sevelamer  1,600 mg Oral TID With Meals CRISTAL Sanchez      torsemide  40 mg Oral Daily Brissa Castillo MD       PRN Meds:acetaminophen, 650 mg, Q6H PRN  ondansetron, 4 mg, Q6H PRN      ____________________________________________________________________    Physical Exam  Constitutional:       General: He is not in acute distress  Appearance: He is well-developed  HENT:      Head: Normocephalic and atraumatic  Mouth/Throat:      Pharynx: No oropharyngeal exudate  Eyes:      Pupils: Pupils are equal, round, and reactive to light  Neck:      Musculoskeletal: Normal range of motion and neck supple  Thyroid: No thyromegaly  Vascular: No JVD  Trachea: No tracheal deviation  Cardiovascular:      Heart sounds: No murmur  No friction rub  No gallop  Pulmonary:      Effort: Pulmonary effort is normal  No respiratory distress  Breath sounds: Normal breath sounds  No stridor  No wheezing or rales     Chest: Chest wall: No tenderness  Abdominal:      General: There is no distension  Tenderness: There is no abdominal tenderness  There is no guarding  Musculoskeletal: Normal range of motion  Skin:     General: Skin is warm and dry  Findings: No erythema or rash  Neurological:      Mental Status: He is alert and oriented to person, place, and time         ____________________________________________________________________    Invasive/non-invasive ventilation settings   Respiratory    Lab Data (Last 4 hours)    None         O2/Vent Data (Last 4 hours)    None                Laboratory and Diagnostics:  Results from last 7 days   Lab Units 01/13/21  0611 01/11/21  1434 01/10/21  0550 01/09/21  0830 01/08/21  0611 01/07/21  0601   WBC Thousand/uL 7 56 8 44 7 71 10 99* 12 18* 10 69*   HEMOGLOBIN g/dL 7 5* 8 1* 7 2* 7 9* 8 0* 7 0*   HEMATOCRIT % 24 3* 26 0* 23 8* 26 1* 26 2* 23 7*   PLATELETS Thousands/uL 100* 100* 96* 111* 147* 148*   NEUTROS PCT %  --   --  89* 92*  --  92*   BANDS PCT %  --  7  --   --   --   --    MONOS PCT %  --   --  5 5  --  3*   MONO PCT %  --  4  --   --   --   --      Results from last 7 days   Lab Units 01/13/21  0611 01/11/21  1434 01/10/21  0550 01/09/21  0831 01/08/21  0611 01/07/21  0601   SODIUM mmol/L 134* 139 135* 139 138 139   POTASSIUM mmol/L 3 7 4 3 4 5 4 0 4 8 4 7   CHLORIDE mmol/L 100 103 101 104 105 104   CO2 mmol/L 26 25 26 25 21 20*   ANION GAP mmol/L 8 11 8 10 12 15*   BUN mg/dL 76* 96* 66* 85* 119* 112*   CREATININE mg/dL 3 74* 4 20* 3 43* 4 40* 5 79* 5 55*   CALCIUM mg/dL 8 2* 8 5 8 3 8 3 8 8 8 8   GLUCOSE RANDOM mg/dL 78 135 101 180* 132 137   ALT U/L 24  --   --   --   --   --    AST U/L 19  --   --   --   --   --    ALK PHOS U/L 84  --   --   --   --   --    ALBUMIN g/dL 2 3*  --   --   --   --   --    TOTAL BILIRUBIN mg/dL 0 80  --   --   --   --   --      Results from last 7 days   Lab Units 01/13/21  0611 01/11/21  1434 01/09/21  0831 01/07/21  0601 MAGNESIUM mg/dL 2 2  --   --  2 6   PHOSPHORUS mg/dL  --  4 2 6 3* 7 6*      Results from last 7 days   Lab Units 01/07/21  0601   INR  1 07              ABG:    VBG:    Results from last 7 days   Lab Units 01/09/21  0831 01/08/21  0611   PROCALCITONIN ng/ml 0 32* 0 25       Micro  Results from last 7 days   Lab Units 01/10/21  0823   MRSA CULTURE ONLY  No Methicillin Resistant Staphlyococcus aureus (MRSA) isolated       Imaging:   XR chest portable   Final Result by Omar Morales DO (01/10 2144)      No pneumothorax following central line placement  Persistent bilateral airspace disease with slight improvement in the left suprahilar region  Workstation performed: [id number]         IR tunneled dialysis catheter placement   Final Result by César Taylor MD (01/08 6348)   Impression:   1  Successful ultrasound and fluoroscopically guided placement of a 28cm Titan dialysis catheter via the right internal jugular vein  The tip of the catheter is in the right atrium and may be used immediately  Workstation performed: [id number]         VAS lower limb venous duplex study, complete bilateral   Final Result by Des Ocampo MD (01/07 2037)      XR chest portable   Final Result by Rose Mary Prabhakar MD (01/07 1615)      Diffuse airspace opacities have worsened and suggests possibility of pulmonary edema or diffuse pneumonia  The study was marked in [facility] for immediate notification  Workstation performed: [id number]         XR chest portable   Final Result by Clifton Phillips MD (01/06 1644)      Diffuse patchy airspace disease bilaterally, worsened since the prior examination  Workstation performed: [id number]         VAS lower limb venous duplex study, complete bilateral    (Results Pending)   VAS lower limb venous duplex study, complete bilateral    (Results Pending)       Micro:   Lab Results   Component Value Date    BLOODCX No Growth After 5 Days  11/24/2020    BLOODCX No Growth After 5 Days  11/24/2020    SPUTUMCULTUR 4+ Growth of  12/19/2020    SPUTUMCULTUR  12/19/2020     Commensal respiratory tobias only; No significant growth of Staph aureus/MRSA or Pseudomonas aeruginosa      MRSACULTURE  01/10/2021     No Methicillin Resistant Staphlyococcus aureus (MRSA) isolated    BRONCHIALCUL 2+ Growth of Stenotrophomonas maltophilia (A) 01/04/2021    BRONCHIALCUL 2+ Growth of  01/04/2021    BRONCHIALCUL 1+ Growth of  12/19/2020            Invalid input(s): Stoney Thompson

## 2021-01-13 NOTE — ASSESSMENT & PLAN NOTE
Lab Results   Component Value Date    EGFR 17 01/13/2021    EGFR 15 01/11/2021    EGFR 19 01/10/2021    CREATININE 3 74 (H) 01/13/2021    CREATININE 4 20 (H) 01/11/2021    CREATININE 3 43 (H) 01/10/2021     In the setting of known Lupus Nephritis  Creatinine continued to get worse with poor urine output  Cr 4 7 on admission   Known to Dr Zonia Bowen and Dr Warden Cruz renal U/S 12/16 showed increased cortical echogenicity bilaterally, suggestive of underlying renal parenchymal disease and no hydronephrosis  Pt underwent kidney biopsy in November  · Nephrology input appreciated  · Patient initially received gentle IV hydration with normal saline which were discontinued  · Avoid nephrotoxic agents and hypotension  · Worsening creatinine likely secondary to lupus nephritis/glomerulonephritis  · Patient received Lasix 80 milligram IV daily for 3 days with poor urine output  · Patient got PermCath placed by IR on January 8, 2021 and was started on dialysis  · Patient had 2nd session of dialysis on January 9, 2021 with ultrafiltration of 2 liters  · Had 3rd session of dialysis on 1/12/2021  · Had 1 hour session of dialysis on 1/13/2021    · Continue oral torsemide 40 mg po daily   · Outpatient chair time is set up - TTS at 1115AM at [facility]  · Per Nephrology, plan for dialysis on Thursday, skip dialysis on Saturday, check BMP on Monday, and await call from Dr Zonia Bowen for further plan  · Patient's renal function is improving  Hopeful continuation of renal recovery with discontinuation of dialysis in the future      Results from last 7 days   Lab Units 01/13/21  0611 01/11/21  1434 01/10/21  0550 01/09/21  0831 01/08/21  0611 01/07/21  0601   BUN mg/dL 76* 96* 66* 85* 119* 112*   CREATININE mg/dL 3 74* 4 20* 3 43* 4 40* 5 79* 5 55*

## 2021-01-13 NOTE — DISCHARGE SUMMARY
Discharge- Alejandro Erp 1965, 48 y o  male MRN: [id number]    Unit/Bed#: [address] [place] [address] Encounter: [id number]    Primary Care Provider: Charisse Duncan MD   Date and time admitted to hospital: 1/4/2021  9:54 AM        * Hemoptysis  Assessment & Plan  Admitted with persistent hemoptysis  Recent hospital admission in December at [facility] for which he underwent bronchoscopy by Pulmonary  Bronchoscopy revealed diffuse erythema and prominent vasculature without overt sequential increases in hemorrhage on serial aliquots   Still appears to be a diffuse inflammatory process rather than focal pneumonia   Diffuse pneumonia not ruled out  Patient underwent pulse steroids and plasmapheresis at that time  Hemoptysis resolved  Patient was discharged home on prednisone and Eliquis for left lower extremity DVT  Likely SLE pneumonitis related hemoptysis  · Pulmonary following, appreciate input  · Patient underwent repeat bronchoscopy on January 4, 2021 which showed no active bleeding or endobronchial lesions with some scattered thin bloody secretions throughout the airway  · Held Eliquis on admission  · Rheumatology was consulted  · Patient was started on pulse dose steroids with Solu-Medrol 250 milligram IV q 6 hours which he received for 3 days  Patient is tapered down to prednisone 60mg po daily  · Patient continues to have persistent symptoms with hemoptysis and shortness of breath  · Prolonged discussion with patient and family in coordination with pulmonology, Nephrology  · Pulmonology discussed coordination of care with potentially [place] [facility] in [place] further intervention was recommended by [facility]  Rheumatology suggesting changing cyclophosphamide to IV Rituxan  IV Rituxan being organized by Nephrology into outpatient setting  · Patient's bronchial cultures growing mixed respiratory tobias and also stenotrophomonas-likely contaminant    Bronchial  AFB and Pneumocystis were negative  Cytology negative for malignancy  · Procalcitonin level was 0 25  CRP has improved to 5 6 from 30 8  · Chest x-ray showing improving bilateral infiltrates after the dialysis on 1/10  · Continue dapsone for PCP prophylaxis  · As hemoptysis has improved, discussed with pulmonology and Nephrology, okay to start Eliquis 2 5 mg BID to treat left lower extremity DVT - patient and patient's wife educated that there is limited data on using reduced dose of Eliquis  However, they accept the risks of using the reduced dose such as further acute DVTs or pulmonary embolism  If patient does well on 2 5 mg BID, patient will consider increasing to 5 mg BID in the near future  · Patient might need IVC filter if anticoagulation is contraindicated in the future    SLE (systemic lupus erythematosus) ([facility] [address] )  Assessment & Plan  Diagnosed lupus in his 20's  Cellcept was discontinued recently  S/p plasmapheresis x 5 treatments recently  S/P cytoxan 12/18 with plan for every other week for 6 doses  Patient was on prednisone 20 mg po daily  · Initially started on Solu-Medrol 40 milligram q 12 hours which was changed to methylprednisolone 250 milligram IV q 6 hours  Patient is prednisone 60mg p o  Daily currently    · Rheumatology was consulted and appreciate recommendations  · Patient has had 2 doses of IV cyclophosphamide so far and the plan was to receive 6 doses total every 2 weeks as outpatient  · Pulmonology discussed coordination of care with [facility] who recommended rituxan in the place of cyclophosphamide  · Further treatment plan based on pulmonology and Rheumatology recommendations  · Patient's repeat MPO, SSA and B, anti PR3 were all negative  · Rheumatology, Nephrology working on setting up outpatient Rituxan treatment and working on insurance approvals  · Patient's left upper lobe bronchial lavage was negative for malignancy  · Continue prednisone 60 mg po daily with outpatient follow-up with pulmonology    Acute kidney injury superimposed on chronic kidney disease [facility])  Assessment & Plan  Lab Results   Component Value Date    EGFR 17 01/13/2021    EGFR 15 01/11/2021    EGFR 19 01/10/2021    CREATININE 3 74 (H) 01/13/2021    CREATININE 4 20 (H) 01/11/2021    CREATININE 3 43 (H) 01/10/2021     In the setting of known Lupus Nephritis  Creatinine continued to get worse with poor urine output  Cr 4 7 on admission   Known to Dr Hernán Kim and Dr Alfonso Mclain renal U/S 12/16 showed increased cortical echogenicity bilaterally, suggestive of underlying renal parenchymal disease and no hydronephrosis  Pt underwent kidney biopsy in November  · Nephrology input appreciated  · Patient initially received gentle IV hydration with normal saline which were discontinued  · Avoid nephrotoxic agents and hypotension  · Worsening creatinine likely secondary to lupus nephritis/glomerulonephritis  · Patient received Lasix 80 milligram IV daily for 3 days with poor urine output  · Patient got PermCath placed by IR on January 8, 2021 and was started on dialysis  · Patient had 2nd session of dialysis on January 9, 2021 with ultrafiltration of 2 liters  · Had 3rd session of dialysis on 1/12/2021  · Had 1 hour session of dialysis on 1/13/2021    · Continue oral torsemide 40 mg po daily   · Outpatient chair time is set up - TTS at 1115AM at [facility]  · Per Nephrology, plan for dialysis on Thursday, skip dialysis on Saturday, check BMP on Monday, and await call from Dr Hernán Kim for further plan  · Patient's renal function is improving  Hopeful continuation of renal recovery with discontinuation of dialysis in the future  Results from last 7 days   Lab Units 01/13/21  0611 01/11/21  1434 01/10/21  0550 01/09/21  0831 01/08/21  0611 01/07/21  0601   BUN mg/dL 76* 96* 66* 85* 119* 112*   CREATININE mg/dL 3 74* 4 20* 3 43* 4 40* 5 79* 5 55*       Oral thrush  Assessment & Plan  Mild oral pharyngeal thrush  Denies dysphagia  Appreciate Infectious Disease input  Will start nystatin swish and swallow 4 times a day for 5 days  Acute respiratory failure [facility])  Assessment & Plan  Patient does not use oxygen at home  Patient was on 5 liters oxygen which is titrated down to room air now  Satting mid to high 90s on room air this morning  In the setting of bilateral pulmonary infiltrates suggesting lupus vasculitis/pneumonitis  Patient was on high doses IV Solu-Medrol  Transitioned to prednisone 60 milligram po daily  Repeat chest x-ray 1/7 shows worsening diffuse airspace opacities suggesting possible pulmonary edema/diffuse pneumonia  Procalcitonin level was 0 25  Bronchial cultures grew stenotrophomonas-possible contaminant  Chest x-ray showed improving bilateral infiltrates after the dialysis  Home O2 eval completed  Patient does not qualify for supplemental oxygen on discharge      Anemia  Assessment & Plan  Chronic anemia with baseline hemoglobin near 8 5  · Hemoglobin is slightly down trending in the setting of hemoptysis  · Patient received 1 unit of PRBC transfusion on January 7, 2021   · Hemoglobin has remained stable  Results from last 7 days   Lab Units 01/13/21  0611 01/11/21  1434 01/10/21  0550 01/09/21  0830 01/08/21  0611 01/07/21  0601   HEMOGLOBIN g/dL 7 5* 8 1* 7 2* 7 9* 8 0* 7 0*   HEMATOCRIT % 24 3* 26 0* 23 8* 26 1* 26 2* 23 7*   MCV fL 103* 102* 104* 104* 104* 107*       DVT (deep venous thrombosis) ([facility] [address] )  Assessment & Plan  Diagnosed with a left lower extremity DVT on 12/21/2020 for which she was started on Eliquis   · Eliquis on hold in the setting of hemoptysis since admission  · Repeat lower extremity 1/7 venous Dopplers showed chronic nonocclusive DVT in the popliteal vein and proximal gastrocnemius vein with evidence of superficial thrombophlebitis below-knee in the greater saphenous vein  · Patient reported left calf pain and ankle pain on 01/11  · Repeat venous Doppler ordered which did not reveal an acute DVT  Does revealed chronic DVT in left lower extremity  · Elevation legs  Compression stockings  · Extensive discussion with patient and his wife regarding resuming anticoagulation as his hemoptysis has resolved  At this time, we will resume reduced dose of Eliquis at 2 5 mg BID  · Patient and his wife educated that there is limited data on using this reduced dose of Eliquis  They are hesitant to trial warfarin  They would like to try Eliquis 2 5 mg BID and consider increasing to 5 mg BID if he does not have recurrence of hemoptysis  · This was discussed with Nephrology and pulmonology who were both in agreeance to trialing Eliquis 2 5 mg BID as long as patient and family except the risks of for potential new acute DVT and even pulmonary embolism  · Plan for repeat venous Doppler in 1 week to evaluate for any new DVT    Elevated troponin  Assessment & Plan  Troponin 0 11, likely non MI elevated troponin elevation in the setting of CAROLINE, respiratory failure  Presently, chest pain-free  · Serial troponins continued remained flat    Chronic diastolic congestive heart failure ([address] )  Assessment & Plan  Wt Readings from Last 3 Encounters:   01/12/21 76 3 kg (168 lb 3 4 oz)   01/04/21 71 2 kg (157 lb)   12/22/20 75 6 kg (166 lb 10 7 oz)     Patient with volume overload on recent admission to [facility] in December 2020 for which she was managed with IV Lasix the later transitioned to Demadex 40 mg daily  · Patient was initially on gentle IV hydration which was later stopped  · No clinical evidence of fluid overload  · Patient has been receiving Lasix 80 milligram IV daily for the past 3 days    · Repeat chest x-ray from 01/07  showed worsening diffuse airspace opacities  · Repeat chest x-ray and 1/10 shows improving bilateral opacities  · Restarted Demadex 40mg po daily on 1/10  · Daily weight and I&Os        Discharging Physician / Practitioner: Patricio Wilson  PCP: Adri Rosas Philipp Sandoval MD  Admission Date:   Admission Orders (From admission, onward)     Ordered        01/04/21 1110  Inpatient Admission  Once                   Discharge Date: 01/13/21    Resolved Problems  Date Reviewed: 1/13/2021    None          Consultations During Hospital Stay:  · Nephrology  · Pulmonology  · Rheumatology  · Interventional radiology  · Infectious disease    Procedures Performed:   · CXR:  Bilateral ground-glass opacity, improved on the right and worsening on the left which could be due to pulmonary hemorrhage or vasculitis  · Lower extremity venous duplex on 01/07: LEFT LOWER LIMB: Evidence of chronic, non-occlusive deep vein thrombosis noted in the popliteal vein and proximal gastrocnemius veins  Evidence of superficial thrombophlebitis noted in the below knee greater saphenous vein  Popliteal, posterior tibial and anterior tibial arterial Doppler waveforms are triphasic  Known non-vascular, well-defined, hypoechoic structure noted in the left proximal thigh/groin  In comparison to the study of 12/21/2020, there is no significant change in the disease process  · PermCath placement to right internal jugular vein on 01/08  · Repeat CXR:  No pneumothorax following central line placement  Persistent bilateral airspace disease with slight improvement in the left suprahilar region  · Lower extremity venous duplex on 01/12:  No evidence of acute DVT  Evidence of chronic DVT in the popliteal vein and gastrocnemius vein  · Bronchoscopy on 01/04: IMPRESSION: Hemoptysis  No active bleeding and no endobronchial lesions  Trachea and bronchial mucosa appeared normal   There were some scattered thin bloody secretions throughout airway  Hemoptysis possibly due to diffuse alveolar hemorrhage from lupus with associated vasculitis and also effect of anticoagulant Eliquis   RECOMMENDATION: Bronchioloalveolar lavage from left upper lobe sent for cultures as well cytology and cell count       Significant Findings / Test Results:   · Hemoptysis secondary to systemic lupus erythematous pneumonitis  · Acute kidney injury on CKD secondary to lupus nephritis  · Systemic lupus erythematous vasculitis, nephritis, pneumonitis  · Mild oral pharyngeal thrush  · Positive bronchoscopy cultures - likely colonization    Incidental Findings:   · As above    Test Results Pending at Discharge (will require follow up): · None     Outpatient Tests Requested:  · BMP and CBC on Monday  · Repeat lower extremity venous doppler in 1 week    Complications:  None    Reason for Admission:  Hemoptysis    Hospital Course:     Nataliya Joyce is a 48 y o  male patient with a past medical history including SLE, hemoptysis, left lower extremity DVT who originally presented to the hospital on 1/4/2021 due to hemoptysis  Patient was previously hospitalized at [facility] for hemoptysis between 12/19 - 12/28/2020  He underwent bronchoscopy revealing diffuse erythema and prominent vasculature without over hemorrhage  Patient was treated with pulse IV steroids and plasmapheresis and then placed on prednisone taper  He was also found to have nonocclusive chronic DVT of the left leg for which she was discharged on Eliquis  Patient presented to the hospital with hemoptysis  Chest x-ray showed bilateral ground-glass opacity, improved on the right and worsening on left, which could be due to pulmonary hemorrhage or vasculitis  Patient was also noted to have worsening renal function with a creatinine up to 4 7  Patient was started on high-dose IV steroids  Patient underwent bronchoscopy which revealed no active bleeding and no endobronchial lesions  There was some scattered thin bloody secretions throughout the airway  Pulmonary believes hemoptysis is possibly due to diffuse alveolar hemorrhage from lupus with associated vasculitis and also effective anticoagulant, Eliquis    Bronchoscopy cultures revealed stenotrophomonas and yeast   Patient was evaluated by Infectious Disease regarding these new culture results  Infectious disease recommends starting nystatin swish and swallow 4 times daily for 5 days  On this admission, patient was started on dialysis  A PermCath was placed on January 8th  Patient underwent his 1st dialysis treatment on January 8th  Patient will be discharged with outpatient dialysis on Tuesday, Thursday, and Saturday  He will start his 1st outpatient treatment tomorrow  He is to skip dialysis on Saturday  He is to repeat a BMP on Monday and await further instructions from outpatient nephrologist     Patient's hemoptysis resolved during his hospitalization  He will be discharged on prednisone 60 mg daily with plans to follow-up with rheumatology and start Rituxan as an outpatient  He is to continue torsemide 40 mg daily  Due to his chronic left lower extremity DVT, patient will resume Eliquis at reduced dose of 2 5 mg BID  Pulmonary and Nephrology aware of Eliquis dosing  Patient and his wife aware that there is limited data at this dosing  They do understand there is a risk for developing a new acute DVT or even a pulmonary embolism  At this time, they accept the risks and would like to proceed with starting Eliquis 2 5 mg BID  Patient states he will consider increasing to 5 mg BID if he does not have recurrence and hemoptysis  Please see above list of diagnoses and related plan for additional information  Condition at Discharge: stable     Discharge Day Visit / Exam:     Subjective:  Patient seen and examined  Resting comfortably in bed  On room air  Overall, patient feels much improved  Denies any shortness of breath at rest   Denies any cough, chills, sputum production  Denies further hemoptysis  Denies headache, dizziness, chest pain, vomiting    Vitals: Blood Pressure: 170/96 (01/13/21 0905)  Pulse: 81 (01/13/21 0905)  Temperature: 97 9 °F (36 6 °C) (01/13/21 0830)  Temp Source: Oral (01/13/21 0830)  Respirations: 16 (01/13/21 0905)  Height: 5' 9 5" (176 5 cm) (01/09/21 0825)  Weight - Scale: 76 3 kg (168 lb 3 4 oz) (01/12/21 0555)  SpO2: 93 % (01/13/21 0905)  Exam:   Physical Exam  Vitals signs and nursing note reviewed  Constitutional:       General: He is not in acute distress  Appearance: Normal appearance  He is well-developed  He is ill-appearing (Chronically ill-appearing, thin gentleman)  He is not toxic-appearing or diaphoretic  Comments: Pleasant gentleman resting comfortably in bed on room air   HENT:      Head: Normocephalic and atraumatic  Eyes:      Conjunctiva/sclera: Conjunctivae normal    Neck:      Musculoskeletal: Neck supple  Cardiovascular:      Rate and Rhythm: Normal rate and regular rhythm  Heart sounds: No murmur  Pulmonary:      Effort: Pulmonary effort is normal  No tachypnea or respiratory distress  Breath sounds: Normal breath sounds  Abdominal:      General: There is no distension  Palpations: Abdomen is soft  Tenderness: There is no abdominal tenderness  Musculoskeletal: Normal range of motion  Left lower leg: Left lower leg edema: +1  Skin:     General: Skin is warm and dry  Capillary Refill: Capillary refill takes less than 2 seconds  Comments: Discoloration to left lower extremity   Neurological:      Mental Status: He is alert and oriented to person, place, and time  Mental status is at baseline  Psychiatric:         Mood and Affect: Mood normal          Behavior: Behavior normal          Thought Content: Thought content normal          Judgment: Judgment normal          Discussion with Family:  Wife via telephone    Discharge instructions/Information to patient and family:   See after visit summary for information provided to patient and family  Provisions for Follow-Up Care:  See after visit summary for information related to follow-up care and any pertinent home health orders        Disposition: Home    For Discharges to [facility]:   · Not Applicable to this Patient - Not Applicable to this Patient    Planned Readmission:  None     Discharge Statement:  I spent > 30 minutes discharging the patient  This time was spent on the day of discharge  I had direct contact with the patient on the day of discharge  Greater than 50% of the total time was spent examining patient, answering all patient questions, arranging and discussing plan of care with patient as well as directly providing post-discharge instructions  Additional time then spent on discharge activities  Discharge Medications:  See after visit summary for reconciled discharge medications provided to patient and family        ** Please Note: This note has been constructed using a voice recognition system **

## 2021-01-13 NOTE — ASSESSMENT & PLAN NOTE
Diagnosed with a left lower extremity DVT on 12/21/2020 for which she was started on Eliquis   · Eliquis on hold in the setting of hemoptysis since admission  · Repeat lower extremity 1/7 venous Dopplers showed chronic nonocclusive DVT in the popliteal vein and proximal gastrocnemius vein with evidence of superficial thrombophlebitis below-knee in the greater saphenous vein  · Patient reported left calf pain and ankle pain on 01/11  · Repeat venous Doppler ordered which did not reveal an acute DVT  Does revealed chronic DVT in left lower extremity  · Elevation legs  Compression stockings  · Extensive discussion with patient and his wife regarding resuming anticoagulation as his hemoptysis has resolved  At this time, we will resume reduced dose of Eliquis at 2 5 mg BID  · Patient and his wife educated that there is limited data on using this reduced dose of Eliquis  They are hesitant to trial warfarin  They would like to try Eliquis 2 5 mg BID and consider increasing to 5 mg BID if he does not have recurrence of hemoptysis    · This was discussed with Nephrology and pulmonology who were both in agreeance to trialing Eliquis 2 5 mg BID as long as patient and family except the risks of for potential new acute DVT and even pulmonary embolism  · Plan for repeat venous Doppler in 1 week to evaluate for any new DVT

## 2021-01-13 NOTE — DISCHARGE INSTRUCTIONS
Acute kidney injury:  1  Go to dialysis tomorrow ( [facility])  2  Do not go to dialysis on Saturday  3  BMP lab work on Monday morning at [facility]  4  Wait for a call from Dr Clau Emmanuel for further plans on Monday  5  Start torsemide 40 mg daily    Left lower extremity blood clot:    1  Can restart Eliquis at 2 5 mg twice daily  2  Repeat left lower extremity venous duplex in 1 week to ensure no acute blood clot formation    Mild oral thrush:  1  Start nystatin swish and swallow 4 times a day for 5 days    Lupus nephritis:  Plan will be to initiate Rituxan as an outpatient  Will need close Nephrology/rheumatology follow-up  Start prednisone 60 mg daily    Elevated blood pressure:  Increase Toprol XL to 100 mg daily          Perma-cath Placement   WHAT YOU NEED TO KNOW:   A perma-cath is a catheter placed through a vein into or near your right atrium  Your right atrium is the right upper chamber of your heart  A perma-cath is used for dialysis in an emergency or until a long-term device is ready to use  After your procedure, you will have some pain and swelling on your chest and neck  You may have some bruises on your chest and neck  You may also have 2 dressings, one on your chest and one on your neck  DISCHARGE INSTRUCTIONS:   Call 911 for any of the following:   · You feel lightheaded, short of breath, and have chest pain  · Your catheter comes out   Contact Interventional Radiology at [phone] [place] PATIENTS: Contact Interventional Radiology at [phone]) Jamal Anaya PATIENTS: Contact Interventional Radiology at [phone]) if:  · Blood soaks through your bandage  · You have new swelling in your arm, neck, face, or chest on your right side  · Your catheter gets wet  · Your bruises or pain get worse  · You have a fever or chills  · Persistent nausea or vomiting  · Your incision is red, swollen, or draining pus     · You have questions or concerns about your condition or care   Self-care:       · Resume your normal diet  · Keep your dressings dry  Do not take a shower or swim  You may take a tub bath, but do not get your dressings wet  Water in your wound can cause bacteria to grow and cause an infection  If your dressing gets wet, dry it off and cover it with dry sterile gauze  Call your healthcare provider  Do not use soaps or ointments  · Do not change your dressings  Your healthcare provider or dialysis nurse will change your dressings  Your dressings should stay in place until your healthcare provider removes them  The dressing on your chest will stay as long as you have the catheter in place  The dressing prevents infection  · Do not remove the red and blue caps from the end of your catheter  The caps prevent air from getting into your catheter    Follow up with your healthcare provider as directed: Write down your questions so you remember to ask them during your visits

## 2021-01-13 NOTE — ASSESSMENT & PLAN NOTE
Patient does not use oxygen at home  Patient was on 5 liters oxygen which is titrated down to room air now  Satting mid to high 90s on room air this morning  In the setting of bilateral pulmonary infiltrates suggesting lupus vasculitis/pneumonitis  Patient was on high doses IV Solu-Medrol  Transitioned to prednisone 60 milligram po daily  Repeat chest x-ray 1/7 shows worsening diffuse airspace opacities suggesting possible pulmonary edema/diffuse pneumonia  Procalcitonin level was 0 25  Bronchial cultures grew stenotrophomonas-possible contaminant  Chest x-ray showed improving bilateral infiltrates after the dialysis  Home O2 eval completed  Patient does not qualify for supplemental oxygen on discharge

## 2021-01-14 ENCOUNTER — TELEPHONE (OUTPATIENT)
Dept: NEPHROLOGY | Facility: CLINIC | Age: 56
End: 2021-01-14

## 2021-01-14 ENCOUNTER — TELEPHONE (OUTPATIENT)
Dept: OTHER | Facility: HOSPITAL | Age: 56
End: 2021-01-14

## 2021-01-14 ENCOUNTER — TELEPHONE (OUTPATIENT)
Dept: PULMONOLOGY | Facility: MEDICAL CENTER | Age: 56
End: 2021-01-14

## 2021-01-14 ENCOUNTER — HOSPITAL ENCOUNTER (OUTPATIENT)
Dept: INFUSION CENTER | Facility: HOSPITAL | Age: 56
Discharge: HOME | End: 2021-01-14
Attending: INTERNAL MEDICINE

## 2021-01-14 NOTE — TELEPHONE ENCOUNTER
----- Message from Chinita Castleman, MD sent at 1/13/2021  4:42 PM EST -----  Hello    Orders sent to [facility] for dialysis  HD tomorrow 1/14 then skip Saturday  BMP Monday - orders placed in chart    Renal MA team - since I am the one placing orders and I am off Monday, can you please f/u BMP on patient and message Dr Olivier Contreras when labs are available for review   Or call me and I can review it too as I am not sure of Dr Olivier Contreras schedule    Thank you    np

## 2021-01-14 NOTE — TELEPHONE ENCOUNTER
Prior to patient's discharge yesterday, had reviewed the case with the patient's primary team and also with the patient's primary nephrologist   Also with the patient's dialysis unit  Patient will go to dialysis on 01/14 and then have blood work on Monday  Patient will skip dialysis on Saturday  Based on blood work Monday, further decision about dialysis will be made for Tuesday  Rituxan is being set up as an outpatient  Patient is aware of the above

## 2021-01-15 ENCOUNTER — TELEPHONE (OUTPATIENT)
Dept: PULMONOLOGY | Facility: CLINIC | Age: 56
End: 2021-01-15

## 2021-01-15 DIAGNOSIS — N18.9 ACUTE KIDNEY INJURY SUPERIMPOSED ON CHRONIC KIDNEY DISEASE (HCC): Primary | ICD-10-CM

## 2021-01-15 DIAGNOSIS — M32.13 OTHER SYSTEMIC LUPUS ERYTHEMATOSUS WITH LUNG INVOLVEMENT (HCC): ICD-10-CM

## 2021-01-15 DIAGNOSIS — N17.9 ACUTE KIDNEY INJURY SUPERIMPOSED ON CHRONIC KIDNEY DISEASE (HCC): Primary | ICD-10-CM

## 2021-01-15 NOTE — TELEPHONE ENCOUNTER
Spoke with Shilo  He was discharged on 1/13 on Eliquis  He said he has a nosebleed today  It is going on 40 minutes and he cannot get it to stop  He had nose bleeds in the past, but they never lasted this long  Please advise

## 2021-01-15 NOTE — UTILIZATION REVIEW
Notification of Discharge  This is a Notification of Discharge from our facility [address]  Please be advised that this patient has been discharge from our facility  Below you will find the admission and discharge date and time including the patients disposition  PRESENTATION DATE: 1/4/2021  9:54 AM  OBS ADMISSION DATE:   IP ADMISSION DATE: 1/4/21 0954   DISCHARGE DATE: 1/13/2021  3:00 PM  DISPOSITION: Home/Self Care Home/Self Care   Admission Orders listed below:  Admission Orders (From admission, onward)     Ordered        01/04/21 1110  Inpatient Admission  Once                   Please contact the UR Department if additional information is required to close this patient's authorization/case  [facility] Utilization Review Department  Main: [phone] x carefully listen to the prompts  All voicemails are confidential   [email or web address]  Send all requests for admission clinical reviews, approved or denied determinations and any other requests to dedicated fax number below belonging to the campus where the patient is receiving treatment   List of dedicated fax numbers:  [address] DENIALS (Administrative/Medical Necessity) [phone]   [address] (Maternity/NICU/Pediatrics) [phone]   Deneise Notice [phone]   Fátima Sung [phone]   Faisal Roberts [phone]   Demetri Boles[address] [phone]   [facility]  [phone]   [address] [facility] [address]  [address] [facility] [address] [facility] [phone]

## 2021-01-15 NOTE — TELEPHONE ENCOUNTER
Spoke with Keri Guadalupe  He said he has been "shoving tissues up there"  The last one he took out, he says it has seemed to stop  I did go over instructions incase it starts up again  He is asking though if he should continue with he blood thinner?

## 2021-01-15 NOTE — TELEPHONE ENCOUNTER
Patient calling stated he saw Dr Jeanne Mcdermott in the hospital and was sent home on blood thinners   Patient  has a bloody nose which he is unable to stop    [phone]

## 2021-01-18 ENCOUNTER — TRANSCRIBE ORDERS (OUTPATIENT)
Dept: LAB | Facility: CLINIC | Age: 56
End: 2021-01-18

## 2021-01-18 ENCOUNTER — APPOINTMENT (OUTPATIENT)
Dept: LAB | Facility: CLINIC | Age: 56
End: 2021-01-18
Payer: COMMERCIAL

## 2021-01-18 DIAGNOSIS — R04.2 HEMOPTYSIS: ICD-10-CM

## 2021-01-18 LAB
ERYTHROCYTE [DISTWIDTH] IN BLOOD BY AUTOMATED COUNT: 15.7 % (ref 11.6–15.1)
HCT VFR BLD AUTO: 27.1 % (ref 36.5–49.3)
HGB BLD-MCNC: 8.5 G/DL (ref 12–17)
MCH RBC QN AUTO: 31.7 PG (ref 26.8–34.3)
MCHC RBC AUTO-ENTMCNC: 31.4 G/DL (ref 31.4–37.4)
MCV RBC AUTO: 101 FL (ref 82–98)
PLATELET # BLD AUTO: 160 THOUSANDS/UL (ref 149–390)
PMV BLD AUTO: 10.2 FL (ref 8.9–12.7)
RBC # BLD AUTO: 2.68 MILLION/UL (ref 3.88–5.62)
WBC # BLD AUTO: 7.66 THOUSAND/UL (ref 4.31–10.16)

## 2021-01-18 PROCEDURE — 36415 COLL VENOUS BLD VENIPUNCTURE: CPT

## 2021-01-18 PROCEDURE — 85027 COMPLETE CBC AUTOMATED: CPT

## 2021-01-19 ENCOUNTER — HOSPITAL ENCOUNTER (OUTPATIENT)
Dept: INFUSION CENTER | Facility: HOSPITAL | Age: 56
Discharge: HOME | End: 2021-01-19
Attending: INTERNAL MEDICINE
Payer: COMMERCIAL

## 2021-01-19 ENCOUNTER — TELEPHONE (OUTPATIENT)
Dept: OTHER | Facility: HOSPITAL | Age: 56
End: 2021-01-19

## 2021-01-19 VITALS
OXYGEN SATURATION: 96 % | HEART RATE: 80 BPM | WEIGHT: 162.04 LBS | TEMPERATURE: 97.6 F | DIASTOLIC BLOOD PRESSURE: 84 MMHG | BODY MASS INDEX: 23.59 KG/M2 | SYSTOLIC BLOOD PRESSURE: 150 MMHG | RESPIRATION RATE: 18 BRPM

## 2021-01-19 DIAGNOSIS — N17.9 ACUTE KIDNEY INJURY SUPERIMPOSED ON CHRONIC KIDNEY DISEASE (HCC): Primary | ICD-10-CM

## 2021-01-19 DIAGNOSIS — N18.9 ACUTE KIDNEY INJURY SUPERIMPOSED ON CHRONIC KIDNEY DISEASE (HCC): Primary | ICD-10-CM

## 2021-01-19 DIAGNOSIS — N17.9 AKI (ACUTE KIDNEY INJURY) (HCC): ICD-10-CM

## 2021-01-19 DIAGNOSIS — M32.13 OTHER SYSTEMIC LUPUS ERYTHEMATOSUS WITH LUNG INVOLVEMENT (HCC): ICD-10-CM

## 2021-01-19 LAB
ANION GAP SERPL CALCULATED.3IONS-SCNC: 12 MMOL/L (ref 4–13)
BUN SERPL-MCNC: 94 MG/DL (ref 5–25)
CALCIUM SERPL-MCNC: 8.5 MG/DL (ref 8.3–10.1)
CHLORIDE SERPL-SCNC: 102 MMOL/L (ref 100–108)
CO2 SERPL-SCNC: 22 MMOL/L (ref 21–32)
CREAT SERPL-MCNC: 4.64 MG/DL (ref 0.6–1.3)
GFR SERPL CREATININE-BSD FRML MDRD: 13 ML/MIN/1.73SQ M
GLUCOSE P FAST SERPL-MCNC: 164 MG/DL (ref 65–99)
GLUCOSE SERPL-MCNC: 164 MG/DL (ref 65–140)
POTASSIUM SERPL-SCNC: 4.6 MMOL/L (ref 3.5–5.3)
SODIUM SERPL-SCNC: 136 MMOL/L (ref 136–145)

## 2021-01-19 PROCEDURE — 96413 CHEMO IV INFUSION 1 HR: CPT

## 2021-01-19 PROCEDURE — 80048 BASIC METABOLIC PNL TOTAL CA: CPT

## 2021-01-19 PROCEDURE — 96415 CHEMO IV INFUSION ADDL HR: CPT

## 2021-01-19 RX ORDER — ACETAMINOPHEN 325 MG/1
650 TABLET ORAL ONCE
Status: CANCELLED | OUTPATIENT
Start: 2021-02-02

## 2021-01-19 RX ORDER — DIPHENHYDRAMINE HCL 25 MG
25 TABLET ORAL ONCE
Status: CANCELLED | OUTPATIENT
Start: 2021-02-02

## 2021-01-19 RX ORDER — DIPHENHYDRAMINE HCL 25 MG
25 TABLET ORAL ONCE
Status: COMPLETED | OUTPATIENT
Start: 2021-01-19 | End: 2021-01-19

## 2021-01-19 RX ORDER — SODIUM CHLORIDE 9 MG/ML
20 INJECTION, SOLUTION INTRAVENOUS ONCE
Status: COMPLETED | OUTPATIENT
Start: 2021-01-19 | End: 2021-01-19

## 2021-01-19 RX ORDER — ACETAMINOPHEN 325 MG/1
650 TABLET ORAL ONCE
Status: COMPLETED | OUTPATIENT
Start: 2021-01-19 | End: 2021-01-19

## 2021-01-19 RX ORDER — SODIUM CHLORIDE 9 MG/ML
20 INJECTION, SOLUTION INTRAVENOUS ONCE
Status: CANCELLED | OUTPATIENT
Start: 2021-02-02

## 2021-01-19 RX ADMIN — SODIUM CHLORIDE 20 ML/HR: 0.9 INJECTION, SOLUTION INTRAVENOUS at 08:57

## 2021-01-19 RX ADMIN — ACETAMINOPHEN 650 MG: 325 TABLET, FILM COATED ORAL at 08:56

## 2021-01-19 RX ADMIN — RITUXIMAB 1000 MG: 10 INJECTION, SOLUTION INTRAVENOUS at 11:02

## 2021-01-19 RX ADMIN — DIPHENHYDRAMINE HYDROCHLORIDE 25 MG: 25 TABLET ORAL at 08:56

## 2021-01-19 NOTE — TELEPHONE ENCOUNTER
I called the patient and we spoke over the phone earlier today  Recent laboratory data were reviewed  Lab Results   Component Value Date    SODIUM 136 01/19/2021    K 4 6 01/19/2021     01/19/2021    CO2 22 01/19/2021    BUN 94 (H) 01/19/2021    CREATININE 4 64 (H) 01/19/2021    GLUC 164 (H) 01/19/2021    CALCIUM 8 5 01/19/2021     His last HD treatment was 1/14/21  We purposely asked him to skip HD on Saturday, 1/16/21, and check labs on Monday, 1/18/21  However, the lab did not draw his BMP and only did the CBC  I requested for labs to be done at the Rituxan infusion earlier today and they are outlined above  SCr is 4 64 and electrolytes are stable  He is due for HD today - I told him to hold off on going to HD today  He should go for HD on Thursday, 1/21/21  I will order a repeat BMP for pre HD on Thursday, 1/21/21  Leslee Huerta has an appointment with me next week

## 2021-01-19 NOTE — PROGRESS NOTES
Hepatitis panel already complete per provider communication  Pt with multiple bruises UE's due to IV's and recent hospitalization  IV placement successful after 3rd attempt  BMP drawn as ordered and will be sent to Dr Keaton Arellano  Per Dr Keaton Arellano, may give premeds prior to [facility] results  Premeds given as ordered

## 2021-01-19 NOTE — PLAN OF CARE
Problem: Potential for Falls  Goal: Patient will remain free of falls  Description: INTERVENTIONS:  - Assess patient frequently for physical needs  -  Identify cognitive and physical deficits and behaviors that affect risk of falls  -  [place] fall precautions as indicated by assessment   - Educate patient/family on patient safety including physical limitations  - Instruct patient to call for assistance with activity based on assessment  - Modify environment to reduce risk of injury  - Consider OT/PT consult to assist with strengthening/mobility  Outcome: Progressing     Problem: PAIN - ADULT  Goal: Verbalizes/displays adequate comfort level or baseline comfort level  Description: Interventions:  - Encourage patient to monitor pain and request assistance  - Assess pain using appropriate pain scale  - Administer analgesics based on type and severity of pain and evaluate response  - Implement non-pharmacological measures as appropriate and evaluate response  - Consider cultural and social influences on pain and pain management  - Notify physician/advanced practitioner if interventions unsuccessful or patient reports new pain  Outcome: Progressing     Problem: Knowledge Deficit  Goal: Patient/family/caregiver demonstrates understanding of disease process, treatment plan, medications, and discharge instructions  Description: Complete learning assessment and assess knowledge base    Interventions:  - Provide teaching at level of understanding  - Provide teaching via preferred learning methods  Outcome: Progressing

## 2021-01-21 ENCOUNTER — OFFICE VISIT (OUTPATIENT)
Dept: PULMONOLOGY | Facility: CLINIC | Age: 56
End: 2021-01-21
Payer: COMMERCIAL

## 2021-01-21 VITALS
OXYGEN SATURATION: 100 % | HEIGHT: 69 IN | HEART RATE: 86 BPM | TEMPERATURE: 97.3 F | WEIGHT: 163 LBS | DIASTOLIC BLOOD PRESSURE: 98 MMHG | BODY MASS INDEX: 24.14 KG/M2 | SYSTOLIC BLOOD PRESSURE: 152 MMHG

## 2021-01-21 DIAGNOSIS — R04.2 HEMOPTYSIS: Primary | ICD-10-CM

## 2021-01-21 PROBLEM — R91.8 PULMONARY INFILTRATES: Status: RESOLVED | Noted: 2021-01-04 | Resolved: 2021-01-21

## 2021-01-21 PROBLEM — J96.00 ACUTE RESPIRATORY FAILURE (HCC): Status: RESOLVED | Noted: 2021-01-05 | Resolved: 2021-01-21

## 2021-01-21 PROCEDURE — 99213 OFFICE O/P EST LOW 20 MIN: CPT | Performed by: INTERNAL MEDICINE

## 2021-01-21 RX ORDER — CLINDAMYCIN HYDROCHLORIDE 300 MG/1
600 CAPSULE ORAL
COMMUNITY
Start: 2020-12-11

## 2021-01-21 NOTE — PROGRESS NOTES
Pulmonary Follow Up Note   Lionel Montenegro 48 y o  male MRN: [id number]  1/21/2021      Assessment:    Hemoptysis  The patient has been hemoptysis free since starting rituximab  He has no shortness of breath  There is no current evidence of ongoing intrapulmonary vasculitis  Continue to monitor clinically  Plan:    Diagnoses and all orders for this visit:    Hemoptysis  Follow-up p r n     No follow-ups on file  History of Present Illness   HPI:  Lionel Montenegro is a 48 y o  male who returns for follow-up of hemoptysis  He has lupus vasculitis with intrapulmonary involvement  He has undergone 2 bronchoscopies, neither of which were diagnostic for diffuse alveolar hemorrhage, but that remains his presumptive diagnosis  He was hospitalized again since his consult at [facility], this time [facility], where he was initiated on rituximab therapy and was placed on anticoagulation for DVT, Eliquis, at a lower dose  Reports feeling fine with his breathing and having no acute concerns  Review of Systems   Respiratory: Negative for cough, shortness of breath and wheezing  All other systems reviewed and are negative  Historical Information   Past Medical History:   Diagnosis Date    CHF [facility]congestive heart failure) ([facility] [address] [facility]     Hypertension     Lupus ([facility] [address] )     Osteoporosis     Rheumatoid arthritis ([facility] [address] )      Past Surgical History:   Procedure Laterality Date    CT NEEDLE BIOPSY KIDNEY  11/3/2020    HERNIA REPAIR  199?     IR TUNNELED DIALYSIS CATHETER PLACEMENT  1/8/2021    REPLACEMENT TOTAL KNEE Bilateral     right 06/12 left 07/2016    REVISION TOTAL HIP ARTHROPLASTY Bilateral     right 05/2004 left 02/2006    TOTAL HIP ARTHROPLASTY Bilateral 9066/7792    Right 1988/ left 1989     Family History   Problem Relation Age of Onset    Varicose Veins Father          Meds/Allergies     Current Outpatient Medications:     acetaminophen (TYLENOL) 500 mg tablet, Take 500 mg by mouth every 6 (six) hours as needed for mild pain, Disp: , Rfl:     amLODIPine (NORVASC) 10 mg tablet, Take 1 tablet (10 mg total) by mouth daily, Disp: 30 tablet, Rfl: 0    apixaban (ELIQUIS) 2 5 mg, Take 1 tablet (2 5 mg total) by mouth 2 (two) times a day, Disp: 60 tablet, Rfl: 1    atorvastatin (LIPITOR) 40 mg tablet, Take 1 tablet (40 mg total) by mouth daily, Disp: 30 tablet, Rfl: 3    calcium-vitamin D (OSCAL) 250-125 MG-UNIT per tablet, Take 1 tablet by mouth 2 (two) times a day, Disp: , Rfl: 0    clindamycin (CLEOCIN) 300 MG capsule, Take 600 mg by mouth 1 hour prior to Dental appointment, Disp: , Rfl:     dapsone 100 mg tablet, Take 1 tablet (100 mg total) by mouth daily, Disp: 30 tablet, Rfl: 0    Elastic Bandages & Supports ([address]) MIS, by Does not apply route daily Knee High 20-30mmHg, Disp: 2 each, Rfl: 0    metoprolol succinate (TOPROL-XL) 100 mg 24 hr tablet, Take 1 tablet (100 mg total) by mouth daily, Disp: 30 tablet, Rfl: 1    Multiple Vitamins-Minerals (CENTRUM SILVER) tablet, Take 1 tablet by mouth daily , Disp: , Rfl:     nystatin (MYCOSTATIN) 500,000 units/5 mL suspension, Swish and swallow 5 mL (500,000 Units total) 4 (four) times a day, Disp: 100 mL, Rfl: 0    pantoprazole (PROTONIX) 40 mg tablet, Take 1 tablet (40 mg total) by mouth daily, Disp: 30 tablet, Rfl: 0    predniSONE 20 mg tablet, Take 3 tablets (60 mg total) by mouth daily, Disp: 90 tablet, Rfl: 0    sevelamer (RENAGEL) 800 mg tablet, Take 2 tablets (1,600 mg total) by mouth 3 (three) times a day with meals, Disp: 180 tablet, Rfl: 0    torsemide (DEMADEX) 20 mg tablet, Take 2 tablets (40 mg total) by mouth daily, Disp: 60 tablet, Rfl: 0  No Known Allergies    Vitals: Blood pressure 152/98, pulse 86, temperature (!) 97 3 °F (36 3 °C), temperature source Temporal, height 5' 9" (1 753 m), weight 73 9 kg (163 lb), SpO2 100 %  Body mass index is 24 07 kg/m²   Oxygen Therapy  SpO2: 100 %  Oxygen Therapy: None (Room air)      Physical Exam  Physical Exam  Vitals signs reviewed  Constitutional:       General: He is not in acute distress  Appearance: He is well-developed  HENT:      Head: Normocephalic and atraumatic  Mouth/Throat:      Pharynx: No oropharyngeal exudate  Eyes:      Conjunctiva/sclera: Conjunctivae normal       Pupils: Pupils are equal, round, and reactive to light  Neck:      Musculoskeletal: Neck supple  Thyroid: No thyromegaly  Vascular: No JVD  Cardiovascular:      Rate and Rhythm: Normal rate and regular rhythm  Heart sounds: Normal heart sounds  No murmur  No friction rub  No gallop  Pulmonary:      Effort: Pulmonary effort is normal  No respiratory distress  Breath sounds: Normal breath sounds  No wheezing or rales  Musculoskeletal:         General: No tenderness  Lymphadenopathy:      Cervical: No cervical adenopathy  Skin:     General: Skin is warm and dry  Findings: No rash  Neurological:      Mental Status: He is alert and oriented to person, place, and time  Labs: I have personally reviewed pertinent lab results  Lab Results   Component Value Date    WBC 7 66 01/18/2021    HGB 8 5 (L) 01/18/2021    HCT 27 1 (L) 01/18/2021     (H) 01/18/2021     01/18/2021     Lab Results   Component Value Date    CALCIUM 8 5 01/19/2021    K 4 6 01/19/2021    CO2 22 01/19/2021     01/19/2021    BUN 94 (H) 01/19/2021    CREATININE 4 64 (H) 01/19/2021     No results found for: IGE  Lab Results   Component Value Date    ALT 24 01/13/2021    AST 19 01/13/2021    ALKPHOS 84 01/13/2021     Imaging and other studies: I have personally reviewed pertinent films in PACS    EKG, Pathology, and Other Studies: I have personally reviewed pertinent reports  BI Arshad    [facility]

## 2021-01-21 NOTE — ASSESSMENT & PLAN NOTE
The patient has been hemoptysis free since starting rituximab  He has no shortness of breath  There is no current evidence of ongoing intrapulmonary vasculitis  Continue to monitor clinically

## 2021-01-26 ENCOUNTER — HOSPITAL ENCOUNTER (OUTPATIENT)
Dept: INFUSION CENTER | Facility: HOSPITAL | Age: 56
End: 2021-01-26
Attending: INTERNAL MEDICINE

## 2021-01-28 ENCOUNTER — TELEPHONE (OUTPATIENT)
Dept: NEPHROLOGY | Facility: CLINIC | Age: 56
End: 2021-01-28

## 2021-01-28 ENCOUNTER — OFFICE VISIT (OUTPATIENT)
Dept: NEPHROLOGY | Facility: CLINIC | Age: 56
End: 2021-01-28
Payer: COMMERCIAL

## 2021-01-28 VITALS — DIASTOLIC BLOOD PRESSURE: 82 MMHG | HEIGHT: 69 IN | BODY MASS INDEX: 24.07 KG/M2 | SYSTOLIC BLOOD PRESSURE: 137 MMHG

## 2021-01-28 DIAGNOSIS — I10 ESSENTIAL HYPERTENSION: ICD-10-CM

## 2021-01-28 DIAGNOSIS — M32.14 LUPUS NEPHRITIS (HCC): ICD-10-CM

## 2021-01-28 DIAGNOSIS — B37.0 THRUSH: ICD-10-CM

## 2021-01-28 DIAGNOSIS — N17.9 AKI (ACUTE KIDNEY INJURY) (HCC): ICD-10-CM

## 2021-01-28 DIAGNOSIS — D64.9 ANEMIA, UNSPECIFIED TYPE: ICD-10-CM

## 2021-01-28 DIAGNOSIS — I16.1 HYPERTENSIVE EMERGENCY: ICD-10-CM

## 2021-01-28 DIAGNOSIS — M32.15 SYSTEMIC LUPUS ERYTHEMATOSUS WITH TUBULO-INTERSTITIAL NEPHROPATHY, UNSPECIFIED SLE TYPE (HCC): ICD-10-CM

## 2021-01-28 DIAGNOSIS — N18.9 ACUTE KIDNEY INJURY SUPERIMPOSED ON CHRONIC KIDNEY DISEASE (HCC): Primary | ICD-10-CM

## 2021-01-28 DIAGNOSIS — R80.9 NEPHROTIC RANGE PROTEINURIA: ICD-10-CM

## 2021-01-28 DIAGNOSIS — N17.9 ACUTE KIDNEY INJURY SUPERIMPOSED ON CHRONIC KIDNEY DISEASE (HCC): Primary | ICD-10-CM

## 2021-01-28 DIAGNOSIS — N18.1 CHRONIC KIDNEY DISEASE (CKD), STAGE I: ICD-10-CM

## 2021-01-28 PROCEDURE — 99215 OFFICE O/P EST HI 40 MIN: CPT | Performed by: INTERNAL MEDICINE

## 2021-01-28 RX ORDER — AMLODIPINE BESYLATE 10 MG/1
10 TABLET ORAL DAILY
Qty: 90 TABLET | Refills: 1 | Status: SHIPPED | OUTPATIENT
Start: 2021-01-28 | End: 2021-07-19

## 2021-01-28 RX ORDER — TORSEMIDE 20 MG/1
40 TABLET ORAL DAILY
Qty: 180 TABLET | Refills: 1 | Status: SHIPPED | OUTPATIENT
Start: 2021-01-28 | End: 2021-03-30 | Stop reason: SDUPTHER

## 2021-01-28 RX ORDER — DAPSONE 100 MG/1
100 TABLET ORAL DAILY
Qty: 90 TABLET | Refills: 1 | Status: SHIPPED | OUTPATIENT
Start: 2021-01-28 | End: 2021-02-27

## 2021-01-28 RX ORDER — METOPROLOL SUCCINATE 100 MG/1
100 TABLET, EXTENDED RELEASE ORAL DAILY
Qty: 90 TABLET | Refills: 1 | Status: SHIPPED | OUTPATIENT
Start: 2021-01-28 | End: 2021-08-03

## 2021-01-28 RX ORDER — DOXAZOSIN 2 MG/1
2 TABLET ORAL
Qty: 30 TABLET | Refills: 1 | Status: SHIPPED | OUTPATIENT
Start: 2021-01-28 | End: 2021-02-08 | Stop reason: ALTCHOICE

## 2021-01-28 RX ORDER — PREDNISONE 20 MG/1
40 TABLET ORAL DAILY
Qty: 60 TABLET | Refills: 1 | Status: SHIPPED | OUTPATIENT
Start: 2021-01-28 | End: 2021-03-30 | Stop reason: SDUPTHER

## 2021-01-28 RX ORDER — PANTOPRAZOLE SODIUM 40 MG/1
40 TABLET, DELAYED RELEASE ORAL DAILY
Qty: 90 TABLET | Refills: 1 | Status: SHIPPED | OUTPATIENT
Start: 2021-01-28 | End: 2021-07-19

## 2021-01-28 NOTE — LETTER
January 28, 2021     Francella Aschoff, DO  [address] [facility]  [address]  [address] [facility] [address]    Patient: Mable Ye   YOB: 1965   Date of Visit: 1/28/2021       Dear Dr Federica Solomon:    Thank you for referring Shoshana Vivar to me for evaluation  Below are my notes for this consultation  If you have questions, please do not hesitate to call me  I look forward to following your patient along with you  Sincerely,        May Donis MD        CC: MD May Harrison MD  1/28/2021  5:08 PM  Sign when Signing Visit  NEPHROLOGY OFFICE PROGRESS NOTE   Vance Bamberg [place] 48 y o  male MRN: [id number]  DATE: 01/28/21  Reason for visit: Continued evaluation and management of proteinuria  ASSESSMENT & PLAN:  1  Acute kidney injury (HD dependent):  · HD dependent since 1/8/21  · Most recent SCr is 4 88  · Etiology felt to be due to lupus nephritis class IV + unclear role of vasculitis? + ATN?   · Continue HD support  · Recheck labs again next week with holding dialysis on Tuesday, 2/2/21  · If renal function stabilizes, may consider holding dialysis and monitoring labs  · [facility]  2  Class IV lupus nephritis:  · Failed treatment with Mycophenolate  · Got Cytoxan on 12/18/20 and 12/31/20 but changed to Rituxan in Jan 2021 due to ongoing pulmonary vasculitis ([address]?)  · Received Rituxan 1 gm on 1/19/21 and scheduled for 2nd dose on 2/2/21  · Currently on steroids - was pulsed in December admission to [facility] and in January admission in [facility]  · Currently on Prednisone 60 mg daily - wean to 40 mg daily today  · Will refer to [facility] - Dr Juwan Olivera for 2nd opinion  · Follow up with Dr Ferdinand Camarillo  3  Proteinuria:  · Shilo's baseline UPC ratio is around 0 2 to 0 3  · UPC ratio was up to 5 48 on 11/25/20  · Most recent was 3 14 on 12/20/20  · Recheck UPC ratio  · No Lisinopril due to CAROLINE       4  Chronic kidney disease, stage I:  · Due to lupus nephritis  · Baseline creatinine was 0 6 to 0 7 in middle of 2020  · Was 1 3 to 1 7 in Nov 2020      5  Hypertension:  · BP is above goal   · Current regimen:  Amlodipine 10 mg daily, Torsemide 40 mg daily, Metoprolol succinate 100 mg daily  · Add Doxazosin 2 mg qHS  6  Systemic lupus erythematosus  · Follow up with Dr Charan Cline  7  Anemia:  · Hgb 8 5 on 1/18/21  · Not on Epogen due to L leg DVT  · Recheck CBC  8  L leg DVT:  · On anticoagulation  9  Mineral and bone disease  · Continue Vitamin D 1000 units daily  · Check Vitamin D levels  · Continue sevelamer for hyperphosphatemia  Recheck phos  Patient Instructions   Go for your Rituxan infusion on Tuesday, 2/2/21  You do not need to go to dialysis on that day  We will check a CBC, CMP, UA, UPC ratio, Mg, Phos on Tuesday  Check BMP again prior to dialysis on Thursday, 2/4/21  Start Doxazosin 2 mg at bedtime  Continue with Torsemide, Amlodipine, and Metoprolol at the same doses  We will refer you to the [facility] for a second opinion regarding the lupus nephritis  Decrease Prednisone to 40 mg daily  Follow up with Dr Charan Cline  Follow up in 4 weeks    SUBJECTIVE / INTERVAL HISTORY:  Rashid Dueñas was last seen by me in the office back in December 4, 2020  Since then, there have been multiple events:    12/10/20: Creatinine noted to have risen to 2 44 on 12/9/20  This was felt to be less likely contrast nephropathy and more due to lupus nephritis  Due to worsening proteinuria and renal function despite maximum dose of Cellcept, we decided to change to Cytoxan 500 mg IV q2 weeks x 6 doses  Bactrim, Vitamin D, and Pepcid were started  Metoprolol increased to 50 mg daily due to high BP      12/18/20: Received Cytoxan 500 mg IV 1st dose  Also received Solumedrol 500 mg IV x 1      12/19/20 - 12/28/20: Admitted to [address] [place] [address] with hemoptysis  Creatinine on admission was 3 69 and stabilized in the low 4s   Was 4 1 to 4 3 most of the hospital stay  Was 4 32 at discharge  He got plasmapheresis due to suspicion for diffuse alveolar hemorrhage from 12/20 to 12/27  Per pulm note "He underwent bronchoscopy with diffuse airway inflammation, however sequential BAL aliquots were not indicative of active DAH despite very classic radiographic images " He was also pulsed with steroids  Complements were normal in the hospital  Discharged on Prednisone 60 mg daily  C-ANCA was positive but MPO and PR3 were negative  Found to have L leg DVT and discharged on Eliquis  12/31/21: Received Cytoxan 500 mg IV 2nd dose  1/4/21 - 1/13/21: Admitted to [address] [place] [address] with hemoptysis  Had bronchoscopy done on 1/4/21 - "Patient had some bloody secretions anywhere but no active bleeding and tracheal and bronchial mucosa appeared normal" Received IV pulse steroids again  No plasmapheresis done this admission  Creatinine was 4 70 on admission and worsened in the hospital  He started dialysis on 1/8/21  Working diagnosis at this time was a pulmonary vasculitis  The case was discussed by Dr Kenneth Ashley (pulm [facility]) with pulm and rheum at [place] and it was felt that Hedda Session would not be a candidate for open lung biopsy  [facility] did not feel need for more plasmapheresis  They recommended starting Rituxan  Patient was discharged no dialysis  1/19/21: Received Rituxan 1000 mg IV x 1      1/21/21: Creatinine 4 88 after dialysis was held for 2 sessions (1/16 and 1/19)  Currently, Hedda Session is continuing with dialysis treatments  His EDW is 72 0 kg  Weight after HD today was 71 5 kg  Home BP is 150s to 160s/80s to 90s  He has L leg edema but no R leg edema  His UO is > 1000 cc  No further hemoptysis  He is due for 2nd dose of Rituxan next week  Treatment to date:  July to December 2020: Mycophenolate  Initially could not tolerate 1500 mg BID     December 18, 2020: Cytoxan 500 mg IV  December 31, 2020: Cytoxan 500 mg IV  January 19, 2021: Rituxan 1000 mg IV    PMH/PSH: HTN, SLE, HLP, lymphadenopathy, BPH, RA, bilateral hip replacements, bilateral knee replacements  Previous work up:   Renal biopsy (November 3 2020)  1  Diffuse proliferative glomerulonephritis, consistent with lupus nephritis class 4 (mild activity, mild chronicity)  2  Lupus vasculopathy, severe, with ischemic glomerular changes and zonal tubulointerstitial scarring  3  Tubular atrophy, interstitial fibrosis, and interstitial inflammation, mild  4  Arteriosclerosis and arteriolosclerosis, mild to moderate  ALLERGIES: No Known Allergies    REVIEW OF SYSTEMS:  Review of Systems   Constitutional: Negative for appetite change, chills and fever  Respiratory: Negative for cough and shortness of breath  Cardiovascular: Positive for leg swelling (L leg)  Negative for chest pain  Gastrointestinal: Negative for abdominal pain, diarrhea, nausea and vomiting  Genitourinary: Negative for dysuria  Musculoskeletal: Negative for back pain  Allergic/Immunologic: Positive for immunocompromised state  Neurological: Negative for dizziness  OBJECTIVE:  /82   Ht 5' 9" (1 753 m)   BMI 24 07 kg/m²   Current Weight:   Body mass index is 24 07 kg/m²  Physical Exam  Constitutional:       General: He is not in acute distress  Appearance: Normal appearance  He is well-developed and normal weight  He is not ill-appearing  HENT:      Head: Normocephalic and atraumatic  Eyes:      General: No scleral icterus  Conjunctiva/sclera: Conjunctivae normal    Neck:      Musculoskeletal: Neck supple  Vascular: No JVD  Cardiovascular:      Rate and Rhythm: Normal rate and regular rhythm  Heart sounds: Normal heart sounds  Pulmonary:      Effort: Pulmonary effort is normal  No respiratory distress  Breath sounds: Normal breath sounds  Abdominal:      General: Bowel sounds are normal       Palpations: Abdomen is soft     Musculoskeletal:      Right lower leg: No edema  Left lower leg: Edema present  Skin:     General: Skin is warm and dry  Neurological:      Mental Status: He is alert and oriented to person, place, and time     Psychiatric:         Mood and Affect: Mood normal          Behavior: Behavior normal          Judgment: Judgment normal        Medications:  Current Outpatient Medications:     acetaminophen (TYLENOL) 500 mg tablet, Take 500 mg by mouth every 6 (six) hours as needed for mild pain, Disp: , Rfl:     amLODIPine (NORVASC) 10 mg tablet, Take 1 tablet (10 mg total) by mouth daily, Disp: 30 tablet, Rfl: 0    apixaban (ELIQUIS) 2 5 mg, Take 1 tablet (2 5 mg total) by mouth 2 (two) times a day, Disp: 60 tablet, Rfl: 1    atorvastatin (LIPITOR) 40 mg tablet, Take 1 tablet (40 mg total) by mouth daily, Disp: 30 tablet, Rfl: 3    calcium-vitamin D (OSCAL) 250-125 MG-UNIT per tablet, Take 1 tablet by mouth 2 (two) times a day, Disp: , Rfl: 0    clindamycin (CLEOCIN) 300 MG capsule, Take 600 mg by mouth 1 hour prior to Dental appointment, Disp: , Rfl:     dapsone 100 mg tablet, Take 1 tablet (100 mg total) by mouth daily, Disp: 30 tablet, Rfl: 0    Elastic Bandages & Supports ([address]) MIS, by Does not apply route daily Knee High 20-30mmHg, Disp: 2 each, Rfl: 0    metoprolol succinate (TOPROL-XL) 100 mg 24 hr tablet, Take 1 tablet (100 mg total) by mouth daily, Disp: 30 tablet, Rfl: 1    Multiple Vitamins-Minerals (CENTRUM SILVER) tablet, Take 1 tablet by mouth daily , Disp: , Rfl:     nystatin (MYCOSTATIN) 500,000 units/5 mL suspension, Swish and swallow 5 mL (500,000 Units total) 4 (four) times a day, Disp: 100 mL, Rfl: 0    pantoprazole (PROTONIX) 40 mg tablet, Take 1 tablet (40 mg total) by mouth daily, Disp: 30 tablet, Rfl: 0    predniSONE 20 mg tablet, Take 3 tablets (60 mg total) by mouth daily, Disp: 90 tablet, Rfl: 0    sevelamer (RENAGEL) 800 mg tablet, Take 2 tablets (1,600 mg total) by mouth 3 (three) times a day with meals, Disp: 180 tablet, Rfl: 0    torsemide (DEMADEX) 20 mg tablet, Take 2 tablets (40 mg total) by mouth daily, Disp: 60 tablet, Rfl: 0    Laboratory Results:  Results for orders placed or performed in visit on 93/24/42   Basic metabolic panel   Result Value Ref Range    Sodium 139 136 - 145 mmol/L    Potassium 3 8 3 5 - 5 3 mmol/L    Chloride 103 100 - 108 mmol/L    CO2 22 21 - 32 mmol/L    ANION GAP 14 (H) 4 - 13 mmol/L     (H) 5 - 25 mg/dL    Creatinine 4 88 (H) 0 60 - 1 30 mg/dL    Glucose 166 (H) 65 - 140 mg/dL    Calcium 8 6 8 3 - 10 1 mg/dL    eGFR 12 ml/min/1 73sq m     Lab Results   Component Value Date    WBC 7 66 01/18/2021    HGB 8 5 (L) 01/18/2021    HCT 27 1 (L) 01/18/2021     (H) 01/18/2021     01/18/2021     I have spent 44 minutes with Patient and family today in which greater than 50% of this time was spent in counseling/coordination of care regarding Diagnostic results, Risks and benefits of tx options, Intructions for management, Patient and family education, Importance of tx compliance and Impressions

## 2021-01-28 NOTE — TELEPHONE ENCOUNTER

## 2021-01-28 NOTE — PATIENT INSTRUCTIONS
Go for your Rituxan infusion on Tuesday, 2/2/21  You do not need to go to dialysis on that day  We will check a CBC, CMP, UA, UPC ratio, Mg, Phos on Tuesday  Check BMP again prior to dialysis on Thursday, 2/4/21  Start Doxazosin 2 mg at bedtime  Continue with Torsemide, Amlodipine, and Metoprolol at the same doses  We will refer you to the [facility] for a second opinion regarding the lupus nephritis  Decrease Prednisone to 40 mg daily  Follow up with Dr Esperanza Mcmullen     Follow up in 4 weeks

## 2021-01-28 NOTE — PROGRESS NOTES
NEPHROLOGY OFFICE PROGRESS NOTE   Misty Payer Schoenfield 48 y o  male MRN: [id number]  DATE: 01/28/21  Reason for visit: Continued evaluation and management of proteinuria  ASSESSMENT & PLAN:  1  Acute kidney injury (HD dependent):  · HD dependent since 1/8/21  · Most recent SCr is 4 88  · Etiology felt to be due to lupus nephritis class IV + unclear role of vasculitis? + ATN?   · Continue HD support  · Recheck labs again next week with holding dialysis on Tuesday, 2/2/21  · If renal function stabilizes, may consider holding dialysis and monitoring labs  · Access: R IJ permcath  2  Class IV lupus nephritis:  · Failed treatment with Mycophenolate  · Got Cytoxan on 12/18/20 and 12/31/20 but changed to Rituxan in Jan 2021 due to ongoing pulmonary vasculitis ([id number]?)  · Received Rituxan 1 gm on 1/19/21 and scheduled for 2nd dose on 2/2/21  · Currently on steroids - was pulsed in December admission to [facility] and in January admission in [place]  · Currently on Prednisone 60 mg daily - wean to 40 mg daily today  · Will refer to [facility] - Dr Natan Benz for 2nd opinion  · Follow up with Dr Critsofer Luis  3  Proteinuria:  · Shilo's baseline UPC ratio is around 0 2 to 0 3  · UPC ratio was up to 5 48 on 11/25/20  · Most recent was 3 14 on 12/20/20  · Recheck UPC ratio  · No Lisinopril due to CAROLINE  4  Chronic kidney disease, stage I:  · Due to lupus nephritis  · Baseline creatinine was 0 6 to 0 7 in middle of 2020  · Was 1 3 to 1 7 in Nov 2020      5  Hypertension:  · BP is above goal   · Current regimen:  Amlodipine 10 mg daily, Torsemide 40 mg daily, Metoprolol succinate 100 mg daily  · Add Doxazosin 2 mg qHS  6  Systemic lupus erythematosus  · Follow up with Dr Cristofer Luis  7  Anemia:  · Hgb 8 5 on 1/18/21  · Not on Epogen due to L leg DVT  · Recheck CBC  8  L leg DVT:  · On anticoagulation  9  Mineral and bone disease  · Continue Vitamin D 1000 units daily     · Check Vitamin D levels  · Continue sevelamer for hyperphosphatemia  Recheck phos  Patient Instructions   Go for your Rituxan infusion on Tuesday, 2/2/21  You do not need to go to dialysis on that day  We will check a CBC, CMP, UA, UPC ratio, Mg, Phos on Tuesday  Check BMP again prior to dialysis on Thursday, 2/4/21  Start Doxazosin 2 mg at bedtime  Continue with Torsemide, Amlodipine, and Metoprolol at the same doses  We will refer you to the [facility] for a second opinion regarding the lupus nephritis  Decrease Prednisone to 40 mg daily  Follow up with Dr Preeti Carbajal  Follow up in 4 weeks    SUBJECTIVE / INTERVAL HISTORY:  Johanna Swartz was last seen by me in the office back in December 4, 2020  Since then, there have been multiple events:    12/10/20: Creatinine noted to have risen to 2 44 on 12/9/20  This was felt to be less likely contrast nephropathy and more due to lupus nephritis  Due to worsening proteinuria and renal function despite maximum dose of Cellcept, we decided to change to Cytoxan 500 mg IV q2 weeks x 6 doses  Bactrim, Vitamin D, and Pepcid were started  Metoprolol increased to 50 mg daily due to high BP      12/18/20: Received Cytoxan 500 mg IV 1st dose  Also received Solumedrol 500 mg IV x 1      12/19/20 - 12/28/20: Admitted to [address] [place] [address] with hemoptysis  Creatinine on admission was 3 69 and stabilized in the low 4s  Was 4 1 to 4 3 most of the hospital stay  Was 4 32 at discharge  He got plasmapheresis due to suspicion for diffuse alveolar hemorrhage from 12/20 to 12/27  Per pulm note "He underwent bronchoscopy with diffuse airway inflammation, however sequential BAL aliquots were not indicative of active DAH despite very classic radiographic images " He was also pulsed with steroids  Complements were normal in the hospital  Discharged on Prednisone 60 mg daily  C-ANCA was positive but MPO and PR3 were negative  Found to have L leg DVT and discharged on Eliquis       12/31/21: Received Cytoxan 500 mg IV 2nd dose  1/4/21 - 1/13/21: [facility]  Had bronchoscopy done on 1/4/21 - "Patient had some bloody secretions anywhere but no active bleeding and tracheal and bronchial mucosa appeared normal" Received IV pulse steroids again  No plasmapheresis done this admission  Creatinine was 4 70 on admission and worsened in the hospital  He started dialysis on 1/8/21  Working diagnosis at this time was a pulmonary vasculitis  The case was discussed by Dr Winnie Gutierrez (pulm [facility]) with pulm and rheum at [facility] and it was felt that Pedro Russell would not be a candidate for open lung biopsy  [facility] did not feel need for more plasmapheresis  They recommended starting Rituxan  Patient was discharged no dialysis  1/19/21: Received Rituxan 1000 mg IV x 1      1/21/21: Creatinine 4 88 after dialysis was held for 2 sessions (1/16 and 1/19)  Currently, Pedro Russell is continuing with dialysis treatments  His EDW is 72 0 kg  Weight after HD today was 71 5 kg  Home BP is 150s to 160s/80s to 90s  He has L leg edema but no R leg edema  His UO is > 1000 cc  No further hemoptysis  He is due for 2nd dose of Rituxan next week  Treatment to date:  July to December 2020: Mycophenolate  Initially could not tolerate 1500 mg BID  December 18, 2020: Cytoxan 500 mg IV  December 31, 2020: Cytoxan 500 mg IV  January 19, 2021: Rituxan 1000 mg IV    PMH/PSH: HTN, SLE, HLP, lymphadenopathy, BPH, RA, bilateral hip replacements, bilateral knee replacements  Previous work up:   Renal biopsy (November 3 2020)  1  Diffuse proliferative glomerulonephritis, consistent with lupus nephritis class 4 (mild activity, mild chronicity)  2  Lupus vasculopathy, severe, with ischemic glomerular changes and zonal tubulointerstitial scarring  3  Tubular atrophy, interstitial fibrosis, and interstitial inflammation, mild  4  Arteriosclerosis and arteriolosclerosis, mild to moderate      ALLERGIES: No Known Allergies    REVIEW OF SYSTEMS:  Review of Systems   Constitutional: Negative for appetite change, chills and fever  Respiratory: Negative for cough and shortness of breath  Cardiovascular: Positive for leg swelling (L leg)  Negative for chest pain  Gastrointestinal: Negative for abdominal pain, diarrhea, nausea and vomiting  Genitourinary: Negative for dysuria  Musculoskeletal: Negative for back pain  Allergic/Immunologic: Positive for immunocompromised state  Neurological: Negative for dizziness  OBJECTIVE:  /82   Ht 5' 9" (1 753 m)   BMI 24 07 kg/m²   Current Weight:   Body mass index is 24 07 kg/m²  Physical Exam  Constitutional:       General: He is not in acute distress  Appearance: Normal appearance  He is well-developed and normal weight  He is not ill-appearing  HENT:      Head: Normocephalic and atraumatic  Eyes:      General: No scleral icterus  Conjunctiva/sclera: Conjunctivae normal    Neck:      Musculoskeletal: Neck supple  Vascular: No JVD  Cardiovascular:      Rate and Rhythm: Normal rate and regular rhythm  Heart sounds: Normal heart sounds  Pulmonary:      Effort: Pulmonary effort is normal  No respiratory distress  Breath sounds: Normal breath sounds  Abdominal:      General: Bowel sounds are normal       Palpations: Abdomen is soft  Musculoskeletal:      Right lower leg: No edema  Left lower leg: Edema present  Skin:     General: Skin is warm and dry  Neurological:      Mental Status: He is alert and oriented to person, place, and time     Psychiatric:         Mood and Affect: Mood normal          Behavior: Behavior normal          Judgment: Judgment normal        Medications:  Current Outpatient Medications:     acetaminophen (TYLENOL) 500 mg tablet, Take 500 mg by mouth every 6 (six) hours as needed for mild pain, Disp: , Rfl:     amLODIPine (NORVASC) 10 mg tablet, Take 1 tablet (10 mg total) by mouth daily, Disp: 30 tablet, Rfl: 0    apixaban (ELIQUIS) 2 5 mg, Take 1 tablet (2 5 mg total) by mouth 2 (two) times a day, Disp: 60 tablet, Rfl: 1    atorvastatin (LIPITOR) 40 mg tablet, Take 1 tablet (40 mg total) by mouth daily, Disp: 30 tablet, Rfl: 3    calcium-vitamin D (OSCAL) 250-125 MG-UNIT per tablet, Take 1 tablet by mouth 2 (two) times a day, Disp: , Rfl: 0    clindamycin (CLEOCIN) 300 MG capsule, Take 600 mg by mouth 1 hour prior to Dental appointment, Disp: , Rfl:     dapsone 100 mg tablet, Take 1 tablet (100 mg total) by mouth daily, Disp: 30 tablet, Rfl: 0    Elastic Bandages & Supports ([address]) [facility], by Does not apply route daily Knee High 20-30mmHg, Disp: 2 each, Rfl: 0    metoprolol succinate (TOPROL-XL) 100 mg 24 hr tablet, Take 1 tablet (100 mg total) by mouth daily, Disp: 30 tablet, Rfl: 1    Multiple Vitamins-Minerals (CENTRUM SILVER) tablet, Take 1 tablet by mouth daily , Disp: , Rfl:     nystatin (MYCOSTATIN) 500,000 units/5 mL suspension, Swish and swallow 5 mL (500,000 Units total) 4 (four) times a day, Disp: 100 mL, Rfl: 0    pantoprazole (PROTONIX) 40 mg tablet, Take 1 tablet (40 mg total) by mouth daily, Disp: 30 tablet, Rfl: 0    predniSONE 20 mg tablet, Take 3 tablets (60 mg total) by mouth daily, Disp: 90 tablet, Rfl: 0    sevelamer (RENAGEL) 800 mg tablet, Take 2 tablets (1,600 mg total) by mouth 3 (three) times a day with meals, Disp: 180 tablet, Rfl: 0    torsemide (DEMADEX) 20 mg tablet, Take 2 tablets (40 mg total) by mouth daily, Disp: 60 tablet, Rfl: 0    Laboratory Results:  Results for orders placed or performed in visit on 66/56/25   Basic metabolic panel   Result Value Ref Range    Sodium 139 136 - 145 mmol/L    Potassium 3 8 3 5 - 5 3 mmol/L    Chloride 103 100 - 108 mmol/L    CO2 22 21 - 32 mmol/L    ANION GAP 14 (H) 4 - 13 mmol/L     (H) 5 - 25 mg/dL    Creatinine 4 88 (H) 0 60 - 1 30 mg/dL    Glucose 166 (H) 65 - 140 mg/dL    Calcium 8 6 8 3 - 10 1 mg/dL    eGFR 12 ml/min/1 73sq m     Lab Results   Component Value Date    WBC 7 66 01/18/2021    HGB 8 5 (L) 01/18/2021    HCT 27 1 (L) 01/18/2021     (H) 01/18/2021     01/18/2021     I have spent 44 minutes with Patient and family today in which greater than 50% of this time was spent in counseling/coordination of care regarding Diagnostic results, Risks and benefits of tx options, Intructions for management, Patient and family education, Importance of tx compliance and Impressions

## 2021-02-02 ENCOUNTER — HOSPITAL ENCOUNTER (OUTPATIENT)
Dept: INFUSION CENTER | Facility: HOSPITAL | Age: 56
Discharge: HOME | End: 2021-02-02
Attending: INTERNAL MEDICINE

## 2021-02-02 RX ORDER — DIPHENHYDRAMINE HCL 25 MG
25 TABLET ORAL ONCE
Status: CANCELLED | OUTPATIENT
Start: 2021-02-03

## 2021-02-02 RX ORDER — ACETAMINOPHEN 325 MG/1
650 TABLET ORAL ONCE
Status: CANCELLED | OUTPATIENT
Start: 2021-02-03

## 2021-02-02 RX ORDER — SODIUM CHLORIDE 9 MG/ML
20 INJECTION, SOLUTION INTRAVENOUS ONCE
Status: CANCELLED | OUTPATIENT
Start: 2021-02-03

## 2021-02-03 ENCOUNTER — HOSPITAL ENCOUNTER (OUTPATIENT)
Dept: INFUSION CENTER | Facility: HOSPITAL | Age: 56
Discharge: HOME | End: 2021-02-03
Attending: INTERNAL MEDICINE
Payer: COMMERCIAL

## 2021-02-03 VITALS
DIASTOLIC BLOOD PRESSURE: 80 MMHG | BODY MASS INDEX: 23.31 KG/M2 | OXYGEN SATURATION: 100 % | WEIGHT: 157.85 LBS | RESPIRATION RATE: 18 BRPM | SYSTOLIC BLOOD PRESSURE: 154 MMHG | TEMPERATURE: 97.3 F

## 2021-02-03 DIAGNOSIS — N17.9 ACUTE KIDNEY INJURY SUPERIMPOSED ON CHRONIC KIDNEY DISEASE (HCC): Primary | ICD-10-CM

## 2021-02-03 DIAGNOSIS — N18.9 ACUTE KIDNEY INJURY SUPERIMPOSED ON CHRONIC KIDNEY DISEASE (HCC): Primary | ICD-10-CM

## 2021-02-03 DIAGNOSIS — M32.13 OTHER SYSTEMIC LUPUS ERYTHEMATOSUS WITH LUNG INVOLVEMENT (HCC): ICD-10-CM

## 2021-02-03 LAB
25(OH)D3 SERPL-MCNC: 33.6 NG/ML (ref 30–100)
ALBUMIN SERPL BCP-MCNC: 3.5 G/DL (ref 3.5–5)
ALP SERPL-CCNC: 96 U/L (ref 46–116)
ALT SERPL W P-5'-P-CCNC: 41 U/L (ref 12–78)
ANION GAP SERPL CALCULATED.3IONS-SCNC: 12 MMOL/L (ref 4–13)
AST SERPL W P-5'-P-CCNC: 16 U/L (ref 5–45)
BACTERIA UR QL AUTO: ABNORMAL /HPF
BILIRUB SERPL-MCNC: 0.8 MG/DL (ref 0.2–1)
BILIRUB UR QL STRIP: NEGATIVE
BUN SERPL-MCNC: 81 MG/DL (ref 5–25)
CALCIUM SERPL-MCNC: 9.1 MG/DL (ref 8.3–10.1)
CHLORIDE SERPL-SCNC: 101 MMOL/L (ref 100–108)
CLARITY UR: CLEAR
CO2 SERPL-SCNC: 22 MMOL/L (ref 21–32)
COLOR UR: YELLOW
CREAT SERPL-MCNC: 4.58 MG/DL (ref 0.6–1.3)
CREAT UR-MCNC: 42.6 MG/DL
ERYTHROCYTE [DISTWIDTH] IN BLOOD BY AUTOMATED COUNT: 14.7 % (ref 11.6–15.1)
FINE GRAN CASTS URNS QL MICRO: ABNORMAL /LPF
GFR SERPL CREATININE-BSD FRML MDRD: 13 ML/MIN/1.73SQ M
GLUCOSE SERPL-MCNC: 141 MG/DL (ref 65–140)
GLUCOSE UR STRIP-MCNC: NEGATIVE MG/DL
HCT VFR BLD AUTO: 25.6 % (ref 36.5–49.3)
HGB BLD-MCNC: 7.8 G/DL (ref 12–17)
HGB UR QL STRIP.AUTO: ABNORMAL
KETONES UR STRIP-MCNC: NEGATIVE MG/DL
LEUKOCYTE ESTERASE UR QL STRIP: NEGATIVE
MAGNESIUM SERPL-MCNC: 2.2 MG/DL (ref 1.6–2.6)
MCH RBC QN AUTO: 32.2 PG (ref 26.8–34.3)
MCHC RBC AUTO-ENTMCNC: 30.5 G/DL (ref 31.4–37.4)
MCV RBC AUTO: 106 FL (ref 82–98)
NITRITE UR QL STRIP: NEGATIVE
NON-SQ EPI CELLS URNS QL MICRO: ABNORMAL /HPF
PH UR STRIP.AUTO: 5 [PH]
PHOSPHATE SERPL-MCNC: 3 MG/DL (ref 2.7–4.5)
PLATELET # BLD AUTO: 164 THOUSANDS/UL (ref 149–390)
PMV BLD AUTO: 9.6 FL (ref 8.9–12.7)
POTASSIUM SERPL-SCNC: 3.8 MMOL/L (ref 3.5–5.3)
PROT SERPL-MCNC: 6.4 G/DL (ref 6.4–8.2)
PROT UR STRIP-MCNC: ABNORMAL MG/DL
PROT UR-MCNC: 64 MG/DL
PROT/CREAT UR: 1.5 MG/G{CREAT} (ref 0–0.1)
RBC # BLD AUTO: 2.42 MILLION/UL (ref 3.88–5.62)
RBC #/AREA URNS AUTO: ABNORMAL /HPF
SODIUM SERPL-SCNC: 135 MMOL/L (ref 136–145)
SP GR UR STRIP.AUTO: 1.01 (ref 1–1.03)
UROBILINOGEN UR QL STRIP.AUTO: 0.2 E.U./DL
WBC # BLD AUTO: 10.48 THOUSAND/UL (ref 4.31–10.16)
WBC #/AREA URNS AUTO: ABNORMAL /HPF

## 2021-02-03 PROCEDURE — 82306 VITAMIN D 25 HYDROXY: CPT

## 2021-02-03 PROCEDURE — 82570 ASSAY OF URINE CREATININE: CPT

## 2021-02-03 PROCEDURE — 80053 COMPREHEN METABOLIC PANEL: CPT

## 2021-02-03 PROCEDURE — 96413 CHEMO IV INFUSION 1 HR: CPT

## 2021-02-03 PROCEDURE — 83735 ASSAY OF MAGNESIUM: CPT

## 2021-02-03 PROCEDURE — 81001 URINALYSIS AUTO W/SCOPE: CPT

## 2021-02-03 PROCEDURE — 84100 ASSAY OF PHOSPHORUS: CPT

## 2021-02-03 PROCEDURE — 85027 COMPLETE CBC AUTOMATED: CPT

## 2021-02-03 PROCEDURE — 84156 ASSAY OF PROTEIN URINE: CPT

## 2021-02-03 PROCEDURE — 96415 CHEMO IV INFUSION ADDL HR: CPT

## 2021-02-03 RX ORDER — DIPHENHYDRAMINE HCL 25 MG
25 TABLET ORAL ONCE
Status: CANCELLED | OUTPATIENT
Start: 2021-02-03

## 2021-02-03 RX ORDER — SODIUM CHLORIDE 9 MG/ML
20 INJECTION, SOLUTION INTRAVENOUS ONCE
Status: COMPLETED | OUTPATIENT
Start: 2021-02-03 | End: 2021-02-03

## 2021-02-03 RX ORDER — DIPHENHYDRAMINE HCL 25 MG
25 TABLET ORAL ONCE
Status: COMPLETED | OUTPATIENT
Start: 2021-02-03 | End: 2021-02-03

## 2021-02-03 RX ORDER — SODIUM CHLORIDE 9 MG/ML
20 INJECTION, SOLUTION INTRAVENOUS ONCE
Status: CANCELLED | OUTPATIENT
Start: 2021-02-03

## 2021-02-03 RX ORDER — ACETAMINOPHEN 325 MG/1
650 TABLET ORAL ONCE
Status: COMPLETED | OUTPATIENT
Start: 2021-02-03 | End: 2021-02-03

## 2021-02-03 RX ORDER — ACETAMINOPHEN 325 MG/1
650 TABLET ORAL ONCE
Status: CANCELLED | OUTPATIENT
Start: 2021-02-03

## 2021-02-03 RX ADMIN — SODIUM CHLORIDE 20 ML/HR: 0.9 INJECTION, SOLUTION INTRAVENOUS at 08:34

## 2021-02-03 RX ADMIN — ACETAMINOPHEN 650 MG: 325 TABLET, FILM COATED ORAL at 08:35

## 2021-02-03 RX ADMIN — RITUXIMAB 1000 MG: 10 INJECTION, SOLUTION INTRAVENOUS at 09:23

## 2021-02-03 RX ADMIN — DIPHENHYDRAMINE HYDROCHLORIDE 25 MG: 25 TABLET ORAL at 08:35

## 2021-02-03 NOTE — PLAN OF CARE
Problem: Potential for Falls  Goal: Patient will remain free of falls  Description: INTERVENTIONS:  - Assess patient frequently for physical needs  -  Identify cognitive and physical deficits and behaviors that affect risk of falls    -  [place] fall precautions as indicated by assessment   - Educate patient/family on patient safety including physical limitations  - Instruct patient to call for assistance with activity based on assessment  - Modify environment to reduce risk of injury  - Consider OT/PT consult to assist with strengthening/mobility  Outcome: Progressing

## 2021-02-04 ENCOUNTER — TELEPHONE (OUTPATIENT)
Dept: NEPHROLOGY | Facility: CLINIC | Age: 56
End: 2021-02-04

## 2021-02-04 DIAGNOSIS — N17.9 ACUTE KIDNEY INJURY SUPERIMPOSED ON CHRONIC KIDNEY DISEASE (HCC): ICD-10-CM

## 2021-02-04 DIAGNOSIS — N18.9 ACUTE KIDNEY INJURY SUPERIMPOSED ON CHRONIC KIDNEY DISEASE (HCC): ICD-10-CM

## 2021-02-04 RX ORDER — SEVELAMER HYDROCHLORIDE 800 MG/1
1600 TABLET, FILM COATED ORAL
Qty: 180 TABLET | Refills: 2 | Status: SHIPPED | OUTPATIENT
Start: 2021-02-04 | End: 2021-03-30

## 2021-02-04 NOTE — TELEPHONE ENCOUNTER
I called the patient and we spoke over the phone  Recent laboratory data were reviewed  Lab Results   Component Value Date    SODIUM 135 (L) 02/03/2021    K 3 8 02/03/2021     02/03/2021    CO2 22 02/03/2021    BUN 81 (H) 02/03/2021    CREATININE 4 58 (H) 02/03/2021    GLUC 141 (H) 02/03/2021    CALCIUM 9 1 02/03/2021     His last HD was 1/30/21 (Saturday)  Creatinine appears stable so far  He received Rituximab on 2/3/21  Will hold HD on 2/4/21 and 2/6/21  He is waiting for call back from [facility] regarding appt for second opinion  Plan:   Recheck CBC and BMP on 2/9/21   Renew Sevelamer

## 2021-02-08 DIAGNOSIS — I10 ESSENTIAL HYPERTENSION: ICD-10-CM

## 2021-02-08 RX ORDER — DOXAZOSIN MESYLATE 4 MG/1
4 TABLET ORAL
Qty: 30 TABLET | Refills: 1 | Status: SHIPPED | OUTPATIENT
Start: 2021-02-08 | End: 2021-04-05 | Stop reason: SDUPTHER

## 2021-02-09 ENCOUNTER — APPOINTMENT (OUTPATIENT)
Dept: LAB | Facility: CLINIC | Age: 56
End: 2021-02-09
Payer: COMMERCIAL

## 2021-02-09 ENCOUNTER — TELEPHONE (OUTPATIENT)
Dept: OTHER | Facility: HOSPITAL | Age: 56
End: 2021-02-09

## 2021-02-09 DIAGNOSIS — N17.9 ACUTE KIDNEY INJURY SUPERIMPOSED ON CHRONIC KIDNEY DISEASE (HCC): ICD-10-CM

## 2021-02-09 DIAGNOSIS — N18.9 ACUTE KIDNEY INJURY SUPERIMPOSED ON CHRONIC KIDNEY DISEASE (HCC): ICD-10-CM

## 2021-02-09 LAB
ANION GAP SERPL CALCULATED.3IONS-SCNC: 13 MMOL/L (ref 4–13)
BUN SERPL-MCNC: 130 MG/DL (ref 5–25)
CALCIUM SERPL-MCNC: 8.9 MG/DL (ref 8.3–10.1)
CHLORIDE SERPL-SCNC: 106 MMOL/L (ref 100–108)
CO2 SERPL-SCNC: 21 MMOL/L (ref 21–32)
CREAT SERPL-MCNC: 5.44 MG/DL (ref 0.6–1.3)
ERYTHROCYTE [DISTWIDTH] IN BLOOD BY AUTOMATED COUNT: 15.2 % (ref 11.6–15.1)
GFR SERPL CREATININE-BSD FRML MDRD: 11 ML/MIN/1.73SQ M
GLUCOSE P FAST SERPL-MCNC: 86 MG/DL (ref 65–99)
HCT VFR BLD AUTO: 23.2 % (ref 36.5–49.3)
HGB BLD-MCNC: 7 G/DL (ref 12–17)
MCH RBC QN AUTO: 32.4 PG (ref 26.8–34.3)
MCHC RBC AUTO-ENTMCNC: 30.2 G/DL (ref 31.4–37.4)
MCV RBC AUTO: 107 FL (ref 82–98)
PLATELET # BLD AUTO: 156 THOUSANDS/UL (ref 149–390)
PMV BLD AUTO: 9.5 FL (ref 8.9–12.7)
POTASSIUM SERPL-SCNC: 4.3 MMOL/L (ref 3.5–5.3)
RBC # BLD AUTO: 2.16 MILLION/UL (ref 3.88–5.62)
SODIUM SERPL-SCNC: 140 MMOL/L (ref 136–145)
WBC # BLD AUTO: 4.94 THOUSAND/UL (ref 4.31–10.16)

## 2021-02-09 PROCEDURE — 85027 COMPLETE CBC AUTOMATED: CPT

## 2021-02-09 PROCEDURE — 36415 COLL VENOUS BLD VENIPUNCTURE: CPT

## 2021-02-09 PROCEDURE — 80048 BASIC METABOLIC PNL TOTAL CA: CPT

## 2021-02-09 NOTE — TELEPHONE ENCOUNTER
I called the patient and we spoke over the phone  Recent laboratory data were reviewed  Lab Results   Component Value Date    SODIUM 140 02/09/2021    K 4 3 02/09/2021     02/09/2021    CO2 21 02/09/2021     (H) 02/09/2021    CREATININE 5 44 (H) 02/09/2021    GLUC 141 (H) 02/03/2021    CALCIUM 8 9 02/09/2021     No evidence of renal recovery yet  Patient did not get dialysis x 3 treatments last week as advised  Will plan for restart HD today  Discussed with HD unit  Hgb down to 7 0  I informed him that he is high risk for an JESSEE  Will need PRBC transfusion if Hgb <7 0

## 2021-02-19 ENCOUNTER — OFFICE VISIT (OUTPATIENT)
Dept: CARDIOLOGY CLINIC | Facility: CLINIC | Age: 56
End: 2021-02-19
Payer: COMMERCIAL

## 2021-02-19 VITALS
BODY MASS INDEX: 23.76 KG/M2 | HEIGHT: 69 IN | HEART RATE: 64 BPM | DIASTOLIC BLOOD PRESSURE: 76 MMHG | WEIGHT: 160.4 LBS | SYSTOLIC BLOOD PRESSURE: 132 MMHG | OXYGEN SATURATION: 95 %

## 2021-02-19 DIAGNOSIS — N18.9 ACUTE KIDNEY INJURY SUPERIMPOSED ON CHRONIC KIDNEY DISEASE (HCC): Primary | ICD-10-CM

## 2021-02-19 DIAGNOSIS — D61.818 PANCYTOPENIA (HCC): Chronic | ICD-10-CM

## 2021-02-19 DIAGNOSIS — I50.32 CHRONIC DIASTOLIC CONGESTIVE HEART FAILURE (HCC): Primary | ICD-10-CM

## 2021-02-19 DIAGNOSIS — M32.15 SYSTEMIC LUPUS ERYTHEMATOSUS WITH TUBULO-INTERSTITIAL NEPHROPATHY, UNSPECIFIED SLE TYPE (HCC): ICD-10-CM

## 2021-02-19 DIAGNOSIS — I83.893 VARICOSE VEINS OF BOTH LEGS WITH EDEMA: ICD-10-CM

## 2021-02-19 DIAGNOSIS — N17.9 ACUTE KIDNEY INJURY SUPERIMPOSED ON CHRONIC KIDNEY DISEASE (HCC): Primary | ICD-10-CM

## 2021-02-19 DIAGNOSIS — I10 ESSENTIAL HYPERTENSION: ICD-10-CM

## 2021-02-19 DIAGNOSIS — E78.2 MIXED HYPERLIPIDEMIA: ICD-10-CM

## 2021-02-19 PROCEDURE — 99214 OFFICE O/P EST MOD 30 MIN: CPT | Performed by: INTERNAL MEDICINE

## 2021-02-19 NOTE — PROGRESS NOTES
Cardiology Follow Up    Timm L Schoenffior  1965  [id number]  CARDIOVASC PHYSICIAN  [address] [id number]  [phone]  [phone]    1  Chronic diastolic congestive heart failure ([address] )     2  Essential hypertension     3  Varicose veins of both legs with edema     4  Pancytopenia ([address] )     5  Mixed hyperlipidemia     6  Systemic lupus erythematosus with tubulo-interstitial nephropathy, unspecified SLE type ([facility] [address] )         Discussion/Summary: overall he has been doing well since our visit in the hospital   He has lupus nephritis which has caused worsening renal failure and he is now on dialysis  I agree with the increased dose of torsemide he has some mild lower extremity edema  Blood pressure control has been much better recently  He has chronic diastolic dysfunction and mild-to-moderate mitral regurgitation  Both of these are quite stable  We talked about watching the sodium in his diet fluid restriction  I do not think we need to proceed with stress testing at this time as I would not  meaning left heart catheterization given his severe renal dysfunction  Continue current cardiac medications I will follow up with him in 3-4 months  Interval History:   71-year-old male with grade 1 diastolic dysfunction, mild to moderate mitral regurgitation, hypertension, chronic diastolic congestive heart failure, lupus nephritis, CKD presents for post hospital follow-up  Unfortunately his renal function has not recovered  Functionally he has been doing good  Denies any chest pain, shortness of breath, palpitations, lightheadedness, dizziness, or syncope  Blood pressures have been better controlled recently with the addition of doxazosin  Nephrology has been managing him quite closely  He is going for a 2nd opinion at [facility]    From a heart failure standpoint most of this has been due to fluid retention from the renal dysfunction  He is following a low-sodium diet      Problem List     Varicose veins of both legs with edema    Syncope    Other forms of systemic lupus erythematosus (HCC)    Rheumatoid arthritis involving multiple sites (HCC)    Pancytopenia (HCC) (Chronic)    Chronic kidney disease (CKD), stage I    Lab Results   Component Value Date    EGFR 11 02/09/2021    EGFR 13 02/03/2021    EGFR 12 01/21/2021    CREATININE 5 44 (H) 02/09/2021    CREATININE 4 58 (H) 02/03/2021    CREATININE 4 88 (H) 01/21/2021         Acute kidney injury superimposed on chronic kidney disease [facility])    Lab Results   Component Value Date    EGFR 11 02/09/2021    EGFR 13 02/03/2021    EGFR 12 01/21/2021    CREATININE 5 44 (H) 02/09/2021    CREATININE 4 58 (H) 02/03/2021    CREATININE 4 88 (H) 01/21/2021         Nephrotic range proteinuria    Chronic diastolic congestive heart failure (HCC)    Wt Readings from Last 3 Encounters:   02/19/21 72 8 kg (160 lb 6 4 oz)   02/03/21 71 6 kg (157 lb 13 6 oz)   01/21/21 73 9 kg (163 lb)                 Hemoptysis    HLD (hyperlipidemia)    Elevated troponin    CAROLINE (acute kidney injury) ([facility] [address] )    Essential hypertension    SLE (systemic lupus erythematosus) (HCC)    DVT (deep venous thrombosis) (HCC)    Anemia    Oral thrush        Past Medical History:   Diagnosis Date    CHF (congestive heart failure) (HCC)     Hypertension     Lupus (HCC)     Osteoporosis     Rheumatoid arthritis ([facility] [address] )      Social History     Socioeconomic History    Marital status: /Civil Union     Spouse name: Not on file    Number of children: Not on file    Years of education: Not on file    Highest education level: Not on file   Occupational History    Not on file   Social Needs    Financial resource strain: Not on file    Food insecurity     Worry: Not on file     Inability: Not on file    Transportation needs     Medical: Not on file     Non-medical: Not on file   Tobacco Use    Smoking status: Former Smoker Packs/day: 1 00     Years: 10 00     Pack years: 10 00     Types: Cigarettes     Quit date: 18     Years since quittin 1    Smokeless tobacco: Former User     Types: Chew    Tobacco comment: 25 yrs    Substance and Sexual Activity    Alcohol use: Never     Frequency: Never    Drug use: Never    Sexual activity: Not on file   Lifestyle    Physical activity     Days per week: Not on file     Minutes per session: Not on file    Stress: Not on file   Relationships    Social connections     Talks on phone: Not on file     Gets together: Not on file     Attends Yazdanism service: Not on file     Active member of club or organization: Not on file     Attends meetings of clubs or organizations: Not on file     Relationship status: Not on file    Intimate partner violence     Fear of current or ex partner: Not on file     Emotionally abused: Not on file     Physically abused: Not on file     Forced sexual activity: Not on file   Other Topics Concern    Not on file   Social History Narrative    Not on file      Family History   Problem Relation Age of Onset    Varicose Veins Father      Past Surgical History:   Procedure Laterality Date    CT NEEDLE BIOPSY KIDNEY  11/3/2020    HERNIA REPAIR  199?     IR TUNNELED DIALYSIS CATHETER PLACEMENT  2021    REPLACEMENT TOTAL KNEE Bilateral     right  left 2016    REVISION TOTAL HIP ARTHROPLASTY Bilateral     right 2004 left 2006    TOTAL HIP ARTHROPLASTY Bilateral [id number]/[id number]    Right / left        Current Outpatient Medications:     acetaminophen (TYLENOL) 500 mg tablet, Take 500 mg by mouth every 6 (six) hours as needed for mild pain, Disp: , Rfl:     amLODIPine (NORVASC) 10 mg tablet, Take 1 tablet (10 mg total) by mouth daily, Disp: 90 tablet, Rfl: 1    apixaban (ELIQUIS) 2 5 mg, Take 1 tablet (2 5 mg total) by mouth 2 (two) times a day, Disp: 60 tablet, Rfl: 1    atorvastatin (LIPITOR) 40 mg tablet, Take 1 tablet (40 mg total) by mouth daily, Disp: 30 tablet, Rfl: 3    calcium-vitamin D (OSCAL) 250-125 MG-UNIT per tablet, Take 1 tablet by mouth 2 (two) times a day, Disp: , Rfl: 0    clindamycin (CLEOCIN) 300 MG capsule, Take 600 mg by mouth 1 hour prior to Dental appointment, Disp: , Rfl:     dapsone 100 mg tablet, Take 1 tablet (100 mg total) by mouth daily, Disp: 90 tablet, Rfl: 1    doxazosin (CARDURA) 4 mg tablet, Take 1 tablet (4 mg total) by mouth daily at bedtime, Disp: 30 tablet, Rfl: 1    Elastic Bandages & Supports (MEDICAL COMPRESSION STOCKINGS) MISC, by Does not apply route daily Knee High 20-30mmHg, Disp: 2 each, Rfl: 0    metoprolol succinate (TOPROL-XL) 100 mg 24 hr tablet, Take 1 tablet (100 mg total) by mouth daily, Disp: 90 tablet, Rfl: 1    Multiple Vitamins-Minerals (CENTRUM SILVER) tablet, Take 1 tablet by mouth daily , Disp: , Rfl:     nystatin (MYCOSTATIN) 500,000 units/5 mL suspension, Swish and swallow 5 mL (500,000 Units total) 2 (two) times a day, Disp: 300 mL, Rfl: 1    pantoprazole (PROTONIX) 40 mg tablet, Take 1 tablet (40 mg total) by mouth daily, Disp: 90 tablet, Rfl: 1    predniSONE 20 mg tablet, Take 2 tablets (40 mg total) by mouth daily (Patient taking differently: Take 40 mg by mouth daily ), Disp: 60 tablet, Rfl: 1    sevelamer (RENAGEL) 800 mg tablet, Take 2 tablets (1,600 mg total) by mouth 3 (three) times a day with meals (Patient taking differently: Take 1,600 mg by mouth 3 (three) times a day with meals ), Disp: 180 tablet, Rfl: 2    torsemide (DEMADEX) 20 mg tablet, Take 2 tablets (40 mg total) by mouth daily (Patient taking differently: Take 40 mg by mouth daily ), Disp: 180 tablet, Rfl: 1  No Known Allergies    Labs:     Chemistry        Component Value Date/Time    K 4 3 02/09/2021 0622    K 4 7 07/21/2020 1217     02/09/2021 0622     07/21/2020 1217    CO2 21 02/09/2021 0622    CO2 27 07/21/2020 1217     (H) 02/09/2021 0622    BUN 15 07/21/2020 1217 CREATININE 5 44 (H) 02/09/2021 0622        Component Value Date/Time    CALCIUM 8 9 02/09/2021 0622    CALCIUM 8 8 07/21/2020 1217    ALKPHOS 96 02/03/2021 0821    ALKPHOS 103 07/21/2020 1217    AST 16 02/03/2021 0821    AST 32 07/21/2020 1217    ALT 41 02/03/2021 0821    ALT 22 07/21/2020 1217            No results found for: CHOL  No results found for: HDL  No results found for: LDLCALC  No results found for: TRIG  No results found for: CHOLHDL    Imaging: No results found  ECG:        ROS    Vitals:    02/19/21 1534   BP: 132/76   Pulse: 64   SpO2: 95%     Vitals:    02/19/21 1534   Weight: 72 8 kg (160 lb 6 4 oz)     Height: 5' 9" (175 3 cm)   Body mass index is 23 69 kg/m²  Physical Exam:  Vital signs reviewed  General:  Alert and cooperative, appears stated age, no acute distress  HEENT:  PERRLA, EOMI, no scleral icterus, no conjunctival pallor  Neck:  No lymphadenopathy, no thyromegaly, no carotid bruits, no elevated JVP  Heart:  Regular rate and rhythm, normal S1/S2, no S3/S4, no murmur, rubs or gallops  PMI nondisplaced  Lungs:  Clear to auscultation bilaterally, no wheezes rales or rhonchi  Abdomen:  Soft, non-tender, positive bowel sounds, no rebound or guarding,   no organomegaly   Extremities:  Normal range of motion    No clubbing, cyanosis or edema   Vascular:  2+ pedal pulses  Skin:  No rashes or lesions on exposed skin  Neurologic:  Cranial nerves II-XII grossly intact without focal deficits

## 2021-02-22 LAB
MYCOBACTERIUM SPEC CULT: NORMAL
RHODAMINE-AURAMINE STN SPEC: NORMAL

## 2021-02-23 ENCOUNTER — TRANSCRIBE ORDERS (OUTPATIENT)
Dept: LAB | Facility: CLINIC | Age: 56
End: 2021-02-23

## 2021-02-23 ENCOUNTER — LAB (OUTPATIENT)
Dept: LAB | Facility: CLINIC | Age: 56
End: 2021-02-23
Payer: COMMERCIAL

## 2021-02-23 ENCOUNTER — TELEPHONE (OUTPATIENT)
Dept: NEPHROLOGY | Facility: CLINIC | Age: 56
End: 2021-02-23

## 2021-02-23 DIAGNOSIS — N18.9 ACUTE KIDNEY INJURY SUPERIMPOSED ON CHRONIC KIDNEY DISEASE (HCC): ICD-10-CM

## 2021-02-23 DIAGNOSIS — N17.9 ACUTE KIDNEY INJURY SUPERIMPOSED ON CHRONIC KIDNEY DISEASE (HCC): ICD-10-CM

## 2021-02-23 DIAGNOSIS — M32.14 LUPUS NEPHRITIS (HCC): ICD-10-CM

## 2021-02-23 LAB
ALBUMIN SERPL BCP-MCNC: 3.5 G/DL (ref 3.5–5)
ALP SERPL-CCNC: 72 U/L (ref 46–116)
ALT SERPL W P-5'-P-CCNC: 35 U/L (ref 12–78)
ANION GAP SERPL CALCULATED.3IONS-SCNC: 11 MMOL/L (ref 4–13)
AST SERPL W P-5'-P-CCNC: 25 U/L (ref 5–45)
BACTERIA UR QL AUTO: ABNORMAL /HPF
BILIRUB SERPL-MCNC: 0.98 MG/DL (ref 0.2–1)
BILIRUB UR QL STRIP: NEGATIVE
BUN SERPL-MCNC: 78 MG/DL (ref 5–25)
C3 SERPL-MCNC: 91.9 MG/DL (ref 90–180)
C4 SERPL-MCNC: 25 MG/DL (ref 10–40)
CALCIUM SERPL-MCNC: 9 MG/DL (ref 8.3–10.1)
CHLORIDE SERPL-SCNC: 103 MMOL/L (ref 100–108)
CLARITY UR: CLEAR
CO2 SERPL-SCNC: 23 MMOL/L (ref 21–32)
COLOR UR: YELLOW
CREAT SERPL-MCNC: 4.21 MG/DL (ref 0.6–1.3)
CREAT UR-MCNC: 62 MG/DL
ERYTHROCYTE [DISTWIDTH] IN BLOOD BY AUTOMATED COUNT: 16.2 % (ref 11.6–15.1)
GFR SERPL CREATININE-BSD FRML MDRD: 15 ML/MIN/1.73SQ M
GLUCOSE P FAST SERPL-MCNC: 81 MG/DL (ref 65–99)
GLUCOSE UR STRIP-MCNC: NEGATIVE MG/DL
HCT VFR BLD AUTO: 22.3 % (ref 36.5–49.3)
HGB BLD-MCNC: 6.8 G/DL (ref 12–17)
HGB UR QL STRIP.AUTO: ABNORMAL
INR PPP: 1.1 (ref 0.84–1.19)
KETONES UR STRIP-MCNC: NEGATIVE MG/DL
LEUKOCYTE ESTERASE UR QL STRIP: NEGATIVE
MAGNESIUM SERPL-MCNC: 2.1 MG/DL (ref 1.6–2.6)
MCH RBC QN AUTO: 33 PG (ref 26.8–34.3)
MCHC RBC AUTO-ENTMCNC: 30.5 G/DL (ref 31.4–37.4)
MCV RBC AUTO: 108 FL (ref 82–98)
NITRITE UR QL STRIP: NEGATIVE
NON-SQ EPI CELLS URNS QL MICRO: ABNORMAL /HPF
PH UR STRIP.AUTO: 5.5 [PH]
PHOSPHATE SERPL-MCNC: 4.1 MG/DL (ref 2.7–4.5)
PLATELET # BLD AUTO: 172 THOUSANDS/UL (ref 149–390)
PMV BLD AUTO: 9.8 FL (ref 8.9–12.7)
POTASSIUM SERPL-SCNC: 3.9 MMOL/L (ref 3.5–5.3)
PROT SERPL-MCNC: 6.1 G/DL (ref 6.4–8.2)
PROT UR STRIP-MCNC: ABNORMAL MG/DL
PROT UR-MCNC: 91 MG/DL
PROT/CREAT UR: 1.47 MG/G{CREAT} (ref 0–0.1)
PROTHROMBIN TIME: 14.4 SECONDS (ref 11.6–14.5)
RBC # BLD AUTO: 2.06 MILLION/UL (ref 3.88–5.62)
RBC #/AREA URNS AUTO: ABNORMAL /HPF
SODIUM SERPL-SCNC: 137 MMOL/L (ref 136–145)
SP GR UR STRIP.AUTO: 1.01 (ref 1–1.03)
UROBILINOGEN UR QL STRIP.AUTO: 0.2 E.U./DL
WBC # BLD AUTO: 3.17 THOUSAND/UL (ref 4.31–10.16)
WBC #/AREA URNS AUTO: ABNORMAL /HPF

## 2021-02-23 PROCEDURE — 82570 ASSAY OF URINE CREATININE: CPT

## 2021-02-23 PROCEDURE — 36415 COLL VENOUS BLD VENIPUNCTURE: CPT

## 2021-02-23 PROCEDURE — 86225 DNA ANTIBODY NATIVE: CPT

## 2021-02-23 PROCEDURE — 83735 ASSAY OF MAGNESIUM: CPT

## 2021-02-23 PROCEDURE — 84156 ASSAY OF PROTEIN URINE: CPT

## 2021-02-23 PROCEDURE — 84100 ASSAY OF PHOSPHORUS: CPT

## 2021-02-23 PROCEDURE — 81001 URINALYSIS AUTO W/SCOPE: CPT

## 2021-02-23 PROCEDURE — 85027 COMPLETE CBC AUTOMATED: CPT

## 2021-02-23 PROCEDURE — 86038 ANTINUCLEAR ANTIBODIES: CPT

## 2021-02-23 PROCEDURE — 85610 PROTHROMBIN TIME: CPT

## 2021-02-23 PROCEDURE — 80053 COMPREHEN METABOLIC PANEL: CPT

## 2021-02-23 PROCEDURE — 86160 COMPLEMENT ANTIGEN: CPT

## 2021-02-23 NOTE — TELEPHONE ENCOUNTER
Lab called with abnormal Hg - 6 8 from this am  Message was sent to Dr Mandy Reis via [id number]

## 2021-02-24 LAB
DSDNA AB SER-ACNC: 39 IU/ML (ref 0–9)
RYE IGE QN: NEGATIVE

## 2021-02-26 ENCOUNTER — OFFICE VISIT (OUTPATIENT)
Dept: NEPHROLOGY | Facility: CLINIC | Age: 56
End: 2021-02-26
Payer: COMMERCIAL

## 2021-02-26 VITALS
DIASTOLIC BLOOD PRESSURE: 78 MMHG | BODY MASS INDEX: 23.55 KG/M2 | WEIGHT: 159 LBS | HEIGHT: 69 IN | SYSTOLIC BLOOD PRESSURE: 128 MMHG

## 2021-02-26 DIAGNOSIS — N18.1 CHRONIC KIDNEY DISEASE (CKD), STAGE I: ICD-10-CM

## 2021-02-26 DIAGNOSIS — N17.9 ACUTE KIDNEY INJURY SUPERIMPOSED ON CHRONIC KIDNEY DISEASE (HCC): ICD-10-CM

## 2021-02-26 DIAGNOSIS — M32.15 SYSTEMIC LUPUS ERYTHEMATOSUS WITH TUBULO-INTERSTITIAL NEPHROPATHY, UNSPECIFIED SLE TYPE (HCC): ICD-10-CM

## 2021-02-26 DIAGNOSIS — N18.9 ACUTE KIDNEY INJURY SUPERIMPOSED ON CHRONIC KIDNEY DISEASE (HCC): ICD-10-CM

## 2021-02-26 DIAGNOSIS — I10 ESSENTIAL HYPERTENSION: ICD-10-CM

## 2021-02-26 DIAGNOSIS — I82.569 CHRONIC DEEP VEIN THROMBOSIS (DVT) OF CALF MUSCLE VEIN, UNSPECIFIED LATERALITY (HCC): ICD-10-CM

## 2021-02-26 DIAGNOSIS — D63.1 ANEMIA OF CHRONIC KIDNEY FAILURE, UNSPECIFIED STAGE: ICD-10-CM

## 2021-02-26 DIAGNOSIS — N18.9 ANEMIA OF CHRONIC KIDNEY FAILURE, UNSPECIFIED STAGE: ICD-10-CM

## 2021-02-26 DIAGNOSIS — N17.9 AKI (ACUTE KIDNEY INJURY) (HCC): Primary | ICD-10-CM

## 2021-02-26 PROCEDURE — 99214 OFFICE O/P EST MOD 30 MIN: CPT | Performed by: INTERNAL MEDICINE

## 2021-02-26 NOTE — PATIENT INSTRUCTIONS
Please increase Eliquis to 5 mg twice a day  Check ultrasound of legs  I will touch base with Dr Gavi Martinez once your visit with him is complete  Follow up in 4-6 weeks

## 2021-02-26 NOTE — LETTER
February 26, 2021     Genoveva Sauer, DO  [address] [facility], [facility]  [address]  [address] [place] [id number]    Patient: Jocelynn Light   YOB: 1965   Date of Visit: 2/26/2021       Dear Dr Daniel Lu:    Thank you for referring Marylene Kenner to me for evaluation  Below are my notes for this consultation  If you have questions, please do not hesitate to call me  I look forward to following your patient along with you  Sincerely,        Kimberlee Khan MD        CC: MD Manjinder Badillo DO Jamel Bowl, MD Fern Fluke, MD  2/26/2021  5:09 PM  Sign when Signing Visit  NEPHROLOGY OFFICE PROGRESS NOTE   Jocelynn Light 55 y o  male MRN: [id number]  DATE: 02/26/21  Reason for visit: Continued evaluation and management of proteinuria  ASSESSMENT & PLAN:  1  Acute kidney injury (HD dependent):  · HD dependent since 1/8/21  · No convincing biochemical evidence of renal recovery - Pre HD creatinines have hovered in the mid 4s to low 5s  · Continue with HD 3 times a week  · Etiology felt to be due to lupus nephritis class IV + unclear role of vasculitis? + ATN? · Access: R IJ permcath  · We discussed potentially going on peritoneal dialysis if he remains HD dependent  2  Class IV lupus nephritis:  · Failed treatment with Mycophenolate  · Got Cytoxan on 12/18/20 and 12/31/20 but changed to Rituxan in Jan 2021 due to ongoing pulmonary vasculitis ([address]?)  · Received Rituxan 1 gm on 1/19/21 and 2/3/21  · Complements are now normal    · Anti DS DNA is down to 39 from 216 in July 2020  · UPC ratio is down to 1 47    · On Prednisone 30 mg daily per Dr Mini Resendez  · Will await 2nd opinion from Dr Marilee Chávez  3  Proteinuria:  · Shilo's baseline UPC ratio is around 0 2 to 0 3  · UPC ratio was up to 5 48 on 11/25/20  · Now down to 1 47    · No ACEi or ARB due to CAROLINE  4  Chronic kidney disease, stage I:  · Due to lupus nephritis     · Baseline creatinine was 0 6 to 0 7 in middle of 2020  · Was 1 3 to 1 7 in Nov 2020      5  Hypertension:  · BP is at goal    · Current regimen:  Amlodipine 10 mg daily, Torsemide 60 mg daily, Metoprolol succinate 100 mg daily, Doxazosin 4 mg qHS  · No changes  6  Systemic lupus erythematosus  · Follow up with Dr Sharon Barclay  7  Anemia:  · Hgb 6 8 on 2/23/21  · We held off on JESSEE due to L leg DVT  · Given his age and dialysis dependence, I would recommend trying to hold off on PRBC transfusion to avoid allosensitization  · We agreed to start low dose Epogen on dialysis with lower Hgb targets  8  L leg DVT:  · On anticoagulation with Eliquis at 2 5 mg BID  · Lower dose was given due to epistaxis  · Ideal dose is 5 mg BID - I recommend that he increase this given that we are starting Epogen  · Recheck leg dopplers  9  Prophylaxis:  · On Dapsone for PCP prophylaxis  · On Protonix for PUD prophylaxis  · On Ca with Vit D for osteoporosis prophylaxis  10  Mineral and bone disease  · Hyperphosphatemia - Continue Sevelamer 800 mg TID with meals  · Continue Vitamin D 1000 units daily  Patient Instructions   Please increase Eliquis to 5 mg twice a day  Check ultrasound of legs  I will touch base with Dr Ken Webster once your visit with him is complete  Follow up in 4-6 weeks  SUBJECTIVE / INTERVAL HISTORY:  Sherita Foster was last seen by me in the office back in January 2021  Since then, he received his 2nd dose of Rituxan on Feb 3, 2021  He saw Dr Sharon Barclay and is down to Prednisone 30 mg daily  He has an appointment to see Dr Ken Webster at [facility] next week  Unfortunately, he has remained dialysis dependent  He has left leg swelling  We recently increased his Torsemide to 60 mg daily to augment his UO  Home BP is in the 130s/70s  His EDW is 72 kg and he was 71 5 kg after HD yesterday  No recent hemoptysis  His Hgb was down to 6 8 and we agreed to start a small dose of Epogen       Treatment to date:  July to December 2020: Mycophenolate  Initially could not tolerate 1500 mg BID  December 18, 2020: Cytoxan 500 mg IV  December 31, 2020: Cytoxan 500 mg IV  January 19, 2021: Rituxan 1000 mg IV  February 3, 2021: Rituxan 1000 mg IV    PMH/PSH: HTN, SLE, HLP, lymphadenopathy, BPH, RA, bilateral hip replacements, bilateral knee replacements  Previous work up:   Renal biopsy (November 3 2020)  1  Diffuse proliferative glomerulonephritis, consistent with lupus nephritis class 4 (mild activity, mild chronicity)  2  Lupus vasculopathy, severe, with ischemic glomerular changes and zonal tubulointerstitial scarring  3  Tubular atrophy, interstitial fibrosis, and interstitial inflammation, mild  4  Arteriosclerosis and arteriolosclerosis, mild to moderate  ALLERGIES: No Known Allergies    REVIEW OF SYSTEMS:  Review of Systems   Constitutional: Negative for appetite change, chills, fatigue and fever  Respiratory: Negative for cough and shortness of breath  Cardiovascular: Positive for leg swelling  Negative for chest pain  Gastrointestinal: Negative for abdominal pain, diarrhea, nausea and vomiting  Genitourinary: Negative for hematuria  Musculoskeletal: Negative for arthralgias  Allergic/Immunologic: Positive for immunocompromised state  Neurological: Negative for dizziness and light-headedness  OBJECTIVE:  /78   Ht 5' 9" (1 753 m)   Wt 72 1 kg (159 lb)   BMI 23 48 kg/m²   Current Weight: Weight - Scale: 72 1 kg (159 lb) Body mass index is 23 48 kg/m²  Physical Exam  Constitutional:       General: He is not in acute distress  Appearance: Normal appearance  He is well-developed and normal weight  He is not ill-appearing  HENT:      Head: Normocephalic and atraumatic  Eyes:      General: No scleral icterus  Conjunctiva/sclera: Conjunctivae normal    Neck:      Musculoskeletal: Neck supple  Vascular: No JVD  Cardiovascular:      Rate and Rhythm: Normal rate and regular rhythm  Heart sounds: Normal heart sounds  Pulmonary:      Effort: Pulmonary effort is normal  No respiratory distress  Breath sounds: Normal breath sounds  Abdominal:      General: Bowel sounds are normal       Palpations: Abdomen is soft  Musculoskeletal:      Right lower leg: No edema  Left lower leg: Edema present  Skin:     General: Skin is warm and dry  Neurological:      Mental Status: He is alert and oriented to person, place, and time     Psychiatric:         Behavior: Behavior normal        Medications:  Current Outpatient Medications:     acetaminophen (TYLENOL) 500 mg tablet, Take 500 mg by mouth every 6 (six) hours as needed for mild pain, Disp: , Rfl:     amLODIPine (NORVASC) 10 mg tablet, Take 1 tablet (10 mg total) by mouth daily, Disp: 90 tablet, Rfl: 1    apixaban (ELIQUIS) 5 mg, Take 1 tablet (5 mg total) by mouth 2 (two) times a day, Disp: 1 tablet, Rfl: 1    atorvastatin (LIPITOR) 40 mg tablet, Take 1 tablet (40 mg total) by mouth daily, Disp: 30 tablet, Rfl: 3    calcium-vitamin D (OSCAL) 250-125 MG-UNIT per tablet, Take 1 tablet by mouth 2 (two) times a day, Disp: , Rfl: 0    clindamycin (CLEOCIN) 300 MG capsule, Take 600 mg by mouth 1 hour prior to Dental appointment, Disp: , Rfl:     dapsone 100 mg tablet, Take 1 tablet (100 mg total) by mouth daily, Disp: 90 tablet, Rfl: 1    doxazosin (CARDURA) 4 mg tablet, Take 1 tablet (4 mg total) by mouth daily at bedtime, Disp: 30 tablet, Rfl: 1    metoprolol succinate (TOPROL-XL) 100 mg 24 hr tablet, Take 1 tablet (100 mg total) by mouth daily, Disp: 90 tablet, Rfl: 1    Multiple Vitamins-Minerals (CENTRUM SILVER) tablet, Take 1 tablet by mouth daily , Disp: , Rfl:     nystatin (MYCOSTATIN) 500,000 units/5 mL suspension, Swish and swallow 5 mL (500,000 Units total) 2 (two) times a day, Disp: 300 mL, Rfl: 1    pantoprazole (PROTONIX) 40 mg tablet, Take 1 tablet (40 mg total) by mouth daily, Disp: 90 tablet, Rfl: 1   predniSONE 20 mg tablet, Take 2 tablets (40 mg total) by mouth daily (Patient taking differently: Take 40 mg by mouth daily ), Disp: 60 tablet, Rfl: 1    sevelamer (RENAGEL) 800 mg tablet, Take 2 tablets (1,600 mg total) by mouth 3 (three) times a day with meals (Patient taking differently: Take 1,600 mg by mouth 3 (three) times a day with meals ), Disp: 180 tablet, Rfl: 2    torsemide (DEMADEX) 20 mg tablet, Take 2 tablets (40 mg total) by mouth daily (Patient taking differently: Take 40 mg by mouth daily ), Disp: 180 tablet, Rfl: 1    Elastic Bandages & Supports (MEDICAL COMPRESSION STOCKINGS) MISC, by Does not apply route daily Knee High 20-30mmHg, Disp: 2 each, Rfl: 0    Laboratory Results:  Results for orders placed or performed in visit on 02/23/21   CBC   Result Value Ref Range    WBC 3 17 (L) 4 31 - 10 16 Thousand/uL    RBC 2 06 (L) 3 88 - 5 62 Million/uL    Hemoglobin 6 8 (LL) 12 0 - 17 0 g/dL    Hematocrit 22 3 (L) 36 5 - 49 3 %     (H) 82 - 98 fL    MCH 33 0 26 8 - 34 3 pg    MCHC 30 5 (L) 31 4 - 37 4 g/dL    RDW 16 2 (H) 11 6 - 15 1 %    Platelets 116 381 - 407 Thousands/uL    MPV 9 8 8 9 - 12 7 fL   Comprehensive metabolic panel   Result Value Ref Range    Sodium 137 136 - 145 mmol/L    Potassium 3 9 3 5 - 5 3 mmol/L    Chloride 103 100 - 108 mmol/L    CO2 23 21 - 32 mmol/L    ANION GAP 11 4 - 13 mmol/L    BUN 78 (H) 5 - 25 mg/dL    Creatinine 4 21 (H) 0 60 - 1 30 mg/dL    Glucose, Fasting 81 65 - 99 mg/dL    Calcium 9 0 8 3 - 10 1 mg/dL    AST 25 5 - 45 U/L    ALT 35 12 - 78 U/L    Alkaline Phosphatase 72 46 - 116 U/L    Total Protein 6 1 (L) 6 4 - 8 2 g/dL    Albumin 3 5 3 5 - 5 0 g/dL    Total Bilirubin 0 98 0 20 - 1 00 mg/dL    eGFR 15 ml/min/1 73sq m   Magnesium   Result Value Ref Range    Magnesium 2 1 1 6 - 2 6 mg/dL   Phosphorus   Result Value Ref Range    Phosphorus 4 1 2 7 - 4 5 mg/dL   Urinalysis with microscopic   Result Value Ref Range    Clarity, UA Clear     Color, UA Yellow Specific [place], UA 1 015 1 003 - 1 030    pH, UA 5 5 4 5, 5 0, 5 5, 6 0, 6 5, 7 0, 7 5, 8 0    Glucose, UA Negative Negative mg/dl    Ketones, UA Negative Negative mg/dl    Blood, UA Small (A) Negative    Protein,  (2+) (A) Negative mg/dl    Nitrite, UA Negative Negative    Bilirubin, UA Negative Negative    Urobilinogen, UA 0 2 0 2, 1 0 E U /dl E U /dl    Leukocytes, UA Negative Negative    WBC, UA 0-1 (A) None Seen, 2-4 /hpf    RBC, UA 0-1 (A) None Seen, 2-4 /hpf    Bacteria, UA Occasional None Seen, Occasional /hpf    Epithelial Cells Occasional None Seen, Occasional /hpf   Protein / creatinine ratio, urine   Result Value Ref Range    Creatinine, Ur 62 0 mg/dL    Protein Urine Random 91 mg/dL    Prot/Creat Ratio, Ur 1 47 (H) 0 00 - 0 10   Anti-DNA antibody, double-stranded   Result Value Ref Range    ds DNA Ab 39 (H) 0 - 9 IU/mL   ANDRE Screen w/ Reflex to Titer/Pattern   Result Value Ref Range    ANDRE Negative Negative   C4 complement   Result Value Ref Range    C4, COMPLEMENT 25 0 10 0 - 40 0 mg/dL   C3 complement   Result Value Ref Range    C3 Complement 91 9 90 0 - 180 0 mg/dL   Protime-INR   Result Value Ref Range    Protime 14 4 11 6 - 14 5 seconds    INR 1 10 0 84 - 1 19

## 2021-02-26 NOTE — PROGRESS NOTES
NEPHROLOGY OFFICE PROGRESS NOTE   Misty Payer [place]en[place] 55 y o  male MRN: [id number]  DATE: 02/26/21  Reason for visit: Continued evaluation and management of proteinuria  ASSESSMENT & PLAN:  1  Acute kidney injury (HD dependent):  · HD dependent since 1/8/21  · No convincing biochemical evidence of renal recovery - Pre HD creatinines have hovered in the mid 4s to low 5s  · Continue with HD 3 times a week  · Etiology felt to be due to lupus nephritis class IV + unclear role of vasculitis? + ATN? · Access: R IJ permcath  · We discussed potentially going on peritoneal dialysis if he remains HD dependent  2  Class IV lupus nephritis:  · Failed treatment with Mycophenolate  · Got Cytoxan on 12/18/20 and 12/31/20 but changed to Rituxan in Jan 2021 due to ongoing pulmonary vasculitis ([address]?)  · Received Rituxan 1 gm on 1/19/21 and 2/3/21  · Complements are now normal    · Anti DS DNA is down to 39 from 216 in July 2020  · UPC ratio is down to 1 47    · On Prednisone 30 mg daily per Dr Cristofer Luis  · Will await 2nd opinion from Dr Natan Benz  3  Proteinuria:  · Shilo's baseline UPC ratio is around 0 2 to 0 3  · UPC ratio was up to 5 48 on 11/25/20  · Now down to 1 47    · No ACEi or ARB due to CAROLINE  4  Chronic kidney disease, stage I:  · Due to lupus nephritis  · Baseline creatinine was 0 6 to 0 7 in middle of 2020  · Was 1 3 to 1 7 in Nov 2020      5  Hypertension:  · BP is at goal    · Current regimen:  Amlodipine 10 mg daily, Torsemide 60 mg daily, Metoprolol succinate 100 mg daily, Doxazosin 4 mg qHS  · No changes  6  Systemic lupus erythematosus  · Follow up with Dr Cristofer Luis  7  Anemia:  · Hgb 6 8 on 2/23/21  · We held off on JESSEE due to L leg DVT  · Given his age and dialysis dependence, I would recommend trying to hold off on PRBC transfusion to avoid allosensitization  · We agreed to start low dose Epogen on dialysis with lower Hgb targets       8  L leg DVT:  · On anticoagulation with Eliquis at 2 5 mg BID  · Lower dose was given due to epistaxis  · Ideal dose is 5 mg BID - I recommend that he increase this given that we are starting Epogen  · Recheck leg dopplers  9  Prophylaxis:  · On Dapsone for PCP prophylaxis  · On Protonix for PUD prophylaxis  · On Ca with Vit D for osteoporosis prophylaxis  10  Mineral and bone disease  · Hyperphosphatemia - Continue Sevelamer 800 mg TID with meals  · Continue Vitamin D 1000 units daily  Patient Instructions   Please increase Eliquis to 5 mg twice a day  Check ultrasound of legs  I will touch base with Dr Dinh Fernandez once your visit with him is complete  Follow up in 4-6 weeks  SUBJECTIVE / INTERVAL HISTORY:  Carrol Mccray was last seen by me in the office back in January 2021  Since then, he received his 2nd dose of Rituxan on Feb 3, 2021  He saw Dr Taj Hernandez and is down to Prednisone 30 mg daily  He has an appointment to see Dr Dinh Fernandez at [facility] next week  Unfortunately, he has remained dialysis dependent  He has left leg swelling  We recently increased his Torsemide to 60 mg daily to augment his UO  Home BP is in the 130s/70s  His EDW is 72 kg and he was 71 5 kg after HD yesterday  No recent hemoptysis  His Hgb was down to 6 8 and we agreed to start a small dose of Epogen  Treatment to date:  July to December 2020: Mycophenolate  Initially could not tolerate 1500 mg BID  December 18, 2020: Cytoxan 500 mg IV  December 31, 2020: Cytoxan 500 mg IV  January 19, 2021: Rituxan 1000 mg IV  February 3, 2021: Rituxan 1000 mg IV    PMH/PSH: HTN, SLE, HLP, lymphadenopathy, BPH, RA, bilateral hip replacements, bilateral knee replacements  Previous work up:   Renal biopsy (November 3 2020)  1  Diffuse proliferative glomerulonephritis, consistent with lupus nephritis class 4 (mild activity, mild chronicity)    2  Lupus vasculopathy, severe, with ischemic glomerular changes and zonal tubulointerstitial scarring  3  Tubular atrophy, interstitial fibrosis, and interstitial inflammation, mild  4  Arteriosclerosis and arteriolosclerosis, mild to moderate  ALLERGIES: No Known Allergies    REVIEW OF SYSTEMS:  Review of Systems   Constitutional: Negative for appetite change, chills, fatigue and fever  Respiratory: Negative for cough and shortness of breath  Cardiovascular: Positive for leg swelling  Negative for chest pain  Gastrointestinal: Negative for abdominal pain, diarrhea, nausea and vomiting  Genitourinary: Negative for hematuria  Musculoskeletal: Negative for arthralgias  Allergic/Immunologic: Positive for immunocompromised state  Neurological: Negative for dizziness and light-headedness  OBJECTIVE:  /78   Ht 5' 9" (1 753 m)   Wt 72 1 kg (159 lb)   BMI 23 48 kg/m²   Current Weight: Weight - Scale: 72 1 kg (159 lb) Body mass index is 23 48 kg/m²  Physical Exam  Constitutional:       General: He is not in acute distress  Appearance: Normal appearance  He is well-developed and normal weight  He is not ill-appearing  HENT:      Head: Normocephalic and atraumatic  Eyes:      General: No scleral icterus  Conjunctiva/sclera: Conjunctivae normal    Neck:      Musculoskeletal: Neck supple  Vascular: No JVD  Cardiovascular:      Rate and Rhythm: Normal rate and regular rhythm  Heart sounds: Normal heart sounds  Pulmonary:      Effort: Pulmonary effort is normal  No respiratory distress  Breath sounds: Normal breath sounds  Abdominal:      General: Bowel sounds are normal       Palpations: Abdomen is soft  Musculoskeletal:      Right lower leg: No edema  Left lower leg: Edema present  Skin:     General: Skin is warm and dry  Neurological:      Mental Status: He is alert and oriented to person, place, and time     Psychiatric:         Behavior: Behavior normal        Medications:  Current Outpatient Medications:     acetaminophen (TYLENOL) 500 mg tablet, Take 500 mg by mouth every 6 (six) hours as needed for mild pain, Disp: , Rfl:     amLODIPine (NORVASC) 10 mg tablet, Take 1 tablet (10 mg total) by mouth daily, Disp: 90 tablet, Rfl: 1    apixaban (ELIQUIS) 5 mg, Take 1 tablet (5 mg total) by mouth 2 (two) times a day, Disp: 1 tablet, Rfl: 1    atorvastatin (LIPITOR) 40 mg tablet, Take 1 tablet (40 mg total) by mouth daily, Disp: 30 tablet, Rfl: 3    calcium-vitamin D (OSCAL) 250-125 MG-UNIT per tablet, Take 1 tablet by mouth 2 (two) times a day, Disp: , Rfl: 0    clindamycin (CLEOCIN) 300 MG capsule, Take 600 mg by mouth 1 hour prior to Dental appointment, Disp: , Rfl:     dapsone 100 mg tablet, Take 1 tablet (100 mg total) by mouth daily, Disp: 90 tablet, Rfl: 1    doxazosin (CARDURA) 4 mg tablet, Take 1 tablet (4 mg total) by mouth daily at bedtime, Disp: 30 tablet, Rfl: 1    metoprolol succinate (TOPROL-XL) 100 mg 24 hr tablet, Take 1 tablet (100 mg total) by mouth daily, Disp: 90 tablet, Rfl: 1    Multiple Vitamins-Minerals (CENTRUM SILVER) tablet, Take 1 tablet by mouth daily , Disp: , Rfl:     nystatin (MYCOSTATIN) 500,000 units/5 mL suspension, Swish and swallow 5 mL (500,000 Units total) 2 (two) times a day, Disp: 300 mL, Rfl: 1    pantoprazole (PROTONIX) 40 mg tablet, Take 1 tablet (40 mg total) by mouth daily, Disp: 90 tablet, Rfl: 1    predniSONE 20 mg tablet, Take 2 tablets (40 mg total) by mouth daily (Patient taking differently: Take 40 mg by mouth daily ), Disp: 60 tablet, Rfl: 1    sevelamer (RENAGEL) 800 mg tablet, Take 2 tablets (1,600 mg total) by mouth 3 (three) times a day with meals (Patient taking differently: Take 1,600 mg by mouth 3 (three) times a day with meals ), Disp: 180 tablet, Rfl: 2    torsemide (DEMADEX) 20 mg tablet, Take 2 tablets (40 mg total) by mouth daily (Patient taking differently: Take 40 mg by mouth daily ), Disp: 180 tablet, Rfl: 1    Elastic Bandages & Supports (MEDICAL COMPRESSION STOCKINGS) MISC, by Does not apply route daily Knee High 20-30mmHg, Disp: 2 each, Rfl: 0    Laboratory Results:  Results for orders placed or performed in visit on 02/23/21   CBC   Result Value Ref Range    WBC 3 17 (L) 4 31 - 10 16 Thousand/uL    RBC 2 06 (L) 3 88 - 5 62 Million/uL    Hemoglobin 6 8 (LL) 12 0 - 17 0 g/dL    Hematocrit 22 3 (L) 36 5 - 49 3 %     (H) 82 - 98 fL    MCH 33 0 26 8 - 34 3 pg    MCHC 30 5 (L) 31 4 - 37 4 g/dL    RDW 16 2 (H) 11 6 - 15 1 %    Platelets 326 946 - 813 Thousands/uL    MPV 9 8 8 9 - 12 7 fL   Comprehensive metabolic panel   Result Value Ref Range    Sodium 137 136 - 145 mmol/L    Potassium 3 9 3 5 - 5 3 mmol/L    Chloride 103 100 - 108 mmol/L    CO2 23 21 - 32 mmol/L    ANION GAP 11 4 - 13 mmol/L    BUN 78 (H) 5 - 25 mg/dL    Creatinine 4 21 (H) 0 60 - 1 30 mg/dL    Glucose, Fasting 81 65 - 99 mg/dL    Calcium 9 0 8 3 - 10 1 mg/dL    AST 25 5 - 45 U/L    ALT 35 12 - 78 U/L    Alkaline Phosphatase 72 46 - 116 U/L    Total Protein 6 1 (L) 6 4 - 8 2 g/dL    Albumin 3 5 3 5 - 5 0 g/dL    Total Bilirubin 0 98 0 20 - 1 00 mg/dL    eGFR 15 ml/min/1 73sq m   Magnesium   Result Value Ref Range    Magnesium 2 1 1 6 - 2 6 mg/dL   Phosphorus   Result Value Ref Range    Phosphorus 4 1 2 7 - 4 5 mg/dL   Urinalysis with microscopic   Result Value Ref Range    Clarity, UA Clear     Color, UA Yellow     Specific [place], UA 1 015 1 003 - 1 030    pH, UA 5 5 4 5, 5 0, 5 5, 6 0, 6 5, 7 0, 7 5, 8 0    Glucose, UA Negative Negative mg/dl    Ketones, UA Negative Negative mg/dl    Blood, UA Small (A) Negative    Protein,  (2+) (A) Negative mg/dl    Nitrite, UA Negative Negative    Bilirubin, UA Negative Negative    Urobilinogen, UA 0 2 0 2, 1 0 E U /dl E U /dl    Leukocytes, UA Negative Negative    WBC, UA 0-1 (A) None Seen, 2-4 /hpf    RBC, UA 0-1 (A) None Seen, 2-4 /hpf    Bacteria, UA Occasional None Seen, Occasional /hpf    Epithelial Cells Occasional None Seen, Occasional /hpf   Protein / creatinine ratio, urine   Result Value Ref Range    Creatinine, Ur 62 0 mg/dL    Protein Urine Random 91 mg/dL    Prot/Creat Ratio, Ur 1 47 (H) 0 00 - 0 10   Anti-DNA antibody, double-stranded   Result Value Ref Range    ds DNA Ab 39 (H) 0 - 9 IU/mL   ANDRE Screen w/ Reflex to Titer/Pattern   Result Value Ref Range    ANDRE Negative Negative   C4 complement   Result Value Ref Range    C4, COMPLEMENT 25 0 10 0 - 40 0 mg/dL   C3 complement   Result Value Ref Range    C3 Complement 91 9 90 0 - 180 0 mg/dL   Protime-INR   Result Value Ref Range    Protime 14 4 11 6 - 14 5 seconds    INR 1 10 0 84 - 1 19

## 2021-03-04 DIAGNOSIS — E78.2 MIXED HYPERLIPIDEMIA: ICD-10-CM

## 2021-03-04 RX ORDER — ATORVASTATIN CALCIUM 40 MG/1
40 TABLET, FILM COATED ORAL DAILY
Qty: 30 TABLET | Refills: 3 | Status: SHIPPED | OUTPATIENT
Start: 2021-03-04 | End: 2021-06-07

## 2021-03-05 ENCOUNTER — HOSPITAL ENCOUNTER (OUTPATIENT)
Dept: VASCULAR ULTRASOUND | Facility: HOSPITAL | Age: 56
Discharge: HOME | End: 2021-03-05
Payer: COMMERCIAL

## 2021-03-05 DIAGNOSIS — I82.569 CHRONIC DEEP VEIN THROMBOSIS (DVT) OF CALF MUSCLE VEIN, UNSPECIFIED LATERALITY (HCC): ICD-10-CM

## 2021-03-05 PROCEDURE — 93970 EXTREMITY STUDY: CPT | Performed by: SURGERY

## 2021-03-05 PROCEDURE — 93970 EXTREMITY STUDY: CPT

## 2021-03-17 NOTE — PROGRESS NOTES
Clinical Trial Consent Note  Clinical Trial: GE Ultrasound Performance Evaluation study  : Dr Apurva Ty was approached on 12/17/2020 for consent to enroll into the GE Ultrasound Performance Evaluation study  Consent form was reviewed with the patient, patient was given the opportunity to ask questions  All the patient's questions were answered  The patient verbalized understanding of the study, agreed to proceed with study activities, and signed & dated the informed consent  A copy was provided to the patient for their own future reference  The patient will now begin study as outlined in protocol  See additional documentation for associated visit activities completed  Documentation of Informed Consent Process for Clinical Research Study    Element of Informed Consent Discussed Date Initials/ Person obtaining consent   A statement that the study involves research, an explanation of the purposes of the research and the expected duration of the subject's participation, a description of the procedures to be followed, and identification of any procedures which are experimental 12/17/2020 [facility]   A description of any reasonably foreseeable risks or discomforts to the subject  12/17/2020 [facility]   A description of any benefits to the subject or to others which may reasonably be expected from the research  12/17/2020 [facility]   A disclosure of appropriate alternative procedures or courses of treatment, if any, that might be advantageous to the subject   12/17/2020 [facility]   A statement describing the extent, if any, to which confidentiality of records identifying the subject will be maintained and that notes the possibility that the Food and Drug Administration may inspect the records 12/17/2020 [facility]   For research involving more than minimal risk, an explanation as to whether any compensation and an explanation as to whether any medical treatments are available if injury occurs and, if so, what they consist of, or where further information may be obtained  12/17/2020 [facility]   An explanation of whom to contact for answers to pertinent questions about the research and research subjects' rights, and whom to contact in the event3/08/2021 of a research-related injury to the subject  12/17/2020 [address]   A statement that participation is voluntary, that refusal to participate will involve no penalty or loss of benefits to which the subject is otherwise entitled, and that the subject may discontinue participation at any time without penalty or loss of benefits to which the subject is otherwise entitled  12/17/2020 [facility]   A statement that the particular treatment or procedure may involve risks to the subject (or to the embryo or fetus, if the subject is or may become pregnant) which are currently unforeseeable 12/17/2020 [address]   Anticipated circumstances under which the subject's participation may be terminated by the investigator without regard to the subject's consent  12/17/2020 [facility]   Any additional costs to the subject that may result from participation in the research 12/17/2020 [address]   The consequences of a subjects' decision to withdraw from the research and procedures for orderly termination of participation by the subject  12/17/2020 [facility]   A statement that significant new findings developed during the course of the research which may relate to the subject's willingness to continue participation will be provided to the subject  12/17/2020 [facility]   The approximate number of subjects involved in the study  [address]       Study title:      GE Ultrasound Performance Evaluation Study   This signed and dated document shall serve as certification that all of the below listed required elements of informed consent were provided to the subject or legally authorized representative signing the actual Informed Consent Document, both in written and verbal format       The HIPAA consent is contained within the Informed Consent document and has also been discussed  A copy of the actual signed and dated Informed Consent has also been provided to the subject or legally authorized representative, and the original signed document shall be maintained in the research shadow chart  The patient speaks, reads and understands English?  _X_ Y__N     If NO, Name of :___________________________________      speaks, reads, and understands English?     _X_ Y __N     Process utilized to obtain consent:____________________________________________________    Subject meets eligibility criteria as outline in the current protocol? _X_Y __N    __ N/A - Reason: ___________________________________________________________    The protocol consent was reviewed with the patient and all questions were addressed and answered?    _X_ Y __N      The subject agreed to participate and the consent was signed and dated? __Y __N      Consent was obtained prior to any research procedures being performed?     __Y __N        Date  ICF signed ___[id number]___________________________          Certification of Person Conducting the Consent Process:                                                                                _____SUSAN HAHN________________________________  Printed Name      ___Susan Hahn________________________ __12/17/2020_______  Signature      Date

## 2021-03-19 DIAGNOSIS — M32.14 LUPUS NEPHRITIS (HCC): Primary | ICD-10-CM

## 2021-03-23 ENCOUNTER — APPOINTMENT (OUTPATIENT)
Dept: LAB | Facility: CLINIC | Age: 56
End: 2021-03-23
Payer: COMMERCIAL

## 2021-03-23 DIAGNOSIS — M32.14 LUPUS NEPHRITIS (HCC): ICD-10-CM

## 2021-03-23 LAB
ALBUMIN SERPL BCP-MCNC: 3.3 G/DL (ref 3.5–5)
ALP SERPL-CCNC: 73 U/L (ref 46–116)
ALT SERPL W P-5'-P-CCNC: 27 U/L (ref 12–78)
ANION GAP SERPL CALCULATED.3IONS-SCNC: 17 MMOL/L (ref 4–13)
AST SERPL W P-5'-P-CCNC: 20 U/L (ref 5–45)
BACTERIA UR QL AUTO: ABNORMAL /HPF
BILIRUB SERPL-MCNC: 0.61 MG/DL (ref 0.2–1)
BILIRUB UR QL STRIP: NEGATIVE
BUN SERPL-MCNC: 123 MG/DL (ref 5–25)
C3 SERPL-MCNC: 110 MG/DL (ref 90–180)
C4 SERPL-MCNC: 30 MG/DL (ref 10–40)
CALCIUM ALBUM COR SERPL-MCNC: 9.8 MG/DL (ref 8.3–10.1)
CALCIUM SERPL-MCNC: 9.2 MG/DL (ref 8.3–10.1)
CHLORIDE SERPL-SCNC: 104 MMOL/L (ref 100–108)
CLARITY UR: CLEAR
CO2 SERPL-SCNC: 19 MMOL/L (ref 21–32)
COLOR UR: YELLOW
CREAT SERPL-MCNC: 5.43 MG/DL (ref 0.6–1.3)
CREAT UR-MCNC: 69 MG/DL
GFR SERPL CREATININE-BSD FRML MDRD: 11 ML/MIN/1.73SQ M
GLUCOSE P FAST SERPL-MCNC: 108 MG/DL (ref 65–99)
GLUCOSE UR STRIP-MCNC: NEGATIVE MG/DL
HGB UR QL STRIP.AUTO: ABNORMAL
KETONES UR STRIP-MCNC: NEGATIVE MG/DL
LEUKOCYTE ESTERASE UR QL STRIP: NEGATIVE
NITRITE UR QL STRIP: NEGATIVE
NON-SQ EPI CELLS URNS QL MICRO: ABNORMAL /HPF
PH UR STRIP.AUTO: 5.5 [PH]
POTASSIUM SERPL-SCNC: 3.5 MMOL/L (ref 3.5–5.3)
PROT SERPL-MCNC: 6.1 G/DL (ref 6.4–8.2)
PROT UR STRIP-MCNC: ABNORMAL MG/DL
PROT UR-MCNC: 81 MG/DL
PROT/CREAT UR: 1.17 MG/G{CREAT} (ref 0–0.1)
RBC #/AREA URNS AUTO: ABNORMAL /HPF
SODIUM SERPL-SCNC: 140 MMOL/L (ref 136–145)
SP GR UR STRIP.AUTO: 1.02 (ref 1–1.03)
UROBILINOGEN UR QL STRIP.AUTO: 0.2 E.U./DL
WBC #/AREA URNS AUTO: ABNORMAL /HPF

## 2021-03-23 PROCEDURE — 36415 COLL VENOUS BLD VENIPUNCTURE: CPT

## 2021-03-23 PROCEDURE — 84156 ASSAY OF PROTEIN URINE: CPT

## 2021-03-23 PROCEDURE — 86225 DNA ANTIBODY NATIVE: CPT

## 2021-03-23 PROCEDURE — 81001 URINALYSIS AUTO W/SCOPE: CPT

## 2021-03-23 PROCEDURE — 80053 COMPREHEN METABOLIC PANEL: CPT

## 2021-03-23 PROCEDURE — 82570 ASSAY OF URINE CREATININE: CPT

## 2021-03-23 PROCEDURE — 86160 COMPLEMENT ANTIGEN: CPT

## 2021-03-24 LAB — DSDNA AB SER-ACNC: 40 IU/ML (ref 0–9)

## 2021-03-26 ENCOUNTER — TELEPHONE (OUTPATIENT)
Dept: OTHER | Facility: HOSPITAL | Age: 56
End: 2021-03-26

## 2021-03-26 DIAGNOSIS — I82.569 CHRONIC DEEP VEIN THROMBOSIS (DVT) OF CALF MUSCLE VEIN, UNSPECIFIED LATERALITY (HCC): ICD-10-CM

## 2021-03-26 NOTE — TELEPHONE ENCOUNTER
Talked to patient over phone  1  Will arrange for 1 treatment on Saturday before Easter in [place]  2  He may get vaccine but I am not sure if this will be effective given recent Rituxan dose  3  Will start PD planning once he is back from [place]

## 2021-03-30 ENCOUNTER — OFFICE VISIT (OUTPATIENT)
Dept: NEPHROLOGY | Facility: CLINIC | Age: 56
End: 2021-03-30
Payer: COMMERCIAL

## 2021-03-30 VITALS — DIASTOLIC BLOOD PRESSURE: 91 MMHG | BODY MASS INDEX: 23.48 KG/M2 | SYSTOLIC BLOOD PRESSURE: 156 MMHG | HEIGHT: 69 IN

## 2021-03-30 DIAGNOSIS — N18.9 ACUTE KIDNEY INJURY SUPERIMPOSED ON CHRONIC KIDNEY DISEASE (HCC): Primary | ICD-10-CM

## 2021-03-30 DIAGNOSIS — R80.9 NEPHROTIC RANGE PROTEINURIA: ICD-10-CM

## 2021-03-30 DIAGNOSIS — I82.569 CHRONIC DEEP VEIN THROMBOSIS (DVT) OF CALF MUSCLE VEIN, UNSPECIFIED LATERALITY (HCC): ICD-10-CM

## 2021-03-30 DIAGNOSIS — D63.1 ANEMIA OF CHRONIC KIDNEY FAILURE, UNSPECIFIED STAGE: ICD-10-CM

## 2021-03-30 DIAGNOSIS — N18.9 ANEMIA OF CHRONIC KIDNEY FAILURE, UNSPECIFIED STAGE: ICD-10-CM

## 2021-03-30 DIAGNOSIS — N17.9 ACUTE KIDNEY INJURY SUPERIMPOSED ON CHRONIC KIDNEY DISEASE (HCC): Primary | ICD-10-CM

## 2021-03-30 DIAGNOSIS — M32.14 SYSTEMIC LUPUS ERYTHEMATOSUS WITH GLOMERULAR DISEASE, UNSPECIFIED SLE TYPE (HCC): ICD-10-CM

## 2021-03-30 DIAGNOSIS — M32.14 LUPUS NEPHRITIS (HCC): ICD-10-CM

## 2021-03-30 DIAGNOSIS — I10 ESSENTIAL HYPERTENSION: ICD-10-CM

## 2021-03-30 DIAGNOSIS — N17.9 AKI (ACUTE KIDNEY INJURY) (HCC): ICD-10-CM

## 2021-03-30 DIAGNOSIS — N18.1 CHRONIC KIDNEY DISEASE (CKD), STAGE I: ICD-10-CM

## 2021-03-30 PROCEDURE — 99214 OFFICE O/P EST MOD 30 MIN: CPT | Performed by: INTERNAL MEDICINE

## 2021-03-30 RX ORDER — PREDNISONE 20 MG/1
20 TABLET ORAL DAILY
Qty: 60 TABLET | Refills: 1
Start: 2021-03-30 | End: 2021-05-05

## 2021-03-30 RX ORDER — SEVELAMER HYDROCHLORIDE 800 MG/1
800 TABLET, FILM COATED ORAL
Qty: 180 TABLET | Refills: 2
Start: 2021-03-30 | End: 2021-06-08 | Stop reason: SDUPTHER

## 2021-03-30 RX ORDER — TORSEMIDE 20 MG/1
60 TABLET ORAL DAILY
Qty: 90 TABLET | Refills: 1
Start: 2021-03-30 | End: 2021-04-12 | Stop reason: SDUPTHER

## 2021-03-30 NOTE — LETTER
March 30, 2021     Corby Mcgrath DO  [address], [facility]  [address]  [address] [facility] [address]    Patient: Gail Townsend   YOB: 1965   Date of Visit: 3/30/2021       Dear Dr Debra Armenta:    Thank you for referring Tasha Fitzgerald to me for evaluation  Below are my notes for this consultation  If you have questions, please do not hesitate to call me  I look forward to following your patient along with you  Sincerely,        Kerri Carlos MD        CC: MD Kerri Mondragon MD  3/30/2021  4:27 PM  Sign when Signing Visit  NEPHROLOGY OFFICE PROGRESS NOTE   Pamalee Lares [place] 55 y o  male MRN: [id number]  DATE: 03/30/21  Reason for visit: Continued evaluation and management of proteinuria  ASSESSMENT & PLAN:  1  Acute kidney injury (HD dependent):  · HD dependent since 1/8/21  · Non oliguric on Torsemide 60 mg daily  · Still no evidence of biochemical recovery  SCr 5 43 after holding HD  · Etiology felt to be due to lupus nephritis class IV + unclear role of vasculitis? + ATN? · Access: R IJ permcath  · Continue HD 3 times weekly - he is interested in doing a home modality  · Refer for PD catheter placement  · He has a transplant eval appt tomorrow  2  Class IV lupus nephritis:  · Failed treatment with Mycophenolate  · Got Cytoxan on 12/18/20 and 12/31/20 but changed to Rituxan in Jan 2021 due to ongoing pulmonary vasculitis ([id number]?)  · Received Rituxan 1 gm on 1/19/21 and 2/3/21  · Complements are normal x 2 months now but no biochemical renal recovery  · Complements are now normal  Anti DS DNA is stable at 40 from March 2021  UPC ratio is down to 1  17    · On Prednisone 20 mg daily per Dr Yaquelin Harrison  · [facility] Ehsan Monroy [facility] - no further immunosuppression recommended  3  Proteinuria:  · Shilo's baseline UPC ratio is around 0 2 to 0 3  · UPC ratio was up to 5 48 on 11/25/20  · Now down to 1  17    · No ACEi or ARB due to CAROLINE       4  Chronic kidney disease, stage I:  · Due to lupus nephritis  · Baseline creatinine was 0 6 to 0 7 in middle of 2020  · Was 1 3 to 1 7 in Nov 2020      5  Hypertension:  · BP is close to or at goal    · Current regimen:  Amlodipine 10 mg daily, Torsemide 60 mg daily, Metoprolol succinate 100 mg daily, Doxazosin 4 mg qHS  · No changes  6  Systemic lupus erythematosus  · Follow up with Dr Caty Meneses  7  Anemia:  · Hgb up to 7 3 with Epogen  · Risks and benefits of Epogen were discussed  We agreed to start Epogen to reduce PRBC requirements in anticipation for transplant  8  L leg DVT:  · Continue Eliquis 5 mg BID  9  Prophylaxis:  · On Dapsone for PCP prophylaxis  · On Protonix for PUD prophylaxis  · On Ca with Vit D for osteoporosis prophylaxis  10  Mineral and bone disease  · Hyperphosphatemia - Continue Sevelamer 800 mg TID with meals  Phos is controlled at HD  · Continue Vitamin D 1000 units daily  Patient Instructions   Please see our surgeon for PD catheter placement  Transplant evaluation tomorrow  Check blood tests and serologies in 1st week of May 2021  Follow up in 6-8 weeks  SUBJECTIVE / INTERVAL HISTORY:  David Dukes was last seen by me in the office back in Feb 2021  He was seen by Dr Elda Gill at [facility] on March 9, 2021  No additional immunosuppression was recommended  He is now on Prednisone 20 mg daily which is being titrated down by Dr Caty Meneses  We held dialysis on Saturday, March 20  However, repeat labs on March 23 showed a creatinine of 5 43 and a BUN of 123  There have been no recent hospitalizations or ER visits  His blood pressure on dialysis has been fairly controlled and there has been no cherrie hypotension  He is interested in home dialysis  Treatment to date:  July to December 2020: Mycophenolate  Initially could not tolerate 1500 mg BID     December 18, 2020: Cytoxan 500 mg IV  December 31, 2020: Cytoxan 500 mg IV  January 19, 2021: Rituxan 1000 mg IV  February 3, 2021: Rituxan 1000 mg IV    PMH/PSH: HTN, SLE, HLP, lymphadenopathy, BPH, RA, bilateral hip replacements, bilateral knee replacements  Previous work up:   Renal biopsy (November 3 2020)  1  Diffuse proliferative glomerulonephritis, consistent with lupus nephritis class 4 (mild activity, mild chronicity)  2  Lupus vasculopathy, severe, with ischemic glomerular changes and zonal tubulointerstitial scarring  3  Tubular atrophy, interstitial fibrosis, and interstitial inflammation, mild  4  Arteriosclerosis and arteriolosclerosis, mild to moderate  ALLERGIES: No Known Allergies    REVIEW OF SYSTEMS:  Review of Systems   Constitutional: Negative for appetite change, chills, fatigue and fever  Respiratory: Negative for cough and shortness of breath  Cardiovascular: Negative for chest pain and leg swelling  Gastrointestinal: Negative for abdominal pain, diarrhea, nausea and vomiting  Genitourinary: Negative for dysuria and hematuria  Musculoskeletal: Negative for arthralgias and back pain  Allergic/Immunologic: Positive for immunocompromised state  Neurological: Negative for dizziness  OBJECTIVE:  /91   Ht 5' 9" (1 753 m)   BMI 23 48 kg/m²   Current Weight:   Body mass index is 23 48 kg/m²  Physical Exam  Constitutional:       General: He is not in acute distress  Appearance: Normal appearance  He is well-developed and normal weight  He is not ill-appearing or diaphoretic  HENT:      Head: Normocephalic and atraumatic  Eyes:      General: No scleral icterus  Conjunctiva/sclera: Conjunctivae normal    Neck:      Musculoskeletal: Neck supple  Vascular: No JVD  Cardiovascular:      Rate and Rhythm: Normal rate and regular rhythm  Heart sounds: Normal heart sounds  Pulmonary:      Effort: Pulmonary effort is normal  No respiratory distress  Breath sounds: Normal breath sounds  Abdominal:      General: Bowel sounds are normal       Palpations: Abdomen is soft  Musculoskeletal:      Right lower leg: No edema  Left lower leg: Edema present  Skin:     General: Skin is warm and dry  Neurological:      Mental Status: He is alert and oriented to person, place, and time  Psychiatric:         Mood and Affect: Mood normal          Behavior: Behavior normal          Thought Content:  Thought content normal          Judgment: Judgment normal        Medications:  Current Outpatient Medications:     acetaminophen (TYLENOL) 500 mg tablet, Take 500 mg by mouth every 6 (six) hours as needed for mild pain, Disp: , Rfl:     amLODIPine (NORVASC) 10 mg tablet, Take 1 tablet (10 mg total) by mouth daily, Disp: 90 tablet, Rfl: 1    apixaban (ELIQUIS) 5 mg, Take 1 tablet (5 mg total) by mouth 2 (two) times a day, Disp: 60 tablet, Rfl: 3    atorvastatin (LIPITOR) 40 mg tablet, Take 1 tablet (40 mg total) by mouth daily, Disp: 30 tablet, Rfl: 3    calcium-vitamin D (OSCAL) 250-125 MG-UNIT per tablet, Take 1 tablet by mouth 2 (two) times a day, Disp: , Rfl: 0    clindamycin (CLEOCIN) 300 MG capsule, Take 600 mg by mouth 1 hour prior to Dental appointment, Disp: , Rfl:     doxazosin (CARDURA) 4 mg tablet, Take 1 tablet (4 mg total) by mouth daily at bedtime, Disp: 30 tablet, Rfl: 1    Elastic Bandages & Supports (MEDICAL COMPRESSION STOCKINGS) MISC, by Does not apply route daily Knee High 20-30mmHg, Disp: 2 each, Rfl: 0    metoprolol succinate (TOPROL-XL) 100 mg 24 hr tablet, Take 1 tablet (100 mg total) by mouth daily, Disp: 90 tablet, Rfl: 1    Multiple Vitamins-Minerals (CENTRUM SILVER) tablet, Take 1 tablet by mouth daily , Disp: , Rfl:     nystatin (MYCOSTATIN) 500,000 units/5 mL suspension, Swish and swallow 5 mL (500,000 Units total) 2 (two) times a day, Disp: 300 mL, Rfl: 1    pantoprazole (PROTONIX) 40 mg tablet, Take 1 tablet (40 mg total) by mouth daily, Disp: 90 tablet, Rfl: 1    predniSONE 20 mg tablet, Take 2 tablets (40 mg total) by mouth daily (Patient taking differently: Take 40 mg by mouth daily ), Disp: 60 tablet, Rfl: 1    sevelamer (RENAGEL) 800 mg tablet, Take 2 tablets (1,600 mg total) by mouth 3 (three) times a day with meals (Patient taking differently: Take 1,600 mg by mouth 3 (three) times a day with meals ), Disp: 180 tablet, Rfl: 2    torsemide (DEMADEX) 20 mg tablet, Take 2 tablets (40 mg total) by mouth daily (Patient taking differently: Take 40 mg by mouth daily ), Disp: 180 tablet, Rfl: 1    Laboratory Results:  Results for orders placed or performed in visit on 03/23/21   Comprehensive metabolic panel   Result Value Ref Range    Sodium 140 136 - 145 mmol/L    Potassium 3 5 3 5 - 5 3 mmol/L    Chloride 104 100 - 108 mmol/L    CO2 19 (L) 21 - 32 mmol/L    ANION GAP 17 (H) 4 - 13 mmol/L     (H) 5 - 25 mg/dL    Creatinine 5 43 (H) 0 60 - 1 30 mg/dL    Glucose, Fasting 108 (H) 65 - 99 mg/dL    Calcium 9 2 8 3 - 10 1 mg/dL    Corrected Calcium 9 8 8 3 - 10 1 mg/dL    AST 20 5 - 45 U/L    ALT 27 12 - 78 U/L    Alkaline Phosphatase 73 46 - 116 U/L    Total Protein 6 1 (L) 6 4 - 8 2 g/dL    Albumin 3 3 (L) 3 5 - 5 0 g/dL    Total Bilirubin 0 61 0 20 - 1 00 mg/dL    eGFR 11 ml/min/1 73sq m   Urinalysis with microscopic   Result Value Ref Range    Clarity, UA Clear     Color, UA Yellow     Specific [place], UA 1 020 1 003 - 1 030    pH, UA 5 5 4 5, 5 0, 5 5, 6 0, 6 5, 7 0, 7 5, 8 0    Glucose, UA Negative Negative mg/dl    Ketones, UA Negative Negative mg/dl    Blood, UA Moderate (A) Negative    Protein,  (2+) (A) Negative mg/dl    Nitrite, UA Negative Negative    Bilirubin, UA Negative Negative    Urobilinogen, UA 0 2 0 2, 1 0 E U /dl E U /dl    Leukocytes, UA Negative Negative    WBC, UA None Seen None Seen, 2-4 /hpf    RBC, UA 2-4 None Seen, 2-4 /hpf    Bacteria, UA Occasional None Seen, Occasional /hpf    Epithelial Cells Occasional None Seen, Occasional /hpf   Protein / creatinine ratio, urine   Result Value Ref Range    Creatinine, Ur 69 0 mg/dL    Protein Urine Random 81 mg/dL    Prot/Creat Ratio, Ur 1 17 (H) 0 00 - 0 10   C3 complement   Result Value Ref Range    C3 Complement 110 0 90 0 - 180 0 mg/dL   C4 complement   Result Value Ref Range    C4, COMPLEMENT 30 0 10 0 - 40 0 mg/dL   Anti-DNA antibody, double-stranded   Result Value Ref Range    ds DNA Ab 40 (H) 0 - 9 IU/mL

## 2021-03-30 NOTE — PROGRESS NOTES
NEPHROLOGY OFFICE PROGRESS NOTE   Rhys Semen Schoenfield 55 y o  male MRN: [id number]  DATE: 03/30/21  Reason for visit: Continued evaluation and management of proteinuria  ASSESSMENT & PLAN:  1  Acute kidney injury (HD dependent):  · HD dependent since 1/8/21  · Non oliguric on Torsemide 60 mg daily  · Still no evidence of biochemical recovery  SCr 5 43 after holding HD  · Etiology felt to be due to lupus nephritis class IV + unclear role of vasculitis? + ATN? · Access: R IJ permcath  · Continue HD 3 times weekly - he is interested in doing a home modality  · Refer for PD catheter placement  · He has a transplant eval appt tomorrow  2  Class IV lupus nephritis:  · Failed treatment with Mycophenolate  · Got Cytoxan on 12/18/20 and 12/31/20 but changed to Rituxan in Jan 2021 due to ongoing pulmonary vasculitis ([id number]?)  · Received Rituxan 1 gm on 1/19/21 and 2/3/21  · Complements are normal x 2 months now but no biochemical renal recovery  · Complements are now normal  Anti DS DNA is stable at 40 from March 2021  UPC ratio is down to 1  17    · On Prednisone 20 mg daily per Dr Joey Montoya  · Saw Dr Ziad Deng at [facility] - no further immunosuppression recommended  3  Proteinuria:  · Shilo's baseline UPC ratio is around 0 2 to 0 3  · UPC ratio was up to 5 48 on 11/25/20  · Now down to 1  17    · No ACEi or ARB due to CAROLINE  4  Chronic kidney disease, stage I:  · Due to lupus nephritis  · Baseline creatinine was 0 6 to 0 7 in middle of 2020  · Was 1 3 to 1 7 in Nov 2020      5  Hypertension:  · BP is close to or at goal    · Current regimen:  Amlodipine 10 mg daily, Torsemide 60 mg daily, Metoprolol succinate 100 mg daily, Doxazosin 4 mg qHS  · No changes  6  Systemic lupus erythematosus  · Follow up with Dr Joey Montoya  7  Anemia:  · Hgb up to 7 3 with Epogen  · Risks and benefits of Epogen were discussed   We agreed to start Epogen to reduce PRBC requirements in anticipation for transplant  8  L leg DVT:  · Continue Eliquis 5 mg BID  9  Prophylaxis:  · On Dapsone for PCP prophylaxis  · On Protonix for PUD prophylaxis  · On Ca with Vit D for osteoporosis prophylaxis  10  Mineral and bone disease  · Hyperphosphatemia - Continue Sevelamer 800 mg TID with meals  Phos is controlled at HD  · Continue Vitamin D 1000 units daily  Patient Instructions   Please see our surgeon for PD catheter placement  Transplant evaluation tomorrow  Check blood tests and serologies in 1st week of May 2021  Follow up in 6-8 weeks  SUBJECTIVE / INTERVAL HISTORY:  Kindra John was last seen by me in the office back in Feb 2021  He was seen by Dr Tammy Marie at [facility] on March 9, 2021  No additional immunosuppression was recommended  He is now on Prednisone 20 mg daily which is being titrated down by Dr Niel Sacks  We held dialysis on Saturday, March 20  However, repeat labs on March 23 showed a creatinine of 5 43 and a BUN of 123  There have been no recent hospitalizations or ER visits  His blood pressure on dialysis has been fairly controlled and there has been no cherrie hypotension  He is interested in home dialysis  Treatment to date:  July to December 2020: Mycophenolate  Initially could not tolerate 1500 mg BID  December 18, 2020: Cytoxan 500 mg IV  December 31, 2020: Cytoxan 500 mg IV  January 19, 2021: Rituxan 1000 mg IV  February 3, 2021: Rituxan 1000 mg IV    PMH/PSH: HTN, SLE, HLP, lymphadenopathy, BPH, RA, bilateral hip replacements, bilateral knee replacements  Previous work up:   Renal biopsy (November 3 2020)  1  Diffuse proliferative glomerulonephritis, consistent with lupus nephritis class 4 (mild activity, mild chronicity)  2  Lupus vasculopathy, severe, with ischemic glomerular changes and zonal tubulointerstitial scarring  3  Tubular atrophy, interstitial fibrosis, and interstitial inflammation, mild    4  Arteriosclerosis and arteriolosclerosis, mild to moderate  ALLERGIES: No Known Allergies    REVIEW OF SYSTEMS:  Review of Systems   Constitutional: Negative for appetite change, chills, fatigue and fever  Respiratory: Negative for cough and shortness of breath  Cardiovascular: Negative for chest pain and leg swelling  Gastrointestinal: Negative for abdominal pain, diarrhea, nausea and vomiting  Genitourinary: Negative for dysuria and hematuria  Musculoskeletal: Negative for arthralgias and back pain  Allergic/Immunologic: Positive for immunocompromised state  Neurological: Negative for dizziness  OBJECTIVE:  /91   Ht 5' 9" (1 753 m)   BMI 23 48 kg/m²   Current Weight:   Body mass index is 23 48 kg/m²  Physical Exam  Constitutional:       General: He is not in acute distress  Appearance: Normal appearance  He is well-developed and normal weight  He is not ill-appearing or diaphoretic  HENT:      Head: Normocephalic and atraumatic  Eyes:      General: No scleral icterus  Conjunctiva/sclera: Conjunctivae normal    Neck:      Musculoskeletal: Neck supple  Vascular: No JVD  Cardiovascular:      Rate and Rhythm: Normal rate and regular rhythm  Heart sounds: Normal heart sounds  Pulmonary:      Effort: Pulmonary effort is normal  No respiratory distress  Breath sounds: Normal breath sounds  Abdominal:      General: Bowel sounds are normal       Palpations: Abdomen is soft  Musculoskeletal:      Right lower leg: No edema  Left lower leg: Edema present  Skin:     General: Skin is warm and dry  Neurological:      Mental Status: He is alert and oriented to person, place, and time  Psychiatric:         Mood and Affect: Mood normal          Behavior: Behavior normal          Thought Content:  Thought content normal          Judgment: Judgment normal        Medications:  Current Outpatient Medications:     acetaminophen (TYLENOL) 500 mg tablet, Take 500 mg by mouth every 6 (six) hours as needed for mild pain, Disp: , Rfl:     amLODIPine (NORVASC) 10 mg tablet, Take 1 tablet (10 mg total) by mouth daily, Disp: 90 tablet, Rfl: 1    apixaban (ELIQUIS) 5 mg, Take 1 tablet (5 mg total) by mouth 2 (two) times a day, Disp: 60 tablet, Rfl: 3    atorvastatin (LIPITOR) 40 mg tablet, Take 1 tablet (40 mg total) by mouth daily, Disp: 30 tablet, Rfl: 3    calcium-vitamin D (OSCAL) 250-125 MG-UNIT per tablet, Take 1 tablet by mouth 2 (two) times a day, Disp: , Rfl: 0    clindamycin (CLEOCIN) 300 MG capsule, Take 600 mg by mouth 1 hour prior to Dental appointment, Disp: , Rfl:     doxazosin (CARDURA) 4 mg tablet, Take 1 tablet (4 mg total) by mouth daily at bedtime, Disp: 30 tablet, Rfl: 1    Elastic Bandages & Supports (MEDICAL COMPRESSION STOCKINGS) MISC, by Does not apply route daily Knee High 20-30mmHg, Disp: 2 each, Rfl: 0    metoprolol succinate (TOPROL-XL) 100 mg 24 hr tablet, Take 1 tablet (100 mg total) by mouth daily, Disp: 90 tablet, Rfl: 1    Multiple Vitamins-Minerals (CENTRUM SILVER) tablet, Take 1 tablet by mouth daily , Disp: , Rfl:     nystatin (MYCOSTATIN) 500,000 units/5 mL suspension, Swish and swallow 5 mL (500,000 Units total) 2 (two) times a day, Disp: 300 mL, Rfl: 1    pantoprazole (PROTONIX) 40 mg tablet, Take 1 tablet (40 mg total) by mouth daily, Disp: 90 tablet, Rfl: 1    predniSONE 20 mg tablet, Take 2 tablets (40 mg total) by mouth daily (Patient taking differently: Take 40 mg by mouth daily ), Disp: 60 tablet, Rfl: 1    sevelamer (RENAGEL) 800 mg tablet, Take 2 tablets (1,600 mg total) by mouth 3 (three) times a day with meals (Patient taking differently: Take 1,600 mg by mouth 3 (three) times a day with meals ), Disp: 180 tablet, Rfl: 2    torsemide (DEMADEX) 20 mg tablet, Take 2 tablets (40 mg total) by mouth daily (Patient taking differently: Take 40 mg by mouth daily ), Disp: 180 tablet, Rfl: 1    Laboratory Results:  Results for orders placed or performed in visit on 03/23/21 Comprehensive metabolic panel   Result Value Ref Range    Sodium 140 136 - 145 mmol/L    Potassium 3 5 3 5 - 5 3 mmol/L    Chloride 104 100 - 108 mmol/L    CO2 19 (L) 21 - 32 mmol/L    ANION GAP 17 (H) 4 - 13 mmol/L     (H) 5 - 25 mg/dL    Creatinine 5 43 (H) 0 60 - 1 30 mg/dL    Glucose, Fasting 108 (H) 65 - 99 mg/dL    Calcium 9 2 8 3 - 10 1 mg/dL    Corrected Calcium 9 8 8 3 - 10 1 mg/dL    AST 20 5 - 45 U/L    ALT 27 12 - 78 U/L    Alkaline Phosphatase 73 46 - 116 U/L    Total Protein 6 1 (L) 6 4 - 8 2 g/dL    Albumin 3 3 (L) 3 5 - 5 0 g/dL    Total Bilirubin 0 61 0 20 - 1 00 mg/dL    eGFR 11 ml/min/1 73sq m   Urinalysis with microscopic   Result Value Ref Range    Clarity, UA Clear     Color, UA Yellow     Specific [place], UA 1 020 1 003 - 1 030    pH, UA 5 5 4 5, 5 0, 5 5, 6 0, 6 5, 7 0, 7 5, 8 0    Glucose, UA Negative Negative mg/dl    Ketones, UA Negative Negative mg/dl    Blood, UA Moderate (A) Negative    Protein,  (2+) (A) Negative mg/dl    Nitrite, UA Negative Negative    Bilirubin, UA Negative Negative    Urobilinogen, UA 0 2 0 2, 1 0 E U /dl E U /dl    Leukocytes, UA Negative Negative    WBC, UA None Seen None Seen, 2-4 /hpf    RBC, UA 2-4 None Seen, 2-4 /hpf    Bacteria, UA Occasional None Seen, Occasional /hpf    Epithelial Cells Occasional None Seen, Occasional /hpf   Protein / creatinine ratio, urine   Result Value Ref Range    Creatinine, Ur 69 0 mg/dL    Protein Urine Random 81 mg/dL    Prot/Creat Ratio, Ur 1 17 (H) 0 00 - 0 10   C3 complement   Result Value Ref Range    C3 Complement 110 0 90 0 - 180 0 mg/dL   C4 complement   Result Value Ref Range    C4, COMPLEMENT 30 0 10 0 - 40 0 mg/dL   Anti-DNA antibody, double-stranded   Result Value Ref Range    ds DNA Ab 40 (H) 0 - 9 IU/mL no

## 2021-03-30 NOTE — PATIENT INSTRUCTIONS
Please see our surgeon for PD catheter placement  Transplant evaluation tomorrow  Check blood tests and serologies in 1st week of May 2021  Follow up in 6-8 weeks

## 2021-03-31 ENCOUNTER — OFFICE VISIT (OUTPATIENT)
Dept: NEPHROLOGY | Facility: CLINIC | Age: 56
End: 2021-03-31
Payer: COMMERCIAL

## 2021-03-31 VITALS
SYSTOLIC BLOOD PRESSURE: 160 MMHG | DIASTOLIC BLOOD PRESSURE: 70 MMHG | WEIGHT: 160 LBS | HEIGHT: 69 IN | BODY MASS INDEX: 23.7 KG/M2

## 2021-03-31 DIAGNOSIS — Z01.818 PRE-TRANSPLANT EVALUATION FOR CKD (CHRONIC KIDNEY DISEASE): Primary | ICD-10-CM

## 2021-03-31 PROCEDURE — 99215 OFFICE O/P EST HI 40 MIN: CPT | Performed by: INTERNAL MEDICINE

## 2021-03-31 NOTE — LETTER
March 31, 2021     Minh Kwong MD  [address] [facility]   [address] [facility] [place] [id number] [facility]    Patient: Alejandro Jauregui   YOB: 1965   Date of Visit: 3/31/2021       Dear Dr Chidi Day: Thank you for referring Sayra Art to me for evaluation  Below are my notes for this consultation  If you have questions, please do not hesitate to call me  I look forward to following your patient along with you  Sincerely,        Geri Valverde MD        CC: DO Geri Sanchez MD  3/31/2021  9:27 AM  Sign when Signing Visit  Skyler York is a 55 y o  male  referred by Dr Chidi Day, with a past medical history of being hemodialysis dependent since January 2021 at [facility], native disease secondary to lupus nephritis class 4 /vasculitis, DVT, hypertension (diagnosed 2020), nephrolithiasis (2 X in 1990 and 2007), rheumatoid arthritis, question of avascular necrosis, b/l hip replacements (1988 and 1989 and revision in 2004 and 2006, b/l knee replacements, , former smoker, former ETOH, no drug use, seen in the Nephrology Clinic for pre-transplant kidney evaluation  With regards to lupus nephritis  -First diagnosed at 25years old which was diagnosed with arthralgias, weakness, joint swelling  Symptoms initially started at 25 yo  Initially, started on steroids and then was on plaquenil  Then, a couple years ago was taken off of plaquenil due to ocular toxicity  -  Patient had a renal biopsy November of 2020 concerning for lupus nephritis and lupus vasculopathy   -Patient failed treatment with mycophenolate with maximal dose tolerated 2500 mg per day  Received Cytoxan in December due to rising creatinine, changed to rituximab in January due to concern for pulmonary vasculitis  - patient re-presented to the hospital in December due to concern for alveolar hemorrhage on CT scan    Received pulse dose steroids and plasma exchange in December 2020 - C ANCA titer positive, MPO and PR3 were within normal limits in December   - immunofluorescence on biopsy in 2020 with full house pattern rather than pauci-immune pattern  ECHO 2020 - EF 55, grade I dCHF; moderate hypokinesis of the basal-mid inferior and basal-mid inferolateral walls    Renal biopsy 2020-diffuse  proliferative GN consistent with class 4, severe lupus vasculopathy, mild interstitial fibrosis and tubular atrophy     complement levels have normalized on most recent lab work in 2020  Anti double stranded DNA remains positive at 40  ANDRE was negative in February  C ANCA was positive in 2020 but has remained negative since  Plan   1  For now, the patient is planning on moving to [place] in  of this year  Would normally recommend at least 3-6 months of dialysis time before pursuing active transplant listing in case there is a chance for renal recovery and this would place the patient after he moves to [place] with regards to timing  I did explain this to the patient and his wife who are present today  Will most likely keep is evaluation on hold for now and will review his case with our committee  2  The need to avoid blood transfusion to decrease allosensitization was explained to the patient  3  The advantages of a living donor over a  donor was explained to the patient and family  I strongly encouraged the patient and family to pursue a living donor  4  History of lupus nephritis - since 26 yo  Patient's younger sister with lupus  5 History of pulmonary hemorrhage   6  History of DVT - LLE  7  Cardiology clearance - abn ECHO in 2020  Stress test was not pursued due to that cath was not going to be pursued at that time due to CAROLINE  Dose have some wall motion abn with mild to mod regurg  8  Nephrolithiasis - required lithotripsy   9  Syncope - in   Etiology unclear     10  Colonoscopy records - from [facility] (was told did not need repeat c scope for 10 more years after prior)  11  B/L knee and hip replacements  12  Blurry vision - cataracts  Following closely with Optho  13  To note, patient is on eliquis    I have discussed with Annalisa Shankar and family at length about the risk and benefits of kidney transplantation  I have strongly encouraged him to pursue living donation, and explained to him the benefits of living donation over  donor transplantation  We have briefly discussed the surgical procedure  We have discussed about the need for life long immunosuppression and the importance of compliance  We have discussed the side effects of immunosuppression including but not limited to infection, malignancies and developing/worsening diabetes control  Timm L Schoenfield verbalized understanding and is interested in pursuing transplant  During this visit, I spent 60 minutes with the patient; more than 50% of the time I counseled the patient regarding the risks and benefits of kidney transplantation, the immediate and long-term complications of kidney transplantation, and the advantage of receiving a living donor kidney transplant  It was a pleasure evaluating your patient in the office today  Thank you for allowing our team to participate in the care of Mr Annalisa Shankar  Please do not hesitate to contact our team if further issues/questions shall arise in the interim  HISTORY OF PRESENT ILLNESS    Timm L Schoenfield is a 55 y o  male seen in the Nephrology Clinic for evaluation for kidney transplant  CKD requiring dialysis due to lupus nephritis/component of vasculitis  Renal disease is biopsy proven  Primary Nephrologist is Dr Sergio Hernandez  HD unit - [place]    HD:   he is currently on dialysis since 2021    HD days are TTS  Chronic hypotension on HD: no  Multiple failed or recurrent thrombosis of AVG/AVF:  no     Native Urine Output: yes  Hx of voiding difficulty: no  Hx of hematuria: no  Hx of proteinuria: yes  Hx of nephrolithiasis: yes   Hx of recurrent urinary tract infection: no    HTN: onset 2020,  hx of malignant HTN: no, admission for HTN emergencies: no    DM type n/a    PAD/PVD: no, Claudication: no    Cardiac history - CAD: nl, MI: no    Father with HTN; grandmother with heart disease    Primary Cardiologist: Dr Marguerite Bynum    Exercise tolerance: some - with bike, walk    Hx of CVA: no    Hx of DVT/PE: LLE DVT    Viral Infection:    HIV: no  Hep B: no  Hep C: no    Cancer: History of cancer: mother (breast), father (prostate); Health maintenance and other pertinent history:    Male:    Colonoscopy: at [facility]  Prostate: BPH    Blood transfusion: yes, during admission in January; prior knee surgeries    Last admission jan 2021    Review Of Systems:     Constitutional: nl appetite  no fevers, chills, involuntary weight gain or weight loss  Eyes: blurry vision  ENT: no ear disease, epistaxis or oral ulcers  Respiratory: no SOB, cough, hemoptysis, JOHNSON  Cardiovascular: no CP, palpitations, claudication, edema  GI: no N/V/D/C, abdominal pain, melena, BRBPR  : see HPI  Endocrine: no thyroid disease  Heme: pulm hemorrhage december  MS: b/l knee and hip replacements  Skin: no  Neuro: no HA, seizures, numbness, tingling, focal weakness  Psych: no depression, anxiety    Lives with wife and daughter; 2 dogs, 2 cats    Employment history:  at [employer]    HD [facility]  Abdominal Surgeries: no    Smoke: former (intermittent for 10 years, quit 20 years ago)  ETOH: former, quit 20 years ago  Drugs: no    Past Medical History:   Diagnosis Date    [facility] [address] [facility]     Hypertension     Lupus ([facility] [address] [facility]     Osteoporosis     Rheumatoid arthritis ([facility] [address] )      Past Surgical History:   Procedure Laterality Date    CT NEEDLE BIOPSY KIDNEY  11/3/2020    HERNIA REPAIR  199?     IR TUNNELED DIALYSIS CATHETER PLACEMENT  1/8/2021    REPLACEMENT TOTAL KNEE Bilateral     right 06/12 left 2016    REVISION TOTAL HIP ARTHROPLASTY Bilateral     right 2004 left 2006    TOTAL HIP ARTHROPLASTY Bilateral 1172/2153    Right / left        Family History   Problem Relation Age of Onset    Varicose Veins Father      Social History     Socioeconomic History    Marital status: /Civil Union     Spouse name: None    Number of children: None    Years of education: None    Highest education level: None   Occupational History    None   Social Needs    Financial resource strain: None    Food insecurity     Worry: None     Inability: None    Transportation needs     Medical: None     Non-medical: None   Tobacco Use    Smoking status: Former Smoker     Packs/day:      Years: 10      Pack years: 10 00     Types: Cigarettes     Quit date:      Years since quittin 2    Smokeless tobacco: Former User     Types: Chew    Tobacco comment: 25 yrs    Substance and Sexual Activity    Alcohol use: Never     Frequency: Never    Drug use: Never    Sexual activity: None   Lifestyle    Physical activity     Days per week: None     Minutes per session: None    Stress: None   Relationships    Social connections     Talks on phone: None     Gets together: None     Attends Muslim service: None     Active member of club or organization: None     Attends meetings of clubs or organizations: None     Relationship status: None    Intimate partner violence     Fear of current or ex partner: None     Emotionally abused: None     Physically abused: None     Forced sexual activity: None   Other Topics Concern    None   Social History Narrative    None         Living donors:  Sisters and brothers and friends    Current Outpatient Medications   Medication Sig Dispense Refill    acetaminophen (TYLENOL) 500 mg tablet Take 500 mg by mouth every 6 (six) hours as needed for mild pain      amLODIPine (NORVASC) 10 mg tablet Take 1 tablet (10 mg total) by mouth daily 90 tablet 1    apixaban (ELIQUIS) 5 mg Take 1 tablet (5 mg total) by mouth 2 (two) times a day 60 tablet 3    atorvastatin (LIPITOR) 40 mg tablet Take 1 tablet (40 mg total) by mouth daily 30 tablet 3    calcium-vitamin D (OSCAL) 250-125 MG-UNIT per tablet Take 1 tablet by mouth 2 (two) times a day  0    clindamycin (CLEOCIN) 300 MG capsule Take 600 mg by mouth 1 hour prior to Dental appointment      doxazosin (CARDURA) 4 mg tablet Take 1 tablet (4 mg total) by mouth daily at bedtime 30 tablet 1    Elastic Bandages & Supports (MEDICAL COMPRESSION STOCKINGS) MISC by Does not apply route daily Knee High 20-30mmHg 2 each 0    metoprolol succinate (TOPROL-XL) 100 mg 24 hr tablet Take 1 tablet (100 mg total) by mouth daily 90 tablet 1    Multiple Vitamins-Minerals (CENTRUM SILVER) tablet Take 1 tablet by mouth daily       nystatin (MYCOSTATIN) 500,000 units/5 mL suspension Swish and swallow 5 mL (500,000 Units total) 2 (two) times a day 300 mL 1    pantoprazole (PROTONIX) 40 mg tablet Take 1 tablet (40 mg total) by mouth daily 90 tablet 1    predniSONE 20 mg tablet Take 1 tablet (20 mg total) by mouth daily 60 tablet 1    sevelamer (RENAGEL) 800 mg tablet Take 1 tablet (800 mg total) by mouth 3 (three) times a day with meals 180 tablet 2    torsemide (DEMADEX) 20 mg tablet Take 3 tablets (60 mg total) by mouth daily 90 tablet 1     No current facility-administered medications for this visit  Patient has no known allergies      Physical Exam:    Ht 5' 9" (1 753 m)   Wt 72 6 kg (160 lb)   BMI 23 63 kg/m²     General : NAD    HEENT: anicteric sclera   Cardiac:  RRR, S1, S2 normal,  soft murmur    Lung:  CTAB   Abdomen:  soft, nontender, no ascites   HD access: TDC site clean   femoral pulses (2+ and soft, no bruit)  no edema   Neuro:no focal neuro deficits   Skin: tattoo no  Carotid bruit: no  Lymph: no LN- cervical, axillary, inguinal

## 2021-03-31 NOTE — PATIENT INSTRUCTIONS
1) If you do not hear from our office in 4 weeks, please call locally to the [place] office  2) please start looking at transplant centers in [facility] so your transition there will be smoother - [facility]

## 2021-04-05 DIAGNOSIS — I10 ESSENTIAL HYPERTENSION: ICD-10-CM

## 2021-04-05 RX ORDER — DOXAZOSIN MESYLATE 4 MG/1
4 TABLET ORAL
Qty: 30 TABLET | Refills: 11 | Status: SHIPPED | OUTPATIENT
Start: 2021-04-05

## 2021-04-12 DIAGNOSIS — N18.9 ACUTE KIDNEY INJURY SUPERIMPOSED ON CHRONIC KIDNEY DISEASE (HCC): ICD-10-CM

## 2021-04-12 DIAGNOSIS — N17.9 ACUTE KIDNEY INJURY SUPERIMPOSED ON CHRONIC KIDNEY DISEASE (HCC): ICD-10-CM

## 2021-04-12 RX ORDER — TORSEMIDE 20 MG/1
60 TABLET ORAL DAILY
Qty: 90 TABLET | Refills: 1
Start: 2021-04-12 | End: 2021-04-13 | Stop reason: SDUPTHER

## 2021-04-13 DIAGNOSIS — N18.9 ACUTE KIDNEY INJURY SUPERIMPOSED ON CHRONIC KIDNEY DISEASE (HCC): ICD-10-CM

## 2021-04-13 DIAGNOSIS — N17.9 ACUTE KIDNEY INJURY SUPERIMPOSED ON CHRONIC KIDNEY DISEASE (HCC): ICD-10-CM

## 2021-04-13 RX ORDER — TORSEMIDE 20 MG/1
60 TABLET ORAL DAILY
Qty: 90 TABLET | Refills: 1 | Status: SHIPPED | OUTPATIENT
Start: 2021-04-13 | End: 2021-06-01 | Stop reason: SDUPTHER

## 2021-04-21 ENCOUNTER — CONSULT (OUTPATIENT)
Dept: SURGERY | Facility: CLINIC | Age: 56
End: 2021-04-21
Payer: COMMERCIAL

## 2021-04-21 VITALS
WEIGHT: 162.2 LBS | HEART RATE: 68 BPM | DIASTOLIC BLOOD PRESSURE: 86 MMHG | BODY MASS INDEX: 24.02 KG/M2 | TEMPERATURE: 97.8 F | HEIGHT: 69 IN | SYSTOLIC BLOOD PRESSURE: 148 MMHG

## 2021-04-21 DIAGNOSIS — Z99.2 STAGE 5 CHRONIC KIDNEY DISEASE ON CHRONIC DIALYSIS (HCC): Primary | ICD-10-CM

## 2021-04-21 DIAGNOSIS — N18.6 STAGE 5 CHRONIC KIDNEY DISEASE ON CHRONIC DIALYSIS (HCC): Primary | ICD-10-CM

## 2021-04-21 PROBLEM — N19 RENAL FAILURE: Status: ACTIVE | Noted: 2020-07-01

## 2021-04-21 PROCEDURE — 99204 OFFICE O/P NEW MOD 45 MIN: CPT | Performed by: SURGERY

## 2021-04-21 RX ORDER — HEPARIN SODIUM 5000 [USP'U]/ML
5000 INJECTION, SOLUTION INTRAVENOUS; SUBCUTANEOUS ONCE
Status: CANCELLED | OUTPATIENT
Start: 2021-04-21 | End: 2021-04-21

## 2021-04-21 NOTE — H&P (VIEW-ONLY)
Office Visit - General Surgery  Beatrice Miranda MRN: [id number]  Encounter: [id number]    Assessment and Plan    Problem List Items Addressed This Visit        Genitourinary    Renal failure - Primary     I discussed PD catheter placement with him  I discussed the procedure in detail including risks, benefits, alternatives, and what to expect post op  I discussed the complications of PD catheter with him and he is agreeable to proceed  Plan: laparoscopic PD catheter placement               Chief Complaint:  Beatrice Miranda is a 55 y o  male who presents for Chronic Kidney Disease (Consult PD cath placement)    Subjective  55year old male with progressive renal failure here to discuss PD catheter  History of Lupus renal failure    Past Medical History  Past Medical History:   Diagnosis Date    CHF [facility]cong[facility]) ([facility])     Hypertension     [facility] [address]     Osteoporosis     Rheumatoid arthritis ([facility] [address] [facility]        Past Surgical History  Past Surgical History:   Procedure Laterality Date    CT NEEDLE BIOPSY KIDNEY  11/3/2020    HERNIA REPAIR  199?     IR TUNNELED DIALYSIS CATHETER PLACEMENT  1/8/2021    REPLACEMENT TOTAL KNEE Bilateral     right 06/12 left 07/2016    REVISION TOTAL HIP ARTHROPLASTY Bilateral     right 05/2004 left 02/2006    TOTAL HIP ARTHROPLASTY Bilateral 7690/[id number]    Right 1988/ left 1989       Family History  Family History   Problem Relation Age of Onset    Varicose Veins Father        Social History  Social History     Socioeconomic History    Marital status: /Civil Union     Spouse name: None    Number of children: None    Years of education: None    Highest education level: None   Occupational History    None   Social Needs    Financial resource strain: None    Food insecurity     Worry: None     Inability: None    Transportation needs     Medical: None     Non-medical: None   Tobacco Use    Smoking status: Former Smoker     Packs/day: 1 00     Years: 10 00     Pack years: 10 00     Types: Cigarettes     Quit date: 18     Years since quittin 3    Smokeless tobacco: Former User     Types: Chew    Tobacco comment: 25 yrs    Substance and Sexual Activity    Alcohol use: Never     Frequency: Never    Drug use: Never    Sexual activity: None   Lifestyle    Physical activity     Days per week: None     Minutes per session: None    Stress: None   Relationships    Social connections     Talks on phone: None     Gets together: None     Attends Jew service: None     Active member of club or organization: None     Attends meetings of clubs or organizations: None     Relationship status: None    Intimate partner violence     Fear of current or ex partner: None     Emotionally abused: None     Physically abused: None     Forced sexual activity: None   Other Topics Concern    None   Social History Narrative    None        Medications  Current Outpatient Medications on File Prior to Visit   Medication Sig Dispense Refill    acetaminophen (TYLENOL) 500 mg tablet Take 500 mg by mouth every 6 (six) hours as needed for mild pain      amLODIPine (NORVASC) 10 mg tablet Take 1 tablet (10 mg total) by mouth daily 90 tablet 1    apixaban (ELIQUIS) 5 mg Take 1 tablet (5 mg total) by mouth 2 (two) times a day 60 tablet 3    atorvastatin (LIPITOR) 40 mg tablet Take 1 tablet (40 mg total) by mouth daily 30 tablet 3    calcium-vitamin D (OSCAL) 250-125 MG-UNIT per tablet Take 1 tablet by mouth 2 (two) times a day  0    clindamycin (CLEOCIN) 300 MG capsule Take 600 mg by mouth 1 hour prior to Dental appointment      doxazosin (CARDURA) 4 mg tablet Take 1 tablet (4 mg total) by mouth daily at bedtime 30 tablet 11    Elastic Bandages & Supports (MEDICAL COMPRESSION STOCKINGS) MISC by Does not apply route daily Knee High 20-30mmHg 2 each 0    metoprolol succinate (TOPROL-XL) 100 mg 24 hr tablet Take 1 tablet (100 mg total) by mouth daily 90 tablet 1    Multiple Vitamins-Minerals (CENTRUM SILVER) tablet Take 1 tablet by mouth daily       nystatin (MYCOSTATIN) 500,000 units/5 mL suspension Swish and swallow 5 mL (500,000 Units total) 2 (two) times a day 300 mL 1    pantoprazole (PROTONIX) 40 mg tablet Take 1 tablet (40 mg total) by mouth daily 90 tablet 1    predniSONE 20 mg tablet Take 1 tablet (20 mg total) by mouth daily (Patient taking differently: Take 15 mg by mouth daily ) 60 tablet 1    sevelamer (RENAGEL) 800 mg tablet Take 1 tablet (800 mg total) by mouth 3 (three) times a day with meals 180 tablet 2    torsemide (DEMADEX) 20 mg tablet Take 3 tablets (60 mg total) by mouth daily 90 tablet 1     No current facility-administered medications on file prior to visit  Allergies  No Known Allergies    Review of Systems   Constitutional: Negative for chills, fatigue and fever  HENT: Negative for congestion, ear pain, sneezing, trouble swallowing and voice change  Eyes: Negative for pain and discharge  Respiratory: Negative for cough, shortness of breath and wheezing  Cardiovascular: Negative for palpitations and leg swelling  Gastrointestinal: Negative for abdominal pain, constipation, diarrhea, nausea and vomiting  Endocrine: Negative for cold intolerance, heat intolerance and polyuria  Genitourinary: Negative for decreased urine volume, difficulty urinating and dysuria  Musculoskeletal: Negative for arthralgias and back pain  Skin: Negative for rash and wound  Cool finger tips   Allergic/Immunologic: Negative for environmental allergies and food allergies  Neurological: Negative for dizziness and weakness  Hematological: Negative for adenopathy  Does not bruise/bleed easily  Psychiatric/Behavioral: Negative for confusion and sleep disturbance  The patient is not nervous/anxious  All other systems reviewed and are negative        Objective  Vitals:    04/21/21 1405   BP: 148/86   Pulse: 68   Temp: 97 8 °F (36 6 °C) Physical Exam  Vitals signs and nursing note reviewed  Constitutional:       General: He is not in acute distress  Appearance: He is well-developed  He is not diaphoretic  HENT:      Head: Normocephalic and atraumatic  Right Ear: External ear normal       Left Ear: External ear normal    Eyes:      General: No scleral icterus  Conjunctiva/sclera: Conjunctivae normal    Neck:      Musculoskeletal: Normal range of motion and neck supple  Thyroid: No thyromegaly  Trachea: No tracheal deviation  Cardiovascular:      Rate and Rhythm: Normal rate and regular rhythm  Heart sounds: Normal heart sounds  No murmur  Pulmonary:      Effort: Pulmonary effort is normal  No respiratory distress  Breath sounds: Normal breath sounds  No wheezing or rales  Abdominal:      General: There is no distension  Palpations: Abdomen is soft  There is no mass  Tenderness: There is no abdominal tenderness  There is no guarding or rebound  Musculoskeletal: Normal range of motion  Lymphadenopathy:      Cervical: No cervical adenopathy  Skin:     General: Skin is warm and dry  Neurological:      Mental Status: He is alert and oriented to person, place, and time  Psychiatric:         Behavior: Behavior normal          Thought Content:  Thought content normal          Judgment: Judgment normal

## 2021-04-21 NOTE — ASSESSMENT & PLAN NOTE
I discussed PD catheter placement with him  I discussed the procedure in detail including risks, benefits, alternatives, and what to expect post op  I discussed the complications of PD catheter with him and he is agreeable to proceed      Plan: laparoscopic PD catheter placement

## 2021-04-21 NOTE — PROGRESS NOTES
Office Visit - General Surgery  Natacha Anderson MRN: [id number]  Encounter: [id number]    Assessment and Plan    Problem List Items Addressed This Visit        Genitourinary    Renal failure - Primary     I discussed PD catheter placement with him  I discussed the procedure in detail including risks, benefits, alternatives, and what to expect post op  I discussed the complications of PD catheter with him and he is agreeable to proceed  Plan: laparoscopic PD catheter placement               Chief Complaint:  Natacha Anderson is a 55 y o  male who presents for Chronic Kidney Disease (Consult PD cath placement)    Subjective  55year old male with progressive renal failure here to discuss PD catheter  History of Lupus renal failure    Past Medical History  Past Medical History:   Diagnosis Date    [facility]     Hypertension     [facility]up[facility] [address]     Osteoporosis     Rheumatoid arthritis ([facility] [address] [facility]        Past Surgical History  Past Surgical History:   Procedure Laterality Date    CT NEEDLE BIOPSY KIDNEY  11/3/2020    HERNIA REPAIR  199?     IR TUNNELED DIALYSIS CATHETER PLACEMENT  1/8/2021    REPLACEMENT TOTAL KNEE Bilateral     right 06/12 left 07/2016    REVISION TOTAL HIP ARTHROPLASTY Bilateral     right 05/2004 left 02/2006    TOTAL HIP ARTHROPLASTY Bilateral 1860/6835    Right 1988/ left 1989       Family History  Family History   Problem Relation Age of Onset    Varicose Veins Father        Social History  Social History     Socioeconomic History    Marital status: /Civil Union     Spouse name: None    Number of children: None    Years of education: None    Highest education level: None   Occupational History    None   Social Needs    Financial resource strain: None    Food insecurity     Worry: None     Inability: None    Transportation needs     Medical: None     Non-medical: None   Tobacco Use    Smoking status: Former Smoker     Packs/day: 1 00     Years: 10 00     Pack years: 10 00     Types: Cigarettes     Quit date: 18     Years since quittin 3    Smokeless tobacco: Former User     Types: Chew    Tobacco comment: 25 yrs    Substance and Sexual Activity    Alcohol use: Never     Frequency: Never    Drug use: Never    Sexual activity: None   Lifestyle    Physical activity     Days per week: None     Minutes per session: None    Stress: None   Relationships    Social connections     Talks on phone: None     Gets together: None     Attends Zoroastrianism service: None     Active member of club or organization: None     Attends meetings of clubs or organizations: None     Relationship status: None    Intimate partner violence     Fear of current or ex partner: None     Emotionally abused: None     Physically abused: None     Forced sexual activity: None   Other Topics Concern    None   Social History Narrative    None        Medications  Current Outpatient Medications on File Prior to Visit   Medication Sig Dispense Refill    acetaminophen (TYLENOL) 500 mg tablet Take 500 mg by mouth every 6 (six) hours as needed for mild pain      amLODIPine (NORVASC) 10 mg tablet Take 1 tablet (10 mg total) by mouth daily 90 tablet 1    apixaban (ELIQUIS) 5 mg Take 1 tablet (5 mg total) by mouth 2 (two) times a day 60 tablet 3    atorvastatin (LIPITOR) 40 mg tablet Take 1 tablet (40 mg total) by mouth daily 30 tablet 3    calcium-vitamin D (OSCAL) 250-125 MG-UNIT per tablet Take 1 tablet by mouth 2 (two) times a day  0    clindamycin (CLEOCIN) 300 MG capsule Take 600 mg by mouth 1 hour prior to Dental appointment      doxazosin (CARDURA) 4 mg tablet Take 1 tablet (4 mg total) by mouth daily at bedtime 30 tablet 11    Elastic Bandages & Supports (MEDICAL COMPRESSION STOCKINGS) MISC by Does not apply route daily Knee High 20-30mmHg 2 each 0    metoprolol succinate (TOPROL-XL) 100 mg 24 hr tablet Take 1 tablet (100 mg total) by mouth daily 90 tablet 1    Multiple Vitamins-Minerals (CENTRUM SILVER) tablet Take 1 tablet by mouth daily       nystatin (MYCOSTATIN) 500,000 units/5 mL suspension Swish and swallow 5 mL (500,000 Units total) 2 (two) times a day 300 mL 1    pantoprazole (PROTONIX) 40 mg tablet Take 1 tablet (40 mg total) by mouth daily 90 tablet 1    predniSONE 20 mg tablet Take 1 tablet (20 mg total) by mouth daily (Patient taking differently: Take 15 mg by mouth daily ) 60 tablet 1    sevelamer (RENAGEL) 800 mg tablet Take 1 tablet (800 mg total) by mouth 3 (three) times a day with meals 180 tablet 2    torsemide (DEMADEX) 20 mg tablet Take 3 tablets (60 mg total) by mouth daily 90 tablet 1     No current facility-administered medications on file prior to visit  Allergies  No Known Allergies    Review of Systems   Constitutional: Negative for chills, fatigue and fever  HENT: Negative for congestion, ear pain, sneezing, trouble swallowing and voice change  Eyes: Negative for pain and discharge  Respiratory: Negative for cough, shortness of breath and wheezing  Cardiovascular: Negative for palpitations and leg swelling  Gastrointestinal: Negative for abdominal pain, constipation, diarrhea, nausea and vomiting  Endocrine: Negative for cold intolerance, heat intolerance and polyuria  Genitourinary: Negative for decreased urine volume, difficulty urinating and dysuria  Musculoskeletal: Negative for arthralgias and back pain  Skin: Negative for rash and wound  Cool finger tips   Allergic/Immunologic: Negative for environmental allergies and food allergies  Neurological: Negative for dizziness and weakness  Hematological: Negative for adenopathy  Does not bruise/bleed easily  Psychiatric/Behavioral: Negative for confusion and sleep disturbance  The patient is not nervous/anxious  All other systems reviewed and are negative        Objective  Vitals:    04/21/21 1405   BP: 148/86   Pulse: 68   Temp: 97 8 °F (36 6 °C) Physical Exam  Vitals signs and nursing note reviewed  Constitutional:       General: He is not in acute distress  Appearance: He is well-developed  He is not diaphoretic  HENT:      Head: Normocephalic and atraumatic  Right Ear: External ear normal       Left Ear: External ear normal    Eyes:      General: No scleral icterus  Conjunctiva/sclera: Conjunctivae normal    Neck:      Musculoskeletal: Normal range of motion and neck supple  Thyroid: No thyromegaly  Trachea: No tracheal deviation  Cardiovascular:      Rate and Rhythm: Normal rate and regular rhythm  Heart sounds: Normal heart sounds  No murmur  Pulmonary:      Effort: Pulmonary effort is normal  No respiratory distress  Breath sounds: Normal breath sounds  No wheezing or rales  Abdominal:      General: There is no distension  Palpations: Abdomen is soft  There is no mass  Tenderness: There is no abdominal tenderness  There is no guarding or rebound  Musculoskeletal: Normal range of motion  Lymphadenopathy:      Cervical: No cervical adenopathy  Skin:     General: Skin is warm and dry  Neurological:      Mental Status: He is alert and oriented to person, place, and time  Psychiatric:         Behavior: Behavior normal          Thought Content:  Thought content normal          Judgment: Judgment normal

## 2021-04-21 NOTE — H&P (VIEW-ONLY)
Office Visit - General Surgery  Moni Velez MRN: [id number]  Encounter: [id number]    Assessment and Plan    Problem List Items Addressed This Visit        Genitourinary    Renal failure - Primary     I discussed PD catheter placement with him  I discussed the procedure in detail including risks, benefits, alternatives, and what to expect post op  I discussed the complications of PD catheter with him and he is agreeable to proceed  Plan: laparoscopic PD catheter placement               Chief Complaint:  Moni Velez is a 55 y o  male who presents for Chronic Kidney Disease (Consult PD cath placement)    Subjective  55year old male with progressive renal failure here to discuss PD catheter  History of Lupus renal failure    Past Medical History  Past Medical History:   Diagnosis Date    [facility])     Hypertension     [facility]up[facility] [address]     Osteoporosis     Rheumatoid arthritis ([facility] [address] [facility]        Past Surgical History  Past Surgical History:   Procedure Laterality Date    CT NEEDLE BIOPSY KIDNEY  11/3/2020    HERNIA REPAIR  199?     IR TUNNELED DIALYSIS CATHETER PLACEMENT  1/8/2021    REPLACEMENT TOTAL KNEE Bilateral     right 06/12 left 07/2016    REVISION TOTAL HIP ARTHROPLASTY Bilateral     right 05/2004 left 02/2006    TOTAL HIP ARTHROPLASTY Bilateral 4876/[id number]    Right 1988/ left 1989       Family History  Family History   Problem Relation Age of Onset    Varicose Veins Father        Social History  Social History     Socioeconomic History    Marital status: /Civil Union     Spouse name: None    Number of children: None    Years of education: None    Highest education level: None   Occupational History    None   Social Needs    Financial resource strain: None    Food insecurity     Worry: None     Inability: None    Transportation needs     Medical: None     Non-medical: None   Tobacco Use    Smoking status: Former Smoker     Packs/day: 1 00     Years: 10 00     Pack years: 10 00     Types: Cigarettes     Quit date: 18     Years since quittin 3    Smokeless tobacco: Former User     Types: Chew    Tobacco comment: 25 yrs    Substance and Sexual Activity    Alcohol use: Never     Frequency: Never    Drug use: Never    Sexual activity: None   Lifestyle    Physical activity     Days per week: None     Minutes per session: None    Stress: None   Relationships    Social connections     Talks on phone: None     Gets together: None     Attends Yarsanism service: None     Active member of club or organization: None     Attends meetings of clubs or organizations: None     Relationship status: None    Intimate partner violence     Fear of current or ex partner: None     Emotionally abused: None     Physically abused: None     Forced sexual activity: None   Other Topics Concern    None   Social History Narrative    None        Medications  Current Outpatient Medications on File Prior to Visit   Medication Sig Dispense Refill    acetaminophen (TYLENOL) 500 mg tablet Take 500 mg by mouth every 6 (six) hours as needed for mild pain      amLODIPine (NORVASC) 10 mg tablet Take 1 tablet (10 mg total) by mouth daily 90 tablet 1    apixaban (ELIQUIS) 5 mg Take 1 tablet (5 mg total) by mouth 2 (two) times a day 60 tablet 3    atorvastatin (LIPITOR) 40 mg tablet Take 1 tablet (40 mg total) by mouth daily 30 tablet 3    calcium-vitamin D (OSCAL) 250-125 MG-UNIT per tablet Take 1 tablet by mouth 2 (two) times a day  0    clindamycin (CLEOCIN) 300 MG capsule Take 600 mg by mouth 1 hour prior to Dental appointment      doxazosin (CARDURA) 4 mg tablet Take 1 tablet (4 mg total) by mouth daily at bedtime 30 tablet 11    Elastic Bandages & Supports (MEDICAL COMPRESSION STOCKINGS) MISC by Does not apply route daily Knee High 20-30mmHg 2 each 0    metoprolol succinate (TOPROL-XL) 100 mg 24 hr tablet Take 1 tablet (100 mg total) by mouth daily 90 tablet 1    Multiple Vitamins-Minerals (CENTRUM SILVER) tablet Take 1 tablet by mouth daily       nystatin (MYCOSTATIN) 500,000 units/5 mL suspension Swish and swallow 5 mL (500,000 Units total) 2 (two) times a day 300 mL 1    pantoprazole (PROTONIX) 40 mg tablet Take 1 tablet (40 mg total) by mouth daily 90 tablet 1    predniSONE 20 mg tablet Take 1 tablet (20 mg total) by mouth daily (Patient taking differently: Take 15 mg by mouth daily ) 60 tablet 1    sevelamer (RENAGEL) 800 mg tablet Take 1 tablet (800 mg total) by mouth 3 (three) times a day with meals 180 tablet 2    torsemide (DEMADEX) 20 mg tablet Take 3 tablets (60 mg total) by mouth daily 90 tablet 1     No current facility-administered medications on file prior to visit  Allergies  No Known Allergies    Review of Systems   Constitutional: Negative for chills, fatigue and fever  HENT: Negative for congestion, ear pain, sneezing, trouble swallowing and voice change  Eyes: Negative for pain and discharge  Respiratory: Negative for cough, shortness of breath and wheezing  Cardiovascular: Negative for palpitations and leg swelling  Gastrointestinal: Negative for abdominal pain, constipation, diarrhea, nausea and vomiting  Endocrine: Negative for cold intolerance, heat intolerance and polyuria  Genitourinary: Negative for decreased urine volume, difficulty urinating and dysuria  Musculoskeletal: Negative for arthralgias and back pain  Skin: Negative for rash and wound  Cool finger tips   Allergic/Immunologic: Negative for environmental allergies and food allergies  Neurological: Negative for dizziness and weakness  Hematological: Negative for adenopathy  Does not bruise/bleed easily  Psychiatric/Behavioral: Negative for confusion and sleep disturbance  The patient is not nervous/anxious  All other systems reviewed and are negative        Objective  Vitals:    04/21/21 1405   BP: 148/86   Pulse: 68   Temp: 97 8 °F (36 6 °C) Physical Exam  Vitals signs and nursing note reviewed  Constitutional:       General: He is not in acute distress  Appearance: He is well-developed  He is not diaphoretic  HENT:      Head: Normocephalic and atraumatic  Right Ear: External ear normal       Left Ear: External ear normal    Eyes:      General: No scleral icterus  Conjunctiva/sclera: Conjunctivae normal    Neck:      Musculoskeletal: Normal range of motion and neck supple  Thyroid: No thyromegaly  Trachea: No tracheal deviation  Cardiovascular:      Rate and Rhythm: Normal rate and regular rhythm  Heart sounds: Normal heart sounds  No murmur  Pulmonary:      Effort: Pulmonary effort is normal  No respiratory distress  Breath sounds: Normal breath sounds  No wheezing or rales  Abdominal:      General: There is no distension  Palpations: Abdomen is soft  There is no mass  Tenderness: There is no abdominal tenderness  There is no guarding or rebound  Musculoskeletal: Normal range of motion  Lymphadenopathy:      Cervical: No cervical adenopathy  Skin:     General: Skin is warm and dry  Neurological:      Mental Status: He is alert and oriented to person, place, and time  Psychiatric:         Behavior: Behavior normal          Thought Content:  Thought content normal          Judgment: Judgment normal

## 2021-04-29 ENCOUNTER — PREP FOR PROCEDURE (OUTPATIENT)
Dept: INTERVENTIONAL RADIOLOGY/VASCULAR | Facility: CLINIC | Age: 56
End: 2021-04-29

## 2021-04-29 ENCOUNTER — TRANSCRIBE ORDERS (OUTPATIENT)
Dept: RADIOLOGY | Facility: HOSPITAL | Age: 56
End: 2021-04-29

## 2021-04-29 DIAGNOSIS — N18.1 CHRONIC KIDNEY DISEASE (CKD), STAGE I: Primary | ICD-10-CM

## 2021-04-29 DIAGNOSIS — N18.9 CHRONIC KIDNEY DISEASE, UNSPECIFIED CKD STAGE: Primary | ICD-10-CM

## 2021-04-30 ENCOUNTER — APPOINTMENT (OUTPATIENT)
Dept: LAB | Facility: CLINIC | Age: 56
End: 2021-04-30
Payer: COMMERCIAL

## 2021-04-30 ENCOUNTER — TELEPHONE (OUTPATIENT)
Dept: INTERVENTIONAL RADIOLOGY/VASCULAR | Facility: HOSPITAL | Age: 56
End: 2021-04-30

## 2021-04-30 DIAGNOSIS — N18.9 ACUTE KIDNEY INJURY SUPERIMPOSED ON CHRONIC KIDNEY DISEASE (HCC): ICD-10-CM

## 2021-04-30 DIAGNOSIS — N17.9 ACUTE KIDNEY INJURY SUPERIMPOSED ON CHRONIC KIDNEY DISEASE (HCC): ICD-10-CM

## 2021-04-30 DIAGNOSIS — M32.14 LUPUS NEPHRITIS (HCC): ICD-10-CM

## 2021-04-30 LAB
ALBUMIN SERPL BCP-MCNC: 3.8 G/DL (ref 3.5–5)
ALP SERPL-CCNC: 78 U/L (ref 46–116)
ALT SERPL W P-5'-P-CCNC: 26 U/L (ref 12–78)
AMORPH URATE CRY URNS QL MICRO: ABNORMAL /HPF
ANION GAP SERPL CALCULATED.3IONS-SCNC: 13 MMOL/L (ref 4–13)
AST SERPL W P-5'-P-CCNC: 20 U/L (ref 5–45)
BACTERIA UR QL AUTO: ABNORMAL /HPF
BILIRUB SERPL-MCNC: 0.91 MG/DL (ref 0.2–1)
BILIRUB UR QL STRIP: NEGATIVE
BUN SERPL-MCNC: 58 MG/DL (ref 5–25)
C3 SERPL-MCNC: 108 MG/DL (ref 90–180)
C4 SERPL-MCNC: 27 MG/DL (ref 10–40)
CALCIUM SERPL-MCNC: 9.4 MG/DL (ref 8.3–10.1)
CHLORIDE SERPL-SCNC: 103 MMOL/L (ref 100–108)
CLARITY UR: ABNORMAL
CO2 SERPL-SCNC: 23 MMOL/L (ref 21–32)
COARSE GRAN CASTS URNS QL MICRO: ABNORMAL /LPF
COLOR UR: YELLOW
CREAT SERPL-MCNC: 4.21 MG/DL (ref 0.6–1.3)
CREAT UR-MCNC: 142 MG/DL
ERYTHROCYTE [DISTWIDTH] IN BLOOD BY AUTOMATED COUNT: 19.8 % (ref 11.6–15.1)
FINE GRAN CASTS URNS QL MICRO: ABNORMAL /LPF
GFR SERPL CREATININE-BSD FRML MDRD: 15 ML/MIN/1.73SQ M
GLUCOSE P FAST SERPL-MCNC: 87 MG/DL (ref 65–99)
GLUCOSE UR STRIP-MCNC: NEGATIVE MG/DL
HCT VFR BLD AUTO: 26.8 % (ref 36.5–49.3)
HGB BLD-MCNC: 7.9 G/DL (ref 12–17)
HGB UR QL STRIP.AUTO: ABNORMAL
KETONES UR STRIP-MCNC: NEGATIVE MG/DL
LEUKOCYTE ESTERASE UR QL STRIP: NEGATIVE
MCH RBC QN AUTO: 31.3 PG (ref 26.8–34.3)
MCHC RBC AUTO-ENTMCNC: 29.5 G/DL (ref 31.4–37.4)
MCV RBC AUTO: 106 FL (ref 82–98)
NITRITE UR QL STRIP: NEGATIVE
NON-SQ EPI CELLS URNS QL MICRO: ABNORMAL /HPF
PH UR STRIP.AUTO: 5 [PH]
PLATELET # BLD AUTO: 220 THOUSANDS/UL (ref 149–390)
PMV BLD AUTO: 9.3 FL (ref 8.9–12.7)
POTASSIUM SERPL-SCNC: 4 MMOL/L (ref 3.5–5.3)
PROT SERPL-MCNC: 6.7 G/DL (ref 6.4–8.2)
PROT UR STRIP-MCNC: ABNORMAL MG/DL
PROT UR-MCNC: 120 MG/DL
PROT/CREAT UR: 0.85 MG/G{CREAT} (ref 0–0.1)
RBC # BLD AUTO: 2.52 MILLION/UL (ref 3.88–5.62)
RBC #/AREA URNS AUTO: ABNORMAL /HPF
SODIUM SERPL-SCNC: 139 MMOL/L (ref 136–145)
SP GR UR STRIP.AUTO: 1.01 (ref 1–1.03)
UROBILINOGEN UR QL STRIP.AUTO: 0.2 E.U./DL
WBC # BLD AUTO: 8.7 THOUSAND/UL (ref 4.31–10.16)
WBC #/AREA URNS AUTO: ABNORMAL /HPF

## 2021-04-30 PROCEDURE — 86225 DNA ANTIBODY NATIVE: CPT

## 2021-04-30 PROCEDURE — 86038 ANTINUCLEAR ANTIBODIES: CPT

## 2021-04-30 PROCEDURE — 80053 COMPREHEN METABOLIC PANEL: CPT

## 2021-04-30 PROCEDURE — 86255 FLUORESCENT ANTIBODY SCREEN: CPT

## 2021-04-30 PROCEDURE — 83520 IMMUNOASSAY QUANT NOS NONAB: CPT

## 2021-04-30 PROCEDURE — 86160 COMPLEMENT ANTIGEN: CPT

## 2021-04-30 PROCEDURE — 85027 COMPLETE CBC AUTOMATED: CPT

## 2021-04-30 PROCEDURE — 81001 URINALYSIS AUTO W/SCOPE: CPT

## 2021-04-30 PROCEDURE — 82570 ASSAY OF URINE CREATININE: CPT

## 2021-04-30 PROCEDURE — 86355 B CELLS TOTAL COUNT: CPT

## 2021-04-30 PROCEDURE — 84156 ASSAY OF PROTEIN URINE: CPT

## 2021-04-30 PROCEDURE — 86357 NK CELLS TOTAL COUNT: CPT

## 2021-04-30 PROCEDURE — 36415 COLL VENOUS BLD VENIPUNCTURE: CPT

## 2021-05-01 LAB — DSDNA AB SER-ACNC: 37 IU/ML (ref 0–9)

## 2021-05-02 LAB — PROTEINASE3 AB SER IA-ACNC: <3.5 U/ML (ref 0–3.5)

## 2021-05-03 LAB — RYE IGE QN: NEGATIVE

## 2021-05-03 NOTE — PRE-PROCEDURE INSTRUCTIONS
Pre-Surgery Instructions:   Medication Instructions    acetaminophen (TYLENOL) 500 mg tablet Instructed patient per Anesthesia Guidelines   amLODIPine (NORVASC) 10 mg tablet Instructed patient per Anesthesia Guidelines   apixaban (ELIQUIS) 5 mg Patient was instructed by Physician and understands   atorvastatin (LIPITOR) 40 mg tablet Instructed patient per Anesthesia Guidelines   calcium-vitamin D (OSCAL) 250-125 MG-UNIT per tablet Instructed patient per Anesthesia Guidelines   clindamycin (CLEOCIN) 300 MG capsule Instructed patient per Anesthesia Guidelines   doxazosin (CARDURA) 4 mg tablet Instructed patient per Anesthesia Guidelines   Elastic Bandages & Supports (MEDICAL COMPRESSION STOCKINGS) MISC Instructed patient per Anesthesia Guidelines   metoprolol succinate (TOPROL-XL) 100 mg 24 hr tablet Instructed patient per Anesthesia Guidelines   Multiple Vitamins-Minerals (CENTRUM SILVER) tablet Instructed patient per Anesthesia Guidelines   nystatin (MYCOSTATIN) 500,000 units/5 mL suspension Instructed patient per Anesthesia Guidelines   pantoprazole (PROTONIX) 40 mg tablet Instructed patient per Anesthesia Guidelines   predniSONE 20 mg tablet Instructed patient per Anesthesia Guidelines   sevelamer (RENAGEL) 800 mg tablet Instructed patient per Anesthesia Guidelines   torsemide (DEMADEX) 20 mg tablet Instructed patient per Anesthesia Guidelines  Spoke with patient medication list reviewed  Pt has already stopped elaquis as instructed by surgeon  Pt told not to take any vitamins or nsaids  Pt will not take demadex DOS  He will only take Metoprolol and amlodipine DOS with sip of water  Npo after midnight  Pt denies covid symptoms and has been vaccinated  Pt understands shower instructions 1 adult visitor policy and the need for a ride home after surgery  asc will call between 2p-7p with arrival time to [facility] direction were given

## 2021-05-04 ENCOUNTER — ANESTHESIA EVENT (OUTPATIENT)
Dept: PERIOP | Facility: HOSPITAL | Age: 56
End: 2021-05-04
Payer: COMMERCIAL

## 2021-05-04 ENCOUNTER — HOSPITAL ENCOUNTER (OUTPATIENT)
Facility: HOSPITAL | Age: 56
Setting detail: OUTPATIENT SURGERY
Discharge: HOME | End: 2021-05-04
Attending: SURGERY | Admitting: SURGERY
Payer: COMMERCIAL

## 2021-05-04 ENCOUNTER — ANESTHESIA (OUTPATIENT)
Dept: PERIOP | Facility: HOSPITAL | Age: 56
End: 2021-05-04
Payer: COMMERCIAL

## 2021-05-04 VITALS
DIASTOLIC BLOOD PRESSURE: 72 MMHG | HEART RATE: 71 BPM | TEMPERATURE: 97.5 F | RESPIRATION RATE: 18 BRPM | WEIGHT: 157 LBS | BODY MASS INDEX: 23.25 KG/M2 | SYSTOLIC BLOOD PRESSURE: 114 MMHG | OXYGEN SATURATION: 96 % | HEIGHT: 69 IN

## 2021-05-04 DIAGNOSIS — N18.5 STAGE 5 CHRONIC KIDNEY DISEASE NOT ON CHRONIC DIALYSIS (HCC): Primary | ICD-10-CM

## 2021-05-04 PROBLEM — I25.10 3-VESSEL CORONARY ARTERY DISEASE: Status: ACTIVE | Noted: 2017-07-27

## 2021-05-04 PROBLEM — I34.0 NONRHEUMATIC MITRAL VALVE REGURGITATION: Status: ACTIVE | Noted: 2020-12-30

## 2021-05-04 PROCEDURE — C1750 CATH, HEMODIALYSIS,LONG-TERM: HCPCS | Performed by: SURGERY

## 2021-05-04 PROCEDURE — 49324 LAP INSERT TUNNEL IP CATH: CPT | Performed by: SURGERY

## 2021-05-04 DEVICE — PERITONEAL DIALYSIS CATHETER,MONCRIEF-POPOVICH SWAN NECK CURL CATH, 2 CUFF,62.5 CM
Type: IMPLANTABLE DEVICE | Site: ABDOMEN | Status: FUNCTIONAL
Brand: ARGYLE

## 2021-05-04 RX ORDER — BUPIVACAINE HYDROCHLORIDE AND EPINEPHRINE 5; 5 MG/ML; UG/ML
INJECTION, SOLUTION PERINEURAL AS NEEDED
Status: DISCONTINUED | OUTPATIENT
Start: 2021-05-04 | End: 2021-05-04 | Stop reason: HOSPADM

## 2021-05-04 RX ORDER — ONDANSETRON 2 MG/ML
4 INJECTION INTRAMUSCULAR; INTRAVENOUS ONCE AS NEEDED
Status: DISCONTINUED | OUTPATIENT
Start: 2021-05-04 | End: 2021-05-04 | Stop reason: HOSPADM

## 2021-05-04 RX ORDER — HYDROMORPHONE HCL/PF 1 MG/ML
0.2 SYRINGE (ML) INJECTION EVERY 4 HOURS PRN
Status: DISCONTINUED | OUTPATIENT
Start: 2021-05-04 | End: 2021-05-04 | Stop reason: HOSPADM

## 2021-05-04 RX ORDER — ONDANSETRON 2 MG/ML
INJECTION INTRAMUSCULAR; INTRAVENOUS AS NEEDED
Status: DISCONTINUED | OUTPATIENT
Start: 2021-05-04 | End: 2021-05-04

## 2021-05-04 RX ORDER — HYDROCODONE BITARTRATE AND ACETAMINOPHEN 5; 325 MG/1; MG/1
1 TABLET ORAL EVERY 4 HOURS PRN
Status: DISCONTINUED | OUTPATIENT
Start: 2021-05-04 | End: 2021-05-04 | Stop reason: HOSPADM

## 2021-05-04 RX ORDER — PROPOFOL 10 MG/ML
INJECTION, EMULSION INTRAVENOUS AS NEEDED
Status: DISCONTINUED | OUTPATIENT
Start: 2021-05-04 | End: 2021-05-04

## 2021-05-04 RX ORDER — GLYCOPYRROLATE 0.2 MG/ML
INJECTION INTRAMUSCULAR; INTRAVENOUS AS NEEDED
Status: DISCONTINUED | OUTPATIENT
Start: 2021-05-04 | End: 2021-05-04

## 2021-05-04 RX ORDER — HYDROMORPHONE HCL IN WATER/PF 6 MG/30 ML
0.2 PATIENT CONTROLLED ANALGESIA SYRINGE INTRAVENOUS
Status: DISCONTINUED | OUTPATIENT
Start: 2021-05-04 | End: 2021-05-04 | Stop reason: HOSPADM

## 2021-05-04 RX ORDER — LIDOCAINE HYDROCHLORIDE 10 MG/ML
INJECTION, SOLUTION EPIDURAL; INFILTRATION; INTRACAUDAL; PERINEURAL AS NEEDED
Status: DISCONTINUED | OUTPATIENT
Start: 2021-05-04 | End: 2021-05-04

## 2021-05-04 RX ORDER — ONDANSETRON 2 MG/ML
4 INJECTION INTRAMUSCULAR; INTRAVENOUS ONCE
Status: DISCONTINUED | OUTPATIENT
Start: 2021-05-04 | End: 2021-05-04 | Stop reason: HOSPADM

## 2021-05-04 RX ORDER — NEOSTIGMINE METHYLSULFATE 1 MG/ML
INJECTION INTRAVENOUS AS NEEDED
Status: DISCONTINUED | OUTPATIENT
Start: 2021-05-04 | End: 2021-05-04

## 2021-05-04 RX ORDER — HEPARIN SODIUM (PORCINE) LOCK FLUSH IV SOLN 100 UNIT/ML 100 UNIT/ML
SOLUTION INTRAVENOUS AS NEEDED
Status: DISCONTINUED | OUTPATIENT
Start: 2021-05-04 | End: 2021-05-04 | Stop reason: HOSPADM

## 2021-05-04 RX ORDER — FENTANYL CITRATE 50 UG/ML
INJECTION, SOLUTION INTRAMUSCULAR; INTRAVENOUS AS NEEDED
Status: DISCONTINUED | OUTPATIENT
Start: 2021-05-04 | End: 2021-05-04

## 2021-05-04 RX ORDER — CEFAZOLIN SODIUM 1 G/50ML
1000 SOLUTION INTRAVENOUS
Status: COMPLETED | OUTPATIENT
Start: 2021-05-04 | End: 2021-05-04

## 2021-05-04 RX ORDER — SODIUM CHLORIDE, SODIUM LACTATE, POTASSIUM CHLORIDE, CALCIUM CHLORIDE 600; 310; 30; 20 MG/100ML; MG/100ML; MG/100ML; MG/100ML
20 INJECTION, SOLUTION INTRAVENOUS CONTINUOUS
Status: DISCONTINUED | OUTPATIENT
Start: 2021-05-04 | End: 2021-05-04 | Stop reason: HOSPADM

## 2021-05-04 RX ORDER — OXYCODONE HYDROCHLORIDE 5 MG/1
5 TABLET ORAL EVERY 4 HOURS PRN
Qty: 10 TABLET | Refills: 0 | Status: SHIPPED | OUTPATIENT
Start: 2021-05-04 | End: 2021-05-14

## 2021-05-04 RX ORDER — SODIUM CHLORIDE, SODIUM LACTATE, POTASSIUM CHLORIDE, CALCIUM CHLORIDE 600; 310; 30; 20 MG/100ML; MG/100ML; MG/100ML; MG/100ML
125 INJECTION, SOLUTION INTRAVENOUS CONTINUOUS
Status: DISCONTINUED | OUTPATIENT
Start: 2021-05-04 | End: 2021-05-04 | Stop reason: HOSPADM

## 2021-05-04 RX ORDER — DEXAMETHASONE SODIUM PHOSPHATE 4 MG/ML
INJECTION, SOLUTION INTRA-ARTICULAR; INTRALESIONAL; INTRAMUSCULAR; INTRAVENOUS; SOFT TISSUE AS NEEDED
Status: DISCONTINUED | OUTPATIENT
Start: 2021-05-04 | End: 2021-05-04

## 2021-05-04 RX ORDER — ROCURONIUM BROMIDE 10 MG/ML
INJECTION, SOLUTION INTRAVENOUS AS NEEDED
Status: DISCONTINUED | OUTPATIENT
Start: 2021-05-04 | End: 2021-05-04

## 2021-05-04 RX ADMIN — NEOSTIGMINE METHYLSULFATE 3 MG: 1 INJECTION INTRAVENOUS at 08:23

## 2021-05-04 RX ADMIN — PHENYLEPHRINE HYDROCHLORIDE 100 MCG: 10 INJECTION INTRAVENOUS at 08:19

## 2021-05-04 RX ADMIN — PHENYLEPHRINE HYDROCHLORIDE 100 MCG: 10 INJECTION INTRAVENOUS at 08:05

## 2021-05-04 RX ADMIN — PHENYLEPHRINE HYDROCHLORIDE 100 MCG: 10 INJECTION INTRAVENOUS at 08:26

## 2021-05-04 RX ADMIN — GLYCOPYRROLATE 0.6 MG: 0.2 INJECTION, SOLUTION INTRAMUSCULAR; INTRAVENOUS at 08:23

## 2021-05-04 RX ADMIN — CEFAZOLIN SODIUM 1000 MG: 1 SOLUTION INTRAVENOUS at 07:45

## 2021-05-04 RX ADMIN — PROPOFOL 150 MG: 10 INJECTION, EMULSION INTRAVENOUS at 07:51

## 2021-05-04 RX ADMIN — DEXAMETHASONE SODIUM PHOSPHATE 10 MG: 4 INJECTION INTRA-ARTICULAR; INTRALESIONAL; INTRAMUSCULAR; INTRAVENOUS; SOFT TISSUE at 08:00

## 2021-05-04 RX ADMIN — ROCURONIUM BROMIDE 30 MG: 50 INJECTION, SOLUTION INTRAVENOUS at 07:51

## 2021-05-04 RX ADMIN — LIDOCAINE HYDROCHLORIDE 80 MG: 10 INJECTION, SOLUTION EPIDURAL; INFILTRATION; INTRACAUDAL; PERINEURAL at 07:50

## 2021-05-04 RX ADMIN — SODIUM CHLORIDE, SODIUM LACTATE, POTASSIUM CHLORIDE, AND CALCIUM CHLORIDE: .6; .31; .03; .02 INJECTION, SOLUTION INTRAVENOUS at 07:12

## 2021-05-04 RX ADMIN — FENTANYL CITRATE 100 MCG: 50 INJECTION INTRAMUSCULAR; INTRAVENOUS at 07:50

## 2021-05-04 RX ADMIN — ONDANSETRON 4 MG: 2 INJECTION INTRAMUSCULAR; INTRAVENOUS at 08:00

## 2021-05-04 RX ADMIN — PHENYLEPHRINE HYDROCHLORIDE 100 MCG: 10 INJECTION INTRAVENOUS at 08:22

## 2021-05-04 NOTE — INTERVAL H&P NOTE
H&P reviewed  After examining the patient I find no changes in the patients condition since the H&P had been written    Plan: laparoscopic Pd catheter placement

## 2021-05-04 NOTE — OP NOTE
OPERATIVE REPORT  PATIENT NAME: Arlington Chang Schoenfield    :  1965  MRN: [id number]  Pt Location: BE OR ROOM 06    SURGERY DATE: 2021    Surgeon(s) and Role:     * Lonny Guardado MD - Primary    Preop Diagnosis:  Chronic kidney disease, unspecified CKD stage [N18 9]    Post-Op Diagnosis Codes:     * Chronic kidney disease, unspecified CKD stage [N18 9]    Procedure(s) (LRB):  INSERTION PERITONEAL CATHETER DIALYSIS LAPAROSCOPIC (N/A)    Specimen(s):  * No specimens in log *    Estimated Blood Loss:   Minimal    Drains:  [REMOVED] NG/OG/Enteral Tube Orogastric 18 Fr Center mouth (Removed)   Number of days: 0       Anesthesia Type:   General    Operative Indications:  Chronic kidney disease, unspecified CKD stage [N18 9]      Operative Findings:  Few intra-abdominal adhesions which were easily lysed  Catheter functioned well    Complications:   None    Procedure and Technique:   the patient was identified by visualization and conversation on the operating room table  After satisfactory induction general anesthesia, the patient's abdomen was prepped and draped in usual sterile fashion  Time-out taken  Local anesthesia of 0 5% Marcaine with epi was infiltrated into the skin subcutaneous tissue in the left upper quadrant  A small stab wound was made  A Veress needle introduced into the abdominal cavity and carbon oxide  Was instilled  When there is about 2 L, local anesthesia was infiltrated on the right lateral abdomen approximately at the site where the catheter would come out  Incision made and an Optiview 5 mm trocar placed under direct vision  There 2 bands of adhesions holding fat up 1 near the umbilicus 1 in the pelvis  To the right of the umbilicus local anesthesia was infiltrated incision made a 5 mm trocar was introduced  A scissors was placed through this and cut both of those adhesions  The pelvis appeared free  The catheter was placed on a stylet and passed through the trocar    The trocar was removed the catheter was left in the pelvis  Saline was run in which ran in easily and drained easily  The catheter was then tunneled removing it at that 1st trocar site  The catheter was again tested and worked properly  The connector caps were applied to the and the catheter  Heparinized saline was instilled and the catheter capped  The subcutaneous tissue at the incision near the umbilicus was closed with 4 Monocryl the skin closed with subcuticular 4 Monocryl  The skin at the exit site was closed with 4 Monocryl  Glue was placed on the incisions  Dressing placed at the exit site  Patient was awakened taken to recovery room satisfactorily   Instrument needle sponge counts  Were correct  RF wanding  Was clear     I was present for the entire procedure, I was present for all critical portions of the procedure and A qualified resident physician was not available    Patient Disposition:  PACU     SIGNATURE: Alcon Bernardo MD  DATE: May 4, 2021  TIME: 9:00 AM

## 2021-05-04 NOTE — ANESTHESIA PREPROCEDURE EVALUATION
Procedure:  INSERTION PERITONEAL CATHETER DIALYSIS LAPAROSCOPIC (N/A Abdomen)    Relevant Problems   CARDIO   (+) 3-vessel coronary artery disease   (+) Chronic diastolic congestive heart failure (HCC)   (+) DVT (deep venous thrombosis) (HCC)   (+) Essential hypertension   (+) HLD (hyperlipidemia)   (+) Nonrheumatic mitral valve regurgitation      /RENAL   (+) Anemia of chronic kidney failure, unspecified stage   (+) Chronic kidney disease (CKD), stage I   (+) Renal failure      HEMATOLOGY   (+) Anemia of chronic kidney failure, unspecified stage      MUSCULOSKELETAL   (+) Rheumatoid arthritis involving multiple sites (HCC)   (+) SLE (systemic lupus erythematosus) (HCC)        Physical Exam    Airway    Mallampati score: III  TM Distance: >3 FB  Neck ROM: full     Dental   No notable dental hx     Cardiovascular      Pulmonary      Other Findings        Anesthesia Plan  ASA Score- 4     Anesthesia Type- general with ASA Monitors  Additional Monitors:   Airway Plan: ETT  Plan Factors-    Chart reviewed  Existing labs reviewed  Patient summary reviewed  Induction- intravenous  Postoperative Plan- Plan for postoperative opioid use  Informed Consent- Anesthetic plan and risks discussed with patient  I personally reviewed this patient with the CRNA  Discussed and agreed on the Anesthesia Plan with the RACHEL Aguilera

## 2021-05-04 NOTE — ANESTHESIA POSTPROCEDURE EVALUATION
Post-Op Assessment Note    CV Status:  Stable  Pain Score: 0    Pain management: adequate     Mental Status:  Alert and awake   Hydration Status:  Euvolemic   PONV Controlled:  Controlled   Airway Patency:  Patent      Post Op Vitals Reviewed: Yes      Staff: CRNA         No complications documented      BP   137/74   Temp   97 4   Pulse  69   Resp   18   SpO2   100

## 2021-05-05 DIAGNOSIS — M32.14 LUPUS NEPHRITIS (HCC): ICD-10-CM

## 2021-05-05 LAB
C-ANCA TITR SER IF: NORMAL TITER
MISCELLANEOUS LAB TEST RESULT: NORMAL
MYELOPEROXIDASE AB SER IA-ACNC: <9 U/ML (ref 0–9)
P-ANCA ATYPICAL TITR SER IF: NORMAL TITER
P-ANCA TITR SER IF: NORMAL TITER
PROTEINASE3 AB SER IA-ACNC: <3.5 U/ML (ref 0–3.5)

## 2021-05-05 RX ORDER — PREDNISONE 1 MG/1
15 TABLET ORAL DAILY
Qty: 90 TABLET | Refills: 2 | Status: SHIPPED | OUTPATIENT
Start: 2021-05-05

## 2021-05-06 ENCOUNTER — HOSPITAL ENCOUNTER (OUTPATIENT)
Dept: RADIOLOGY | Facility: HOSPITAL | Age: 56
Discharge: HOME | End: 2021-05-06
Attending: STUDENT IN AN ORGANIZED HEALTH CARE EDUCATION/TRAINING PROGRAM | Admitting: RADIOLOGY
Payer: COMMERCIAL

## 2021-05-06 VITALS — OXYGEN SATURATION: 98 % | SYSTOLIC BLOOD PRESSURE: 137 MMHG | DIASTOLIC BLOOD PRESSURE: 72 MMHG | HEART RATE: 78 BPM

## 2021-05-06 DIAGNOSIS — N18.1 CHRONIC KIDNEY DISEASE (CKD), STAGE I: ICD-10-CM

## 2021-05-06 PROCEDURE — C1769 GUIDE WIRE: HCPCS

## 2021-05-06 PROCEDURE — 36581 REPLACE TUNNELED CV CATH: CPT | Performed by: RADIOLOGY

## 2021-05-06 PROCEDURE — 77001 FLUOROGUIDE FOR VEIN DEVICE: CPT

## 2021-05-06 PROCEDURE — 36598 INJ W/FLUOR EVAL CV DEVICE: CPT

## 2021-05-06 PROCEDURE — 36581 REPLACE TUNNELED CV CATH: CPT

## 2021-05-06 PROCEDURE — C1750 CATH, HEMODIALYSIS,LONG-TERM: HCPCS

## 2021-05-06 RX ORDER — HEPARIN SODIUM 1000 [USP'U]/ML
INJECTION, SOLUTION INTRAVENOUS; SUBCUTANEOUS CODE/TRAUMA/SEDATION MEDICATION
Status: COMPLETED | OUTPATIENT
Start: 2021-05-06 | End: 2021-05-06

## 2021-05-06 RX ORDER — LIDOCAINE WITH 8.4% SOD BICARB 0.9%(10ML)
SYRINGE (ML) INJECTION CODE/TRAUMA/SEDATION MEDICATION
Status: COMPLETED | OUTPATIENT
Start: 2021-05-06 | End: 2021-05-06

## 2021-05-06 RX ORDER — CEFAZOLIN SODIUM 1 G/3ML
INJECTION, POWDER, FOR SOLUTION INTRAMUSCULAR; INTRAVENOUS CODE/TRAUMA/SEDATION MEDICATION
Status: COMPLETED | OUTPATIENT
Start: 2021-05-06 | End: 2021-05-06

## 2021-05-06 RX ADMIN — HEPARIN SODIUM 2200 UNITS: 1000 INJECTION INTRAVENOUS; SUBCUTANEOUS at 09:19

## 2021-05-06 RX ADMIN — Medication 10 ML: at 09:02

## 2021-05-06 RX ADMIN — CEFAZOLIN SODIUM 1000 MG: 1 INJECTION, POWDER, FOR SOLUTION INTRAMUSCULAR; INTRAVENOUS at 09:03

## 2021-05-06 RX ADMIN — HEPARIN SODIUM 2100 UNITS: 1000 INJECTION INTRAVENOUS; SUBCUTANEOUS at 09:19

## 2021-05-06 RX ADMIN — Medication 5 ML: at 09:15

## 2021-05-06 RX ADMIN — IOHEXOL 10 ML: 350 INJECTION, SOLUTION INTRAVENOUS at 09:17

## 2021-05-06 NOTE — PROCEDURES
Central Line Insertion    Date/Time: 5/6/2021 9:19 AM  Performed by: Juliann Tello MD  Authorized by: Juliann Tello MD     Patient location:  IR  Consent:     Consent obtained:  Verbal    Consent given by:  Patient    Risks discussed:  Bleeding and infection  Universal protocol:     Procedure explained and questions answered to patient or proxy's satisfaction: yes      Relevant documents present and verified: yes      Test results available and properly labeled: yes      Radiology Images displayed and confirmed  If images not available, report reviewed: yes      Required blood products, implants, devices, and special equipment available: yes      Site/side marked: yes      Immediately prior to procedure, a time out was called: yes      Patient identity confirmed:  Verbally with patient  Pre-procedure details:     Hand hygiene: Hand hygiene performed prior to insertion      Sterile barrier technique: All elements of maximal sterile technique followed      Skin preparation:  2% chlorhexidine  Indications:     Central line indications: dialysis    Anesthesia (see MAR for exact dosages): Anesthesia method:  Local infiltration    Local anesthetic:  Lidocaine 1% WITH epi  Procedure details:     Location:  Right internal jugular    Vessel type: vein      Laterality:  Right    Catheter type:  Double lumen    Catheter size:  15 5 Fr    Successful placement: yes    Post-procedure details:     Post-procedure:  Dressing applied and line sutured    Assessment:  Blood return through all ports, no pneumothorax on x-ray, placement verified by x-ray and free fluid flow    Patient tolerance of procedure:   Tolerated well, no immediate complications

## 2021-05-06 NOTE — DISCHARGE INSTRUCTIONS
Perma-cath Placement   WHAT YOU NEED TO KNOW:   A perma-cath is a catheter placed through a vein into or near your right atrium  Your right atrium is the right upper chamber of your heart  A perma-cath is used for dialysis in an emergency or until a long-term device is ready to use  After your procedure, you will have some pain and swelling on your chest and neck  You may have some bruises on your chest and neck  You may also have 2 dressings, one on your chest and one on your neck  DISCHARGE INSTRUCTIONS:   Call 911 for any of the following:   · You feel lightheaded, short of breath, and have chest pain  · Your catheter comes out   Contact Interventional Radiology at [phone] [place] PATIENTS: Contact Interventional Radiology at [phone]) Rut Fonseca PATIENTS: Contact Interventional Radiology at [phone]) if:  · Blood soaks through your bandage  · You have new swelling in your arm, neck, face, or chest on your right side  · Your catheter gets wet  · Your bruises or pain get worse  · You have a fever or chills  · Persistent nausea or vomiting  · Your incision is red, swollen, or draining pus  · You have questions or concerns about your condition or care  Self-care:       · Resume your normal diet  · Keep your dressings dry  Do not take a shower or swim  You may take a tub bath, but do not get your dressings wet  Water in your wound can cause bacteria to grow and cause an infection  If your dressing gets wet, dry it off and cover it with dry sterile gauze  Call your healthcare provider  Do not use soaps or ointments  · Do not change your dressings  Your healthcare provider or dialysis nurse will change your dressings  Your dressings should stay in place until your healthcare provider removes them  The dressing on your chest will stay as long as you have the catheter in place  The dressing prevents infection  · Do not remove the red and blue caps from the end of your catheter   The caps prevent air from getting into your catheter    Follow up with your healthcare provider as directed: Write down your questions so you remember to ask them during your visits

## 2021-05-06 NOTE — INTERVAL H&P NOTE
Update: (This section must be completed if the H&P was completed greater than 24 hrs to procedure or admission)    H&P reviewed  After examining the patient, I find no changed to the H&P since it had been written  Patient re-evaluated   Accept as history and physical     Zhanna Chapman MD/May 6, 2021/8:38 AM

## 2021-05-06 NOTE — SEDATION DOCUMENTATION
Procedure ended  Permacath exchanged by Dr Aleena Alvarado without incident  Dressing applied to site  Patient tolerated well   Patient discharged from IR in stable condition

## 2021-05-11 DIAGNOSIS — N17.9 AKI (ACUTE KIDNEY INJURY) (HCC): Primary | ICD-10-CM

## 2021-05-11 DIAGNOSIS — M32.14 LUPUS NEPHRITIS, ISN/RPS CLASS IV (HCC): ICD-10-CM

## 2021-05-13 ENCOUNTER — DOCUMENTATION (OUTPATIENT)
Dept: NEPHROLOGY | Facility: CLINIC | Age: 56
End: 2021-05-13

## 2021-05-13 NOTE — PROGRESS NOTES
As per patient's request, copies of pertinent records faxed to nephrologist at  [facility] in [place]  (fax [phone])

## 2021-05-18 DIAGNOSIS — N17.9 AKI (ACUTE KIDNEY INJURY) (HCC): Primary | ICD-10-CM

## 2021-06-01 ENCOUNTER — TRANSCRIBE ORDERS (OUTPATIENT)
Dept: LAB | Facility: CLINIC | Age: 56
End: 2021-06-01

## 2021-06-01 ENCOUNTER — APPOINTMENT (OUTPATIENT)
Dept: LAB | Facility: CLINIC | Age: 56
End: 2021-06-01
Payer: COMMERCIAL

## 2021-06-01 ENCOUNTER — OFFICE VISIT (OUTPATIENT)
Dept: NEPHROLOGY | Facility: CLINIC | Age: 56
End: 2021-06-01
Payer: COMMERCIAL

## 2021-06-01 VITALS — BODY MASS INDEX: 25.33 KG/M2 | HEIGHT: 69 IN | WEIGHT: 171 LBS

## 2021-06-01 DIAGNOSIS — N17.9 AKI (ACUTE KIDNEY INJURY) (HCC): Primary | ICD-10-CM

## 2021-06-01 DIAGNOSIS — Z99.2 ESRD (END STAGE RENAL DISEASE) ON DIALYSIS (HCC): ICD-10-CM

## 2021-06-01 DIAGNOSIS — N18.9 ACUTE KIDNEY INJURY SUPERIMPOSED ON CHRONIC KIDNEY DISEASE (HCC): ICD-10-CM

## 2021-06-01 DIAGNOSIS — N18.6 ESRD (END STAGE RENAL DISEASE) ON DIALYSIS (HCC): ICD-10-CM

## 2021-06-01 DIAGNOSIS — N17.9 AKI (ACUTE KIDNEY INJURY) (HCC): ICD-10-CM

## 2021-06-01 DIAGNOSIS — R80.9 NEPHROTIC RANGE PROTEINURIA: ICD-10-CM

## 2021-06-01 DIAGNOSIS — N17.9 ACUTE KIDNEY INJURY SUPERIMPOSED ON CHRONIC KIDNEY DISEASE (HCC): ICD-10-CM

## 2021-06-01 DIAGNOSIS — M32.14 LUPUS NEPHRITIS, ISN/RPS CLASS IV (HCC): ICD-10-CM

## 2021-06-01 DIAGNOSIS — M32.14 SYSTEMIC LUPUS ERYTHEMATOSUS WITH GLOMERULAR DISEASE, UNSPECIFIED SLE TYPE (HCC): ICD-10-CM

## 2021-06-01 LAB
ANION GAP SERPL CALCULATED.3IONS-SCNC: 14 MMOL/L (ref 4–13)
BUN SERPL-MCNC: 115 MG/DL (ref 5–25)
CALCIUM SERPL-MCNC: 9.3 MG/DL (ref 8.3–10.1)
CHLORIDE SERPL-SCNC: 106 MMOL/L (ref 100–108)
CO2 SERPL-SCNC: 27 MMOL/L (ref 21–32)
CREAT SERPL-MCNC: 5.35 MG/DL (ref 0.6–1.3)
GFR SERPL CREATININE-BSD FRML MDRD: 11 ML/MIN/1.73SQ M
GLUCOSE P FAST SERPL-MCNC: 87 MG/DL (ref 65–99)
POTASSIUM SERPL-SCNC: 3.4 MMOL/L (ref 3.5–5.3)
SODIUM SERPL-SCNC: 147 MMOL/L (ref 136–145)

## 2021-06-01 PROCEDURE — 99214 OFFICE O/P EST MOD 30 MIN: CPT | Performed by: INTERNAL MEDICINE

## 2021-06-01 PROCEDURE — 80048 BASIC METABOLIC PNL TOTAL CA: CPT

## 2021-06-01 PROCEDURE — 36415 COLL VENOUS BLD VENIPUNCTURE: CPT

## 2021-06-01 RX ORDER — TORSEMIDE 100 MG/1
100 TABLET ORAL DAILY
Qty: 90 TABLET | Refills: 1 | Status: SHIPPED | OUTPATIENT
Start: 2021-06-01

## 2021-06-01 NOTE — PROGRESS NOTES
NEPHROLOGY OFFICE PROGRESS NOTE   Charlette Guppy Schoenfield 55 y o  male MRN: [id number]  DATE: 06/01/21  Reason for visit: Continued evaluation and management of proteinuria  ASSESSMENT & PLAN:  1  Acute kidney injury (dialysis dependent): · Velma Bain has been dialysis dependent since 1/8/21  · He started off with hemodialysis via a permcath but is now doing peritoneal dialysis over the past 2 weeks  · His labs today (Creatinine 5 35 and BUN of 115) after holding PD x 3 days do not show any indication of renal recovery  · As such, he is now considered ESRD  · Etiology is felt to be due to lupus nephritis class IV + unclear role of vasculitis? · He was evaluated by Dr Cortes Finley of [facility] transplant but will need to continue with his transplant evaluation at the [facility]  2  Class IV lupus nephritis:  · Failed treatment with Mycophenolate  · Got Cytoxan on 12/18/20 and 12/31/20 but changed to Rituxan in Jan 2021 due to ongoing pulmonary vasculitis ([id number]?)  · Received Rituxan 1 gm on 1/19/21 and 2/3/21  · No biochemical evidence of renal recovery  · Decrease Prednisone back to 15 mg daily which is where he was a few weeks ago  · He saw Dr Chasity Jorge at [facility] for 2nd opinion  3  Proteinuria:  · UPC is 0 85  · No ACEi or ARB due to CAROLINE  4  Chronic kidney disease, stage I:  · Baseline creatinine was 0 6 to 0 7 in middle of 2020  · Was 1 3 to 1 7 in Nov 2020      5  Hypertension:  · BP is close to or at goal    · Current regimen:  Amlodipine 10 mg daily, Torsemide 80 mg daily, Metoprolol succinate 100 mg daily, Doxazosin 4 mg qHS  · Change Torsemide to 100 mg daily due to edema  6  Systemic lupus erythematosus  · Follow up with Dr Kelton Trinh  · Continue Prednisone but decrease dose to 15 mg daily  7  Anemia:  · Hgb improved up to 8 4    · Goal Hgb is 9-10 in light of hx of DVT  · Continue Epogen  8  L leg DVT:  · Continue Eliquis 5 mg BID       9  Prophylaxis:  · On Dapsone for PCP prophylaxis - may stop Dapsone  · On Protonix for PUD prophylaxis  · On Ca with Vit D for osteoporosis prophylaxis  10  Mineral and bone disease  · Hyperphosphatemia - Continue Sevelamer 800 mg TID with meals  Phos is controlled at HD  · Continue Vitamin D 1000 units daily  DISCUSSION:  · Claudia Marks will be moving back to [place] over the next few weeks  He has been deemed ESRD and I informed him that he will need to continue with the transplant evaluation process at the [facility]  He will be seeing Dr Sunita Mejia who is a nephrologist with the [address] system  I asked Claudia Marks to call his office and ask which peritoneal dialysis clinics Dr Justa Zimmerman rounds in so that we can work on transferring his dialysis records from [place] to his new dialysis unit  We will continue to follow him over at [place] until his move to [place] becomes official      Patient Instructions   Please go back to prednisone 15 mg daily  Stop Dapsone  Change Torsemide to 100 mg daily  SUBJECTIVE / INTERVAL HISTORY:  Claudia Marks was last seen by me in the office back in March 2021  Since that time, he has been doing fairly well  There have been no recent hospitalizations or ER visits  He recently had a PD catheter placed and started training for peritoneal dialysis  He seems to be doing well on peritoneal dialysis and is now on CCPD  We held PD x 3 days and his creatinine had gone up to 5 35 and his BUN went to 115  He continues to be non oliguric  Treatment to date:  July to December 2020: Mycophenolate  Initially could not tolerate 1500 mg BID  December 18, 2020: Cytoxan 500 mg IV  December 31, 2020: Cytoxan 500 mg IV  January 19, 2021: Rituxan 1000 mg IV  February 3, 2021: Rituxan 1000 mg IV    PMH/PSH: HTN, SLE, HLP, lymphadenopathy, BPH, RA, bilateral hip replacements, bilateral knee replacements       Previous work up:   Renal biopsy (November 3 2020)  1  Diffuse proliferative glomerulonephritis, consistent with lupus nephritis class 4 (mild activity, mild chronicity)  2  Lupus vasculopathy, severe, with ischemic glomerular changes and zonal tubulointerstitial scarring  3  Tubular atrophy, interstitial fibrosis, and interstitial inflammation, mild  4  Arteriosclerosis and arteriolosclerosis, mild to moderate  ALLERGIES: No Known Allergies    REVIEW OF SYSTEMS:  Review of Systems   Constitutional: Negative for appetite change, chills, fatigue and fever  Respiratory: Negative for cough and shortness of breath  Cardiovascular: Positive for leg swelling  Negative for chest pain  Gastrointestinal: Negative for abdominal pain, diarrhea, nausea and vomiting  Genitourinary: Negative for dysuria and hematuria  Musculoskeletal: Negative for arthralgias  Allergic/Immunologic: Positive for immunocompromised state  Neurological: Negative for dizziness  OBJECTIVE:  Ht 5' 9" (1 753 m)   Wt 77 6 kg (171 lb)   BMI 25 25 kg/m²   Current Weight:   Body mass index is 25 25 kg/m²  Physical Exam  Constitutional:       General: He is not in acute distress  Appearance: Normal appearance  He is well-developed and normal weight  He is not ill-appearing, toxic-appearing or diaphoretic  HENT:      Head: Normocephalic and atraumatic  Eyes:      General: No scleral icterus  Conjunctiva/sclera: Conjunctivae normal    Neck:      Musculoskeletal: Neck supple  Vascular: No JVD  Cardiovascular:      Rate and Rhythm: Normal rate and regular rhythm  Heart sounds: Normal heart sounds  Pulmonary:      Effort: Pulmonary effort is normal  No respiratory distress  Breath sounds: Normal breath sounds  Abdominal:      General: Bowel sounds are normal       Palpations: Abdomen is soft  Musculoskeletal:      Right lower leg: Edema (mild) present  Left lower leg: Edema (trace) present  Skin:     General: Skin is warm and dry  Neurological:      Mental Status: He is alert and oriented to person, place, and time  Psychiatric:         Mood and Affect: Mood normal          Behavior: Behavior normal          Thought Content:  Thought content normal          Judgment: Judgment normal        Medications:  Current Outpatient Medications:     acetaminophen (TYLENOL) 500 mg tablet, Take 500 mg by mouth every 6 (six) hours as needed for mild pain, Disp: , Rfl:     amLODIPine (NORVASC) 10 mg tablet, Take 1 tablet (10 mg total) by mouth daily, Disp: 90 tablet, Rfl: 1    apixaban (ELIQUIS) 5 mg, Take 1 tablet (5 mg total) by mouth 2 (two) times a day, Disp: 60 tablet, Rfl: 3    atorvastatin (LIPITOR) 40 mg tablet, Take 1 tablet (40 mg total) by mouth daily, Disp: 30 tablet, Rfl: 3    calcium-vitamin D (OSCAL) 250-125 MG-UNIT per tablet, Take 1 tablet by mouth 2 (two) times a day, Disp: , Rfl: 0    clindamycin (CLEOCIN) 300 MG capsule, Take 600 mg by mouth 1 hour prior to Dental appointment, Disp: , Rfl:     doxazosin (CARDURA) 4 mg tablet, Take 1 tablet (4 mg total) by mouth daily at bedtime, Disp: 30 tablet, Rfl: 11    Elastic Bandages & Supports (MEDICAL COMPRESSION STOCKINGS) MISC, by Does not apply route daily Knee High 20-30mmHg, Disp: 2 each, Rfl: 0    metoprolol succinate (TOPROL-XL) 100 mg 24 hr tablet, Take 1 tablet (100 mg total) by mouth daily, Disp: 90 tablet, Rfl: 1    Multiple Vitamins-Minerals (CENTRUM SILVER) tablet, Take 1 tablet by mouth daily , Disp: , Rfl:     nystatin (MYCOSTATIN) 500,000 units/5 mL suspension, Swish and swallow 5 mL (500,000 Units total) 2 (two) times a day, Disp: 300 mL, Rfl: 1    pantoprazole (PROTONIX) 40 mg tablet, Take 1 tablet (40 mg total) by mouth daily, Disp: 90 tablet, Rfl: 1    predniSONE 5 mg tablet, Take 3 tablets (15 mg total) by mouth daily, Disp: 90 tablet, Rfl: 2    sevelamer (RENAGEL) 800 mg tablet, Take 1 tablet (800 mg total) by mouth 3 (three) times a day with meals, Disp: 180 tablet, Rfl: 2    torsemide (DEMADEX) 20 mg tablet, Take 3 tablets (60 mg total) by mouth daily, Disp: 90 tablet, Rfl: 1    Laboratory Results:  Results for orders placed or performed in visit on 47/16/09   Basic metabolic panel   Result Value Ref Range    Sodium 147 (H) 136 - 145 mmol/L    Potassium 3 4 (L) 3 5 - 5 3 mmol/L    Chloride 106 100 - 108 mmol/L    CO2 27 21 - 32 mmol/L    ANION GAP 14 (H) 4 - 13 mmol/L     (H) 5 - 25 mg/dL    Creatinine 5 35 (H) 0 60 - 1 30 mg/dL    Glucose, Fasting 87 65 - 99 mg/dL    Calcium 9 3 8 3 - 10 1 mg/dL    eGFR 11 ml/min/1 73sq m

## 2021-06-01 NOTE — LETTER
June 1, 2021     Pablito Arriaza DO  [address] [facility], [facility]  [address]  [address] [facility] [address]    Patient: Cassidy Severino   YOB: 1965   Date of Visit: 6/1/2021       Dear Dr Temi De Jesus:    Thank you for referring Angie Coleman to me for evaluation  Below are my notes for this consultation  If you have questions, please do not hesitate to call me  I look forward to following your patient along with you  Sincerely,        Karina Hare MD        CC: MD Devin Montana MD Maryjane Flint, MD  6/1/2021  5:18 PM  Sign when Signing Visit  NEPHROLOGY OFFICE PROGRESS NOTE   Benay Jakes [place] 55 y o  male MRN: [id number]  DATE: 06/01/21  Reason for visit: Continued evaluation and management of proteinuria  ASSESSMENT & PLAN:  1  Acute kidney injury (dialysis dependent): · Eliza Avalos has been dialysis dependent since 1/8/21  · He started off with hemodialysis via a permcath but is now doing peritoneal dialysis over the past 2 weeks  · His labs today (Creatinine 5 35 and BUN of 115) after holding PD x 3 days do not show any indication of renal recovery  · As such, he is now considered ESRD  · Etiology is felt to be due to lupus nephritis class IV + unclear role of vasculitis? · He was evaluated by Dr Audelia Kumari of [facility] transplant but will need to continue with his transplant evaluation at the [facility]  2  Class IV lupus nephritis:  · Failed treatment with Mycophenolate  · Got Cytoxan on 12/18/20 and 12/31/20 but changed to Rituxan in Jan 2021 due to ongoing pulmonary vasculitis ([id number]?)  · Received Rituxan 1 gm on 1/19/21 and 2/3/21  · No biochemical evidence of renal recovery  · Decrease Prednisone back to 15 mg daily which is where he was a few weeks ago  · He saw Dr Mikey Martinez at [facility] for 2nd opinion  3  Proteinuria:  · UPC is 0 85  · No ACEi or ARB due to CAROLINE  4  Chronic kidney disease, stage I:  · Baseline creatinine was 0 6 to 0 7 in middle of 2020  · Was 1 3 to 1 7 in Nov 2020      5  Hypertension:  · BP is close to or at goal    · Current regimen:  Amlodipine 10 mg daily, Torsemide 80 mg daily, Metoprolol succinate 100 mg daily, Doxazosin 4 mg qHS  · Change Torsemide to 100 mg daily due to edema  6  Systemic lupus erythematosus  · Follow up with Dr Valentin Godinez  · Continue Prednisone but decrease dose to 15 mg daily  7  Anemia:  · Hgb improved up to 8 4    · Goal Hgb is 9-10 in light of hx of DVT  · Continue Epogen  8  L leg DVT:  · Continue Eliquis 5 mg BID  9  Prophylaxis:  · On Dapsone for PCP prophylaxis - may stop Dapsone  · On Protonix for PUD prophylaxis  · On Ca with Vit D for osteoporosis prophylaxis  10  Mineral and bone disease  · Hyperphosphatemia - Continue Sevelamer 800 mg TID with meals  Phos is controlled at HD  · Continue Vitamin D 1000 units daily  DISCUSSION:  · Britt Powers will be moving back to [place] over the next few weeks  He has been deemed ESRD and I informed him that he will need to continue with the transplant evaluation process at the [facility]  He will be seeing Dr El Ardon who is a nephrologist with the [address] system  I asked Britt Powers to call his office and ask which peritoneal dialysis clinics Dr Yasmin Nuñez rounds in so that we can work on transferring his dialysis records from [place] to his new dialysis unit  We will continue to follow him over at [place] until his move to [place] becomes official      Patient Instructions   Please go back to prednisone 15 mg daily  Stop Dapsone  Change Torsemide to 100 mg daily  SUBJECTIVE / INTERVAL HISTORY:  Britt Powers was last seen by me in the office back in March 2021  Since that time, he has been doing fairly well  There have been no recent hospitalizations or ER visits  He recently had a PD catheter placed and started training for peritoneal dialysis    He seems to be doing well on peritoneal dialysis and is now on CCPD  We held PD x 3 days and his creatinine had gone up to 5 35 and his BUN went to 115  He continues to be non oliguric  Treatment to date:  July to December 2020: Mycophenolate  Initially could not tolerate 1500 mg BID  December 18, 2020: Cytoxan 500 mg IV  December 31, 2020: Cytoxan 500 mg IV  January 19, 2021: Rituxan 1000 mg IV  February 3, 2021: Rituxan 1000 mg IV    PMH/PSH: HTN, SLE, HLP, lymphadenopathy, BPH, RA, bilateral hip replacements, bilateral knee replacements  Previous work up:   Renal biopsy (November 3 2020)  1  Diffuse proliferative glomerulonephritis, consistent with lupus nephritis class 4 (mild activity, mild chronicity)  2  Lupus vasculopathy, severe, with ischemic glomerular changes and zonal tubulointerstitial scarring  3  Tubular atrophy, interstitial fibrosis, and interstitial inflammation, mild  4  Arteriosclerosis and arteriolosclerosis, mild to moderate  ALLERGIES: No Known Allergies    REVIEW OF SYSTEMS:  Review of Systems   Constitutional: Negative for appetite change, chills, fatigue and fever  Respiratory: Negative for cough and shortness of breath  Cardiovascular: Positive for leg swelling  Negative for chest pain  Gastrointestinal: Negative for abdominal pain, diarrhea, nausea and vomiting  Genitourinary: Negative for dysuria and hematuria  Musculoskeletal: Negative for arthralgias  Allergic/Immunologic: Positive for immunocompromised state  Neurological: Negative for dizziness  OBJECTIVE:  Ht 5' 9" (1 753 m)   Wt 77 6 kg (171 lb)   BMI 25 25 kg/m²   Current Weight:   Body mass index is 25 25 kg/m²  Physical Exam  Constitutional:       General: He is not in acute distress  Appearance: Normal appearance  He is well-developed and normal weight  He is not ill-appearing, toxic-appearing or diaphoretic  HENT:      Head: Normocephalic and atraumatic  Eyes:      General: No scleral icterus       Conjunctiva/sclera: Conjunctivae normal    Neck:      Musculoskeletal: Neck supple  Vascular: No JVD  Cardiovascular:      Rate and Rhythm: Normal rate and regular rhythm  Heart sounds: Normal heart sounds  Pulmonary:      Effort: Pulmonary effort is normal  No respiratory distress  Breath sounds: Normal breath sounds  Abdominal:      General: Bowel sounds are normal       Palpations: Abdomen is soft  Musculoskeletal:      Right lower leg: Edema (mild) present  Left lower leg: Edema (trace) present  Skin:     General: Skin is warm and dry  Neurological:      Mental Status: He is alert and oriented to person, place, and time  Psychiatric:         Mood and Affect: Mood normal          Behavior: Behavior normal          Thought Content:  Thought content normal          Judgment: Judgment normal        Medications:  Current Outpatient Medications:     acetaminophen (TYLENOL) 500 mg tablet, Take 500 mg by mouth every 6 (six) hours as needed for mild pain, Disp: , Rfl:     amLODIPine (NORVASC) 10 mg tablet, Take 1 tablet (10 mg total) by mouth daily, Disp: 90 tablet, Rfl: 1    apixaban (ELIQUIS) 5 mg, Take 1 tablet (5 mg total) by mouth 2 (two) times a day, Disp: 60 tablet, Rfl: 3    atorvastatin (LIPITOR) 40 mg tablet, Take 1 tablet (40 mg total) by mouth daily, Disp: 30 tablet, Rfl: 3    calcium-vitamin D (OSCAL) 250-125 MG-UNIT per tablet, Take 1 tablet by mouth 2 (two) times a day, Disp: , Rfl: 0    clindamycin (CLEOCIN) 300 MG capsule, Take 600 mg by mouth 1 hour prior to Dental appointment, Disp: , Rfl:     doxazosin (CARDURA) 4 mg tablet, Take 1 tablet (4 mg total) by mouth daily at bedtime, Disp: 30 tablet, Rfl: 11    Elastic Bandages & Supports (MEDICAL COMPRESSION STOCKINGS) MISC, by Does not apply route daily Knee High 20-30mmHg, Disp: 2 each, Rfl: 0    metoprolol succinate (TOPROL-XL) 100 mg 24 hr tablet, Take 1 tablet (100 mg total) by mouth daily, Disp: 90 tablet, Rfl: 1    Multiple Vitamins-Minerals (CENTRUM SILVER) tablet, Take 1 tablet by mouth daily , Disp: , Rfl:     nystatin (MYCOSTATIN) 500,000 units/5 mL suspension, Swish and swallow 5 mL (500,000 Units total) 2 (two) times a day, Disp: 300 mL, Rfl: 1    pantoprazole (PROTONIX) 40 mg tablet, Take 1 tablet (40 mg total) by mouth daily, Disp: 90 tablet, Rfl: 1    predniSONE 5 mg tablet, Take 3 tablets (15 mg total) by mouth daily, Disp: 90 tablet, Rfl: 2    sevelamer (RENAGEL) 800 mg tablet, Take 1 tablet (800 mg total) by mouth 3 (three) times a day with meals, Disp: 180 tablet, Rfl: 2    torsemide (DEMADEX) 20 mg tablet, Take 3 tablets (60 mg total) by mouth daily, Disp: 90 tablet, Rfl: 1    Laboratory Results:  Results for orders placed or performed in visit on 96/97/19   Basic metabolic panel   Result Value Ref Range    Sodium 147 (H) 136 - 145 mmol/L    Potassium 3 4 (L) 3 5 - 5 3 mmol/L    Chloride 106 100 - 108 mmol/L    CO2 27 21 - 32 mmol/L    ANION GAP 14 (H) 4 - 13 mmol/L     (H) 5 - 25 mg/dL    Creatinine 5 35 (H) 0 60 - 1 30 mg/dL    Glucose, Fasting 87 65 - 99 mg/dL    Calcium 9 3 8 3 - 10 1 mg/dL    eGFR 11 ml/min/1 73sq m

## 2021-06-07 ENCOUNTER — TRANSCRIBE ORDERS (OUTPATIENT)
Dept: RADIOLOGY | Facility: HOSPITAL | Age: 56
End: 2021-06-07

## 2021-06-07 DIAGNOSIS — N18.6 ESRD (END STAGE RENAL DISEASE) (HCC): Primary | ICD-10-CM

## 2021-06-07 DIAGNOSIS — E78.2 MIXED HYPERLIPIDEMIA: ICD-10-CM

## 2021-06-07 DIAGNOSIS — R80.9 NEPHROTIC RANGE PROTEINURIA: Primary | ICD-10-CM

## 2021-06-07 RX ORDER — ATORVASTATIN CALCIUM 40 MG/1
TABLET, FILM COATED ORAL
Qty: 90 TABLET | Refills: 1 | Status: SHIPPED | OUTPATIENT
Start: 2021-06-07

## 2021-06-08 ENCOUNTER — TELEPHONE (OUTPATIENT)
Dept: NEPHROLOGY | Facility: CLINIC | Age: 56
End: 2021-06-08

## 2021-06-08 DIAGNOSIS — N18.9 ACUTE KIDNEY INJURY SUPERIMPOSED ON CHRONIC KIDNEY DISEASE (HCC): ICD-10-CM

## 2021-06-08 DIAGNOSIS — I82.569 CHRONIC DEEP VEIN THROMBOSIS (DVT) OF CALF MUSCLE VEIN, UNSPECIFIED LATERALITY (HCC): ICD-10-CM

## 2021-06-08 DIAGNOSIS — N17.9 ACUTE KIDNEY INJURY SUPERIMPOSED ON CHRONIC KIDNEY DISEASE (HCC): ICD-10-CM

## 2021-06-08 RX ORDER — SEVELAMER HYDROCHLORIDE 800 MG/1
800 TABLET, FILM COATED ORAL
Qty: 270 TABLET | Refills: 3
Start: 2021-06-08 | End: 2021-06-09 | Stop reason: SDUPTHER

## 2021-06-08 NOTE — TELEPHONE ENCOUNTER
Patient called and requested a refill on his apizaban (Eliquis) 5mg 90 day supply and Sevelamer (Renagel) 800 mg 90 day supply  Pharmacy in [facility] is correct

## 2021-06-09 DIAGNOSIS — N18.9 ACUTE KIDNEY INJURY SUPERIMPOSED ON CHRONIC KIDNEY DISEASE (HCC): ICD-10-CM

## 2021-06-09 DIAGNOSIS — N17.9 ACUTE KIDNEY INJURY SUPERIMPOSED ON CHRONIC KIDNEY DISEASE (HCC): ICD-10-CM

## 2021-06-09 RX ORDER — SEVELAMER HYDROCHLORIDE 800 MG/1
800 TABLET, FILM COATED ORAL
Qty: 270 TABLET | Refills: 3 | Status: SHIPPED | OUTPATIENT
Start: 2021-06-09

## 2021-06-14 ENCOUNTER — HOSPITAL ENCOUNTER (OUTPATIENT)
Dept: RADIOLOGY | Facility: HOSPITAL | Age: 56
Discharge: HOME | End: 2021-06-14
Attending: RADIOLOGY | Admitting: RADIOLOGY
Payer: COMMERCIAL

## 2021-06-14 DIAGNOSIS — R80.9 NEPHROTIC RANGE PROTEINURIA: ICD-10-CM

## 2021-06-14 PROCEDURE — 36589 REMOVAL TUNNELED CV CATH: CPT | Performed by: RADIOLOGY

## 2021-06-14 PROCEDURE — 36589 REMOVAL TUNNELED CV CATH: CPT

## 2021-06-14 RX ORDER — LIDOCAINE WITH 8.4% SOD BICARB 0.9%(10ML)
SYRINGE (ML) INJECTION CODE/TRAUMA/SEDATION MEDICATION
Status: COMPLETED | OUTPATIENT
Start: 2021-06-14 | End: 2021-06-14

## 2021-06-14 RX ADMIN — Medication 5 ML: at 10:59

## 2021-06-14 NOTE — SEDATION DOCUMENTATION
HD permacath removed by Dr Richard Spindle  Dressing to site  Dressing is clean, dry, and intact  Patient tolerated well and discharged from IR

## 2021-06-14 NOTE — BRIEF OP NOTE (RAD/CATH)
INTERVENTIONAL RADIOLOGY PROCEDURE NOTE    Date: 6/14/2021    Procedure: IR TUNNELED DIALYSIS CATHETER REMOVAL    Preoperative diagnosis:   1  Nephrotic range proteinuria         Postoperative diagnosis: Same  Surgeon: Angela Aguirre MD     Assistant: None  No qualified resident was available  Blood loss: 1 m    Specimens: Removed tunneled HD catheter     Findings: Successful removal of right IJV tunneled HD catheter    Complications: None immediate      Anesthesia: local

## 2021-06-14 NOTE — H&P
Interventional Radiology  History and Physical 2021     Pleasant Shade Delay   1965   [id number]    Assessment/Plan:  81-year-old male with ESRD dialyzing through a peritoneal dialysis catheter returns for removal of a tunneled hemodialysis catheter as it is no longer needed  Problem List Items Addressed This Visit        Other    Nephrotic range proteinuria    Relevant Orders    IR tunneled dialysis catheter removal             Subjective:     Patient ID: Carolyn Delay is a 55 y o  male  History of Present Illness  Patient with ESRD dialyzing through a peritoneal dialysis catheter returns for removal of a tunneled hemodialysis catheter as it is no longer needed  Review of Systems   Constitutional: Negative for chills and fever  Past Medical History:   Diagnosis Date    CHF (congestive heart failure) ([facility])     Hypertension     Lupus [facility] [address]     Osteoporosis     Rheumatoid arthritis ([facility] [address] )         Past Surgical History:   Procedure Laterality Date    CT NEEDLE BIOPSY KIDNEY  11/3/2020    HERNIA REPAIR  199?     IR TUNNELED CENTRAL LINE CHECK/CHANGE/REPOSITION  2021    IR TUNNELED DIALYSIS CATHETER PLACEMENT  2021    MA LAP INSERTION TUNNELED INTRAPERITONEAL CATHETER N/A 2021    Procedure: INSERTION PERITONEAL CATHETER DIALYSIS LAPAROSCOPIC;  Surgeon: Risa Garsia MD;  Location: [facility];  Service: General    REPLACEMENT TOTAL KNEE Bilateral     right  left 2016    REVISION TOTAL HIP ARTHROPLASTY Bilateral     right 2004 left 2006    TOTAL HIP ARTHROPLASTY Bilateral 8231/3547    Right / left         Social History     Tobacco Use   Smoking Status Former Smoker    Packs/day: 1 00    Years: 10 00    Pack years: 10 00    Types: Cigarettes    Quit date: 18    Years since quittin 4   Smokeless Tobacco Former User    Types: Chew   Tobacco Comment    25 yrs         Social History     Substance and Sexual Activity   Alcohol Use Never Social History     Substance and Sexual Activity   Drug Use Never        No Known Allergies    Current Outpatient Medications   Medication Sig Dispense Refill    acetaminophen (TYLENOL) 500 mg tablet Take 500 mg by mouth every 6 (six) hours as needed for mild pain      amLODIPine (NORVASC) 10 mg tablet Take 1 tablet (10 mg total) by mouth daily 90 tablet 1    apixaban (ELIQUIS) 5 mg Take 1 tablet (5 mg total) by mouth 2 (two) times a day 180 tablet 3    atorvastatin (LIPITOR) 40 mg tablet TAKE 1 TABLET BY MOUTH EVERY DAY 90 tablet 1    calcium-vitamin D (OSCAL) 250-125 MG-UNIT per tablet Take 1 tablet by mouth 2 (two) times a day  0    clindamycin (CLEOCIN) 300 MG capsule Take 600 mg by mouth 1 hour prior to Dental appointment      doxazosin (CARDURA) 4 mg tablet Take 1 tablet (4 mg total) by mouth daily at bedtime 30 tablet 11    Elastic Bandages & Supports (MEDICAL COMPRESSION STOCKINGS) MISC by Does not apply route daily Knee High 20-30mmHg 2 each 0    metoprolol succinate (TOPROL-XL) 100 mg 24 hr tablet Take 1 tablet (100 mg total) by mouth daily 90 tablet 1    Multiple Vitamins-Minerals (CENTRUM SILVER) tablet Take 1 tablet by mouth daily       nystatin (MYCOSTATIN) 500,000 units/5 mL suspension Swish and swallow 5 mL (500,000 Units total) 2 (two) times a day (Patient not taking: Reported on 6/1/2021) 300 mL 1    pantoprazole (PROTONIX) 40 mg tablet Take 1 tablet (40 mg total) by mouth daily 90 tablet 1    predniSONE 5 mg tablet Take 3 tablets (15 mg total) by mouth daily (Patient taking differently: Take 20 mg by mouth daily ) 90 tablet 2    sevelamer (RENAGEL) 800 mg tablet Take 1 tablet (800 mg total) by mouth 3 (three) times a day with meals 270 tablet 3    torsemide (DEMADEX) 100 mg tablet Take 1 tablet (100 mg total) by mouth daily 90 tablet 1     No current facility-administered medications for this encounter  Objective: There were no vitals filed for this visit  Physical Exam  Constitutional:       Appearance: Normal appearance  Pulmonary:      Effort: Pulmonary effort is normal    Musculoskeletal:      Comments: Right IJV tunneled HD catheter in place           No results found for: BNP   Lab Results   Component Value Date    WBC 8 70 04/30/2021    HGB 7 9 (L) 04/30/2021    HCT 26 8 (L) 04/30/2021     (H) 04/30/2021     04/30/2021     Lab Results   Component Value Date    INR 1 10 02/23/2021    INR 1 07 01/07/2021    INR 1 52 (H) 01/04/2021    PROTIME 14 4 02/23/2021    PROTIME 13 8 01/07/2021    PROTIME 18 4 (H) 01/04/2021     Lab Results   Component Value Date    PTT 26 01/04/2021         I have personally reviewed pertinent imaging and laboratory results  Code Status: Prior  Advance Directive and Living Will:      Power of :    POLST:      This text is generated with voice recognition software  There may be translation, syntax,  or grammatical errors  If you have any questions, please contact the dictating provider

## 2021-06-14 NOTE — DISCHARGE INSTRUCTIONS
Perma-cath Removal   WHAT YOU NEED TO KNOW:   A perma-cath is a catheter placed through a vein into or near your right atrium  Your right atrium is the right upper chamber of your heart  A perma-cath is used for dialysis in an emergency or until a long-term device is ready to use  Or you no longer need dialysis  DISCHARGE INSTRUCTIONS:   Call 911 for any of the following:   · You feel lightheaded, short of breath, and have chest pain  · You start bleeding    Contact Interventional Radiology at [phone] [place] PATIENTS: Contact Interventional Radiology at [phone]) Abhi Catsamiragarth PATIENTS: Contact Interventional Radiology at [phone]) if:  · Blood soaks through your bandage  · You have new swelling in your arm, neck, face, or chest on your right side  · Your bruises or pain get worse  · You have a fever or chills  · Persistent nausea or vomiting  · Your incision is red, swollen, or draining pus  · You have questions or concerns about your condition or care  Self-care:   · Resume your normal diet  Small sips of flat soda will help with nausea  · Keep your dressings dry  Do not get your perma-cath site wet until the incision closes  You may take a tub bath, but do not get your dressings wet  Water in your wound can cause bacteria to grow and cause an infection  If your dressing gets wet, remove the wet dressing and apply a dry bandaid  Keep it covered until the incision closes  This should only take a few days to heal  Do not use soaps or ointments  · Immediately after your catheter is removed, no strenuous activity for twenty four hours and stay in an upright sitting position for two hours     Follow up with your healthcare provider as directed: Write down your questions so you remember to ask them during your visits

## 2021-07-19 DIAGNOSIS — M32.15 SYSTEMIC LUPUS ERYTHEMATOSUS WITH TUBULO-INTERSTITIAL NEPHROPATHY, UNSPECIFIED SLE TYPE (HCC): ICD-10-CM

## 2021-07-19 DIAGNOSIS — I16.1 HYPERTENSIVE EMERGENCY: ICD-10-CM

## 2021-07-19 RX ORDER — PANTOPRAZOLE SODIUM 40 MG/1
TABLET, DELAYED RELEASE ORAL
Qty: 90 TABLET | Refills: 1 | Status: SHIPPED | OUTPATIENT
Start: 2021-07-19

## 2021-07-19 RX ORDER — AMLODIPINE BESYLATE 10 MG/1
TABLET ORAL
Qty: 90 TABLET | Refills: 1 | Status: SHIPPED | OUTPATIENT
Start: 2021-07-19

## 2021-07-26 ENCOUNTER — TELEPHONE (OUTPATIENT)
Dept: CARDIOLOGY CLINIC | Facility: CLINIC | Age: 56
End: 2021-07-26

## 2021-08-03 DIAGNOSIS — I10 ESSENTIAL HYPERTENSION: ICD-10-CM

## 2021-08-03 RX ORDER — METOPROLOL SUCCINATE 100 MG/1
TABLET, EXTENDED RELEASE ORAL
Qty: 90 TABLET | Refills: 1 | Status: SHIPPED | OUTPATIENT
Start: 2021-08-03

## 2021-09-10 NOTE — PROGRESS NOTES
Assessment   Dayton Morgan is a 55 y o  male  referred by Dr Mohsen Mcfarland, with a past medical history of being hemodialysis dependent since January 2021 at [facility], native disease secondary to lupus nephritis class 4 /vasculitis, DVT, hypertension (diagnosed 2020), nephrolithiasis (2 X in 1990 and 2007), rheumatoid arthritis, question of avascular necrosis, b/l hip replacements (1988 and 1989 and revision in 2004 and 2006, b/l knee replacements, , former smoker, former ETOH, no drug use, seen in the Nephrology Clinic for pre-transplant kidney evaluation  With regards to lupus nephritis  -First diagnosed at 25years old which was diagnosed with arthralgias, weakness, joint swelling  Symptoms initially started at 25 yo  Initially, started on steroids and then was on plaquenil  Then, a couple years ago was taken off of plaquenil due to ocular toxicity  -  Patient had a renal biopsy November of 2020 concerning for lupus nephritis and lupus vasculopathy   -Patient failed treatment with mycophenolate with maximal dose tolerated 2500 mg per day  Received Cytoxan in December due to rising creatinine, changed to rituximab in January due to concern for pulmonary vasculitis  - patient re-presented to the hospital in December due to concern for alveolar hemorrhage on CT scan  Received pulse dose steroids and plasma exchange in December 2020   - C ANCA titer positive, MPO and PR3 were within normal limits in December   - immunofluorescence on biopsy in November 2020 with full house pattern rather than pauci-immune pattern  ECHO 11/2020 - EF 55, grade I dCHF; moderate hypokinesis of the basal-mid inferior and basal-mid inferolateral walls    Renal biopsy November 2020-diffuse  proliferative GN consistent with class 4, severe lupus vasculopathy, mild interstitial fibrosis and tubular atrophy     complement levels have normalized on most recent lab work in March 2020   Anti double stranded DNA remains positive at 40  ANDRE was negative in February  C ANCA was positive in 2020 but has remained negative since  Plan   1  For now, the patient is planning on moving to [place] in  of this year  Would normally recommend at least 3-6 months of dialysis time before pursuing active transplant listing in case there is a chance for renal recovery and this would place the patient after he moves to [place] with regards to timing  I did explain this to the patient and his wife who are present today  Will most likely keep is evaluation on hold for now and will review his case with our committee  2  The need to avoid blood transfusion to decrease allosensitization was explained to the patient  3  The advantages of a living donor over a  donor was explained to the patient and family  I strongly encouraged the patient and family to pursue a living donor  4  History of lupus nephritis - since 24 yo  Patient's younger sister with lupus  5 History of pulmonary hemorrhage   6  History of DVT - LLE  7  Cardiology clearance - abn ECHO in 2020  Stress test was not pursued due to that cath was not going to be pursued at that time due to CAROLINE  Dose have some wall motion abn with mild to mod regurg  8  Nephrolithiasis - required lithotripsy   9  Syncope - in   Etiology unclear  10  Colonoscopy records - from [facility] (was told did not need repeat c scope for 10 more years after prior)  11  B/L knee and hip replacements  12  Blurry vision - cataracts  Following closely with Optho  13  To note, patient is on eliquis    I have discussed with Patricia Mcnamara and family at length about the risk and benefits of kidney transplantation  I have strongly encouraged him to pursue living donation, and explained to him the benefits of living donation over  donor transplantation  We have briefly discussed the surgical procedure   We have discussed about the need for life long immunosuppression and the importance of compliance  We have discussed the side effects of immunosuppression including but not limited to infection, malignancies and developing/worsening diabetes control  Timm L Schoenfield verbalized understanding and is interested in pursuing transplant  During this visit, I spent 60 minutes with the patient; more than 50% of the time I counseled the patient regarding the risks and benefits of kidney transplantation, the immediate and long-term complications of kidney transplantation, and the advantage of receiving a living donor kidney transplant  It was a pleasure evaluating your patient in the office today  Thank you for allowing our team to participate in the care of Mr Palma Pizarro  Please do not hesitate to contact our team if further issues/questions shall arise in the interim  HISTORY OF PRESENT ILLNESS    Timm L Schoenfield is a 55 y o  male seen in the Nephrology Clinic for evaluation for kidney transplant  CKD requiring dialysis due to lupus nephritis/component of vasculitis  Renal disease is biopsy proven  Primary Nephrologist is Dr Ji Solorzano  HD unit - [address]    HD:   he is currently on dialysis since 1/2021    HD days are TTS  Chronic hypotension on HD: no  Multiple failed or recurrent thrombosis of AVG/AVF:  no     Native Urine Output: yes  Hx of voiding difficulty: no  Hx of hematuria: no  Hx of proteinuria: yes  Hx of nephrolithiasis: yes   Hx of recurrent urinary tract infection: no    HTN: onset 2020,  hx of malignant HTN: no, admission for HTN emergencies: no    DM type n/a    PAD/PVD: no, Claudication: no    Cardiac history - CAD: nl, MI: no    Father with HTN; grandmother with heart disease    Primary Cardiologist: Dr Donnell Cardoso    Exercise tolerance: some - with bike, walk    Hx of CVA: no    Hx of DVT/PE: LLE DVT    Viral Infection:    HIV: no  Hep B: no  Hep C: no    Cancer: History of cancer: mother (breast), father (prostate); Health maintenance and other pertinent history:    Male:    Colonoscopy: at [facility]  Prostate: BPH    Blood transfusion: yes, during admission in January; prior knee surgeries    Last admission jan 2021    Review Of Systems:     Constitutional: nl appetite  no fevers, chills, involuntary weight gain or weight loss  Eyes: blurry vision  ENT: no ear disease, epistaxis or oral ulcers  Respiratory: no SOB, cough, hemoptysis, JOHNSON  Cardiovascular: no CP, palpitations, claudication, edema  GI: no N/V/D/C, abdominal pain, melena, BRBPR  : see HPI  Endocrine: no thyroid disease  Heme: pulm hemorrhage december  MS: b/l knee and hip replacements  Skin: no  Neuro: no HA, seizures, numbness, tingling, focal weakness  Psych: no depression, anxiety    Lives with wife and daughter; 2 dogs, 2 cats    Employment history:  at [employer]    HD Access: [facility]  Abdominal Surgeries: no    Smoke: former (intermittent for 10 years, quit 20 years ago)  ETOH: former, quit 20 years ago  Drugs: no    Past Medical History:   Diagnosis Date    [facility] [address] [facility]     Hypertension     Lupus [facility] [address] [facility]     Osteoporosis     Rheumatoid arthritis ([facility] [address] [facility]      Past Surgical History:   Procedure Laterality Date    CT NEEDLE BIOPSY KIDNEY  11/3/2020    HERNIA REPAIR  199?     IR TUNNELED DIALYSIS CATHETER PLACEMENT  1/8/2021    REPLACEMENT TOTAL KNEE Bilateral     right 06/12 left 07/2016    REVISION TOTAL HIP ARTHROPLASTY Bilateral     right 05/2004 left 02/2006    TOTAL HIP ARTHROPLASTY Bilateral 8250/9425    Right 1988/ left 1989       Family History   Problem Relation Age of Onset    Varicose Veins Father      Social History     Socioeconomic History    Marital status: /Civil Union     Spouse name: None    Number of children: None    Years of education: None    Highest education level: None   Occupational History    None   Social Needs    Financial resource strain: None    Food insecurity Worry: None     Inability: None    Transportation needs     Medical: None     Non-medical: None   Tobacco Use    Smoking status: Former Smoker     Packs/day: 1 00     Years: 10 00     Pack years: 10 00     Types: Cigarettes     Quit date:      Years since quittin 2    Smokeless tobacco: Former User     Types: Chew    Tobacco comment: 25 yrs    Substance and Sexual Activity    Alcohol use: Never     Frequency: Never    Drug use: Never    Sexual activity: None   Lifestyle    Physical activity     Days per week: None     Minutes per session: None    Stress: None   Relationships    Social connections     Talks on phone: None     Gets together: None     Attends Alevism service: None     Active member of club or organization: None     Attends meetings of clubs or organizations: None     Relationship status: None    Intimate partner violence     Fear of current or ex partner: None     Emotionally abused: None     Physically abused: None     Forced sexual activity: None   Other Topics Concern    None   Social History Narrative    None         Living donors:  Sisters and brothers and friends    Current Outpatient Medications   Medication Sig Dispense Refill    acetaminophen (TYLENOL) 500 mg tablet Take 500 mg by mouth every 6 (six) hours as needed for mild pain      amLODIPine (NORVASC) 10 mg tablet Take 1 tablet (10 mg total) by mouth daily 90 tablet 1    apixaban (ELIQUIS) 5 mg Take 1 tablet (5 mg total) by mouth 2 (two) times a day 60 tablet 3    atorvastatin (LIPITOR) 40 mg tablet Take 1 tablet (40 mg total) by mouth daily 30 tablet 3    calcium-vitamin D (OSCAL) 250-125 MG-UNIT per tablet Take 1 tablet by mouth 2 (two) times a day  0    clindamycin (CLEOCIN) 300 MG capsule Take 600 mg by mouth 1 hour prior to Dental appointment      doxazosin (CARDURA) 4 mg tablet Take 1 tablet (4 mg total) by mouth daily at bedtime 30 tablet 1    Elastic Bandages & Supports (MEDICAL COMPRESSION STOCKINGS) MISC by Does not apply route daily Knee High 20-30mmHg 2 each 0    metoprolol succinate (TOPROL-XL) 100 mg 24 hr tablet Take 1 tablet (100 mg total) by mouth daily 90 tablet 1    Multiple Vitamins-Minerals (CENTRUM SILVER) tablet Take 1 tablet by mouth daily       nystatin (MYCOSTATIN) 500,000 units/5 mL suspension Swish and swallow 5 mL (500,000 Units total) 2 (two) times a day 300 mL 1    pantoprazole (PROTONIX) 40 mg tablet Take 1 tablet (40 mg total) by mouth daily 90 tablet 1    predniSONE 20 mg tablet Take 1 tablet (20 mg total) by mouth daily 60 tablet 1    sevelamer (RENAGEL) 800 mg tablet Take 1 tablet (800 mg total) by mouth 3 (three) times a day with meals 180 tablet 2    torsemide (DEMADEX) 20 mg tablet Take 3 tablets (60 mg total) by mouth daily 90 tablet 1     No current facility-administered medications for this visit  Patient has no known allergies      Physical Exam:    Ht 5' 9" (1 753 m)   Wt 72 6 kg (160 lb)   BMI 23 63 kg/m²     General : NAD    HEENT: anicteric sclera   Cardiac:  RRR, S1, S2 normal,  soft murmur    Lung:  CTAB   Abdomen:  soft, nontender, no ascites   HD access: TDC site clean   femoral pulses (2+ and soft, no bruit)  no edema   Neuro:no focal neuro deficits   Skin: tattoo no  Carotid bruit: no  Lymph: no LN- cervical, axillary, inguinal Home

## (undated) DEVICE — ENDOPATH PNEUMONEEDLE INSUFFLATION NEEDLES WITH LUER LOCK CONNECTORS 120MM: Brand: ENDOPATH

## (undated) DEVICE — SYRINGE 10ML LL

## (undated) DEVICE — GLOVE INDICATOR PI UNDERGLOVE SZ 8 BLUE

## (undated) DEVICE — SUT MONOCRYL 4-0 PS-2 18 IN Y496G

## (undated) DEVICE — SUT VICRYL PLUS 0 UR-6 27IN VCP603H

## (undated) DEVICE — THIS ADAPTER IS A DOUBLE SEALING FEMALE LUER LOCK ADAPTER WITH A 2-PIECE, COMBINATION COMPRESSION FIT/BARBED CATHETER CONNECTOR. THE ADAPTER IS USED TO CONNECT THE PD CATHETER TO A SOLUTION TRANSFER SET WITH LOCKING CONNECTOR.: Brand: LOCKING TITANIUM ADAPTER FOR PERITONEAL DIALYSIS CATHETER

## (undated) DEVICE — GLOVE SRG BIOGEL ORTHOPEDIC 7.5

## (undated) DEVICE — PACK PBDS LAP CHOLE RF

## (undated) DEVICE — 3000CC GUARDIAN II: Brand: GUARDIAN

## (undated) DEVICE — THIS DEVICE IS A DOUBLE SEALING MALE LUER LOCK INTENDED FOR USE WITH THE BAXTER LOCKING TITANIUM ADAPTER FOR PERITONEAL DIALYSIS.: Brand: LOCKING CAP FOR PERITONEAL DIALYSIS CATHETER ADAPTER

## (undated) DEVICE — GAUZE SPONGES,16 PLY: Brand: CURITY

## (undated) DEVICE — LAPAROSCOPIC TROCAR SLEEVE/SINGLE USE: Brand: KII® OPTICAL ACCESS SYSTEM

## (undated) DEVICE — TROCAR: Brand: KII® SLEEVE

## (undated) DEVICE — 3M™ TEGADERM™ TRANSPARENT FILM DRESSING FRAME STYLE, 1626W, 4 IN X 4-3/4 IN (10 CM X 12 CM), 50/CT 4CT/CASE: Brand: 3M™ TEGADERM™

## (undated) DEVICE — ADHESIVE SKIN HIGH VISCOSITY EXOFIN 1ML

## (undated) DEVICE — NEEDLE 25G X 1 1/2

## (undated) DEVICE — INTENDED FOR TISSUE SEPARATION, AND OTHER PROCEDURES THAT REQUIRE A SHARP SURGICAL BLADE TO PUNCTURE OR CUT.: Brand: BARD-PARKER SAFETY BLADES SIZE 11, STERILE

## (undated) DEVICE — CHLORAPREP HI-LITE 26ML ORANGE

## (undated) DEVICE — CYSTO TUBING SINGLE IRRIGATION